# Patient Record
Sex: MALE | Race: BLACK OR AFRICAN AMERICAN | NOT HISPANIC OR LATINO | Employment: UNEMPLOYED | URBAN - METROPOLITAN AREA
[De-identification: names, ages, dates, MRNs, and addresses within clinical notes are randomized per-mention and may not be internally consistent; named-entity substitution may affect disease eponyms.]

---
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
## 2024-01-04 ENCOUNTER — OFFICE VISIT (OUTPATIENT)
Dept: OBGYN CLINIC | Facility: MEDICAL CENTER | Age: 19
End: 2024-01-04
Payer: COMMERCIAL

## 2024-01-04 VITALS
BODY MASS INDEX: 46.68 KG/M2 | HEIGHT: 68 IN | SYSTOLIC BLOOD PRESSURE: 131 MMHG | DIASTOLIC BLOOD PRESSURE: 90 MMHG | WEIGHT: 308 LBS | HEART RATE: 88 BPM

## 2024-01-04 DIAGNOSIS — G89.29 CHRONIC PAIN OF RIGHT KNEE: Primary | ICD-10-CM

## 2024-01-04 DIAGNOSIS — M25.561 CHRONIC PAIN OF RIGHT KNEE: Primary | ICD-10-CM

## 2024-01-04 PROCEDURE — 99203 OFFICE O/P NEW LOW 30 MIN: CPT | Performed by: EMERGENCY MEDICINE

## 2024-01-04 NOTE — PROGRESS NOTES
Assessment/Plan:    Diagnoses and all orders for this visit:    Chronic pain of right knee  -     MRI knee right  wo contrast; Future    No improvement with PT  Reviewed Xrays  Obtain MRI Right knee    Reviewed ER notes and imaging    Return for Follow Up After Imaging Study.      Subjective:   Patient ID: Manuel Back is a 18 y.o. male.    NP presents with Person Directed staff for chronic right knee pain.  Patient has participated in formal PT over the summer and was discharged at that time.  He was recently evaluated in the emergency department as he did have a recent fall landing on a flexed knee x-rays were obtained at that time.  Staff states that he does not appear to be in significant pain he is able to do what he needs to do during the day        Review of Systems    The following portions of the patient's chart were reviewed and updated as appropriate:   Allergy:    Allergies   Allergen Reactions    Cholecalciferol Other (See Comments)    Pollen Extract Sneezing       Medications:    Current Outpatient Medications:     acetaminophen (TYLENOL) 650 mg CR tablet, Take 1 tablet (650 mg total) by mouth every 8 (eight) hours as needed for mild pain, moderate pain or headaches (Patient taking differently: Take 500 mg by mouth every 8 (eight) hours as needed for mild pain, moderate pain or headaches), Disp: 30 tablet, Rfl: 0    aluminum-magnesium hydroxide-simethicone (MAALOX MAX) 400-400-40 MG/5ML suspension, Take 10 mL by mouth every 6 (six) hours as needed for indigestion or heartburn, Disp: 355 mL, Rfl: 0    chlorproMAZINE (THORAZINE) 100 mg tablet, Take 100 mg by mouth 2 (two) times a day, Disp: , Rfl:     cholecalciferol (VITAMIN D3) 1,000 units tablet, Take 1 tablet (1,000 Units total) by mouth daily, Disp: 90 tablet, Rfl: 3    cloNIDine (CATAPRES) 0.2 mg tablet, Take 1 tablet (0.2 mg total) by mouth 3 (three) times a day, Disp: 90 tablet, Rfl: 0    cyanocobalamin (VITAMIN B-12) 500 MCG tablet,  Take 1 tablet (500 mcg total) by mouth daily, Disp: 30 tablet, Rfl: 11    desmopressin (DDAVP) 0.2 mg tablet, Take two tablets by mouth at bedtime, Disp: 60 tablet, Rfl: 2    diphenhydrAMINE (GNP Allergy Relief) 12.5 MG chewable tablet, Chew 1 tablet (12.5 mg total) in the morning, Disp: 30 tablet, Rfl: 11    divalproex sodium (DEPAKOTE ER) 250 mg 24 hr tablet, Take 5 tablets (1,250 mg total) by mouth daily at bedtime, Disp: 150 tablet, Rfl: 0    guaiFENesin (ROBITUSSIN) 100 MG/5ML oral liquid, Take 10 mL (200 mg total) by mouth 3 (three) times a day as needed for cough, Disp: 120 mL, Rfl: 0    ibuprofen (MOTRIN) 400 mg tablet, Take 400 mg by mouth every 6 (six) hours as needed for mild pain, Disp: , Rfl:     metoprolol succinate (TOPROL-XL) 50 mg 24 hr tablet, Take 1 tablet (50 mg total) by mouth daily, Disp: 90 tablet, Rfl: 1    OLANZapine (ZyPREXA) 10 mg tablet, Take 10 mg by mouth 6 (six) times a day, Disp: , Rfl:     paliperidone (INVEGA) 6 MG 24 hr tablet, Take 2 tablets (12 mg total) by mouth daily Do not start before July 12, 2023., Disp: 30 tablet, Rfl: 0    polyethylene glycol (GLYCOLAX) 17 GM/SCOOP, Take 17 g by mouth daily, Disp: 850 g, Rfl: 6    clonazePAM (KlonoPIN) 0.5 mg tablet, Take 1 tablet (0.5 mg total) by mouth daily for 10 days Do not start before July 12, 2023. (Patient not taking: Reported on 10/13/2023), Disp: 10 tablet, Rfl: 0    ergocalciferol (VITAMIN D2) 50,000 units, Take 1 capsule (50,000 Units total) by mouth once a week for 8 doses Do not start before July 7, 2023. (Patient not taking: Reported on 10/13/2023), Disp: 8 capsule, Rfl: 0    Incontinence Supply Disposable (Comfort Shield Adult Diapers) MISC, Use 1 packet 4 (four) times a day (Patient not taking: Reported on 1/12/2023), Disp: 48 each, Rfl: 11    Patient Active Problem List   Diagnosis    Obesity, unspecified    Moderate episode of recurrent major depressive disorder (HCC)    Disorganized schizophrenia (HCC)    Urinary  "incontinence, nocturnal enuresis       Objective:  /90   Pulse 88   Ht 5' 8\" (1.727 m)   Wt (!) 140 kg (308 lb)   BMI 46.83 kg/m²     Right Knee Exam     Tenderness   The patient is experiencing no tenderness.     Range of Motion   Extension:  normal     Tests   Varus: negative Valgus: negative    Other   Erythema: absent  Sensation: normal            Physical Exam      Neurologic Exam    Procedures    I have personally reviewed the written report of the pertinent studies.   RIGHT KNEE     INDICATION:   fall.     COMPARISON:  None     VIEWS:  XR KNEE 4+ VW RIGHT INJURY        FINDINGS:     There is no acute fracture or dislocation.     There is no joint effusion.     No significant degenerative changes.     No lytic or blastic osseous lesion.     Soft tissues are unremarkable.     IMPRESSION:     No acute osseous abnormality.               Past Medical History:   Diagnosis Date    Chronic headaches     Cognitive impairment     Psychiatric illness        Past Surgical History:   Procedure Laterality Date    WRIST SURGERY Right        Social History     Socioeconomic History    Marital status: Single     Spouse name: Not on file    Number of children: Not on file    Years of education: Not on file    Highest education level: Not on file   Occupational History    Not on file   Tobacco Use    Smoking status: Never     Passive exposure: Past    Smokeless tobacco: Never   Vaping Use    Vaping status: Some Days    Substances: Nicotine   Substance and Sexual Activity    Alcohol use: Never    Drug use: Not on file    Sexual activity: Not Currently   Other Topics Concern    Not on file   Social History Narrative    Not on file     Social Determinants of Health     Financial Resource Strain: Not on file   Food Insecurity: Not on file   Transportation Needs: Not on file   Physical Activity: Not on file   Stress: Not on file   Social Connections: Not on file   Intimate Partner Violence: Not on file   Housing Stability: " Not on file       Family History   Problem Relation Age of Onset    No Known Problems Mother     No Known Problems Father

## 2024-01-10 ENCOUNTER — TELEPHONE (OUTPATIENT)
Dept: FAMILY MEDICINE CLINIC | Facility: CLINIC | Age: 19
End: 2024-01-10

## 2024-01-10 DIAGNOSIS — E66.01 CLASS 3 SEVERE OBESITY WITHOUT SERIOUS COMORBIDITY WITH BODY MASS INDEX (BMI) OF 40.0 TO 44.9 IN ADULT, UNSPECIFIED OBESITY TYPE (HCC): Primary | ICD-10-CM

## 2024-01-10 NOTE — TELEPHONE ENCOUNTER
Jannie; the clinical coordinator from the group home called requesting a referral for nutritional services. She states pt is gaining a lot wt.

## 2024-01-17 ENCOUNTER — TELEPHONE (OUTPATIENT)
Dept: FAMILY MEDICINE CLINIC | Facility: CLINIC | Age: 19
End: 2024-01-17
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
## 2024-01-18 ENCOUNTER — HOSPITAL ENCOUNTER (EMERGENCY)
Facility: HOSPITAL | Age: 19
End: 2024-01-19
Attending: EMERGENCY MEDICINE
Payer: COMMERCIAL

## 2024-01-18 DIAGNOSIS — Z00.8 ENCOUNTER FOR PSYCHOLOGICAL EVALUATION: ICD-10-CM

## 2024-01-18 DIAGNOSIS — F29 PSYCHOSIS (HCC): ICD-10-CM

## 2024-01-18 DIAGNOSIS — Z00.8 MEDICAL CLEARANCE FOR PSYCHIATRIC ADMISSION: ICD-10-CM

## 2024-01-18 DIAGNOSIS — R46.89 AGGRESSION: Primary | ICD-10-CM

## 2024-01-18 LAB — ETHANOL EXG-MCNC: 0 MG/DL

## 2024-01-18 PROCEDURE — 36415 COLL VENOUS BLD VENIPUNCTURE: CPT | Performed by: EMERGENCY MEDICINE

## 2024-01-18 PROCEDURE — 82075 ASSAY OF BREATH ETHANOL: CPT | Performed by: EMERGENCY MEDICINE

## 2024-01-18 PROCEDURE — 99285 EMERGENCY DEPT VISIT HI MDM: CPT

## 2024-01-18 PROCEDURE — 99285 EMERGENCY DEPT VISIT HI MDM: CPT | Performed by: EMERGENCY MEDICINE

## 2024-01-18 PROCEDURE — 85025 COMPLETE CBC W/AUTO DIFF WBC: CPT | Performed by: EMERGENCY MEDICINE

## 2024-01-18 PROCEDURE — 80048 BASIC METABOLIC PNL TOTAL CA: CPT | Performed by: EMERGENCY MEDICINE

## 2024-01-18 PROCEDURE — 80164 ASSAY DIPROPYLACETIC ACD TOT: CPT | Performed by: EMERGENCY MEDICINE

## 2024-01-18 PROCEDURE — G0427 INPT/ED TELECONSULT70: HCPCS | Performed by: GENERAL PRACTICE

## 2024-01-18 RX ORDER — CHLORPROMAZINE HYDROCHLORIDE 25 MG/1
100 TABLET, FILM COATED ORAL 2 TIMES DAILY
Status: DISCONTINUED | OUTPATIENT
Start: 2024-01-18 | End: 2024-01-19 | Stop reason: HOSPADM

## 2024-01-18 RX ADMIN — CHLORPROMAZINE HYDROCHLORIDE 100 MG: 25 TABLET, FILM COATED ORAL at 23:39

## 2024-01-19 ENCOUNTER — HOSPITAL ENCOUNTER (INPATIENT)
Facility: HOSPITAL | Age: 19
LOS: 19 days | Discharge: HOME | DRG: 750 | End: 2024-02-07
Attending: PSYCHIATRY & NEUROLOGY | Admitting: STUDENT IN AN ORGANIZED HEALTH CARE EDUCATION/TRAINING PROGRAM
Payer: COMMERCIAL

## 2024-01-19 VITALS
HEART RATE: 91 BPM | SYSTOLIC BLOOD PRESSURE: 142 MMHG | OXYGEN SATURATION: 100 % | DIASTOLIC BLOOD PRESSURE: 92 MMHG | TEMPERATURE: 98.2 F | WEIGHT: 313.27 LBS | BODY MASS INDEX: 47.63 KG/M2 | RESPIRATION RATE: 20 BRPM

## 2024-01-19 DIAGNOSIS — K59.00 CONSTIPATION: ICD-10-CM

## 2024-01-19 DIAGNOSIS — Z00.8 MEDICAL CLEARANCE FOR PSYCHIATRIC ADMISSION: ICD-10-CM

## 2024-01-19 DIAGNOSIS — F25.9 SCHIZOAFFECTIVE DISORDER (HCC): Primary | ICD-10-CM

## 2024-01-19 DIAGNOSIS — E66.01 MORBID OBESITY WITH BODY MASS INDEX (BMI) OF 40.0 TO 49.9 (HCC): ICD-10-CM

## 2024-01-19 DIAGNOSIS — I10 ESSENTIAL HYPERTENSION: ICD-10-CM

## 2024-01-19 LAB
AMPHETAMINES SERPL QL SCN: NEGATIVE
ANION GAP SERPL CALCULATED.3IONS-SCNC: 8 MMOL/L
ATRIAL RATE: 85 BPM
ATRIAL RATE: 86 BPM
BARBITURATES UR QL: NEGATIVE
BASOPHILS # BLD AUTO: 0.05 THOUSANDS/ÂΜL (ref 0–0.1)
BASOPHILS NFR BLD AUTO: 1 % (ref 0–1)
BENZODIAZ UR QL: NEGATIVE
BUN SERPL-MCNC: 8 MG/DL (ref 5–25)
CALCIUM SERPL-MCNC: 9.1 MG/DL (ref 8.4–10.2)
CHLORIDE SERPL-SCNC: 104 MMOL/L (ref 96–108)
CO2 SERPL-SCNC: 26 MMOL/L (ref 21–32)
COCAINE UR QL: NEGATIVE
CREAT SERPL-MCNC: 0.98 MG/DL (ref 0.6–1.3)
EOSINOPHIL # BLD AUTO: 0.07 THOUSAND/ÂΜL (ref 0–0.61)
EOSINOPHIL NFR BLD AUTO: 1 % (ref 0–6)
ERYTHROCYTE [DISTWIDTH] IN BLOOD BY AUTOMATED COUNT: 13.5 % (ref 11.6–15.1)
GFR SERPL CREATININE-BSD FRML MDRD: 111 ML/MIN/1.73SQ M
GLUCOSE SERPL-MCNC: 84 MG/DL (ref 65–140)
HCT VFR BLD AUTO: 41.9 % (ref 36.5–49.3)
HGB BLD-MCNC: 13.9 G/DL (ref 12–17)
IMM GRANULOCYTES # BLD AUTO: 0.04 THOUSAND/UL (ref 0–0.2)
IMM GRANULOCYTES NFR BLD AUTO: 0 % (ref 0–2)
LYMPHOCYTES # BLD AUTO: 3.13 THOUSANDS/ÂΜL (ref 0.6–4.47)
LYMPHOCYTES NFR BLD AUTO: 29 % (ref 14–44)
MCH RBC QN AUTO: 28.1 PG (ref 26.8–34.3)
MCHC RBC AUTO-ENTMCNC: 33.2 G/DL (ref 31.4–37.4)
MCV RBC AUTO: 85 FL (ref 82–98)
METHADONE UR QL: NEGATIVE
MONOCYTES # BLD AUTO: 1.18 THOUSAND/ÂΜL (ref 0.17–1.22)
MONOCYTES NFR BLD AUTO: 11 % (ref 4–12)
NEUTROPHILS # BLD AUTO: 6.38 THOUSANDS/ÂΜL (ref 1.85–7.62)
NEUTS SEG NFR BLD AUTO: 58 % (ref 43–75)
NRBC BLD AUTO-RTO: 0 /100 WBCS
OPIATES UR QL SCN: NEGATIVE
OXYCODONE+OXYMORPHONE UR QL SCN: NEGATIVE
P AXIS: 67 DEGREES
P AXIS: 69 DEGREES
PCP UR QL: NEGATIVE
PLATELET # BLD AUTO: 245 THOUSANDS/UL (ref 149–390)
PMV BLD AUTO: 10.6 FL (ref 8.9–12.7)
POTASSIUM SERPL-SCNC: 4.1 MMOL/L (ref 3.5–5.3)
PR INTERVAL: 152 MS
PR INTERVAL: 154 MS
QRS AXIS: 54 DEGREES
QRS AXIS: 58 DEGREES
QRSD INTERVAL: 80 MS
QRSD INTERVAL: 82 MS
QT INTERVAL: 376 MS
QT INTERVAL: 384 MS
QTC INTERVAL: 447 MS
QTC INTERVAL: 459 MS
RBC # BLD AUTO: 4.95 MILLION/UL (ref 3.88–5.62)
SODIUM SERPL-SCNC: 138 MMOL/L (ref 135–147)
T WAVE AXIS: 64 DEGREES
T WAVE AXIS: 68 DEGREES
THC UR QL: NEGATIVE
VALPROATE SERPL-MCNC: 90 UG/ML (ref 50–100)
VENTRICULAR RATE: 85 BPM
VENTRICULAR RATE: 86 BPM
WBC # BLD AUTO: 10.85 THOUSAND/UL (ref 4.31–10.16)

## 2024-01-19 PROCEDURE — 93005 ELECTROCARDIOGRAM TRACING: CPT

## 2024-01-19 PROCEDURE — 80307 DRUG TEST PRSMV CHEM ANLYZR: CPT | Performed by: EMERGENCY MEDICINE

## 2024-01-19 RX ORDER — SENNOSIDES 8.6 MG
650 CAPSULE ORAL EVERY 8 HOURS PRN
Status: DISCONTINUED | OUTPATIENT
Start: 2024-01-19 | End: 2024-01-19

## 2024-01-19 RX ORDER — HYDROXYZINE 50 MG/1
100 TABLET, FILM COATED ORAL
Status: DISCONTINUED | OUTPATIENT
Start: 2024-01-19 | End: 2024-02-07 | Stop reason: HOSPADM

## 2024-01-19 RX ORDER — IBUPROFEN 400 MG/1
400 TABLET ORAL EVERY 4 HOURS PRN
Status: DISCONTINUED | OUTPATIENT
Start: 2024-01-19 | End: 2024-02-07 | Stop reason: HOSPADM

## 2024-01-19 RX ORDER — BENZTROPINE MESYLATE 1 MG/1
1 TABLET ORAL
Status: DISCONTINUED | OUTPATIENT
Start: 2024-01-19 | End: 2024-02-07 | Stop reason: HOSPADM

## 2024-01-19 RX ORDER — AMOXICILLIN 250 MG
1 CAPSULE ORAL DAILY PRN
Status: DISCONTINUED | OUTPATIENT
Start: 2024-01-19 | End: 2024-02-05

## 2024-01-19 RX ORDER — OLANZAPINE 5 MG/1
2.5 TABLET ORAL
Status: CANCELLED | OUTPATIENT
Start: 2024-01-19

## 2024-01-19 RX ORDER — IBUPROFEN 400 MG/1
400 TABLET ORAL EVERY 4 HOURS PRN
Status: CANCELLED | OUTPATIENT
Start: 2024-01-19

## 2024-01-19 RX ORDER — CHLORPROMAZINE HYDROCHLORIDE 25 MG/1
100 TABLET, FILM COATED ORAL 2 TIMES DAILY
Status: DISCONTINUED | OUTPATIENT
Start: 2024-01-19 | End: 2024-01-19

## 2024-01-19 RX ORDER — OLANZAPINE 5 MG/1
10 TABLET ORAL
Status: CANCELLED | OUTPATIENT
Start: 2024-01-19

## 2024-01-19 RX ORDER — CLONIDINE HYDROCHLORIDE 0.1 MG/1
0.2 TABLET ORAL 3 TIMES DAILY
Status: DISCONTINUED | OUTPATIENT
Start: 2024-01-19 | End: 2024-02-07 | Stop reason: HOSPADM

## 2024-01-19 RX ORDER — LANOLIN ALCOHOL/MO/W.PET/CERES
3 CREAM (GRAM) TOPICAL
Status: CANCELLED | OUTPATIENT
Start: 2024-01-19

## 2024-01-19 RX ORDER — CHLORPROMAZINE HYDROCHLORIDE 25 MG/1
100 TABLET, FILM COATED ORAL 2 TIMES DAILY
OUTPATIENT
Start: 2024-01-19

## 2024-01-19 RX ORDER — DESMOPRESSIN ACETATE 0.1 MG/1
0.2 TABLET ORAL
Status: DISCONTINUED | OUTPATIENT
Start: 2024-01-19 | End: 2024-01-20

## 2024-01-19 RX ORDER — LORAZEPAM 2 MG/ML
2 INJECTION INTRAMUSCULAR EVERY 6 HOURS PRN
Status: CANCELLED | OUTPATIENT
Start: 2024-01-19

## 2024-01-19 RX ORDER — OLANZAPINE 10 MG/2ML
10 INJECTION, POWDER, FOR SOLUTION INTRAMUSCULAR
Status: CANCELLED | OUTPATIENT
Start: 2024-01-19

## 2024-01-19 RX ORDER — IBUPROFEN 400 MG/1
800 TABLET ORAL EVERY 8 HOURS PRN
Status: CANCELLED | OUTPATIENT
Start: 2024-01-19

## 2024-01-19 RX ORDER — OLANZAPINE 10 MG/2ML
5 INJECTION, POWDER, FOR SOLUTION INTRAMUSCULAR
Status: CANCELLED | OUTPATIENT
Start: 2024-01-19

## 2024-01-19 RX ORDER — CHLORPROMAZINE HYDROCHLORIDE 25 MG/ML
50 INJECTION INTRAMUSCULAR EVERY 8 HOURS PRN
Status: CANCELLED | OUTPATIENT
Start: 2024-01-19

## 2024-01-19 RX ORDER — BENZTROPINE MESYLATE 1 MG/ML
1 INJECTION INTRAMUSCULAR; INTRAVENOUS
Status: CANCELLED | OUTPATIENT
Start: 2024-01-19

## 2024-01-19 RX ORDER — OLANZAPINE 10 MG/2ML
10 INJECTION, POWDER, FOR SOLUTION INTRAMUSCULAR
Status: DISCONTINUED | OUTPATIENT
Start: 2024-01-19 | End: 2024-02-07 | Stop reason: HOSPADM

## 2024-01-19 RX ORDER — OLANZAPINE 5 MG/1
5 TABLET ORAL
Status: DISCONTINUED | OUTPATIENT
Start: 2024-01-19 | End: 2024-02-07 | Stop reason: HOSPADM

## 2024-01-19 RX ORDER — CLONIDINE HYDROCHLORIDE 0.2 MG/1
0.2 TABLET ORAL 3 TIMES DAILY
Status: DISCONTINUED | OUTPATIENT
Start: 2024-01-19 | End: 2024-01-19 | Stop reason: HOSPADM

## 2024-01-19 RX ORDER — MAGNESIUM HYDROXIDE/ALUMINUM HYDROXICE/SIMETHICONE 120; 1200; 1200 MG/30ML; MG/30ML; MG/30ML
5 SUSPENSION ORAL EVERY 6 HOURS PRN
Status: DISCONTINUED | OUTPATIENT
Start: 2024-01-19 | End: 2024-01-19 | Stop reason: HOSPADM

## 2024-01-19 RX ORDER — DESMOPRESSIN ACETATE 0.1 MG/1
0.2 TABLET ORAL
Status: DISCONTINUED | OUTPATIENT
Start: 2024-01-19 | End: 2024-01-19 | Stop reason: HOSPADM

## 2024-01-19 RX ORDER — OLANZAPINE 10 MG/1
10 TABLET ORAL
Status: DISCONTINUED | OUTPATIENT
Start: 2024-01-19 | End: 2024-02-07 | Stop reason: HOSPADM

## 2024-01-19 RX ORDER — IBUPROFEN 600 MG/1
600 TABLET ORAL EVERY 6 HOURS PRN
Status: DISCONTINUED | OUTPATIENT
Start: 2024-01-19 | End: 2024-02-07 | Stop reason: HOSPADM

## 2024-01-19 RX ORDER — OLANZAPINE 5 MG/1
10 TABLET ORAL
Status: DISCONTINUED | OUTPATIENT
Start: 2024-01-19 | End: 2024-01-19

## 2024-01-19 RX ORDER — IBUPROFEN 400 MG/1
800 TABLET ORAL EVERY 8 HOURS PRN
Status: DISCONTINUED | OUTPATIENT
Start: 2024-01-19 | End: 2024-02-07 | Stop reason: HOSPADM

## 2024-01-19 RX ORDER — DIPHENHYDRAMINE HYDROCHLORIDE 50 MG/ML
50 INJECTION INTRAMUSCULAR; INTRAVENOUS EVERY 8 HOURS PRN
Status: CANCELLED | OUTPATIENT
Start: 2024-01-19

## 2024-01-19 RX ORDER — CLONIDINE HYDROCHLORIDE 0.2 MG/1
0.2 TABLET ORAL 3 TIMES DAILY
Status: CANCELLED | OUTPATIENT
Start: 2024-01-19

## 2024-01-19 RX ORDER — BENZTROPINE MESYLATE 1 MG/1
1 TABLET ORAL
Status: CANCELLED | OUTPATIENT
Start: 2024-01-19

## 2024-01-19 RX ORDER — AMOXICILLIN 250 MG
1 CAPSULE ORAL DAILY PRN
Status: CANCELLED | OUTPATIENT
Start: 2024-01-19

## 2024-01-19 RX ORDER — OLANZAPINE 2.5 MG/1
2.5 TABLET, FILM COATED ORAL
Status: DISCONTINUED | OUTPATIENT
Start: 2024-01-19 | End: 2024-02-07 | Stop reason: HOSPADM

## 2024-01-19 RX ORDER — MAGNESIUM HYDROXIDE/ALUMINUM HYDROXICE/SIMETHICONE 120; 1200; 1200 MG/30ML; MG/30ML; MG/30ML
30 SUSPENSION ORAL EVERY 4 HOURS PRN
Status: DISCONTINUED | OUTPATIENT
Start: 2024-01-19 | End: 2024-02-07 | Stop reason: HOSPADM

## 2024-01-19 RX ORDER — PROPRANOLOL HYDROCHLORIDE 10 MG/1
10 TABLET ORAL EVERY 8 HOURS PRN
Status: DISCONTINUED | OUTPATIENT
Start: 2024-01-19 | End: 2024-02-07 | Stop reason: HOSPADM

## 2024-01-19 RX ORDER — POLYETHYLENE GLYCOL 3350 17 G/17G
17 POWDER, FOR SOLUTION ORAL DAILY PRN
Status: CANCELLED | OUTPATIENT
Start: 2024-01-19

## 2024-01-19 RX ORDER — DESMOPRESSIN ACETATE 0.1 MG/1
0.2 TABLET ORAL
Status: CANCELLED | OUTPATIENT
Start: 2024-01-19

## 2024-01-19 RX ORDER — POLYETHYLENE GLYCOL 3350 17 G/17G
17 POWDER, FOR SOLUTION ORAL DAILY
Status: DISCONTINUED | OUTPATIENT
Start: 2024-01-20 | End: 2024-02-07 | Stop reason: HOSPADM

## 2024-01-19 RX ORDER — DIPHENHYDRAMINE HYDROCHLORIDE 50 MG/ML
50 INJECTION INTRAMUSCULAR; INTRAVENOUS EVERY 8 HOURS PRN
Status: DISCONTINUED | OUTPATIENT
Start: 2024-01-19 | End: 2024-02-07 | Stop reason: HOSPADM

## 2024-01-19 RX ORDER — HYDROXYZINE HYDROCHLORIDE 25 MG/1
100 TABLET, FILM COATED ORAL
Status: CANCELLED | OUTPATIENT
Start: 2024-01-19

## 2024-01-19 RX ORDER — POLYETHYLENE GLYCOL 3350 17 G/17G
17 POWDER, FOR SOLUTION ORAL DAILY PRN
Status: DISCONTINUED | OUTPATIENT
Start: 2024-01-19 | End: 2024-02-07 | Stop reason: HOSPADM

## 2024-01-19 RX ORDER — CHLORPROMAZINE HYDROCHLORIDE 25 MG/1
100 TABLET, FILM COATED ORAL 2 TIMES DAILY
Status: CANCELLED | OUTPATIENT
Start: 2024-01-19

## 2024-01-19 RX ORDER — OLANZAPINE 5 MG/1
5 TABLET ORAL
Status: CANCELLED | OUTPATIENT
Start: 2024-01-19

## 2024-01-19 RX ORDER — OLANZAPINE 5 MG/1
10 TABLET ORAL 2 TIMES DAILY
Status: CANCELLED | OUTPATIENT
Start: 2024-01-19

## 2024-01-19 RX ORDER — HYDROXYZINE HYDROCHLORIDE 25 MG/1
50 TABLET, FILM COATED ORAL
Status: CANCELLED | OUTPATIENT
Start: 2024-01-19

## 2024-01-19 RX ORDER — LORAZEPAM 2 MG/ML
2 INJECTION INTRAMUSCULAR EVERY 6 HOURS PRN
Status: DISCONTINUED | OUTPATIENT
Start: 2024-01-19 | End: 2024-02-07 | Stop reason: HOSPADM

## 2024-01-19 RX ORDER — GUAIFENESIN 100 MG/5ML
200 SOLUTION ORAL 3 TIMES DAILY PRN
Status: DISCONTINUED | OUTPATIENT
Start: 2024-01-19 | End: 2024-01-19 | Stop reason: HOSPADM

## 2024-01-19 RX ORDER — MAGNESIUM HYDROXIDE/ALUMINUM HYDROXICE/SIMETHICONE 120; 1200; 1200 MG/30ML; MG/30ML; MG/30ML
30 SUSPENSION ORAL EVERY 4 HOURS PRN
Status: CANCELLED | OUTPATIENT
Start: 2024-01-19

## 2024-01-19 RX ORDER — PROPRANOLOL HYDROCHLORIDE 20 MG/1
10 TABLET ORAL EVERY 8 HOURS PRN
Status: CANCELLED | OUTPATIENT
Start: 2024-01-19

## 2024-01-19 RX ORDER — HYDROXYZINE 50 MG/1
50 TABLET, FILM COATED ORAL
Status: DISCONTINUED | OUTPATIENT
Start: 2024-01-19 | End: 2024-02-07 | Stop reason: HOSPADM

## 2024-01-19 RX ORDER — HYDROXYZINE HYDROCHLORIDE 25 MG/1
25 TABLET, FILM COATED ORAL
Status: DISCONTINUED | OUTPATIENT
Start: 2024-01-19 | End: 2024-02-07 | Stop reason: HOSPADM

## 2024-01-19 RX ORDER — IBUPROFEN 400 MG/1
400 TABLET ORAL EVERY 6 HOURS PRN
Status: DISCONTINUED | OUTPATIENT
Start: 2024-01-19 | End: 2024-01-19 | Stop reason: HOSPADM

## 2024-01-19 RX ORDER — BENZTROPINE MESYLATE 1 MG/ML
1 INJECTION INTRAMUSCULAR; INTRAVENOUS
Status: DISCONTINUED | OUTPATIENT
Start: 2024-01-19 | End: 2024-02-07 | Stop reason: HOSPADM

## 2024-01-19 RX ORDER — MELATONIN
1000 DAILY
Status: DISCONTINUED | OUTPATIENT
Start: 2024-01-19 | End: 2024-01-19 | Stop reason: HOSPADM

## 2024-01-19 RX ORDER — DIPHENHYDRAMINE HYDROCHLORIDE 50 MG/ML
50 INJECTION INTRAMUSCULAR; INTRAVENOUS EVERY 6 HOURS PRN
Status: DISCONTINUED | OUTPATIENT
Start: 2024-01-19 | End: 2024-02-07 | Stop reason: HOSPADM

## 2024-01-19 RX ORDER — POLYETHYLENE GLYCOL 3350 17 G/17G
17 POWDER, FOR SOLUTION ORAL DAILY
Status: DISCONTINUED | OUTPATIENT
Start: 2024-01-19 | End: 2024-01-19 | Stop reason: HOSPADM

## 2024-01-19 RX ORDER — HYDROXYZINE HYDROCHLORIDE 25 MG/1
25 TABLET, FILM COATED ORAL
Status: CANCELLED | OUTPATIENT
Start: 2024-01-19

## 2024-01-19 RX ORDER — METOPROLOL SUCCINATE 50 MG/1
50 TABLET, EXTENDED RELEASE ORAL DAILY
Status: DISCONTINUED | OUTPATIENT
Start: 2024-01-19 | End: 2024-01-19 | Stop reason: HOSPADM

## 2024-01-19 RX ORDER — IBUPROFEN 600 MG/1
600 TABLET ORAL EVERY 6 HOURS PRN
Status: CANCELLED | OUTPATIENT
Start: 2024-01-19

## 2024-01-19 RX ORDER — CHLORPROMAZINE HYDROCHLORIDE 25 MG/ML
50 INJECTION INTRAMUSCULAR EVERY 8 HOURS PRN
Status: DISCONTINUED | OUTPATIENT
Start: 2024-01-19 | End: 2024-02-07 | Stop reason: HOSPADM

## 2024-01-19 RX ORDER — DIPHENHYDRAMINE HYDROCHLORIDE 50 MG/ML
50 INJECTION INTRAMUSCULAR; INTRAVENOUS EVERY 6 HOURS PRN
Status: CANCELLED | OUTPATIENT
Start: 2024-01-19

## 2024-01-19 RX ORDER — METOPROLOL SUCCINATE 50 MG/1
50 TABLET, EXTENDED RELEASE ORAL DAILY
Status: CANCELLED | OUTPATIENT
Start: 2024-01-20

## 2024-01-19 RX ORDER — LANOLIN ALCOHOL/MO/W.PET/CERES
3 CREAM (GRAM) TOPICAL
Status: DISCONTINUED | OUTPATIENT
Start: 2024-01-19 | End: 2024-02-07 | Stop reason: HOSPADM

## 2024-01-19 RX ORDER — OLANZAPINE 10 MG/2ML
5 INJECTION, POWDER, FOR SOLUTION INTRAMUSCULAR
Status: DISCONTINUED | OUTPATIENT
Start: 2024-01-19 | End: 2024-02-07 | Stop reason: HOSPADM

## 2024-01-19 RX ORDER — METOPROLOL SUCCINATE 50 MG/1
50 TABLET, EXTENDED RELEASE ORAL DAILY
Status: DISCONTINUED | OUTPATIENT
Start: 2024-01-20 | End: 2024-02-07 | Stop reason: HOSPADM

## 2024-01-19 RX ORDER — POLYETHYLENE GLYCOL 3350 17 G/17G
17 POWDER, FOR SOLUTION ORAL DAILY
Status: CANCELLED | OUTPATIENT
Start: 2024-01-20

## 2024-01-19 RX ADMIN — CYANOCOBALAMIN TAB 500 MCG 500 MCG: 500 TAB at 08:31

## 2024-01-19 RX ADMIN — CLONIDINE HYDROCHLORIDE 0.2 MG: 0.1 TABLET ORAL at 21:18

## 2024-01-19 RX ADMIN — CHLORPROMAZINE HYDROCHLORIDE 100 MG: 25 TABLET, FILM COATED ORAL at 17:33

## 2024-01-19 RX ADMIN — MELATONIN TAB 3 MG 3 MG: 3 TAB at 21:18

## 2024-01-19 RX ADMIN — CHLORPROMAZINE HYDROCHLORIDE 100 MG: 25 TABLET, FILM COATED ORAL at 08:30

## 2024-01-19 RX ADMIN — OLANZAPINE 10 MG: 5 TABLET, FILM COATED ORAL at 13:23

## 2024-01-19 RX ADMIN — DIVALPROEX SODIUM 1250 MG: 500 TABLET, EXTENDED RELEASE ORAL at 21:18

## 2024-01-19 RX ADMIN — OLANZAPINE 10 MG: 5 TABLET, FILM COATED ORAL at 10:45

## 2024-01-19 RX ADMIN — OLANZAPINE 10 MG: 5 TABLET, FILM COATED ORAL at 06:40

## 2024-01-19 RX ADMIN — DIPHENHYDRAMINE HYDROCHLORIDE 12.5 MG: 25 SOLUTION ORAL at 08:33

## 2024-01-19 RX ADMIN — CLONIDINE HYDROCHLORIDE 0.2 MG: 0.2 TABLET ORAL at 08:30

## 2024-01-19 RX ADMIN — DESMOPRESSIN ACETATE 0.2 MG: 0.1 TABLET ORAL at 21:35

## 2024-01-19 RX ADMIN — DESMOPRESSIN ACETATE 0.2 MG: 0.1 TABLET ORAL at 01:49

## 2024-01-19 RX ADMIN — METOPROLOL SUCCINATE 50 MG: 50 TABLET, EXTENDED RELEASE ORAL at 08:31

## 2024-01-19 RX ADMIN — CLONIDINE HYDROCHLORIDE 0.2 MG: 0.2 TABLET ORAL at 17:20

## 2024-01-19 RX ADMIN — DIVALPROEX SODIUM 1250 MG: 500 TABLET, FILM COATED, EXTENDED RELEASE ORAL at 01:49

## 2024-01-19 RX ADMIN — Medication 1000 UNITS: at 08:31

## 2024-01-19 RX ADMIN — POLYETHYLENE GLYCOL 3350 17 G: 17 POWDER, FOR SOLUTION ORAL at 08:31

## 2024-01-19 NOTE — ED NOTES
Patient ambulated to bathroom with an even, steady gait.      Jannie [facility] [place]  01/18/24 0852
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
## 2024-01-19 NOTE — ED NOTES
Provider at bedside.     [place] [facility] [place]  01/18/24 1942

## 2024-01-19 NOTE — ED NOTES
Insurance Authorization for admission:   Phone call placed to [employer] [place]  Phone number: [phone]   Spoke to [place]     4 days approved.  Level of care: acute inpatient psychiatric  Review on 4th day of admission  Authorization # provided to accepting facility upon admission notification      Eligibility Verification System checked - ([phone]).  Online system / automated system indicates: active MA, Shobha Sheldon    Insurance Authorization for Transportation:  TBD  Phone call placed to **.   Phone number **.   Spoke to **.   Authorization #: **

## 2024-01-19 NOTE — ED NOTES
Patient was given  scrubs to change into at this time.      Jannie [facility] [place]  01/18/24 2116

## 2024-01-19 NOTE — ED NOTES
Patient given specimen cup for urine sample at this time. Pt unable to go currently, but group home staff will remind patient of needed specimen.      Jannie [facility] [place]  01/18/24 5828

## 2024-01-19 NOTE — ED NOTES
Saleem was called at this time and informed on patients need for consultation.      Jannie [facility] [place]  01/18/24 9028
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
## 2024-01-19 NOTE — ED PROCEDURE NOTE
PROCEDURE  ECG 12 Lead Documentation Only    Date/Time: 1/19/2024 1:36 PM    Performed by: Raul Kim MD  Authorized by: Raul Kim MD    ECG reviewed by me, the ED Provider: yes    Patient location:  ED  Rate:     ECG rate:  85    ECG rate assessment: normal    Rhythm:     Rhythm: sinus rhythm    Ectopy:     Ectopy: none    QRS:     QRS axis:  Normal    QRS intervals:  Normal  Conduction:     Conduction: normal    ST segments:     ST segments:  Normal  T waves:     T waves: normal         Raul Kim MD  01/19/24 1332
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
## 2024-01-19 NOTE — ED NOTES
Patient signed 201- Rights explained, bed search explained- Faxed to  Intake- Original on chart.    Patient rights explained thoroughly to patient. Patient verbalizes understanding.    Helio James  Crisis Intervention Specialist II  01/18/24

## 2024-01-19 NOTE — ED NOTES
Calls placed to Jose Dos Santos- Guardian,  CYS ([phone])- No answer at either number, left voicemails and asked for call back.    eHlio James  Crisis Intervention Specialist II  01/18/24
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
## 2024-01-19 NOTE — ED NOTES
Patient was given 2 sandwiches, pretzels, and a drink at this time.      Jannie Navarro  01/19/24 0010

## 2024-01-19 NOTE — ED NOTES
Patient on [facility] call at this time.      Jannie [facility]  01/18/24 5300

## 2024-01-19 NOTE — EMTALA/ACUTE CARE TRANSFER
[facility] EMERGENCY DEPARTMENT  [address] [address]  Dept: [phone]      EMTALA TRANSFER CONSENT    NAME Manuel Back                                         2005                              MRN [id number]    I have been informed of my rights regarding examination, treatment, and transfer   by Dr. Raul Kim MD    Benefits: Specialized equipment and/or services available at the receiving facility (Include comment)________________________ (pyschiatry)    Risks: Potential for delay in receiving treatment (psychiatry)      Consent for Transfer:  I acknowledge that my medical condition has been evaluated and explained to me by the emergency department physician or other qualified medical person and/or my attending physician, who has recommended that I be transferred to the service of  Accepting Physician: Dr Grewal at Accepting Facility Name, City & State : [address]. The above potential benefits of such transfer, the potential risks associated with such transfer, and the probable risks of not being transferred have been explained to me, and I fully understand them.  The doctor has explained that, in my case, the benefits of transfer outweigh the risks.  I agree to be transferred.    I authorize the performance of emergency medical procedures and treatments upon me in both transit and upon arrival at the receiving facility.  Additionally, I authorize the release of any and all medical records to the receiving facility and request they be transported with me, if possible.  I understand that the safest mode of transportation during a medical emergency is an ambulance and that the Hospital advocates the use of this mode of transport. Risks of traveling to the receiving facility by car, including absence of medical control, life sustaining equipment, such as oxygen, and medical personnel has been explained to me and I fully understand  them.    (DAO CORRECT BOX BELOW)  [  ]  I consent to the stated transfer and to be transported by ambulance/helicopter.  [  ]  I consent to the stated transfer, but refuse transportation by ambulance and accept full responsibility for my transportation by car.  I understand the risks of non-ambulance transfers and I exonerate the Hospital and its staff from any deterioration in my condition that results from this refusal.    X___________________________________________    DATE  24  TIME________  Signature of patient or legally responsible individual signing on patient behalf           RELATIONSHIP TO PATIENT_________________________          Provider Certification    NAME Manuel Back                                         2005                              MRN [id number]    A medical screening exam was performed on the above named patient.  Based on the examination:    Condition Necessitating Transfer The primary encounter diagnosis was Aggression. Diagnoses of Encounter for psychological evaluation, Psychosis (HCC), and Medical clearance for psychiatric admission were also pertinent to this visit.    Patient Condition: The patient has been stabilized such that within reasonable medical probability, no material deterioration of the patient condition or the condition of the unborn child(patrice) is likely to result from the transfer    Reason for Transfer: Level of Care needed not available at this facility    Transfer Requirements: Facility [address]   Space available and qualified personnel available for treatment as acknowledged by micki  Agreed to accept transfer and to provide appropriate medical treatment as acknowledged by       Dr Grewal  Appropriate medical records of the examination and treatment of the patient are provided at the time of transfer   STAFF INITIAL WHEN COMPLETED _______  Transfer will be performed by qualified personnel from [facility]  and appropriate transfer  equipment as required, including the use of necessary and appropriate life support measures.    Provider Certification: I have examined the patient and explained the following risks and benefits of being transferred/refusing transfer to the patient/family:  General risk, such as traffic hazards, adverse weather conditions, rough terrain or turbulence, possible failure of equipment (including vehicle or aircraft), or consequences of actions of persons outside the control of the transport personnel, Unanticipated needs of medical equipment and personnel during transport, The possibility of a transport vehicle being unavailable, Risk of worsening condition, The patient is stable for psychiatric transfer because they are medically stable, and is protected from harming him/herself or others during transport      Based on these reasonable risks and benefits to the patient and/or the unborn child(patrice), and based upon the information available at the time of the patient’s examination, I certify that the medical benefits reasonably to be expected from the provision of appropriate medical treatments at another medical facility outweigh the increasing risks, if any, to the individual’s medical condition, and in the case of labor to the unborn child, from effecting the transfer.    X____________________________________________ DATE 01/19/24        TIME_______      ORIGINAL - SEND TO MEDICAL RECORDS   COPY - SEND WITH PATIENT DURING TRANSFER

## 2024-01-19 NOTE — ED NOTES
Patient is accepted at [facility]  Patient is accepted by Dr.Siddiqui kavita Jeronimo    Transportation is arranged with [facility]  Transportation is scheduled for TBD    Nurse report is to be called to  [phone]  prior to patient transfer.
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
## 2024-01-19 NOTE — ED NOTES
Call placed to [facility] ([phone])- Left voicemail and requested call back.    Helio James  Crisis Intervention Specialist II  01/18/24

## 2024-01-19 NOTE — ED NOTES
0400 vitals were deferred at this time as patient is sleeping. Pt does not display any signs of distress. Patient with non-labored respirations.      Jannie [facility]  01/19/24 0404

## 2024-01-20 PROBLEM — G89.29 CHRONIC PAIN OF RIGHT KNEE: Status: ACTIVE | Noted: 2024-01-20

## 2024-01-20 PROBLEM — E55.9 VITAMIN D DEFICIENCY: Status: ACTIVE | Noted: 2024-01-20

## 2024-01-20 PROBLEM — Z00.8 MEDICAL CLEARANCE FOR PSYCHIATRIC ADMISSION: Status: ACTIVE | Noted: 2024-01-20

## 2024-01-20 PROBLEM — M25.561 CHRONIC PAIN OF RIGHT KNEE: Status: ACTIVE | Noted: 2024-01-20

## 2024-01-20 PROBLEM — E66.01 MORBID OBESITY WITH BODY MASS INDEX (BMI) OF 40.0 TO 49.9 (HCC): Status: ACTIVE | Noted: 2024-01-20

## 2024-01-20 PROBLEM — I10 ESSENTIAL HYPERTENSION: Status: ACTIVE | Noted: 2024-01-20

## 2024-01-20 LAB
25(OH)D3 SERPL-MCNC: 21.7 NG/ML (ref 30–100)
ALBUMIN SERPL BCP-MCNC: 3.8 G/DL (ref 3.5–5)
ALP SERPL-CCNC: 99 U/L (ref 34–104)
ALT SERPL W P-5'-P-CCNC: 35 U/L (ref 7–52)
ANION GAP SERPL CALCULATED.3IONS-SCNC: 8 MMOL/L
AST SERPL W P-5'-P-CCNC: 35 U/L (ref 13–39)
ATRIAL RATE: 86 BPM
ATRIAL RATE: 88 BPM
BACTERIA UR QL AUTO: ABNORMAL /HPF
BASOPHILS # BLD AUTO: 0.03 THOUSANDS/ÂΜL (ref 0–0.1)
BASOPHILS NFR BLD AUTO: 0 % (ref 0–1)
BILIRUB SERPL-MCNC: 0.27 MG/DL (ref 0.2–1)
BILIRUB UR QL STRIP: NEGATIVE
BUN SERPL-MCNC: 10 MG/DL (ref 5–25)
CALCIUM SERPL-MCNC: 9.3 MG/DL (ref 8.4–10.2)
CHLORIDE SERPL-SCNC: 103 MMOL/L (ref 96–108)
CHOLEST SERPL-MCNC: 105 MG/DL
CLARITY UR: CLEAR
CO2 SERPL-SCNC: 27 MMOL/L (ref 21–32)
COLOR UR: ABNORMAL
CREAT SERPL-MCNC: 0.93 MG/DL (ref 0.6–1.3)
EOSINOPHIL # BLD AUTO: 0.11 THOUSAND/ÂΜL (ref 0–0.61)
EOSINOPHIL NFR BLD AUTO: 1 % (ref 0–6)
ERYTHROCYTE [DISTWIDTH] IN BLOOD BY AUTOMATED COUNT: 13.4 % (ref 11.6–15.1)
GFR SERPL CREATININE-BSD FRML MDRD: 118 ML/MIN/1.73SQ M
GLUCOSE P FAST SERPL-MCNC: 101 MG/DL (ref 65–99)
GLUCOSE SERPL-MCNC: 101 MG/DL (ref 65–140)
GLUCOSE UR STRIP-MCNC: NEGATIVE MG/DL
HCT VFR BLD AUTO: 42.8 % (ref 36.5–49.3)
HDLC SERPL-MCNC: 27 MG/DL
HGB BLD-MCNC: 14 G/DL (ref 12–17)
HGB UR QL STRIP.AUTO: NEGATIVE
IMM GRANULOCYTES # BLD AUTO: 0.03 THOUSAND/UL (ref 0–0.2)
IMM GRANULOCYTES NFR BLD AUTO: 0 % (ref 0–2)
KETONES UR STRIP-MCNC: NEGATIVE MG/DL
LDLC SERPL CALC-MCNC: 29 MG/DL (ref 0–100)
LEUKOCYTE ESTERASE UR QL STRIP: NEGATIVE
LYMPHOCYTES # BLD AUTO: 2.86 THOUSANDS/ÂΜL (ref 0.6–4.47)
LYMPHOCYTES NFR BLD AUTO: 29 % (ref 14–44)
MCH RBC QN AUTO: 28.2 PG (ref 26.8–34.3)
MCHC RBC AUTO-ENTMCNC: 32.7 G/DL (ref 31.4–37.4)
MCV RBC AUTO: 86 FL (ref 82–98)
MONOCYTES # BLD AUTO: 1.02 THOUSAND/ÂΜL (ref 0.17–1.22)
MONOCYTES NFR BLD AUTO: 10 % (ref 4–12)
NEUTROPHILS # BLD AUTO: 5.95 THOUSANDS/ÂΜL (ref 1.85–7.62)
NEUTS SEG NFR BLD AUTO: 60 % (ref 43–75)
NITRITE UR QL STRIP: NEGATIVE
NON-SQ EPI CELLS URNS QL MICRO: ABNORMAL /HPF
NONHDLC SERPL-MCNC: 78 MG/DL
NRBC BLD AUTO-RTO: 0 /100 WBCS
P AXIS: 72 DEGREES
P AXIS: 77 DEGREES
PH UR STRIP.AUTO: 8 [PH]
PLATELET # BLD AUTO: 243 THOUSANDS/UL (ref 149–390)
PMV BLD AUTO: 10.4 FL (ref 8.9–12.7)
POTASSIUM SERPL-SCNC: 4.1 MMOL/L (ref 3.5–5.3)
PR INTERVAL: 156 MS
PR INTERVAL: 158 MS
PROT SERPL-MCNC: 6.8 G/DL (ref 6.4–8.4)
PROT UR STRIP-MCNC: NEGATIVE MG/DL
QRS AXIS: 48 DEGREES
QRS AXIS: 58 DEGREES
QRSD INTERVAL: 78 MS
QRSD INTERVAL: 84 MS
QT INTERVAL: 356 MS
QT INTERVAL: 362 MS
QTC INTERVAL: 430 MS
QTC INTERVAL: 433 MS
RBC # BLD AUTO: 4.97 MILLION/UL (ref 3.88–5.62)
RBC #/AREA URNS AUTO: ABNORMAL /HPF
SODIUM SERPL-SCNC: 138 MMOL/L (ref 135–147)
SP GR UR STRIP.AUTO: 1.01 (ref 1–1.04)
T WAVE AXIS: 35 DEGREES
T WAVE AXIS: 49 DEGREES
TRIGL SERPL-MCNC: 245 MG/DL
TSH SERPL DL<=0.05 MIU/L-ACNC: 3.21 UIU/ML (ref 0.45–4.5)
UROBILINOGEN UA: NEGATIVE MG/DL
VENTRICULAR RATE: 86 BPM
VENTRICULAR RATE: 88 BPM
VIT B12 SERPL-MCNC: 1238 PG/ML (ref 180–914)
WBC # BLD AUTO: 10 THOUSAND/UL (ref 4.31–10.16)
WBC #/AREA URNS AUTO: ABNORMAL /HPF

## 2024-01-20 PROCEDURE — 80053 COMPREHEN METABOLIC PANEL: CPT | Performed by: PSYCHIATRY & NEUROLOGY

## 2024-01-20 PROCEDURE — 93010 ELECTROCARDIOGRAM REPORT: CPT | Performed by: INTERNAL MEDICINE

## 2024-01-20 PROCEDURE — 82306 VITAMIN D 25 HYDROXY: CPT | Performed by: PSYCHIATRY & NEUROLOGY

## 2024-01-20 PROCEDURE — 82607 VITAMIN B-12: CPT | Performed by: NURSE PRACTITIONER

## 2024-01-20 PROCEDURE — 84443 ASSAY THYROID STIM HORMONE: CPT | Performed by: PSYCHIATRY & NEUROLOGY

## 2024-01-20 PROCEDURE — 80061 LIPID PANEL: CPT | Performed by: PSYCHIATRY & NEUROLOGY

## 2024-01-20 PROCEDURE — 86780 TREPONEMA PALLIDUM: CPT | Performed by: PSYCHIATRY & NEUROLOGY

## 2024-01-20 PROCEDURE — 93005 ELECTROCARDIOGRAM TRACING: CPT

## 2024-01-20 PROCEDURE — 85025 COMPLETE CBC W/AUTO DIFF WBC: CPT | Performed by: PSYCHIATRY & NEUROLOGY

## 2024-01-20 PROCEDURE — 99253 IP/OBS CNSLTJ NEW/EST LOW 45: CPT | Performed by: NURSE PRACTITIONER

## 2024-01-20 PROCEDURE — 81001 URINALYSIS AUTO W/SCOPE: CPT | Performed by: PSYCHIATRY & NEUROLOGY

## 2024-01-20 PROCEDURE — 99223 1ST HOSP IP/OBS HIGH 75: CPT | Performed by: STUDENT IN AN ORGANIZED HEALTH CARE EDUCATION/TRAINING PROGRAM

## 2024-01-20 RX ORDER — PALIPERIDONE 6 MG/1
12 TABLET, EXTENDED RELEASE ORAL DAILY
Status: DISCONTINUED | OUTPATIENT
Start: 2024-01-21 | End: 2024-01-26

## 2024-01-20 RX ORDER — CLONAZEPAM 0.5 MG/1
0.5 TABLET ORAL
Status: DISCONTINUED | OUTPATIENT
Start: 2024-01-20 | End: 2024-01-21

## 2024-01-20 RX ORDER — DESMOPRESSIN ACETATE 0.1 MG/1
0.4 TABLET ORAL
Status: DISCONTINUED | OUTPATIENT
Start: 2024-01-20 | End: 2024-02-07 | Stop reason: HOSPADM

## 2024-01-20 RX ADMIN — METOPROLOL SUCCINATE 50 MG: 50 TABLET, EXTENDED RELEASE ORAL at 10:19

## 2024-01-20 RX ADMIN — MELATONIN TAB 3 MG 3 MG: 3 TAB at 21:35

## 2024-01-20 RX ADMIN — CLONIDINE HYDROCHLORIDE 0.2 MG: 0.1 TABLET ORAL at 10:17

## 2024-01-20 RX ADMIN — CLONAZEPAM 0.5 MG: 0.5 TABLET ORAL at 21:36

## 2024-01-20 RX ADMIN — CHLORPROMAZINE HYDROCHLORIDE 150 MG: 100 TABLET, FILM COATED ORAL at 21:35

## 2024-01-20 RX ADMIN — OLANZAPINE 10 MG: 10 TABLET, FILM COATED ORAL at 23:02

## 2024-01-20 RX ADMIN — CLONIDINE HYDROCHLORIDE 0.2 MG: 0.1 TABLET ORAL at 17:23

## 2024-01-20 RX ADMIN — DIPHENHYDRAMINE HYDROCHLORIDE 12.5 MG: 25 SOLUTION ORAL at 10:38

## 2024-01-20 RX ADMIN — DIVALPROEX SODIUM 1250 MG: 500 TABLET, EXTENDED RELEASE ORAL at 21:35

## 2024-01-20 RX ADMIN — CHLORPROMAZINE HYDROCHLORIDE 125 MG: 100 TABLET, FILM COATED ORAL at 17:23

## 2024-01-20 RX ADMIN — POLYETHYLENE GLYCOL 3350 17 G: 17 POWDER, FOR SOLUTION ORAL at 10:21

## 2024-01-20 RX ADMIN — CYANOCOBALAMIN TAB 1000 MCG 500 MCG: 1000 TAB at 10:18

## 2024-01-20 RX ADMIN — CLONIDINE HYDROCHLORIDE 0.2 MG: 0.1 TABLET ORAL at 21:36

## 2024-01-20 RX ADMIN — DESMOPRESSIN ACETATE 0.4 MG: 0.1 TABLET ORAL at 21:35

## 2024-01-20 NOTE — NURSING NOTE
Patient appears to be overly friendly with the female MHT's.  Patient refused his morning medications but was agreeable to take them from another staff member.  He is irritable, and has a difficult time following redirection.  Patient was loitering around the nurses station and he was asked several times to move away.  Patient pretended not to hear the directions and ignored staff.  When he was told security would be called, patient then moved away.

## 2024-01-20 NOTE — ASSESSMENT & PLAN NOTE
Patient will continue with his outpatient vitamin D 1000 international units p.o. daily  Patient will have a vitamin D level drawn today on 1/20/2024  I will adjust his vitamin D as necessary based off of his level
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
## 2024-01-20 NOTE — PLAN OF CARE
Problem: BEHAVIOR  Goal: Pt/Family maintain appropriate behavior and adhere to behavioral management agreement, if implemented  Description: INTERVENTIONS:  - Assess the family dynamic   - Encourage verbalization of thoughts and concerns in a socially appropriate manner  - Assess patient/family's coping skills and non-compliant behavior (including use of illegal substances).  - Utilize positive, consistent limit setting strategies supporting safety of patient, staff and others  - Initiate consult with Case Management, Spiritual Care or other ancillary services as appropriate  - If a patient's/visitor's behavior jeopardizes the safety of the patient, staff, or others, refer to organization procedure.   - Notify Security of behavior or suspected illegal substances which indicate the need for search of the patient and/or belongings  - Encourage participation in the decision making process about a behavioral management agreement; implement if patient meets criteria  1/20/2024 0054 by Beatris Dash RN  Outcome: Progressing  1/19/2024 2043 by Beatris Dash RN  Outcome: Not Progressing     Problem: Alteration in Thoughts and Perception  Goal: Verbalize thoughts and feelings  Description: Interventions:  - Promote a nonjudgmental and trusting relationship with the patient through active listening and therapeutic communication  - Assess patient's level of functioning, behavior and potential for risk  - Engage patient in 1 on 1 interactions  - Encourage patient to express fears, feelings, frustrations, and discuss symptoms    - [place] patient to reality, help patient recognize reality-based thinking   - Administer medications as ordered and assess for potential side effects  - Provide the patient education related to the signs and symptoms of the illness and desired effects of prescribed medications  1/20/2024 0054 by Beatris Dash RN  Outcome: Progressing  1/19/2024 2043 by Beatris Dash RN  Outcome: Not Progressing  Goal:  Agree to be compliant with medication regime, as prescribed and report medication side effects  Description: Interventions:  - Offer appropriate PRN medication and supervise ingestion; conduct AIMS, as needed   1/20/2024 0054 by Beatris Dash RN  Outcome: Progressing  1/19/2024 2043 by Beatris Dash RN  Outcome: Not Progressing  Goal: Attend and participate in unit activities, including therapeutic, recreational, and educational groups  Description: Interventions:  -Encourage Visitation and family involvement in care  1/20/2024 0054 by Beatris Dash RN  Outcome: Progressing  1/19/2024 2043 by Beatris Dash RN  Outcome: Not Progressing  Goal: Recognize dysfunctional thoughts, communicate reality-based thoughts at the time of discharge  Description: Interventions:  - Provide medication and psycho-education to assist patient in compliance and developing insight into his/her illness   1/20/2024 0054 by Beatris Dash RN  Outcome: Progressing  1/19/2024 2043 by Beatris Dash RN  Outcome: Not Progressing

## 2024-01-20 NOTE — TREATMENT PLAN
TREATMENT PLAN REVIEW - Behavioral Health Manuel Back 18 y.o. 2005 male MRN: [id number]    [facility] 3B BEHAVIORAL [facility] Room / Bed: [facility] 352/[facility] 352-01 Encounter: [id number]        Admit Date/Time:  1/19/2024  6:32 PM    Treatment Team:   MD Romeo Lane DO Jarrell Johnson Patricia Ballard, RN Megan Davis Darlyn de Los Santos    Diagnosis: Active Problems:    Urinary incontinence, nocturnal enuresis    Medical clearance for psychiatric admission    Morbid obesity with body mass index (BMI) of 40.0 to 49.9 ([facility])    Essential hypertension    Vitamin D deficiency    Chronic pain of right knee    Patient Strengths/Assets: compliant with medication      Patient Barriers/Limitations: chronic mental illness, intellectual disability, lack of social/family support, poor insight, self-care deficit, unable to express basic needs, uncooperative    Short Term Goals: decrease in depressive symptoms, decrease in anxiety symptoms, decrease in paranoid thoughts, decrease in psychotic symptoms, decrease in homicidal thoughts, decrease in level of agitation, decrease in frequency of aggressive outbursts, ability to stay safe on the unit, ability to stay free from restraints, improvement in ability to express basic needs, improvement in insight, improvement in reality testing, improvement in self care, improvement in impulse control, sleep improvement, improvement in appetite, tolerate medications, mood stabilization, increase in group attendance, increase in group participation, increase in socialization with peers on the unit, acceptance of need for psychiatric treatment, acceptance of psychiatric medications    Long Term Goals: stabilization of mood, free of homicidal thoughts, resolution of psychotic symptoms, no agitation on the unit, no aggressive behavior on the unit, able to express basic needs, acceptance of need for psychiatric medications, acceptance of  need for psychiatric treatment, acceptance of need for psychiatric follow up after discharge, acceptance of psychiatric diagnosis, adequate self care, adequate sleep, adequate appetite, adequate oral intake, appropriate interaction with peers, appropriate interaction with family    Progress Towards Goals: starting psychiatric medications as prescribed    Recommended Treatment: medication management, patient medication education, group therapy, milieu therapy, continued Behavioral Health psychiatric evaluation/assessment process     Treatment Frequency: daily medication monitoring, group and milieu therapy daily, monitoring through interdisciplinary rounds, monitoring through weekly patient care conferences    Expected Discharge Date: 10 days - 1/30/2024    Discharge Plan: discharge to home    Treatment Plan Created/Updated By: Phill Kamara MD

## 2024-01-20 NOTE — PLAN OF CARE
Problem: BEHAVIOR  Goal: Pt/Family maintain appropriate behavior and adhere to behavioral management agreement, if implemented  Description: INTERVENTIONS:  - Assess the family dynamic   - Encourage verbalization of thoughts and concerns in a socially appropriate manner  - Assess patient/family's coping skills and non-compliant behavior (including use of illegal substances).  - Utilize positive, consistent limit setting strategies supporting safety of patient, staff and others  - Initiate consult with Case Management, Spiritual Care or other ancillary services as appropriate  - If a patient's/visitor's behavior jeopardizes the safety of the patient, staff, or others, refer to organization procedure.   - Notify Security of behavior or suspected illegal substances which indicate the need for search of the patient and/or belongings  - Encourage participation in the decision making process about a behavioral management agreement; implement if patient meets criteria  Outcome: Not Progressing     Problem: Alteration in Thoughts and Perception  Goal: Verbalize thoughts and feelings  Description: Interventions:  - Promote a nonjudgmental and trusting relationship with the patient through active listening and therapeutic communication  - Assess patient's level of functioning, behavior and potential for risk  - Engage patient in 1 on 1 interactions  - Encourage patient to express fears, feelings, frustrations, and discuss symptoms    - [place] patient to reality, help patient recognize reality-based thinking   - Administer medications as ordered and assess for potential side effects  - Provide the patient education related to the signs and symptoms of the illness and desired effects of prescribed medications  Outcome: Not Progressing  Goal: Agree to be compliant with medication regime, as prescribed and report medication side effects  Description: Interventions:  - Offer appropriate PRN medication and supervise ingestion;  conduct AIMS, as needed   Outcome: Not Progressing  Goal: Attend and participate in unit activities, including therapeutic, recreational, and educational groups  Description: Interventions:  -Encourage Visitation and family involvement in care  Outcome: Not Progressing  Goal: Recognize dysfunctional thoughts, communicate reality-based thoughts at the time of discharge  Description: Interventions:  - Provide medication and psycho-education to assist patient in compliance and developing insight into his/her illness   Outcome: Not Progressing     Problem: Ineffective Coping  Goal: Cooperates with admission process  Description: Interventions:   - Complete admission process  Outcome: Not Progressing  Goal: Identifies ineffective coping skills  Outcome: Not Progressing  Goal: Identifies healthy coping skills  Outcome: Not Progressing  Goal: Demonstrates healthy coping skills  Outcome: Not Progressing  Goal: Participates in unit activities  Description: Interventions:  - Provide therapeutic environment   - Provide required programming   - Redirect inappropriate behaviors   Outcome: Not Progressing  Goal: Patient/Family participate in treatment and DC plans  Description: Interventions:  - Provide therapeutic environment  Outcome: Not Progressing  Goal: Patient/Family verbalizes awareness of resources  Outcome: Not Progressing  Goal: Understands least restrictive measures  Description: Interventions:  - Utilize least restrictive behavior  Outcome: Not Progressing  Goal: Free from restraint events  Description: - Utilize least restrictive measures   - Provide behavioral interventions   - Redirect inappropriate behaviors   Outcome: Not Progressing
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
## 2024-01-20 NOTE — ASSESSMENT & PLAN NOTE
Vital signs stable afebrile patient seen and examined by myself Labs from today 1/20/2024 reviewed by myself potassium sodium 138 potassium 4.1 creatinine 0.93  Patient medically cleared for admit  SL IM will sign off please call with any questions or concerns

## 2024-01-20 NOTE — ASSESSMENT & PLAN NOTE
Patient will continue with his outpatient clonidine 0.2 mg p.o. 3 times daily  Patient will continue with his metoprolol XL 50 mg p.o. daily

## 2024-01-20 NOTE — ASSESSMENT & PLAN NOTE
Patient has chronic right knee pain from a fall dating back to October 202020  He has seen outpatient orthopedics on multiple occasions  I have ordered Voltaren gel 4 times daily as needed to right knee  He also has Tylenol ordered for pain

## 2024-01-20 NOTE — NURSING NOTE
Patient c/o difficulty sleeping. PRN melatonin 3mg was administered at 2118 and effective. Patient is sleeping.

## 2024-01-21 PROBLEM — F39 MOOD DISORDER (HCC): Status: ACTIVE | Noted: 2024-01-21

## 2024-01-21 LAB — TREPONEMA PALLIDUM IGG+IGM AB [PRESENCE] IN SERUM OR PLASMA BY IMMUNOASSAY: NORMAL

## 2024-01-21 PROCEDURE — 99232 SBSQ HOSP IP/OBS MODERATE 35: CPT | Performed by: STUDENT IN AN ORGANIZED HEALTH CARE EDUCATION/TRAINING PROGRAM

## 2024-01-21 RX ORDER — CLONAZEPAM 0.5 MG/1
0.5 TABLET ORAL DAILY
Status: DISCONTINUED | OUTPATIENT
Start: 2024-01-21 | End: 2024-01-25

## 2024-01-21 RX ORDER — ERGOCALCIFEROL 1.25 MG/1
50000 CAPSULE ORAL WEEKLY
Status: DISCONTINUED | OUTPATIENT
Start: 2024-01-21 | End: 2024-02-07 | Stop reason: HOSPADM

## 2024-01-21 RX ADMIN — CHLORPROMAZINE HYDROCHLORIDE 125 MG: 100 TABLET, FILM COATED ORAL at 10:42

## 2024-01-21 RX ADMIN — CYANOCOBALAMIN TAB 1000 MCG 500 MCG: 1000 TAB at 10:25

## 2024-01-21 RX ADMIN — CLONIDINE HYDROCHLORIDE 0.2 MG: 0.1 TABLET ORAL at 17:05

## 2024-01-21 RX ADMIN — MELATONIN TAB 3 MG 3 MG: 3 TAB at 21:21

## 2024-01-21 RX ADMIN — CHLORPROMAZINE HYDROCHLORIDE 125 MG: 100 TABLET, FILM COATED ORAL at 14:41

## 2024-01-21 RX ADMIN — CLONIDINE HYDROCHLORIDE 0.2 MG: 0.1 TABLET ORAL at 21:21

## 2024-01-21 RX ADMIN — CHLORPROMAZINE HYDROCHLORIDE 150 MG: 100 TABLET, FILM COATED ORAL at 21:21

## 2024-01-21 RX ADMIN — POLYETHYLENE GLYCOL 3350 17 G: 17 POWDER, FOR SOLUTION ORAL at 10:42

## 2024-01-21 RX ADMIN — DESMOPRESSIN ACETATE 0.4 MG: 0.1 TABLET ORAL at 21:21

## 2024-01-21 RX ADMIN — CLONIDINE HYDROCHLORIDE 0.2 MG: 0.1 TABLET ORAL at 10:38

## 2024-01-21 RX ADMIN — PALIPERIDONE 12 MG: 6 TABLET, EXTENDED RELEASE ORAL at 10:26

## 2024-01-21 RX ADMIN — DIVALPROEX SODIUM 1250 MG: 500 TABLET, EXTENDED RELEASE ORAL at 21:21

## 2024-01-21 RX ADMIN — DIPHENHYDRAMINE HYDROCHLORIDE 12.5 MG: 25 SOLUTION ORAL at 10:42

## 2024-01-21 RX ADMIN — ERGOCALCIFEROL 50000 UNITS: 1.25 CAPSULE ORAL at 10:26

## 2024-01-21 RX ADMIN — CLONAZEPAM 0.5 MG: 0.5 TABLET ORAL at 10:38

## 2024-01-21 RX ADMIN — METOPROLOL SUCCINATE 50 MG: 50 TABLET, EXTENDED RELEASE ORAL at 10:26
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
## 2024-01-21 NOTE — NURSING NOTE
Patient showered today.  He has been med/meal compliant.  He is irritable, attention seeking, and lacks boundaries. Patient denies SI/HI and A/V hallucinations.

## 2024-01-21 NOTE — PLAN OF CARE
Problem: SAFETY ADULT  Goal: Maintain or return to baseline ADL function  Description: INTERVENTIONS:  -  Assess patient's ability to carry out ADLs; assess patient's baseline for ADL function and identify physical deficits which impact ability to perform ADLs (bathing, care of mouth/teeth, toileting, grooming, dressing, etc.)  - Assess/evaluate cause of self-care deficits   - Assess range of motion  - Assess patient's mobility; develop plan if impaired  - Assess patient's need for assistive devices and provide as appropriate  - Encourage maximum independence but intervene and supervise when necessary  - Involve family in performance of ADLs  - Assess for home care needs following discharge   - Consider OT consult to assist with ADL evaluation and planning for discharge  - Provide patient education as appropriate  1/21/2024 0035 by Evon Hanson RN  Outcome: Progressing  1/21/2024 0035 by Evon Hanson RN  Outcome: Progressing     Problem: Alteration in Thoughts and Perception  Goal: Agree to be compliant with medication regime, as prescribed and report medication side effects  Description: Interventions:  - Offer appropriate PRN medication and supervise ingestion; conduct AIMS, as needed   1/21/2024 0035 by Evon Hanson RN  Outcome: Progressing  1/21/2024 0035 by Evon Hanson RN  Outcome: Progressing
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
## 2024-01-22 PROCEDURE — 99232 SBSQ HOSP IP/OBS MODERATE 35: CPT | Performed by: STUDENT IN AN ORGANIZED HEALTH CARE EDUCATION/TRAINING PROGRAM

## 2024-01-22 RX ORDER — CHLORPROMAZINE HYDROCHLORIDE 100 MG/1
100 TABLET, FILM COATED ORAL 2 TIMES DAILY
Status: DISCONTINUED | OUTPATIENT
Start: 2024-01-23 | End: 2024-01-30

## 2024-01-22 RX ADMIN — DIPHENHYDRAMINE HYDROCHLORIDE 12.5 MG: 25 SOLUTION ORAL at 08:39

## 2024-01-22 RX ADMIN — CHLORPROMAZINE HYDROCHLORIDE 150 MG: 100 TABLET, FILM COATED ORAL at 21:19

## 2024-01-22 RX ADMIN — DESMOPRESSIN ACETATE 0.4 MG: 0.1 TABLET ORAL at 21:20

## 2024-01-22 RX ADMIN — CHLORPROMAZINE HYDROCHLORIDE 125 MG: 100 TABLET, FILM COATED ORAL at 08:16

## 2024-01-22 RX ADMIN — DIVALPROEX SODIUM 1250 MG: 500 TABLET, EXTENDED RELEASE ORAL at 21:19

## 2024-01-22 RX ADMIN — POLYETHYLENE GLYCOL 3350 17 G: 17 POWDER, FOR SOLUTION ORAL at 08:15

## 2024-01-22 RX ADMIN — MELATONIN TAB 3 MG 3 MG: 3 TAB at 21:19

## 2024-01-22 RX ADMIN — CLONIDINE HYDROCHLORIDE 0.2 MG: 0.1 TABLET ORAL at 21:19

## 2024-01-22 RX ADMIN — CLONAZEPAM 0.5 MG: 0.5 TABLET ORAL at 08:16

## 2024-01-22 RX ADMIN — CLONIDINE HYDROCHLORIDE 0.2 MG: 0.1 TABLET ORAL at 08:16

## 2024-01-22 RX ADMIN — CYANOCOBALAMIN TAB 1000 MCG 500 MCG: 1000 TAB at 08:16

## 2024-01-22 RX ADMIN — CLONIDINE HYDROCHLORIDE 0.2 MG: 0.1 TABLET ORAL at 17:00

## 2024-01-22 RX ADMIN — CHLORPROMAZINE HYDROCHLORIDE 125 MG: 100 TABLET, FILM COATED ORAL at 13:01

## 2024-01-22 RX ADMIN — PALIPERIDONE 12 MG: 6 TABLET, EXTENDED RELEASE ORAL at 08:16

## 2024-01-22 RX ADMIN — METOPROLOL SUCCINATE 50 MG: 50 TABLET, EXTENDED RELEASE ORAL at 08:16

## 2024-01-22 NOTE — PROGRESS NOTES
Progress Note - Behavioral Health   Manuel Back 18 y.o. male MRN: [id number]  Unit/Bed#: [facility] 352-01 Encounter: [id number]    Assessment/Plan   Principal Problem:    Mood disorder (HCC)  Active Problems:    Urinary incontinence, nocturnal enuresis    Medical clearance for psychiatric admission    Morbid obesity with body mass index (BMI) of 40.0 to 49.9 (HCC)    Essential hypertension    Vitamin D deficiency    Chronic pain of right knee      Recommended Treatment:      Will make the following medication changes:  Continue with current medications:  Decrease Thorazine to 100 mg twice daily, 150 mg at bedtime for agitation and impulsivity.  Klonopin 0.5 mg daily for severe anxiety.  Clonidine 0.2 mg 3 times daily for severe agitation and impulsivity.  Vitamin B12 500 mcg daily for supplementation.  Depakote ER 1250 mg nightly for mood stabilization.  Vitamin D2 50,000 units q. weekly for supplementation.  Invega 12 mg daily for psychosis and mood.  MiraLAX 17 g daily for standing bowel regimen.  Continue with group therapy, milieu therapy and occupational therapy.    Continue frequent safety checks and vitals per unit protocol.  Continue with SLIM medical management as indicated  Continue coordinating with case management regarding disposition    Legal Status: 201  Disposition: To be determined, coordinating with case management    Case discussed with treatment team.  Risks, benefits and possible side effects of Medications: Risks, benefits, and possible side effects of medications have been explained to the patient, who verbalizes understanding    ------------------------------------------------------------    Subjective: Patient's chart was reviewed, and patient's progress and plan was discussed with treatment team. Per nursing report, Manuel has been isolative, cooperative on the unit and compliant with medications. Patient has remained in behavioral control for the last 24 hours. Last night patient  "was documented to have slept throughout the night.    Manuel was evaluated this morning for continuity of care. On examination, Manuel is calm, initially sedated but arousable to voice, indicating he is doing well by providing a thumbs up to this writer. He states his mood is \" good.\" He reports sleeping well and his energy level today is adequate which she indicates by giving this writer again a thumbs up. His appetite has been good. He denies adverse effects from medications but appears to be significantly sedated and was amenable to decreasing scheduled Thorazine. He denies suicidal ideation, homicidal ideation, auditory hallucinations, and visual hallucinations. His goals for today are to remain in behavioral control, medication compliant.    VS: Reviewed,  BMI of 47.14, hypertensive noted to be 168/98, otherwise within normal limits    Progress Toward Goals: Slow improvement    Psychiatric Review of Systems:  Behavior over the last 24 hours: unchanged  Sleep: hypersomnia  Appetite: adequate  Medication side effects: none verbalized  Medical ROS:  No cough, chest pain, nausea, vomiting, diarrhea, all other symptoms are negative.    Vital signs in last 24 hours:  Temp:  [97.9 °F (36.6 °C)-98.7 °F (37.1 °C)] 98 °F (36.7 °C)  HR:  [90-99] 99  Resp:  [16-18] 18  BP: (126-168)/(62-98) 168/98    Mental Status Exam:    Appearance:  drowsy, poor eye contact, appears older than stated age, casually dressed, marginal grooming/hygiene, obese, and bearded   Behavior:  calm, cooperative, and laying in bed   Speech:  delayed initiation, slow, soft, and poverty of content   Mood:  \"Good\"   Affect:  flat   Thought Process:  poverty of thought   Associations: concrete associations   Thought Content:  no verbalized delusions or overt paranoia   Perceptual Disturbances: denies current hallucinations and appears internally preoccupied   Risk Potential: Suicidal ideation - None at present  Homicidal ideation - None at " present  Potential for aggression - Not at present   Sensorium:  oriented to person and place   Memory:  recent and remote memory grossly intact   Consciousness:  sedated   Attention/Concentration: attention span and concentration appear shorter than expected for age   Insight:  poor   Judgment: poor   Gait/Station: unable to assess   Motor Activity: no abnormal movements     Current Medications:  Current Facility-Administered Medications   Medication Dose Route Frequency Provider Last Rate    aluminum-magnesium hydroxide-simethicone  30 mL Oral Q4H PRN Dimitris Grewal MD      benztropine  1 mg Intramuscular Q4H PRN Max 6/day Dimitris Grewal MD      benztropine  1 mg Oral Q4H PRN Max 6/day Dimitris Grewal MD      chlorproMAZINE  50 mg Intramuscular Q8H PRN Dimitris Grewal MD      And    diphenhydrAMINE  50 mg Intramuscular Q8H PRN Dimitris Grewal MD      chlorproMAZINE  125 mg Oral BID Phill Kamara MD      chlorproMAZINE  150 mg Oral HS Phill Kamara MD      clonazePAM  0.5 mg Oral Daily Phill Kamara MD      cloNIDine  0.2 mg Oral TID Dimitris Grewal MD      cyanocobalamin  500 mcg Oral Daily Dimitris Grewal MD      desmopressin  0.4 mg Oral HS Phill Kamara MD      Diclofenac Sodium  2 g Topical 4x Daily PRN ABUNDIO Villalobos      hydrOXYzine HCL  50 mg Oral Q6H PRN Max 4/day Dimitris Grewal MD      Or    diphenhydrAMINE  50 mg Intramuscular Q6H PRN Dimitris Grewal MD      diphenhydrAMINE  12.5 mg Oral Daily Dimitris Grewal MD      divalproex sodium  1,250 mg Oral HS Dimitris Grewal MD      ergocalciferol  50,000 Units Oral Weekly ABUNDIO Villalobos      glycerin-hypromellose-  1 drop Both Eyes Q3H PRN Dimitris Grewal MD      hydrOXYzine HCL  100 mg Oral Q6H PRN Max 4/day Dimitris Grewal MD      Or    LORazepam  2 mg Intramuscular Q6H PRN Dimitris Grewal MD      hydrOXYzine HCL  25 mg Oral Q6H PRN Max 4/day Dimitris Grewal MD      ibuprofen  400 mg Oral Q4H PRN Dimitris CASH  MD Carmina      ibuprofen  600 mg Oral Q6H PRN Dimitris Grewal MD      ibuprofen  800 mg Oral Q8H PRN Dimitris Grewal MD      melatonin  3 mg Oral HS PRN Dimitris Grewal MD      metoprolol succinate  50 mg Oral Daily Dimitris Grewal MD      OLANZapine  10 mg Oral Q3H PRN Max 3/day iDmitris Grewal MD      Or    OLANZapine  10 mg Intramuscular Q3H PRN Max 3/day Dimitris Grewal MD      OLANZapine  5 mg Oral Q3H PRN Max 6/day Dimitris Grewal MD      Or    OLANZapine  5 mg Intramuscular Q3H PRN Max 6/day Dimitris Grewal MD      OLANZapine  2.5 mg Oral Q3H PRN Max 8/day Dimitirs Grewal MD      paliperidone  12 mg Oral Daily Phill Kamara MD      polyethylene glycol  17 g Oral Daily Dimitris Grewal MD      polyethylene glycol  17 g Oral Daily PRN Dimitris Grewal MD      propranolol  10 mg Oral Q8H PRN Dimitris Grewal MD      senna-docusate sodium  1 tablet Oral Daily PRN Dimitris Grewal MD         Behavioral Health Medications: all current active meds have been reviewed. Changes as in plan section above.    Laboratory results:  I have personally reviewed all pertinent laboratory/tests results.   Recent Results (from the past 48 hour(s))   ECG 12 lead    Collection Time: 01/20/24 10:45 AM   Result Value Ref Range    Ventricular Rate 88 BPM    Atrial Rate 88 BPM    WA Interval 158 ms    QRSD Interval 78 ms    QT Interval 356 ms    QTC Interval 430 ms    P Axis 77 degrees    QRS Axis 58 degrees    T Wave Axis 49 degrees   ECG 12 lead    Collection Time: 01/20/24 10:46 AM   Result Value Ref Range    Ventricular Rate 86 BPM    Atrial Rate 86 BPM    WA Interval 156 ms    QRSD Interval 84 ms    QT Interval 362 ms    QTC Interval 433 ms    P Axis 72 degrees    QRS Axis 48 degrees    T Wave Axis 35 degrees   Urinalysis    Collection Time: 01/20/24  3:17 PM   Result Value Ref Range    Clarity, UA Clear Clear, Other    Color, UA Straw Straw, Yellow, Pale Yellow    Specific Gravity, UA 1.015 1.003 - 1.040    pH, UA 8.0  4.5, 5.0, 5.5, 6.0, 6.5, 7.0, 7.5, 8.0    Glucose, UA Negative Negative mg/dl    Ketones, UA Negative Negative mg/dl    Occult Blood, UA Negative Negative    Protein, UA Negative Negative mg/dl    Nitrite, UA Negative Negative    Bilirubin, UA Negative Negative    Leukocytes, UA Negative Negative    WBC, UA 0-1 (A) None Seen, 2-4, 5-60 /hpf    RBC, UA 0-1 (A) None Seen, 2-4 /hpf    Bacteria, UA None Seen None Seen, Occasional /hpf    Epithelial Cells Occasional None Seen, Occasional /hpf    UROBILINOGEN UA Negative 1.0, Negative mg/dL        This note has been constructed using a voice recognition system. There may be translation, syntax, or grammatical errors. If you have any questions, please contact the dictating author.    Darrell Le, DO  Psychiatry Residency, PGY-2
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
## 2024-01-22 NOTE — NURSING NOTE
Pt is visible on the milieu, pt has poor boundaries and needs redirection at times. Pt is calm and cooperative upon approach. Denies SI, HI, AH, and VH. Medication and meal compliant. Denies any needs at this time.

## 2024-01-22 NOTE — PROGRESS NOTES
Attempted to meet with patient to complete his admission self assessment. He did not respond to his name being called. He  was wrapped head to toe in his blanket laying on his bed.
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
## 2024-01-22 NOTE — NURSING NOTE
Patient visible on the unit throughout the evening. Labile. Patient swatted this writers hand away during HS medication pass. Within a couple minutes, patient changed his mind and took medications. At 2121, patient received melatonin prn. Patient had no further issues.     At 2221, patient remains awake.

## 2024-01-22 NOTE — PLAN OF CARE
Problem: DISCHARGE PLANNING  Goal: Discharge to home or other facility with appropriate resources  Description: INTERVENTIONS:  - Identify barriers to discharge w/patient and caregiver  - Arrange for needed discharge resources and transportation as appropriate  - Identify discharge learning needs (meds, wound care, etc.)  - Arrange for interpretive services to assist at discharge as needed  - Refer to Case Management Department for coordinating discharge planning if the patient needs post-hospital services based on physician/advanced practitioner order or complex needs related to functional status, cognitive ability, or social support system  Outcome: Progressing     Problem: BEHAVIOR  Goal: Pt/Family maintain appropriate behavior and adhere to behavioral management agreement, if implemented  Description: INTERVENTIONS:  - Assess the family dynamic   - Encourage verbalization of thoughts and concerns in a socially appropriate manner  - Assess patient/family's coping skills and non-compliant behavior (including use of illegal substances).  - Utilize positive, consistent limit setting strategies supporting safety of patient, staff and others  - Initiate consult with Case Management, Spiritual Care or other ancillary services as appropriate  - If a patient's/visitor's behavior jeopardizes the safety of the patient, staff, or others, refer to organization procedure.   - Notify Security of behavior or suspected illegal substances which indicate the need for search of the patient and/or belongings  - Encourage participation in the decision making process about a behavioral management agreement; implement if patient meets criteria  Outcome: Progressing     Problem: DISCHARGE PLANNING - CARE MANAGEMENT  Goal: Discharge to post-acute care or home with appropriate resources  Description: INTERVENTIONS:  - Conduct assessment to determine patient/family and health care team treatment goals, and need for post-acute services  based on payer coverage, community resources, and patient preferences, and barriers to discharge  - Address psychosocial, clinical, and financial barriers to discharge as identified in assessment in conjunction with the patient/family and health care team  - Arrange appropriate level of post-acute services according to patient’s   needs and preference and payer coverage in collaboration with the physician and health care team  - Communicate with and update the patient/family, physician, and health care team regarding progress on the discharge plan  - Arrange appropriate transportation to post-acute venues  Outcome: Progressing     Problem: Alteration in Thoughts and Perception  Goal: Verbalize thoughts and feelings  Description: Interventions:  - Promote a nonjudgmental and trusting relationship with the patient through active listening and therapeutic communication  - Assess patient's level of functioning, behavior and potential for risk  - Engage patient in 1 on 1 interactions  - Encourage patient to express fears, feelings, frustrations, and discuss symptoms    - [place] patient to reality, help patient recognize reality-based thinking   - Administer medications as ordered and assess for potential side effects  - Provide the patient education related to the signs and symptoms of the illness and desired effects of prescribed medications  Outcome: Progressing  Goal: Agree to be compliant with medication regime, as prescribed and report medication side effects  Description: Interventions:  - Offer appropriate PRN medication and supervise ingestion; conduct AIMS, as needed   Outcome: Progressing  Goal: Attend and participate in unit activities, including therapeutic, recreational, and educational groups  Description: Interventions:  -Encourage Visitation and family involvement in care  Outcome: Progressing  Goal: Recognize dysfunctional thoughts, communicate reality-based thoughts at the time of  discharge  Description: Interventions:  - Provide medication and psycho-education to assist patient in compliance and developing insight into his/her illness   Outcome: Progressing

## 2024-01-22 NOTE — PROGRESS NOTES
Attempted to meet Manuel after breakfast he went back to bed and was sound asleep. He continues to be on a 1:1.

## 2024-01-23 ENCOUNTER — TELEPHONE (OUTPATIENT)
Dept: FAMILY MEDICINE CLINIC | Facility: CLINIC | Age: 19
End: 2024-01-23

## 2024-01-23 PROCEDURE — 99232 SBSQ HOSP IP/OBS MODERATE 35: CPT | Performed by: STUDENT IN AN ORGANIZED HEALTH CARE EDUCATION/TRAINING PROGRAM

## 2024-01-23 RX ADMIN — POLYETHYLENE GLYCOL 3350 17 G: 17 POWDER, FOR SOLUTION ORAL at 09:00

## 2024-01-23 RX ADMIN — DESMOPRESSIN ACETATE 0.4 MG: 0.1 TABLET ORAL at 21:05

## 2024-01-23 RX ADMIN — DIPHENHYDRAMINE HYDROCHLORIDE 12.5 MG: 25 SOLUTION ORAL at 09:08

## 2024-01-23 RX ADMIN — METOPROLOL SUCCINATE 50 MG: 50 TABLET, EXTENDED RELEASE ORAL at 08:59

## 2024-01-23 RX ADMIN — CHLORPROMAZINE HYDROCHLORIDE 150 MG: 100 TABLET, FILM COATED ORAL at 21:04

## 2024-01-23 RX ADMIN — CHLORPROMAZINE HYDROCHLORIDE 100 MG: 100 TABLET, FILM COATED ORAL at 08:51

## 2024-01-23 RX ADMIN — CLONAZEPAM 0.5 MG: 0.5 TABLET ORAL at 08:59

## 2024-01-23 RX ADMIN — CLONIDINE HYDROCHLORIDE 0.2 MG: 0.1 TABLET ORAL at 08:58

## 2024-01-23 RX ADMIN — DIVALPROEX SODIUM 1250 MG: 500 TABLET, EXTENDED RELEASE ORAL at 21:05

## 2024-01-23 RX ADMIN — CHLORPROMAZINE HYDROCHLORIDE 100 MG: 100 TABLET, FILM COATED ORAL at 15:53

## 2024-01-23 RX ADMIN — CLONIDINE HYDROCHLORIDE 0.2 MG: 0.1 TABLET ORAL at 15:53

## 2024-01-23 RX ADMIN — PALIPERIDONE 12 MG: 6 TABLET, EXTENDED RELEASE ORAL at 08:51

## 2024-01-23 RX ADMIN — CLONIDINE HYDROCHLORIDE 0.2 MG: 0.1 TABLET ORAL at 21:04

## 2024-01-23 RX ADMIN — CYANOCOBALAMIN TAB 1000 MCG 500 MCG: 1000 TAB at 08:51

## 2024-01-23 NOTE — PROGRESS NOTES
01/23/24 1313    Admission Notification   Notification of Admission Provided to: Psychiatrist   Psychiatrist Notified via: Phone call     CM Intern outreached to Mel Avila at [phone]. Mel Avila was notified of Pt's admission. They also verified that Pt is seeing Dr. Pelaez for Psychiatry and Bill for Psychotherapy.
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
## 2024-01-23 NOTE — MALNUTRITION/BMI
This medical record reflects one or more clinical indicators suggestive of malnutrition and/or morbid obesity.    Malnutrition Findings:                                 BMI Findings:  Adult BMI Classifications: Morbid Obesity 45-49.9        Body mass index is 47.14 kg/m².     See Nutrition note dated 1/23/2024 for additional details.  Completed nutrition assessment is viewable in the nutrition documentation.

## 2024-01-23 NOTE — PROGRESS NOTES
01/22/24 0944   Team Meeting   Meeting Type Tx Team Meeting   Initial Conference Date 01/22/24   Team Members Present   Team Members Present Physician;Nurse;   Physician Team Member Mimi   Nursing Team Member Real   Care Management Team Member Carol   Patient/Family Present   Patient Present Yes   Patient's Family Present No     Attempted to meet with pt to review tx plan. Pt asleep and did not respond to staff's requests to meet. Tx team will re-attempt.
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
## 2024-01-23 NOTE — PLAN OF CARE
Problem: SAFETY ADULT  Goal: Maintain or return to baseline ADL function  Description: INTERVENTIONS:  -  Assess patient's ability to carry out ADLs; assess patient's baseline for ADL function and identify physical deficits which impact ability to perform ADLs (bathing, care of mouth/teeth, toileting, grooming, dressing, etc.)  - Assess/evaluate cause of self-care deficits   - Assess range of motion  - Assess patient's mobility; develop plan if impaired  - Assess patient's need for assistive devices and provide as appropriate  - Encourage maximum independence but intervene and supervise when necessary  - Involve family in performance of ADLs  - Assess for home care needs following discharge   - Consider OT consult to assist with ADL evaluation and planning for discharge  - Provide patient education as appropriate  Outcome: Not Progressing  Goal: Maintains/Returns to pre admission functional level  Description: INTERVENTIONS:  - Perform AM-PAC 6 Click Basic Mobility/ Daily Activity assessment daily.  - Set and communicate daily mobility goal to care team and patient/family/caregiver.   - Collaborate with rehabilitation services on mobility goals if consulted  - Perform Range of Motion  times a day.  - Reposition patient every  hours.  - Dangle patient  times a day  - Stand patient  times a day  - Ambulate patient  times a day  - Out of bed to chair  times a day   - Out of bed for meals  times a day  - Out of bed for toileting  - Record patient progress and toleration of activity level   Outcome: Not Progressing     Problem: Alteration in Thoughts and Perception  Goal: Verbalize thoughts and feelings  Description: Interventions:  - Promote a nonjudgmental and trusting relationship with the patient through active listening and therapeutic communication  - Assess patient's level of functioning, behavior and potential for risk  - Engage patient in 1 on 1 interactions  - Encourage patient to express fears, feelings,  frustrations, and discuss symptoms    - [place] patient to reality, help patient recognize reality-based thinking   - Administer medications as ordered and assess for potential side effects  - Provide the patient education related to the signs and symptoms of the illness and desired effects of prescribed medications  Outcome: Progressing  Goal: Agree to be compliant with medication regime, as prescribed and report medication side effects  Description: Interventions:  - Offer appropriate PRN medication and supervise ingestion; conduct AIMS, as needed   Outcome: Progressing  Goal: Attend and participate in unit activities, including therapeutic, recreational, and educational groups  Description: Interventions:  -Encourage Visitation and family involvement in care  Outcome: Progressing  Goal: Recognize dysfunctional thoughts, communicate reality-based thoughts at the time of discharge  Description: Interventions:  - Provide medication and psycho-education to assist patient in compliance and developing insight into his/her illness   Outcome: Progressing     Problem: Ineffective Coping  Goal: Cooperates with admission process  Description: Interventions:   - Complete admission process  Outcome: Progressing  Goal: Identifies ineffective coping skills  Outcome: Progressing  Goal: Identifies healthy coping skills  Outcome: Progressing  Goal: Demonstrates healthy coping skills  Outcome: Progressing  Goal: Participates in unit activities  Description: Interventions:  - Provide therapeutic environment   - Provide required programming   - Redirect inappropriate behaviors   Outcome: Progressing  Goal: Understands least restrictive measures  Description: Interventions:  - Utilize least restrictive behavior  Outcome: Not Progressing  Goal: Free from restraint events  Description: - Utilize least restrictive measures   - Provide behavioral interventions   - Redirect inappropriate behaviors   Outcome: Progressing     Problem: Risk  for Violence/Aggression Toward Others  Goal: Refrain from harming others  Outcome: Progressing  Goal: Refrain from destructive acts on the environment or property  Outcome: Progressing  Goal: Control angry outbursts  Description: Interventions:  - Monitor patient closely, per order  - Ensure early verbal de-escalation  - Monitor prn medication needs  - Set reasonable/therapeutic limits, outline behavioral expectations, and consequences   - Provide a non-threatening milieu, utilizing the least restrictive interventions   Outcome: Progressing  Goal: Identify appropriate positive anger management techniques  Description: Interventions:  - Offer anger management and coping skills groups   - Staff will provide positive feedback for appropriate anger control  Outcome: Progressing

## 2024-01-23 NOTE — PROGRESS NOTES
01/23/24 0957   Team Meeting   Meeting Type Daily Rounds   Team Members Present   Team Members Present Physician;Nurse;   Physician Team Member Constanza   [facility]   Care Management Team Member Silvia   Patient/Family Present   Patient Present No   Patient's Family Present No     Pt is visible and cooperative on unit, med/meal compliant. Received PRN Melatonin; it was effective. D/c TBD

## 2024-01-23 NOTE — NURSING NOTE
Received patient  at 1500. Patient  has been awake,  and visible out in the milieu. Denies SI,HI,AH, and VH. Cooperative with unit routine. Patient remain Q 7 min check

## 2024-01-23 NOTE — PROGRESS NOTES
Progress Note - Behavioral Health   Manuel Back 18 y.o. male MRN: [id number]  Unit/Bed#: U 340-01 Encounter: [id number]    Assessment/Plan   Principal Problem:    Mood disorder (HCC)  Active Problems:    Urinary incontinence, nocturnal enuresis    Medical clearance for psychiatric admission    Morbid obesity with body mass index (BMI) of 40.0 to 49.9 (HCC)    Essential hypertension    Vitamin D deficiency    Chronic pain of right knee      Recommended Treatment:      No psychopharmacologic changes necessary at this time; will continue to assess for further optimization.  Continue with current medications:  Thorazine 100 mg twice daily, 150 mg at bedtime for agitation and impulsivity.  Klonopin 0.5 mg daily for severe anxiety.  Clonidine 0.2 mg 3 times daily for severe agitation and impulsivity.  Vitamin B12 500 mcg daily for supplementation.  Depakote ER 1250 mg at bedtime for mood stabilization.  Vitamin D2 50,000 units q. weekly for supplementation.  Invega 12 mg daily for psychosis and mood.  MiraLAX 17 g daily for standing bowel regimen.  Continue with group therapy, milieu therapy and occupational therapy.    Continue frequent safety checks and vitals per unit protocol.  Continue with SLIM medical management as indicated  Continue coordinating with case management regarding disposition    Legal Status: 201  Disposition: To be determined, coordinating with case management    Case discussed with treatment team.  Risks, benefits and possible side effects of Medications: Risks, benefits, and possible side effects of medications have previously been explained. No new medications at this time.    ------------------------------------------------------------    Subjective: Patient's chart was reviewed, and patient's progress and plan was discussed with treatment team. Per nursing report, Manuel has been visible in the evening, requiring redirection, poor boundaries, calm, cooperative on the unit and  "compliant with medications. Last night the patient reported insomnia and received PRN melatonin, which was effective. Patient has remained in behavioral control for the last 24 hours. Last night patient was documented to have slept throughout the night.    Manuel was evaluated this morning for continuity of care. On examination, Manuel is calm, pleasant, cooperative, less drowsy than previously, dressed in hospital attire and standing to speak. He states his mood is \" good.\" He reports sleeping well despite only getting 3 hours of sleep last night, he states it was because he was sleeping throughout the majority of the day and his energy level today is adequate. His appetite has been good. He denies adverse effects from medications and reports regular bowel movements with last being last evening. He denies suicidal ideation, homicidal ideation, auditory hallucinations, and visual hallucinations. His goals for today are to remain in behavioral control and medication compliant.    VS: Reviewed,  BMI 47.14, patient's previous hypertension has resolved, otherwise within normal limits    Progress Toward Goals: Slow improvement    Psychiatric Review of Systems:  Behavior over the last 24 hours: improved  Sleep: improving  Appetite: adequate  Medication side effects: none verbalized  Medical ROS:  No cough, chest pain, nausea, vomiting, diarrhea, all other symptoms are negative.    Vital signs in last 24 hours:  Temp:  [97.7 °F (36.5 °C)-98.2 °F (36.8 °C)] 97.7 °F (36.5 °C)  HR:  [90-95] 92  Resp:  [16] 16  BP: (112-130)/(57-77) 112/57    Mental Status Exam:    Appearance:  alert, improving eye contact, appears stated age, hospital attire dressed, appropriate grooming and hygiene, and bearded   Behavior:  calm and cooperative   Speech:  spontaneous, clear, normal rate, normal volume, scant, and coherent   Mood:  \"Good\"   Affect:  blunted   Thought Process:  [place]   Associations: concrete associations   Thought " Content:  paranoid ideation, Anglican preoccupation   Perceptual Disturbances: denies current hallucinations and appears to be responding to internal stimuli   Risk Potential: Suicidal ideation - None at present  Homicidal ideation - None at present  Potential for aggression - Yes, due to poor impulse control   Sensorium:  oriented to person, place, and situation   Memory:  recent and remote memory grossly intact   Consciousness:  alert and awake   Attention/Concentration: attention span and concentration appear shorter than expected for age   Insight:  poor   Judgment: poor   Gait/Station: normal gait/station   Motor Activity: no abnormal movements     Current Medications:  Current Facility-Administered Medications   Medication Dose Route Frequency Provider Last Rate    aluminum-magnesium hydroxide-simethicone  30 mL Oral Q4H PRN Dimitris Grewal MD      benztropine  1 mg Intramuscular Q4H PRN Max 6/day Dimitris Grewal MD      benztropine  1 mg Oral Q4H PRN Max 6/day Dimitris Grewal MD      chlorproMAZINE  50 mg Intramuscular Q8H PRN Dimitris Grewal MD      And    diphenhydrAMINE  50 mg Intramuscular Q8H PRN Dimitris Grewal MD      chlorproMAZINE  100 mg Oral BID Darrell Le DO      chlorproMAZINE  150 mg Oral HS Phill Kamara MD      clonazePAM  0.5 mg Oral Daily Phill Kamara MD      cloNIDine  0.2 mg Oral TID Dimitris Grewal MD      cyanocobalamin  500 mcg Oral Daily Dimitris Grewal MD      desmopressin  0.4 mg Oral HS Phill Kamara MD      Diclofenac Sodium  2 g Topical 4x Daily PRN ABUNDIO Villalobos      hydrOXYzine HCL  50 mg Oral Q6H PRN Max 4/day Dimitris Grewal MD      Or    diphenhydrAMINE  50 mg Intramuscular Q6H PRN Dimitris Grewal MD      diphenhydrAMINE  12.5 mg Oral Daily Dimitris Grewal MD      divalproex sodium  1,250 mg Oral HS Dimitris Grewal MD      ergocalciferol  50,000 Units Oral Weekly ABUNDIO Villalobos      glycerin-hypromellose-  1 drop Both Eyes  Q3H PRN Dimitris Grewal MD      hydrOXYzine HCL  100 mg Oral Q6H PRN Max 4/day Dimitris Grewal MD      Or    LORazepam  2 mg Intramuscular Q6H PRN Dimitris Grewal MD      hydrOXYzine HCL  25 mg Oral Q6H PRN Max 4/day Dimitris Grewal MD      ibuprofen  400 mg Oral Q4H PRN Dimitris Grewal MD      ibuprofen  600 mg Oral Q6H PRN Dimitris Grewal MD      ibuprofen  800 mg Oral Q8H PRN Dimitris Grewal MD      melatonin  3 mg Oral HS PRN Dimitris Grewal MD      metoprolol succinate  50 mg Oral Daily Dimitris Grewal MD      OLANZapine  10 mg Oral Q3H PRN Max 3/day Dimitris Grewal MD      Or    OLANZapine  10 mg Intramuscular Q3H PRN Max 3/day Dimitris Grewal MD      OLANZapine  5 mg Oral Q3H PRN Max 6/day Dimitris Grewal MD      Or    OLANZapine  5 mg Intramuscular Q3H PRN Max 6/day Dimitris Grewal MD      OLANZapine  2.5 mg Oral Q3H PRN Max 8/day Dimitris Grewal MD      paliperidone  12 mg Oral Daily Phill Kamara MD      polyethylene glycol  17 g Oral Daily Dimitris Grewal MD      polyethylene glycol  17 g Oral Daily PRN Dimitris Grewal MD      propranolol  10 mg Oral Q8H PRN Dimitris Grewal MD      senna-docusate sodium  1 tablet Oral Daily PRN Dimitris Grewal MD         Behavioral Health Medications: all current active meds have been reviewed. Changes as in plan section above.    Laboratory results:  I have personally reviewed all pertinent laboratory/tests results.   No results found for this or any previous visit (from the past 48 hour(s)).     This note has been constructed using a voice recognition system. There may be translation, syntax, or grammatical errors. If you have any questions, please contact the dictating author.    Darrell Le, DO  Psychiatry Residency, PGY-2

## 2024-01-23 NOTE — PROGRESS NOTES
01/23/24 1156   Team Meeting   Meeting Type Tx Team Meeting   Initial Conference Date 01/23/24   Team Members Present   Team Members Present Physician;Nurse;;Other (Discipline and Name)   Physician Team Member Constanza   Nursing Team Member Jacqueline   Care Management Team Member Carol   Other (Discipline and Name) TATO Vega Intern   Patient/Family Present   Patient Present Yes   Patient's Family Present No     Tx plan was reviewed and discussed with Pt. Pt was encouraged to attend groups. Medication was discussed with Pt. Pt signed tx plan.

## 2024-01-23 NOTE — NURSING NOTE
Patient remained visible on the unit throughout the evening. Pleasant and cooperative with routine. Denied any unmet needs. HS medication compliant. Melatonin prn given at 2119. At 2200, patient observed resting in bed with eyes closed.

## 2024-01-23 NOTE — CONSULTS
Pt did not want to converse at my visit. Stated he did not ask to speak to me. Pt is eating 100% of meals. Not appropriate for diet education at this time.

## 2024-01-23 NOTE — TELEPHONE ENCOUNTER
Natalie from [employer] called again  in regards to Manuel. They have an investigation for staff neglect, and she has some questions for you. She can be reach at  [phone].        She will be faxing the Medical release paperwork so you could talk to her

## 2024-01-23 NOTE — NURSING NOTE
Pt is visible on the unit and periodically social with staff and peers. Needing verbal redirection throughout the day regarding personal space and boundaries. Denies SI/HI/AH/VH at this time. Medication and meal compliant. Denies any unmet needs or complaints.

## 2024-01-23 NOTE — CASE MANAGEMENT
Cm received signed ROIs from Pt's Guardian, Jose. Jose had sent a return email requesting a call for a status update and discharge planning. CM Intern left a brief status update on his messages, requested a call back to discuss further if needed.     Jose Soriano  [facility]  Independent Living / Adoption  [address] [place] [id number]  Office: [phone]  Cell: [phone]  Fax: [phone]  [email or web address]

## 2024-01-24 PROCEDURE — 99232 SBSQ HOSP IP/OBS MODERATE 35: CPT | Performed by: STUDENT IN AN ORGANIZED HEALTH CARE EDUCATION/TRAINING PROGRAM

## 2024-01-24 RX ORDER — WATER 10 ML/10ML
INJECTION INTRAMUSCULAR; INTRAVENOUS; SUBCUTANEOUS
Status: DISCONTINUED
Start: 2024-01-24 | End: 2024-02-07 | Stop reason: HOSPADM

## 2024-01-24 RX ADMIN — CYANOCOBALAMIN TAB 1000 MCG 500 MCG: 1000 TAB at 08:29

## 2024-01-24 RX ADMIN — CHLORPROMAZINE HYDROCHLORIDE 150 MG: 100 TABLET, FILM COATED ORAL at 21:25

## 2024-01-24 RX ADMIN — CLONAZEPAM 0.5 MG: 0.5 TABLET ORAL at 08:29

## 2024-01-24 RX ADMIN — POLYETHYLENE GLYCOL 3350 17 G: 17 POWDER, FOR SOLUTION ORAL at 08:29

## 2024-01-24 RX ADMIN — CHLORPROMAZINE HYDROCHLORIDE 100 MG: 100 TABLET, FILM COATED ORAL at 13:07

## 2024-01-24 RX ADMIN — CLONIDINE HYDROCHLORIDE 0.2 MG: 0.1 TABLET ORAL at 08:29

## 2024-01-24 RX ADMIN — OLANZAPINE 10 MG: 10 INJECTION, POWDER, LYOPHILIZED, FOR SOLUTION INTRAMUSCULAR at 20:30

## 2024-01-24 RX ADMIN — METOPROLOL SUCCINATE 50 MG: 50 TABLET, EXTENDED RELEASE ORAL at 08:29

## 2024-01-24 RX ADMIN — DESMOPRESSIN ACETATE 0.4 MG: 0.1 TABLET ORAL at 21:25

## 2024-01-24 RX ADMIN — CHLORPROMAZINE HYDROCHLORIDE 100 MG: 100 TABLET, FILM COATED ORAL at 08:29

## 2024-01-24 RX ADMIN — DIVALPROEX SODIUM 1250 MG: 500 TABLET, EXTENDED RELEASE ORAL at 21:25

## 2024-01-24 RX ADMIN — OLANZAPINE 10 MG: 10 TABLET, FILM COATED ORAL at 10:11

## 2024-01-24 RX ADMIN — PALIPERIDONE 12 MG: 6 TABLET, EXTENDED RELEASE ORAL at 08:29

## 2024-01-24 RX ADMIN — HYDROXYZINE HYDROCHLORIDE 100 MG: 50 TABLET, FILM COATED ORAL at 10:11

## 2024-01-24 RX ADMIN — CLONIDINE HYDROCHLORIDE 0.2 MG: 0.1 TABLET ORAL at 21:25

## 2024-01-24 RX ADMIN — DIPHENHYDRAMINE HYDROCHLORIDE 50 MG: 50 INJECTION, SOLUTION INTRAMUSCULAR; INTRAVENOUS at 20:30

## 2024-01-24 NOTE — PROGRESS NOTES
01/24/24 0946   Team Meeting   Meeting Type Daily Rounds   Team Members Present   Team Members Present Physician;Nurse;   Physician Team Member Constanza   Nursing Team Member [facility]   Care Management Team Member Silvia   Patient/Family Present   Patient Present No   Patient's Family Present No     Pt denies symptoms, but does seem to appear to respond to internal stimuli. Pt is intrusive and invades staff and peers personal space, requires redirection. Pt is med/meal compliant. Discharge pending.

## 2024-01-24 NOTE — NURSING NOTE
Patient about the unit. Continues to require redirection due to mumbling negative comments about staff and peers under his breath. Required redirection when giving his 1300 dose of Thorazine after taking the pill and saying he couldn't get it out of the cup and dropping it on the floor. Patient then took his hand and swatted writer away.

## 2024-01-24 NOTE — PLAN OF CARE
Problem: Ineffective Coping  Goal: Cooperates with admission process  Description: Interventions:   - Complete admission process  Outcome: Progressing  Goal: Participates in unit activities  Description: Interventions:  - Provide therapeutic environment   - Provide required programming   - Redirect inappropriate behaviors   Outcome: Progressing   Patient completed his admission self assessment and has begun to attend groups

## 2024-01-24 NOTE — NURSING NOTE
Patient cursing at staff to get the fuck away from him. Refusing to move from the nurses station when redirected to do so. Anxious, agitated, irritable. Escorted back to room by staff. Given PRN of Zyprexa 10 mg po for severe agitation and Atarax 100 mg po for severe anxiety. Will monitor effectiveness.

## 2024-01-24 NOTE — PROGRESS NOTES
Progress Note - Behavioral Health   Manuel Back 18 y.o. male MRN: [id number]  Unit/Bed#: U 340-01 Encounter: [id number]    Assessment/Plan   Principal Problem:    Mood disorder (HCC)  Active Problems:    Urinary incontinence, nocturnal enuresis    Medical clearance for psychiatric admission    Morbid obesity with body mass index (BMI) of 40.0 to 49.9 (HCC)    Essential hypertension    Vitamin D deficiency    Chronic pain of right knee      Recommended Treatment:      No psychopharmacologic changes necessary at this time; will continue to assess for further optimization.  Continue with current medications:  Thorazine 100 mg twice daily, 150 mg at bedtime for agitation and impulsivity.  Klonopin 0.5 mg daily for severe anxiety.  Clonidine 0.2 mg 3 times daily for severe agitation and impulsivity.  Vitamin B12 500 mcg daily for supplementation.  Depakote ER 1250 mg at bedtime for mood stabilization.  Vitamin D2 50,000 units q. weekly for supplementation.  Invega 12 mg daily for psychosis and mood.  MiraLAX 17 g daily for standing bowel regimen.  Continue with group therapy, milieu therapy and occupational therapy.    Continue frequent safety checks and vitals per unit protocol.  Continue with SLIM medical management as indicated  Continue coordinating with case management regarding disposition    Legal Status: 201  Disposition: To be determined, coordinating with case management    Case discussed with treatment team.  Risks, benefits and possible side effects of Medications: Risks, benefits, and possible side effects of medications have been explained to the patient, who verbalizes understanding    ------------------------------------------------------------    Subjective: Patient's chart was reviewed, and patient's progress and plan was discussed with treatment team. Per nursing report, Manuel has been awake, visible, intrusive, requiring redirection, cooperative on the unit and compliant with  "medications. Patient has remained in behavioral control for the last 24 hours. Last night patient was documented to have slept throughout the night.    Manuel was evaluated this morning for continuity of care. On examination, Manuel is displaying bizarre behavior, mumbling, and intermittently making aggressive statements like \"get the fuck out of my face.\" He states his mood is \"good.\" He refuses to report on sleep and his energy level. His appetite has been good. He denies adverse effects from medications. He denies suicidal ideation, homicidal ideation, auditory hallucinations, and visual hallucinations but is actively responding throughout the interview requiring PRN Zyprexa for agitation. His goals for today are to remain in behavioral control and medication compliant.    VS: Reviewed,  BMI of 47.14, softer blood pressure of 106/51, otherwise within normal limits    Progress Toward Goals: Slow improvement    Psychiatric Review of Systems:  Behavior over the last 24 hours: unchanged  Sleep: hypersomnia  Appetite: adequate  Medication side effects: none verbalized  Medical ROS:  No cough, chest pain, nausea, vomiting or diarrhea, all other symptoms are negative.    Vital signs in last 24 hours:  Temp:  [97.4 °F (36.3 °C)-97.8 °F (36.6 °C)] 97.4 °F (36.3 °C)  HR:  [74-94] 74  Resp:  [16-18] 18  BP: (117-145)/(56-81) 117/56    Mental Status Exam:    Appearance:  alert, furtive eye contact, appears stated age, hospital attire dressed, disheveled, obese, and bearded   Behavior:  limited cooperativity and displaying disorganized behavior   Speech:  delayed initiation, dysarthric, slow, soft, scant, and incoherent   Mood:  \"Good\"   Affect:  blunted, irritable   Thought Process:  disorganized   Associations: concrete associations   Thought Content:  paranoid ideation   Perceptual Disturbances: denies current hallucinations and appears to be responding to internal stimuli   Risk Potential: Suicidal ideation - None at " present  Homicidal ideation - None at present  Potential for aggression - Yes, due to acute psychosis   Sensorium:  oriented to person and place   Memory:  recent and remote memory: unable to assess due to lack of cooperation   Consciousness:  alert and awake   Attention/Concentration: attention span and concentration appear shorter than expected for age   Insight:  poor   Judgment: poor   Gait/Station: normal gait/station   Motor Activity: no abnormal movements     Current Medications:  Current Facility-Administered Medications   Medication Dose Route Frequency Provider Last Rate    aluminum-magnesium hydroxide-simethicone  30 mL Oral Q4H PRN Dimitris Grewal MD      benztropine  1 mg Intramuscular Q4H PRN Max 6/day Dimitris Grewal MD      benztropine  1 mg Oral Q4H PRN Max 6/day Dimitris Grewal MD      chlorproMAZINE  50 mg Intramuscular Q8H PRN Dimitris Grewal MD      And    diphenhydrAMINE  50 mg Intramuscular Q8H PRN Dimitris Grewal MD      chlorproMAZINE  100 mg Oral BID Darrell Le DO      chlorproMAZINE  150 mg Oral HS Phill Kamara MD      clonazePAM  0.5 mg Oral Daily Phill Kamara MD      cloNIDine  0.2 mg Oral TID Dimitris Grewal MD      cyanocobalamin  500 mcg Oral Daily Dimitris Grewal MD      desmopressin  0.4 mg Oral HS Phill Kamara MD      Diclofenac Sodium  2 g Topical 4x Daily PRN ABUNDIO Villalobos      hydrOXYzine HCL  50 mg Oral Q6H PRN Max 4/day Dimitris Grewal MD      Or    diphenhydrAMINE  50 mg Intramuscular Q6H PRN Dimitris Grewal MD      divalproex sodium  1,250 mg Oral HS Dimitris Grewal MD      ergocalciferol  50,000 Units Oral Weekly ABUNDIO Villalobos      glycerin-hypromellose-  1 drop Both Eyes Q3H PRN Dimitris Grewal MD      hydrOXYzine HCL  100 mg Oral Q6H PRN Max 4/day Dimitris Grewal MD      Or    LORazepam  2 mg Intramuscular Q6H PRN Dimitris Grewal MD      hydrOXYzine HCL  25 mg Oral Q6H PRN Max 4/day Dimitris Grewal MD      ibuprofen   400 mg Oral Q4H PRN Dimitris Grewal MD      ibuprofen  600 mg Oral Q6H PRN Dimitris Grewal MD      ibuprofen  800 mg Oral Q8H PRN Dimitris Grewal MD      melatonin  3 mg Oral HS PRN Dimitris Greawl MD      metoprolol succinate  50 mg Oral Daily Dimitris Grewal MD      OLANZapine  10 mg Oral Q3H PRN Max 3/day Dimitris Grewal MD      Or    OLANZapine  10 mg Intramuscular Q3H PRN Max 3/day Dimitris Grewal MD      OLANZapine  5 mg Oral Q3H PRN Max 6/day Dimitris Grewal MD      Or    OLANZapine  5 mg Intramuscular Q3H PRN Max 6/day Dimitris Grewal MD      OLANZapine  2.5 mg Oral Q3H PRN Max 8/day Dimitris Grewal MD      paliperidone  12 mg Oral Daily Phill Kamara MD      polyethylene glycol  17 g Oral Daily Dimitris Grewal MD      polyethylene glycol  17 g Oral Daily PRN Dimitris Grewal MD      propranolol  10 mg Oral Q8H PRN Dimitris Grewal MD      senna-docusate sodium  1 tablet Oral Daily PRN Dimitris Grewal MD         Behavioral Health Medications: all current active meds have been reviewed. Changes as in plan section above.    Laboratory results:  I have personally reviewed all pertinent laboratory/tests results.   No results found for this or any previous visit (from the past 48 hour(s)).     This note has been constructed using a voice recognition system. There may be translation, syntax, or grammatical errors. If you have any questions, please contact the dictating author.    Darrell Le, DO  Psychiatry Residency, PGY-2

## 2024-01-24 NOTE — NURSING NOTE
A bit less agitated and anxious. Remains irritable. Continues to mumble negative comments about staff under his breath. PRN of Zyprexa 10 mg po and Atarax 100 mg po has been minimally effective.

## 2024-01-24 NOTE — PLAN OF CARE
Problem: SAFETY ADULT  Goal: Maintain or return to baseline ADL function  Description: INTERVENTIONS:  -  Assess patient's ability to carry out ADLs; assess patient's baseline for ADL function and identify physical deficits which impact ability to perform ADLs (bathing, care of mouth/teeth, toileting, grooming, dressing, etc.)  - Assess/evaluate cause of self-care deficits   - Assess range of motion  - Assess patient's mobility; develop plan if impaired  - Assess patient's need for assistive devices and provide as appropriate  - Encourage maximum independence but intervene and supervise when necessary  - Involve family in performance of ADLs  - Assess for home care needs following discharge   - Consider OT consult to assist with ADL evaluation and planning for discharge  - Provide patient education as appropriate  Outcome: Not Progressing  Goal: Maintains/Returns to pre admission functional level  Description: INTERVENTIONS:  - Perform AM-PAC 6 Click Basic Mobility/ Daily Activity assessment daily.  - Set and communicate daily mobility goal to care team and patient/family/caregiver.   - Collaborate with rehabilitation services on mobility goals if consulted  - Perform Range of Motion  times a day.  - Reposition patient every  hours.  - Dangle patient  times a day  - Stand patient  times a day  - Ambulate patient  times a day  - Out of bed to chair  times a day   - Out of bed for meals  times a day  - Out of bed for toileting  - Record patient progress and toleration of activity level   Outcome: Not Progressing     Problem: Alteration in Thoughts and Perception  Goal: Verbalize thoughts and feelings  Description: Interventions:  - Promote a nonjudgmental and trusting relationship with the patient through active listening and therapeutic communication  - Assess patient's level of functioning, behavior and potential for risk  - Engage patient in 1 on 1 interactions  - Encourage patient to express fears, feelings,  frustrations, and discuss symptoms    - [place] patient to reality, help patient recognize reality-based thinking   - Administer medications as ordered and assess for potential side effects  - Provide the patient education related to the signs and symptoms of the illness and desired effects of prescribed medications  Outcome: Progressing  Goal: Agree to be compliant with medication regime, as prescribed and report medication side effects  Description: Interventions:  - Offer appropriate PRN medication and supervise ingestion; conduct AIMS, as needed   Outcome: Progressing  Goal: Attend and participate in unit activities, including therapeutic, recreational, and educational groups  Description: Interventions:  -Encourage Visitation and family involvement in care  Outcome: Progressing  Goal: Recognize dysfunctional thoughts, communicate reality-based thoughts at the time of discharge  Description: Interventions:  - Provide medication and psycho-education to assist patient in compliance and developing insight into his/her illness   Outcome: Not Progressing     Problem: Ineffective Coping  Goal: Cooperates with admission process  Description: Interventions:   - Complete admission process  Outcome: Progressing  Goal: Identifies ineffective coping skills  Outcome: Not Progressing  Goal: Identifies healthy coping skills  Outcome: Not Progressing  Goal: Demonstrates healthy coping skills  Outcome: Not Progressing  Goal: Participates in unit activities  Description: Interventions:  - Provide therapeutic environment   - Provide required programming   - Redirect inappropriate behaviors   Outcome: Progressing  Goal: Understands least restrictive measures  Description: Interventions:  - Utilize least restrictive behavior  Outcome: Not Progressing  Goal: Free from restraint events  Description: - Utilize least restrictive measures   - Provide behavioral interventions   - Redirect inappropriate behaviors   Outcome: Not  Progressing     Problem: Risk for Violence/Aggression Toward Others  Goal: Refrain from harming others  Outcome: Progressing  Goal: Refrain from destructive acts on the environment or property  Outcome: Progressing  Goal: Control angry outbursts  Description: Interventions:  - Monitor patient closely, per order  - Ensure early verbal de-escalation  - Monitor prn medication needs  - Set reasonable/therapeutic limits, outline behavioral expectations, and consequences   - Provide a non-threatening milieu, utilizing the least restrictive interventions   Outcome: Progressing  Goal: Identify appropriate positive anger management techniques  Description: Interventions:  - Offer anger management and coping skills groups   - Staff will provide positive feedback for appropriate anger control  Outcome: Not Progressing

## 2024-01-24 NOTE — NURSING NOTE
"Patient came up to the nurses station c/o feeling dizzy. Staff instructed patient to sit down so that VS could be obtained. Patient refusing to leave from the nurses station and began banging on the nurses station window as well as threatening writer that he will \"slap the shit\" out of them. Security called to the unit at that time. Patient escorted back to his room. Was given scheduled Thorazine 100 mg po approximately 15 minutes prior to incident. Patient was directed to stay in his room at that time, which he did without incident. Also allowed VS to be obtained. /86, .  "

## 2024-01-25 PROCEDURE — 99232 SBSQ HOSP IP/OBS MODERATE 35: CPT | Performed by: STUDENT IN AN ORGANIZED HEALTH CARE EDUCATION/TRAINING PROGRAM

## 2024-01-25 RX ORDER — WATER 10 ML/10ML
INJECTION INTRAMUSCULAR; INTRAVENOUS; SUBCUTANEOUS
Status: COMPLETED
Start: 2024-01-25 | End: 2024-01-25

## 2024-01-25 RX ORDER — CLONAZEPAM 0.5 MG/1
0.5 TABLET ORAL ONCE
Status: COMPLETED | OUTPATIENT
Start: 2024-01-25 | End: 2024-01-25

## 2024-01-25 RX ORDER — CLONAZEPAM 1 MG/1
1 TABLET ORAL DAILY
Status: DISCONTINUED | OUTPATIENT
Start: 2024-01-26 | End: 2024-02-07 | Stop reason: HOSPADM

## 2024-01-25 RX ADMIN — DESMOPRESSIN ACETATE 0.4 MG: 0.1 TABLET ORAL at 21:04

## 2024-01-25 RX ADMIN — CLONAZEPAM 0.5 MG: 0.5 TABLET ORAL at 10:36

## 2024-01-25 RX ADMIN — OLANZAPINE 10 MG: 10 INJECTION, POWDER, LYOPHILIZED, FOR SOLUTION INTRAMUSCULAR at 17:08

## 2024-01-25 RX ADMIN — CLONIDINE HYDROCHLORIDE 0.2 MG: 0.1 TABLET ORAL at 16:02

## 2024-01-25 RX ADMIN — CHLORPROMAZINE HYDROCHLORIDE 150 MG: 100 TABLET, FILM COATED ORAL at 21:04

## 2024-01-25 RX ADMIN — WATER 2 ML: 1 INJECTION INTRAMUSCULAR; INTRAVENOUS; SUBCUTANEOUS at 17:09

## 2024-01-25 RX ADMIN — CLONAZEPAM 0.5 MG: 0.5 TABLET ORAL at 14:11

## 2024-01-25 RX ADMIN — CLONIDINE HYDROCHLORIDE 0.2 MG: 0.1 TABLET ORAL at 21:13

## 2024-01-25 RX ADMIN — METOPROLOL SUCCINATE 50 MG: 50 TABLET, EXTENDED RELEASE ORAL at 10:36

## 2024-01-25 RX ADMIN — CLONIDINE HYDROCHLORIDE 0.2 MG: 0.1 TABLET ORAL at 10:36

## 2024-01-25 RX ADMIN — PALIPERIDONE 12 MG: 6 TABLET, EXTENDED RELEASE ORAL at 10:36

## 2024-01-25 RX ADMIN — CHLORPROMAZINE HYDROCHLORIDE 100 MG: 100 TABLET, FILM COATED ORAL at 14:11

## 2024-01-25 RX ADMIN — DIVALPROEX SODIUM 1250 MG: 500 TABLET, EXTENDED RELEASE ORAL at 21:04

## 2024-01-25 RX ADMIN — CHLORPROMAZINE HYDROCHLORIDE 100 MG: 100 TABLET, FILM COATED ORAL at 10:36

## 2024-01-25 NOTE — NURSING NOTE
Patient incontinent of urine upon waking at the start of the shift. Showered, bedding changed. Continues to require redirection; poor boundaries, intrusive. Again, attempting to swat at writers hand during medication pass. Scant, mumbled speech. Compliant with medications but did refuse scheduled Miralax.

## 2024-01-25 NOTE — PROGRESS NOTES
01/25/24 0849   Team Meeting   Meeting Type Daily Rounds   Team Members Present   Team Members Present Physician;Nurse;   Physician Team Member Constanza   [facility]   Care Management Team Member Carol   Patient/Family Present   Patient Present No   Patient's Family Present No     Pt visible on the unit. Yelling, cursing at staff in the morning. PRN Zyprexa and Atarax given, slight decrease in agitation. Continuing to mumble negative comments about staff. Poor boundaries, requiring frequent redirection. Refusing HS medications, posturing at staff, agitated, threatening, security present on the unit, received IM Zyprexa. Discussed increase in Klonopin. Discharge to be determined.

## 2024-01-25 NOTE — PROGRESS NOTES
Progress Note - Behavioral Health   Manuel Back 18 y.o. male MRN: [id number]  Unit/Bed#: [facility] 340-01 Encounter: [id number]    Assessment/Plan   Principal Problem:    Mood disorder (HCC)  Active Problems:    Urinary incontinence, nocturnal enuresis    Medical clearance for psychiatric admission    Morbid obesity with body mass index (BMI) of 40.0 to 49.9 (HCC)    Essential hypertension    Vitamin D deficiency    Chronic pain of right knee      Recommended Treatment:      No psychopharmacologic changes necessary at this time; will continue to assess for further optimization.  Continue with current medications:  Thorazine 100 mg twice daily, 150 mg at bedtime for agitation and impulsivity.  Increase Klonopin to 1 mg daily for severe anxiety and agitation.  Clonidine 0.2 mg 3 times daily for severe agitation and impulsivity.  Vitamin B12 500 mcg daily for supplementation.  Depakote ER 1250 mg at bedtime for mood stabilization.  Vitamin D2 50,000 units q. weekly for supplementation.  Invega 12 mg daily for psychosis and mood.  MiraLAX 17 g daily for standing bowel regimen.   Continue with group therapy, milieu therapy and occupational therapy.    Continue frequent safety checks and vitals per unit protocol.  Continue with [facility] medical management as indicated  Continue coordinating with case management regarding disposition    Legal Status: 201  Disposition: To be determined, coordinating with case management    Case discussed with treatment team.  Risks, benefits and possible side effects of Medications: Risks, benefits, and possible side effects of medications have been explained to the patient, who verbalizes understanding    ------------------------------------------------------------    Subjective: Patient's chart was reviewed, and patient's progress and plan was discussed with treatment team. Per nursing report, Manuel has been isolative at times, intermittently agitated, mumbling, poor boundaries,  "requiring frequent redirection but ultimately cooperative on the unit and compliant with medications. Last night the patient reported agitation and received PRN Zyprexa 10 mg IM, which was effective. Patient has remained in behavioral control for the last 24 hours. Last night patient was documented to have slept throughout the night.    Manuel was evaluated this morning for continuity of care. On examination, Manuel is disorganized, disheveled, intrusive, displaying poor boundaries and requiring frequent redirection but more open to talking and reporting dry skin on his hands and requesting petroleum jelly as well as most for his hair which is likely an improvement attending to ADLs though he continues to have periods of intermittent agitation throughout the day. He states his mood is \" fine.\" He reports sleeping well and his energy level today is adequate. His appetite has been good. He denies adverse effects from medications. He denies suicidal ideation, homicidal ideation, auditory hallucinations, and visual hallucinations but continues to appear as though he is actively responding to internal stimuli. His goals for today are to remain in behavioral control and medication compliant and continue working on his grooming and personal hygiene.    VS: Reviewed,  BMI 47.14, notably hypertensive at 176/73, tachycardic at 108,This may be due to periods of agitation, however, given patient's disorganized behavior should have low threshold for considering diagnosis of agitated catatonia, will be increasing Klonopin and considering further benzodiazepine therapy pending clinical response., otherwise within normal limits    Progress Toward Goals: Slow improvement    Psychiatric Review of Systems:  Behavior over the last 24 hours: improved  Sleep: normal  Appetite: adequate  Medication side effects: none verbalized  Medical ROS:  No cough, chest pain, nausea, vomiting, diarrhea, all other symptoms are negative.    Vital " "signs in last 24 hours:  Temp:  [98.2 °F (36.8 °C)] 98.2 °F (36.8 °C)  HR:  [] 108  Resp:  [18] 18  BP: (106-176)/(51-92) 176/73    Mental Status Exam:    Appearance:  alert, improving eye contact, appears stated age, hospital attire dressed, disheveled, obese, and bearded   Behavior:  calm, guarded, and sitting comfortably   Speech:  delayed initiation, slow, soft, and incoherent at times   Mood:  \"Fine\"   Affect:  blunted, irritable edge   Thought Process:  disorganized   Associations: concrete associations   Thought Content:  paranoid ideation, no delusional content elicited   Perceptual Disturbances: denies current hallucinations and appears to be responding to internal stimuli   Risk Potential: Suicidal ideation - None at present  Homicidal ideation - None at present  Potential for aggression - Yes, due to acute psychosis   Sensorium:  oriented to person, place, time/date, and situation   Memory:  recent and remote memory grossly intact   Consciousness:  alert and awake   Attention/Concentration: attention span and concentration appear shorter than expected for age   Insight:  poor   Judgment: poor   Gait/Station: normal gait/station   Motor Activity: no abnormal movements     Current Medications:  Current Facility-Administered Medications   Medication Dose Route Frequency Provider Last Rate    aluminum-magnesium hydroxide-simethicone  30 mL Oral Q4H PRN Dimitris Grewal MD      benztropine  1 mg Intramuscular Q4H PRN Max 6/day Dimitris Grewal MD      benztropine  1 mg Oral Q4H PRN Max 6/day Dimitris Grewal MD      chlorproMAZINE  50 mg Intramuscular Q8H PRN Dimitris Grewal MD      And    diphenhydrAMINE  50 mg Intramuscular Q8H PRN Dimitris Grewal MD      chlorproMAZINE  100 mg Oral BID Darrell Le DO      chlorproMAZINE  150 mg Oral HS Phill Kamara MD      clonazePAM  0.5 mg Oral Daily Phill Kamara MD      cloNIDine  0.2 mg Oral TID Dimitris Grewal MD      desmopressin  0.4 mg Oral HS Phill " MD Anh      Diclofenac Sodium  2 g Topical 4x Daily PRN ABUNDIO Villalobos      hydrOXYzine HCL  50 mg Oral Q6H PRN Max 4/day Dimitris Grewal MD      Or    diphenhydrAMINE  50 mg Intramuscular Q6H PRN Dimitris Grewal MD      divalproex sodium  1,250 mg Oral HS Dimitris Grewal MD      ergocalciferol  50,000 Units Oral Weekly ABUNDIO Villalobos      glycerin-hypromellose-  1 drop Both Eyes Q3H PRN Dimitris Grewal MD      hydrOXYzine HCL  100 mg Oral Q6H PRN Max 4/day Dimitris Grewal MD      Or    LORazepam  2 mg Intramuscular Q6H PRN Dimitris Grewal MD      hydrOXYzine HCL  25 mg Oral Q6H PRN Max 4/day Dimitris Grewal MD      ibuprofen  400 mg Oral Q4H PRN Dimitris Grewal MD      ibuprofen  600 mg Oral Q6H PRN Dimitris Grewal MD      ibuprofen  800 mg Oral Q8H PRN Dimitris Grewal MD      melatonin  3 mg Oral HS PRN Dimitris Grewal MD      metoprolol succinate  50 mg Oral Daily Dimitris Grewal MD      OLANZapine  10 mg Oral Q3H PRN Max 3/day Dimitris Grewal MD      Or    OLANZapine  10 mg Intramuscular Q3H PRN Max 3/day Dimitris Grewal MD      OLANZapine  5 mg Oral Q3H PRN Max 6/day Dimitris Grewal MD      Or    OLANZapine  5 mg Intramuscular Q3H PRN Max 6/day Dimitris Grewal MD      OLANZapine  2.5 mg Oral Q3H PRN Max 8/day Dimitris Grewal MD      paliperidone  12 mg Oral Daily Phill Kamara MD      polyethylene glycol  17 g Oral Daily Dimitris Grewal MD      polyethylene glycol  17 g Oral Daily PRN Dimitris Grewal MD      propranolol  10 mg Oral Q8H PRN Dimitris Grewal MD      senna-docusate sodium  1 tablet Oral Daily PRN Dimitris Grewal MD      sterile water              Behavioral Health Medications: all current active meds have been reviewed. Changes as in plan section above.    Laboratory results:  I have personally reviewed all pertinent laboratory/tests results.   No results found for this or any previous visit (from the past 48 hour(s)).     This note has been  constructed using a voice recognition system. There may be translation, syntax, or grammatical errors. If you have any questions, please contact the dictating author.    Darrell Le, DO  Psychiatry Residency, PGY-2

## 2024-01-25 NOTE — PLAN OF CARE
Problem: SAFETY ADULT  Goal: Maintain or return to baseline ADL function  Description: INTERVENTIONS:  -  Assess patient's ability to carry out ADLs; assess patient's baseline for ADL function and identify physical deficits which impact ability to perform ADLs (bathing, care of mouth/teeth, toileting, grooming, dressing, etc.)  - Assess/evaluate cause of self-care deficits   - Assess range of motion  - Assess patient's mobility; develop plan if impaired  - Assess patient's need for assistive devices and provide as appropriate  - Encourage maximum independence but intervene and supervise when necessary  - Involve family in performance of ADLs  - Assess for home care needs following discharge   - Consider OT consult to assist with ADL evaluation and planning for discharge  - Provide patient education as appropriate  Outcome: Not Progressing  Goal: Maintains/Returns to pre admission functional level  Description: INTERVENTIONS:  - Perform AM-PAC 6 Click Basic Mobility/ Daily Activity assessment daily.  - Set and communicate daily mobility goal to care team and patient/family/caregiver.   - Collaborate with rehabilitation services on mobility goals if consulted  - Out of bed for toileting  - Record patient progress and toleration of activity level   Outcome: Not Progressing     Problem: DISCHARGE PLANNING  Goal: Discharge to home or other facility with appropriate resources  Description: INTERVENTIONS:  - Identify barriers to discharge w/patient and caregiver  - Arrange for needed discharge resources and transportation as appropriate  - Identify discharge learning needs (meds, wound care, etc.)  - Arrange for interpretive services to assist at discharge as needed  - Refer to Case Management Department for coordinating discharge planning if the patient needs post-hospital services based on physician/advanced practitioner order or complex needs related to functional status, cognitive ability, or social support  system  Outcome: Progressing     Problem: BEHAVIOR  Goal: Pt/Family maintain appropriate behavior and adhere to behavioral management agreement, if implemented  Description: INTERVENTIONS:  - Assess the family dynamic   - Encourage verbalization of thoughts and concerns in a socially appropriate manner  - Assess patient/family's coping skills and non-compliant behavior (including use of illegal substances).  - Utilize positive, consistent limit setting strategies supporting safety of patient, staff and others  - Initiate consult with Case Management, Spiritual Care or other ancillary services as appropriate  - If a patient's/visitor's behavior jeopardizes the safety of the patient, staff, or others, refer to organization procedure.   - Notify Security of behavior or suspected illegal substances which indicate the need for search of the patient and/or belongings  - Encourage participation in the decision making process about a behavioral management agreement; implement if patient meets criteria  Outcome: Not Progressing     Problem: DISCHARGE PLANNING - CARE MANAGEMENT  Goal: Discharge to post-acute care or home with appropriate resources  Description: INTERVENTIONS:  - Conduct assessment to determine patient/family and health care team treatment goals, and need for post-acute services based on payer coverage, community resources, and patient preferences, and barriers to discharge  - Address psychosocial, clinical, and financial barriers to discharge as identified in assessment in conjunction with the patient/family and health care team  - Arrange appropriate level of post-acute services according to patient’s   needs and preference and payer coverage in collaboration with the physician and health care team  - Communicate with and update the patient/family, physician, and health care team regarding progress on the discharge plan  - Arrange appropriate transportation to post-acute venues  Outcome: Progressing      Problem: Alteration in Thoughts and Perception  Goal: Verbalize thoughts and feelings  Description: Interventions:  - Promote a nonjudgmental and trusting relationship with the patient through active listening and therapeutic communication  - Assess patient's level of functioning, behavior and potential for risk  - Engage patient in 1 on 1 interactions  - Encourage patient to express fears, feelings, frustrations, and discuss symptoms    - [place] patient to reality, help patient recognize reality-based thinking   - Administer medications as ordered and assess for potential side effects  - Provide the patient education related to the signs and symptoms of the illness and desired effects of prescribed medications  Outcome: Progressing  Goal: Agree to be compliant with medication regime, as prescribed and report medication side effects  Description: Interventions:  - Offer appropriate PRN medication and supervise ingestion; conduct AIMS, as needed   Outcome: Not Progressing  Goal: Attend and participate in unit activities, including therapeutic, recreational, and educational groups  Description: Interventions:  -Encourage Visitation and family involvement in care  Outcome: Progressing  Goal: Recognize dysfunctional thoughts, communicate reality-based thoughts at the time of discharge  Description: Interventions:  - Provide medication and psycho-education to assist patient in compliance and developing insight into his/her illness   Outcome: Not Progressing     Problem: Ineffective Coping  Goal: Cooperates with admission process  Description: Interventions:   - Complete admission process  Outcome: Progressing  Goal: Identifies ineffective coping skills  Outcome: Not Progressing  Goal: Identifies healthy coping skills  Outcome: Not Progressing  Goal: Demonstrates healthy coping skills  Outcome: Not Progressing  Goal: Participates in unit activities  Description: Interventions:  - Provide therapeutic environment   -  Provide required programming   - Redirect inappropriate behaviors   Outcome: Progressing  Goal: Patient/Family participate in treatment and DC plans  Description: Interventions:  - Provide therapeutic environment  Outcome: Progressing  Goal: Patient/Family verbalizes awareness of resources  Outcome: Progressing  Goal: Understands least restrictive measures  Description: Interventions:  - Utilize least restrictive behavior  Outcome: Progressing  Goal: Free from restraint events  Description: - Utilize least restrictive measures   - Provide behavioral interventions   - Redirect inappropriate behaviors   Outcome: Progressing     Problem: Risk for Violence/Aggression Toward Others  Goal: Refrain from harming others  Outcome: Progressing  Goal: Refrain from destructive acts on the environment or property  Outcome: Progressing  Goal: Control angry outbursts  Description: Interventions:  - Monitor patient closely, per order  - Ensure early verbal de-escalation  - Monitor prn medication needs  - Set reasonable/therapeutic limits, outline behavioral expectations, and consequences   - Provide a non-threatening milieu, utilizing the least restrictive interventions   Outcome: Not Progressing  Goal: Identify appropriate positive anger management techniques  Description: Interventions:  - Offer anger management and coping skills groups   - Staff will provide positive feedback for appropriate anger control  Outcome: Not Progressing

## 2024-01-25 NOTE — NURSING NOTE
Pt calm and cooperative, Pt isolated in room at times but is also in the small TV room isolated watching Monty, shows slight agitation at times when he doesn't get his way, Pt mumbles and is hard to understand, Pt has poor boundaries and needs frequent redirection, Pt denied all pain anxiety and depression, Pt denies HI and SI , Pt denied AH and VH, Q7 min safety checks maintained

## 2024-01-25 NOTE — NURSING NOTE
"Pt refused to take his meds, Pt was posturing at staff, verbally abusive and aggressive towards staff, Pt staed several times \"fuck you get out of my face and space\" Pt was given multiple chances to take his meds PO but pt aggressively refused, security called and pt was taken top his room and PRN IM Zyprexa 10mg and Benadryl 50mg was given at 2030, will monitor and reassess    Pt reassessed at 2130 and PRNs were effective, Pt apologized to staff multiple times and is calm and cooperative   "

## 2024-01-26 LAB
ALBUMIN SERPL BCP-MCNC: 3.9 G/DL (ref 3.5–5)
ALP SERPL-CCNC: 118 U/L (ref 34–104)
ALT SERPL W P-5'-P-CCNC: 30 U/L (ref 7–52)
ANION GAP SERPL CALCULATED.3IONS-SCNC: 9 MMOL/L
AST SERPL W P-5'-P-CCNC: 27 U/L (ref 13–39)
BILIRUB SERPL-MCNC: 0.24 MG/DL (ref 0.2–1)
BUN SERPL-MCNC: 9 MG/DL (ref 5–25)
CALCIUM SERPL-MCNC: 9.5 MG/DL (ref 8.4–10.2)
CHLORIDE SERPL-SCNC: 96 MMOL/L (ref 96–108)
CO2 SERPL-SCNC: 28 MMOL/L (ref 21–32)
CREAT SERPL-MCNC: 0.97 MG/DL (ref 0.6–1.3)
GFR SERPL CREATININE-BSD FRML MDRD: 112 ML/MIN/1.73SQ M
GLUCOSE SERPL-MCNC: 301 MG/DL (ref 65–140)
POTASSIUM SERPL-SCNC: 4.2 MMOL/L (ref 3.5–5.3)
PROT SERPL-MCNC: 7.1 G/DL (ref 6.4–8.4)
SODIUM SERPL-SCNC: 133 MMOL/L (ref 135–147)
VALPROATE SERPL-MCNC: 70 UG/ML (ref 50–100)

## 2024-01-26 PROCEDURE — 80053 COMPREHEN METABOLIC PANEL: CPT

## 2024-01-26 PROCEDURE — 99232 SBSQ HOSP IP/OBS MODERATE 35: CPT | Performed by: STUDENT IN AN ORGANIZED HEALTH CARE EDUCATION/TRAINING PROGRAM

## 2024-01-26 PROCEDURE — 80164 ASSAY DIPROPYLACETIC ACD TOT: CPT

## 2024-01-26 RX ORDER — OLANZAPINE 5 MG/1
5 TABLET ORAL
Status: DISCONTINUED | OUTPATIENT
Start: 2024-01-26 | End: 2024-01-29

## 2024-01-26 RX ORDER — PALIPERIDONE 9 MG/1
9 TABLET, EXTENDED RELEASE ORAL DAILY
Status: DISCONTINUED | OUTPATIENT
Start: 2024-01-27 | End: 2024-01-28

## 2024-01-26 RX ADMIN — CLONIDINE HYDROCHLORIDE 0.2 MG: 0.1 TABLET ORAL at 21:20

## 2024-01-26 RX ADMIN — CHLORPROMAZINE HYDROCHLORIDE 100 MG: 100 TABLET, FILM COATED ORAL at 13:27

## 2024-01-26 RX ADMIN — CHLORPROMAZINE HYDROCHLORIDE 100 MG: 100 TABLET, FILM COATED ORAL at 10:30

## 2024-01-26 RX ADMIN — CHLORPROMAZINE HYDROCHLORIDE 150 MG: 100 TABLET, FILM COATED ORAL at 21:20

## 2024-01-26 RX ADMIN — OLANZAPINE 5 MG: 5 TABLET, FILM COATED ORAL at 21:22

## 2024-01-26 RX ADMIN — CLONIDINE HYDROCHLORIDE 0.2 MG: 0.1 TABLET ORAL at 16:13

## 2024-01-26 RX ADMIN — CLONAZEPAM 1 MG: 1 TABLET ORAL at 10:30

## 2024-01-26 RX ADMIN — POLYETHYLENE GLYCOL 3350 17 G: 17 POWDER, FOR SOLUTION ORAL at 10:34

## 2024-01-26 RX ADMIN — CLONIDINE HYDROCHLORIDE 0.2 MG: 0.1 TABLET ORAL at 10:31

## 2024-01-26 RX ADMIN — DIVALPROEX SODIUM 1250 MG: 500 TABLET, EXTENDED RELEASE ORAL at 21:20

## 2024-01-26 RX ADMIN — PALIPERIDONE 12 MG: 6 TABLET, EXTENDED RELEASE ORAL at 10:30

## 2024-01-26 RX ADMIN — DESMOPRESSIN ACETATE 0.4 MG: 0.1 TABLET ORAL at 21:19

## 2024-01-26 RX ADMIN — METOPROLOL SUCCINATE 50 MG: 50 TABLET, EXTENDED RELEASE ORAL at 10:31

## 2024-01-26 NOTE — PLAN OF CARE
Problem: Ineffective Coping  Goal: Cooperates with admission process  Description: Interventions:   - Complete admission process  Outcome: Progressing  Goal: Participates in unit activities  Description: Interventions:  - Provide therapeutic environment   - Provide required programming   - Redirect inappropriate behaviors   Outcome: Progressing   Patient completed his admission self assessment. He does attend groups but is internally stimulated which results in him being intrusive and speaking out of turn.

## 2024-01-26 NOTE — PROGRESS NOTES
01/26/24 0845   Team Meeting   Meeting Type Daily Rounds   Team Members Present   Team Members Present Physician;Nurse;   Physician Team Member Constanza   [facility] Carol   Patient/Family Present   Patient Present No   Patient's Family Present No     Pt threatening staff, posturing, not receptive to redirection. Security present and escorted pt back to room. PRN Zyprexa. Med/meal compliant. Discharge to be determined.

## 2024-01-26 NOTE — NURSING NOTE
Reassessed pt @1808, pt is still irritable but able to stay in behavioral control. PRN of IM 10mg zyprexa moderately effective.

## 2024-01-26 NOTE — PLAN OF CARE
Problem: SAFETY ADULT  Goal: Maintain or return to baseline ADL function  Description: INTERVENTIONS:  -  Assess patient's ability to carry out ADLs; assess patient's baseline for ADL function and identify physical deficits which impact ability to perform ADLs (bathing, care of mouth/teeth, toileting, grooming, dressing, etc.)  - Assess/evaluate cause of self-care deficits   - Assess range of motion  - Assess patient's mobility; develop plan if impaired  - Assess patient's need for assistive devices and provide as appropriate  - Encourage maximum independence but intervene and supervise when necessary  - Involve family in performance of ADLs  - Assess for home care needs following discharge   - Consider OT consult to assist with ADL evaluation and planning for discharge  - Provide patient education as appropriate  Outcome: Not Progressing  Goal: Maintains/Returns to pre admission functional level  Description: INTERVENTIONS:  - Perform AM-PAC 6 Click Basic Mobility/ Daily Activity assessment daily.  - Set and communicate daily mobility goal to care team and patient/family/caregiver.   - Collaborate with rehabilitation services on mobility goals if consulted  - Perform Range of Motion  times a day.  - Reposition patient every  hours.  - Dangle patient  times a day  - Stand patient  times a day  - Ambulate patient  times a day  - Out of bed to chair  times a day   - Out of bed for meals  times a day  - Out of bed for toileting  - Record patient progress and toleration of activity level   Outcome: Not Progressing     Problem: BEHAVIOR  Goal: Pt/Family maintain appropriate behavior and adhere to behavioral management agreement, if implemented  Description: INTERVENTIONS:  - Assess the family dynamic   - Encourage verbalization of thoughts and concerns in a socially appropriate manner  - Assess patient/family's coping skills and non-compliant behavior (including use of illegal substances).  - Utilize positive,  consistent limit setting strategies supporting safety of patient, staff and others  - Initiate consult with Case Management, Spiritual Care or other ancillary services as appropriate  - If a patient's/visitor's behavior jeopardizes the safety of the patient, staff, or others, refer to organization procedure.   - Notify Security of behavior or suspected illegal substances which indicate the need for search of the patient and/or belongings  - Encourage participation in the decision making process about a behavioral management agreement; implement if patient meets criteria  Outcome: Not Progressing     Problem: Alteration in Thoughts and Perception  Goal: Verbalize thoughts and feelings  Description: Interventions:  - Promote a nonjudgmental and trusting relationship with the patient through active listening and therapeutic communication  - Assess patient's level of functioning, behavior and potential for risk  - Engage patient in 1 on 1 interactions  - Encourage patient to express fears, feelings, frustrations, and discuss symptoms    - [place] patient to reality, help patient recognize reality-based thinking   - Administer medications as ordered and assess for potential side effects  - Provide the patient education related to the signs and symptoms of the illness and desired effects of prescribed medications  Outcome: Not Progressing     Problem: Ineffective Coping  Goal: Participates in unit activities  Description: Interventions:  - Provide therapeutic environment   - Provide required programming   - Redirect inappropriate behaviors   Outcome: Not Progressing  Goal: Understands least restrictive measures  Description: Interventions:  - Utilize least restrictive behavior  Outcome: Not Progressing

## 2024-01-26 NOTE — NURSING NOTE
About the unit. Continues to mumble when speaking. Labile, poor boundaries. Irritable. No redirection needed as of this time. Compliant with medications. Incontinent of urine upon waking up this morning. Showered with the assistance of staff. Bedding changed.

## 2024-01-26 NOTE — NURSING NOTE
"Pt is threatening staff stating, \"I'll slap the shit out of you, what are you going to do about it.\" Pt was posturing at staff as well, pt refused to go back to room. Security was called and pt was escorted back to his room. Broset Score 3. PRN of 10mg zyprexa IM given @ 1708  "

## 2024-01-26 NOTE — PROGRESS NOTES
Progress Note - Behavioral Health   Manuel Back 18 y.o. male MRN: [id number]  Unit/Bed#: U 340-01 Encounter: [id number]    Behavior over the last 24 hours:  unchanged.   Sleep: hypersomnia  Appetite: normal  Medication side effects: No  ROS: sedation    Mental Status Evaluation:  Appearance:  In safety scrub in bed   Behavior:  uncooperative, somnolent   Speech:  somnolent   Mood:  somnolent   Affect:  normal and somnolent   Thought Process:  concrete   Thought Content:  concrete   Perceptual Disturbances: Unable to obtain due to patient factors   Risk Potential: Unable to assess due to patent factors   Sensorium:  person, place, and situation   Cognition:  recent and remote memory: unable to assess due to lack of cooperation   Consciousness:  somnolent     Attention: attention span appeared shorter than expected for age   Insight:  limited   Judgment: limited   Gait/Station: In bed   Motor Activity: no abnormal movements         Progress Toward Goals:  Per RN report he is incontinent at times with poor boundaries. Per primary team he is being cross tapered from invega to zypexa and was scheduled to get his first zyprexa last evening. Asleep this AM.  Total Valproic acid 70     Lab Results   Component Value Date    SODIUM 133 (L) 01/26/2024    K 4.2 01/26/2024    CL 96 01/26/2024    CO2 28 01/26/2024    AGAP 9 01/26/2024    BUN 9 01/26/2024    CREATININE 0.97 01/26/2024    GLUC 301 (H) 01/26/2024    GLUF 101 (H) 01/20/2024    CALCIUM 9.5 01/26/2024    AST 27 01/26/2024    ALT 30 01/26/2024    ALKPHOS 118 (H) 01/26/2024    TP 7.1 01/26/2024    TBILI 0.24 01/26/2024    EGFR 112 01/26/2024     Lab Results   Component Value Date    VALPROATE 13.4 08/11/2023         Assessment/Plan   Principal Problem:    Mood disorder (HCC)  Active Problems:    Urinary incontinence, nocturnal enuresis    Medical clearance for psychiatric admission    Morbid obesity with body mass index (BMI) of 40.0 to 49.9 (HCC)     Essential hypertension    Vitamin D deficiency    Chronic pain of right knee      Recommended Treatment: Continue with group therapy, milieu therapy and occupational therapy.      Risks, benefits and possible side effects of Medications:   Patient does not verbalize understanding at this time and will require further explanation.      Medications:       all current active meds have been reviewed, continue current psychiatric medications, and current meds:   Current Facility-Administered Medications   Medication Dose Route Frequency    aluminum-magnesium hydroxide-simethicone (MAALOX) oral suspension 30 mL  30 mL Oral Q4H PRN    benztropine (COGENTIN) injection 1 mg  1 mg Intramuscular Q4H PRN Max 6/day    benztropine (COGENTIN) tablet 1 mg  1 mg Oral Q4H PRN Max 6/day    chlorproMAZINE (THORAZINE) injection SOLN 50 mg  50 mg Intramuscular Q8H PRN    And    diphenhydrAMINE (BENADRYL) injection 50 mg  50 mg Intramuscular Q8H PRN    chlorproMAZINE (THORAZINE) tablet 100 mg  100 mg Oral BID    chlorproMAZINE (THORAZINE) tablet 150 mg  150 mg Oral HS    clonazePAM (KlonoPIN) tablet 1 mg  1 mg Oral Daily    cloNIDine (CATAPRES) tablet 0.2 mg  0.2 mg Oral TID    desmopressin (DDAVP) tablet 0.4 mg  0.4 mg Oral HS    Diclofenac Sodium (VOLTAREN) 1 % topical gel 2 g  2 g Topical 4x Daily PRN    hydrOXYzine HCL (ATARAX) tablet 50 mg  50 mg Oral Q6H PRN Max 4/day    Or    diphenhydrAMINE (BENADRYL) injection 50 mg  50 mg Intramuscular Q6H PRN    divalproex sodium (DEPAKOTE ER) 24 hr tablet 1,250 mg  1,250 mg Oral HS    ergocalciferol (VITAMIN D2) capsule 50,000 Units  50,000 Units Oral Weekly    glycerin-hypromellose- (ARTIFICIAL TEARS) ophthalmic solution 1 drop  1 drop Both Eyes Q3H PRN    hydrOXYzine HCL (ATARAX) tablet 100 mg  100 mg Oral Q6H PRN Max 4/day    Or    LORazepam (ATIVAN) injection 2 mg  2 mg Intramuscular Q6H PRN    hydrOXYzine HCL (ATARAX) tablet 25 mg  25 mg Oral Q6H PRN Max 4/day    ibuprofen (MOTRIN)  tablet 400 mg  400 mg Oral Q4H PRN    ibuprofen (MOTRIN) tablet 600 mg  600 mg Oral Q6H PRN    ibuprofen (MOTRIN) tablet 800 mg  800 mg Oral Q8H PRN    melatonin tablet 3 mg  3 mg Oral HS PRN    metoprolol succinate (TOPROL-XL) 24 hr tablet 50 mg  50 mg Oral Daily    OLANZapine (ZyPREXA) tablet 10 mg  10 mg Oral Q3H PRN Max 3/day    Or    OLANZapine (ZyPREXA) IM injection 10 mg  10 mg Intramuscular Q3H PRN Max 3/day    OLANZapine (ZyPREXA) tablet 5 mg  5 mg Oral Q3H PRN Max 6/day    Or    OLANZapine (ZyPREXA) IM injection 5 mg  5 mg Intramuscular Q3H PRN Max 6/day    OLANZapine (ZyPREXA) tablet 2.5 mg  2.5 mg Oral Q3H PRN Max 8/day    OLANZapine (ZyPREXA) tablet 5 mg  5 mg Oral HS    paliperidone (INVEGA) 24 hr tablet 9 mg  9 mg Oral Daily    polyethylene glycol (MIRALAX) packet 17 g  17 g Oral Daily    polyethylene glycol (MIRALAX) packet 17 g  17 g Oral Daily PRN    propranolol (INDERAL) tablet 10 mg  10 mg Oral Q8H PRN    senna-docusate sodium (SENOKOT S) 8.6-50 mg per tablet 1 tablet  1 tablet Oral Daily PRN    sterile water injection **ADS Override Pull**       .    Labs:  Reviewed  Admission on 01/19/2024   Component Date Value    Clarity, UA 01/20/2024 Clear     Color, UA 01/20/2024 Straw     Specific [place], UA 01/20/2024 1.015     pH, UA 01/20/2024 8.0     Glucose, UA 01/20/2024 Negative     Ketones, UA 01/20/2024 Negative     Occult Blood, UA 01/20/2024 Negative     Protein, UA 01/20/2024 Negative     Nitrite, UA 01/20/2024 Negative     Bilirubin, UA 01/20/2024 Negative     Leukocytes, UA 01/20/2024 Negative     WBC, UA 01/20/2024 0-1 (A)     RBC, UA 01/20/2024 0-1 (A)     Bacteria, UA 01/20/2024 None Seen     Epithelial Cells 01/20/2024 Occasional     UROBILINOGEN UA 01/20/2024 Negative     Sodium 01/20/2024 138     Potassium 01/20/2024 4.1     Chloride 01/20/2024 103     CO2 01/20/2024 27     ANION GAP 01/20/2024 8     BUN 01/20/2024 10     Creatinine 01/20/2024 0.93     Glucose 01/20/2024 101      Glucose, Fasting 01/20/2024 101 (H)     Calcium 01/20/2024 9.3     AST 01/20/2024 35     ALT 01/20/2024 35     Alkaline Phosphatase 01/20/2024 99     Total Protein 01/20/2024 6.8     Albumin 01/20/2024 3.8     Total Bilirubin 01/20/2024 0.27     eGFR 01/20/2024 118     WBC 01/20/2024 10.00     RBC 01/20/2024 4.97     Hemoglobin 01/20/2024 14.0     Hematocrit 01/20/2024 42.8     MCV 01/20/2024 86     MCH 01/20/2024 28.2     MCHC 01/20/2024 32.7     RDW 01/20/2024 13.4     MPV 01/20/2024 10.4     Platelets 01/20/2024 243     nRBC 01/20/2024 0     Neutrophils Relative 01/20/2024 60     Immat GRANS % 01/20/2024 0     Lymphocytes Relative 01/20/2024 29     Monocytes Relative 01/20/2024 10     Eosinophils Relative 01/20/2024 1     Basophils Relative 01/20/2024 0     Neutrophils Absolute 01/20/2024 5.95     Immature Grans Absolute 01/20/2024 0.03     Lymphocytes Absolute 01/20/2024 2.86     Monocytes Absolute 01/20/2024 1.02     Eosinophils Absolute 01/20/2024 0.11     Basophils Absolute 01/20/2024 0.03     TSH 3RD GENERATON 01/20/2024 3.212     Vit D, 25-Hydroxy 01/20/2024 21.7 (L)     Cholesterol 01/20/2024 105     Triglycerides 01/20/2024 245 (H)     HDL, Direct 01/20/2024 27 (L)     LDL Calculated 01/20/2024 29     Non-HDL-Chol (CHOL-HDL) 01/20/2024 78     Syphilis Total Antibody 01/20/2024 Non-reactive     Vitamin B-12 01/20/2024 1,238 (H)     Ventricular Rate 01/20/2024 88     Atrial Rate 01/20/2024 88     OK Interval 01/20/2024 158     QRSD Interval 01/20/2024 78     QT Interval 01/20/2024 356     QTC Interval 01/20/2024 430     P Axis 01/20/2024 77     QRS Axis 01/20/2024 58     T Wave [place] 01/20/2024 49     Ventricular Rate 01/20/2024 86     Atrial Rate 01/20/2024 86     OK Interval 01/20/2024 156     QRSD Interval 01/20/2024 84     QT Interval 01/20/2024 362     QTC Interval 01/20/2024 433     P Axis 01/20/2024 72     QRS Axis 01/20/2024 48     T Wave Axis 01/20/2024 35        Counseling / Coordination of  Care  Total floor / unit time spent today 15 minutes. Greater than 50% of total time was spent with the patient and / or family counseling and / or coordination of care. A description of the counseling / coordination of care: medications, treatment progress and treatment plan reviewed with patient.

## 2024-01-26 NOTE — NURSING NOTE
Patient noted to be out of room on unit, this nurse redirected patient for intrusive behavior and poor boundaries. Patient compliant with medication with encouragement. Patient denies SI/HI, offers no complaints of pain or discomfort at this time. Patient difficult to understand at time due to mumbling and soft speech.

## 2024-01-26 NOTE — PROGRESS NOTES
Progress Note - Behavioral Health   Manuel Back 18 y.o. male MRN: [id number]  Unit/Bed#: [facility] 340-01 Encounter: [id number]    Assessment/Plan   Principal Problem:    Mood disorder (HCC)  Active Problems:    Urinary incontinence, nocturnal enuresis    Medical clearance for psychiatric admission    Morbid obesity with body mass index (BMI) of 40.0 to 49.9 (HCC)    Essential hypertension    Vitamin D deficiency    Chronic pain of right knee      Recommended Treatment:      Will make the following medication changes:  Continue with current medications:  Thorazine 100 mg twice daily, 150 mg at bedtime for agitation and impulsivity.  Klonopin 1 mg daily for severe anxiety and agitation.  Clonidine 0.2 mg 3 times daily for severe agitation and impulsivity.  Vitamin B12 500 mcg daily for supplementation.  Depakote ER 1250 mg at bedtime for mood stabilization.  VPA level and CMP for 1/26/2024 prior to p.m. dose.  Vitamin D2 50,000 units q. weekly for supplementation.  Decrease Invega to 9 mg daily for psychosis and mood.  Start Zyprexa 5 mg at bedtime for psychosis and mood.  MiraLAX 17 g daily for standing bowel regimen.  Continue with group therapy, milieu therapy and occupational therapy.    Continue frequent safety checks and vitals per unit protocol.  Continue with SLIM medical management as indicated  Continue coordinating with case management regarding disposition    Legal Status: 201  Disposition: To be determined, coordinating with case management    Case discussed with treatment team.  Risks, benefits and possible side effects of Medications: Risks, benefits, and possible side effects of medications have been explained to the patient, who verbalizes understanding    ------------------------------------------------------------    Subjective: Patient's chart was reviewed, and patient's progress and plan was discussed with treatment team. Per nursing report, Manuel has been threatening, requiring  "redirection, agitated requiring 10 mg Zyprexa IM as needed which was effective and ultimately cooperative on the unit and compliant with medications. Patient has remained in behavioral control for the last 24 hours. Last night patient was documented to have slept throughout the night.    Manuel was evaluated this morning for continuity of care. On examination, Manuel is disorganized with irritable edge, internally responding, and easily agitated. He states his mood is \" fine.\" He reports sleeping well and his energy level today is adequate. His appetite has been good. He denies adverse effects from medications but agrees that he finds as needed Zyprexa helpful for his agitation, he is amenable to cross tapering from Invega to Zyprexa. He denies suicidal ideation, homicidal ideation, auditory hallucinations, and visual hallucinations but appears to be actively responding to internal stimuli. His goals for today are to remain in behavioral control and medication compliant.    VS: Reviewed,  BMI 47.14, hypertension seems to have resolved, may be correlated with episodes of agitation, slightly tachycardic at 108, otherwise within normal limits    Progress Toward Goals: Slow improvement    Psychiatric Review of Systems:  Behavior over the last 24 hours: unchanged  Sleep: normal  Appetite: adequate  Medication side effects: none verbalized  Medical ROS:  No cough, chest pain, nausea, vomiting, diarrhea, all other symptoms are negative.    Vital signs in last 24 hours:  Temp:  [98.5 °F (36.9 °C)-98.6 °F (37 °C)] 98.6 °F (37 °C)  HR:  [104-113] 104  Resp:  [18-20] 19  BP: (132-176)/(73-87) 135/84    Mental Status Exam:    Appearance:  alert, furtive eye contact, appears stated age, hospital attire dressed, disheveled, obese, bearded, and smiling   Behavior:  limited cooperativity and guarded   Speech:  delayed initiation, slow, soft, scant, and incoherent   Mood:  \"Fine\"   Affect:  blunted, irritable edge   Thought " Process:  disorganized   Associations: concrete associations   Thought Content:  paranoid ideation, no overt delusional content elicited   Perceptual Disturbances: denies current hallucinations and appears to be responding to internal stimuli   Risk Potential: Suicidal ideation - None at present  Homicidal ideation - None at present  Potential for aggression - Yes, due to acute psychosis   Sensorium:  oriented to person and place   Memory:  recent and remote memory: unable to assess due to lack of cooperation   Consciousness:  alert and awake   Attention/Concentration: attention span and concentration appear shorter than expected for age   Insight:  limited   Judgment: limited   Gait/Station: normal gait/station   Motor Activity: no abnormal movements     Current Medications:  Current Facility-Administered Medications   Medication Dose Route Frequency Provider Last Rate    aluminum-magnesium hydroxide-simethicone  30 mL Oral Q4H PRN Dimitris Grewal MD      benztropine  1 mg Intramuscular Q4H PRN Max 6/day Dimitris Grewal MD      benztropine  1 mg Oral Q4H PRN Max 6/day Dimitris Grewal MD      chlorproMAZINE  50 mg Intramuscular Q8H PRN Dimitris Grewal MD      And    diphenhydrAMINE  50 mg Intramuscular Q8H PRN Dimitris Grewal MD      chlorproMAZINE  100 mg Oral BID Darrell Le, DO      chlorproMAZINE  150 mg Oral HS Phill Kamara MD      clonazePAM  1 mg Oral Daily Darrell Le, DO      cloNIDine  0.2 mg Oral TID Dimitris Grewal MD      desmopressin  0.4 mg Oral HS Phill Kamara MD      Diclofenac Sodium  2 g Topical 4x Daily PRN ABUNDIO Villalobos      hydrOXYzine HCL  50 mg Oral Q6H PRN Max 4/day Dimitris Grewal MD      Or    diphenhydrAMINE  50 mg Intramuscular Q6H PRN Dimitris Grewal MD      divalproex sodium  1,250 mg Oral HS Dimitris Grewal MD      ergocalciferol  50,000 Units Oral Weekly ABUNDIO Villalobos      glycerin-hypromellose-  1 drop Both Eyes Q3H PRN Dimitris CASH  MD Carmina      hydrOXYzine HCL  100 mg Oral Q6H PRN Max 4/day Dimitris Grewal MD      Or    LORazepam  2 mg Intramuscular Q6H PRN Dimitris Grewal MD      hydrOXYzine HCL  25 mg Oral Q6H PRN Max 4/day Dimitris Grewal MD      ibuprofen  400 mg Oral Q4H PRN Dimitris Grewal MD      ibuprofen  600 mg Oral Q6H PRN Dimitris Grewal MD      ibuprofen  800 mg Oral Q8H PRN Dimitris Grewal MD      melatonin  3 mg Oral HS PRN Dimitris Grewal MD      metoprolol succinate  50 mg Oral Daily Dimitris Grewal MD      OLANZapine  10 mg Oral Q3H PRN Max 3/day Dimitris Grewal MD      Or    OLANZapine  10 mg Intramuscular Q3H PRN Max 3/day Dimitris Grewal MD      OLANZapine  5 mg Oral Q3H PRN Max 6/day Dimitris Grewal MD      Or    OLANZapine  5 mg Intramuscular Q3H PRN Max 6/day Dimitris Grewal MD      OLANZapine  2.5 mg Oral Q3H PRN Max 8/day Dimitris Grewal MD      paliperidone  12 mg Oral Daily Phill Kamara MD      polyethylene glycol  17 g Oral Daily Dimitris Grewal MD      polyethylene glycol  17 g Oral Daily PRN Dimitris Grewal MD      propranolol  10 mg Oral Q8H PRN Dimitris Grewal MD      senna-docusate sodium  1 tablet Oral Daily PRN Dimitris Grewal MD      sterile water              Behavioral Health Medications: all current active meds have been reviewed. Changes as in plan section above.    Laboratory results:  I have personally reviewed all pertinent laboratory/tests results.   No results found for this or any previous visit (from the past 48 hour(s)).     This note has been constructed using a voice recognition system. There may be translation, syntax, or grammatical errors. If you have any questions, please contact the dictating author.    Darrell Le, DO  Psychiatry Residency, PGY-2

## 2024-01-26 NOTE — NURSING NOTE
Patient noted to be less agitated at this time able to remain in his room at this time . Zyprexa 10 mg IM prn effective.

## 2024-01-27 PROCEDURE — 99232 SBSQ HOSP IP/OBS MODERATE 35: CPT | Performed by: PSYCHIATRY & NEUROLOGY

## 2024-01-27 RX ADMIN — OLANZAPINE 5 MG: 5 TABLET, FILM COATED ORAL at 21:22

## 2024-01-27 RX ADMIN — PALIPERIDONE 9 MG: 9 TABLET, EXTENDED RELEASE ORAL at 08:30

## 2024-01-27 RX ADMIN — MELATONIN TAB 3 MG 3 MG: 3 TAB at 21:23

## 2024-01-27 RX ADMIN — METOPROLOL SUCCINATE 50 MG: 50 TABLET, EXTENDED RELEASE ORAL at 08:41

## 2024-01-27 RX ADMIN — CHLORPROMAZINE HYDROCHLORIDE 100 MG: 100 TABLET, FILM COATED ORAL at 08:30

## 2024-01-27 RX ADMIN — CLONIDINE HYDROCHLORIDE 0.2 MG: 0.1 TABLET ORAL at 15:57

## 2024-01-27 RX ADMIN — CHLORPROMAZINE HYDROCHLORIDE 150 MG: 100 TABLET, FILM COATED ORAL at 21:21

## 2024-01-27 RX ADMIN — POLYETHYLENE GLYCOL 3350 17 G: 17 POWDER, FOR SOLUTION ORAL at 08:42

## 2024-01-27 RX ADMIN — CLONIDINE HYDROCHLORIDE 0.2 MG: 0.1 TABLET ORAL at 21:22

## 2024-01-27 RX ADMIN — CHLORPROMAZINE HYDROCHLORIDE 100 MG: 100 TABLET, FILM COATED ORAL at 14:26

## 2024-01-27 RX ADMIN — DESMOPRESSIN ACETATE 0.4 MG: 0.1 TABLET ORAL at 21:22

## 2024-01-27 RX ADMIN — DIVALPROEX SODIUM 1250 MG: 500 TABLET, EXTENDED RELEASE ORAL at 21:21

## 2024-01-27 RX ADMIN — CLONIDINE HYDROCHLORIDE 0.2 MG: 0.1 TABLET ORAL at 08:30

## 2024-01-27 RX ADMIN — CLONAZEPAM 1 MG: 1 TABLET ORAL at 08:30

## 2024-01-27 NOTE — NURSING NOTE
Patient continues to have poor boundaries and intrusive with peers personal space.   Patient difficult to redirect at times, compliant with medications and routine care. Denies SI/HI/AH/VH and offers no complaint of pain or discomfort. Patient enjoys snacks and walking on the unit.

## 2024-01-27 NOTE — SOCIAL WORK
SW spoke with Pt's group home staff from [facility] Provided update on pt's presentation and behavior upon admission. SW advised no d/c date has been set yet. [facility] requested SW continue to provide updates as available.

## 2024-01-27 NOTE — NURSING NOTE
Patient in bed out for breakfast.  He was medication compliant.  Patient dismissive during morning assessment.  Patient wanted to go back to bed.

## 2024-01-27 NOTE — PLAN OF CARE
Problem: BEHAVIOR  Goal: Pt/Family maintain appropriate behavior and adhere to behavioral management agreement, if implemented  Description: INTERVENTIONS:  - Assess the family dynamic   - Encourage verbalization of thoughts and concerns in a socially appropriate manner  - Assess patient/family's coping skills and non-compliant behavior (including use of illegal substances).  - Utilize positive, consistent limit setting strategies supporting safety of patient, staff and others  - Initiate consult with Case Management, Spiritual Care or other ancillary services as appropriate  - If a patient's/visitor's behavior jeopardizes the safety of the patient, staff, or others, refer to organization procedure.   - Notify Security of behavior or suspected illegal substances which indicate the need for search of the patient and/or belongings  - Encourage participation in the decision making process about a behavioral management agreement; implement if patient meets criteria  Outcome: Progressing     Problem: Alteration in Thoughts and Perception  Goal: Verbalize thoughts and feelings  Description: Interventions:  - Promote a nonjudgmental and trusting relationship with the patient through active listening and therapeutic communication  - Assess patient's level of functioning, behavior and potential for risk  - Engage patient in 1 on 1 interactions  - Encourage patient to express fears, feelings, frustrations, and discuss symptoms    - [place] patient to reality, help patient recognize reality-based thinking   - Administer medications as ordered and assess for potential side effects  - Provide the patient education related to the signs and symptoms of the illness and desired effects of prescribed medications  Outcome: Progressing  Goal: Agree to be compliant with medication regime, as prescribed and report medication side effects  Description: Interventions:  - Offer appropriate PRN medication and supervise ingestion; conduct  AIMS, as needed   Outcome: Progressing  Goal: Attend and participate in unit activities, including therapeutic, recreational, and educational groups  Description: Interventions:  -Encourage Visitation and family involvement in care  Outcome: Progressing  Goal: Recognize dysfunctional thoughts, communicate reality-based thoughts at the time of discharge  Description: Interventions:  - Provide medication and psycho-education to assist patient in compliance and developing insight into his/her illness   Outcome: Progressing     Problem: Ineffective Coping  Goal: Cooperates with admission process  Description: Interventions:   - Complete admission process  Outcome: Progressing  Goal: Identifies ineffective coping skills  Outcome: Progressing  Goal: Identifies healthy coping skills  Outcome: Progressing  Goal: Demonstrates healthy coping skills  Outcome: Progressing  Goal: Participates in unit activities  Description: Interventions:  - Provide therapeutic environment   - Provide required programming   - Redirect inappropriate behaviors   Outcome: Progressing  Goal: Patient/Family participate in treatment and DC plans  Description: Interventions:  - Provide therapeutic environment  Outcome: Progressing  Goal: Patient/Family verbalizes awareness of resources  Outcome: Progressing  Goal: Understands least restrictive measures  Description: Interventions:  - Utilize least restrictive behavior  Outcome: Progressing  Goal: Free from restraint events  Description: - Utilize least restrictive measures   - Provide behavioral interventions   - Redirect inappropriate behaviors   Outcome: Progressing     Problem: Risk for Violence/Aggression Toward Others  Goal: Refrain from harming others  Outcome: Progressing  Goal: Refrain from destructive acts on the environment or property  Outcome: Progressing  Goal: Control angry outbursts  Description: Interventions:  - Monitor patient closely, per order  - Ensure early verbal  de-escalation  - Monitor prn medication needs  - Set reasonable/therapeutic limits, outline behavioral expectations, and consequences   - Provide a non-threatening milieu, utilizing the least restrictive interventions   Outcome: Progressing  Goal: Identify appropriate positive anger management techniques  Description: Interventions:  - Offer anger management and coping skills groups   - Staff will provide positive feedback for appropriate anger control  Outcome: Progressing

## 2024-01-27 NOTE — NURSING NOTE
Patient became more visible after lunch.  Patient continues to have poor boundaries and needs redirection often.

## 2024-01-28 PROCEDURE — 99232 SBSQ HOSP IP/OBS MODERATE 35: CPT | Performed by: PSYCHIATRY & NEUROLOGY

## 2024-01-28 RX ORDER — PALIPERIDONE 6 MG/1
6 TABLET, EXTENDED RELEASE ORAL DAILY
Status: DISCONTINUED | OUTPATIENT
Start: 2024-01-29 | End: 2024-01-29

## 2024-01-28 RX ADMIN — CLONAZEPAM 1 MG: 1 TABLET ORAL at 09:28

## 2024-01-28 RX ADMIN — CLONIDINE HYDROCHLORIDE 0.2 MG: 0.1 TABLET ORAL at 09:28

## 2024-01-28 RX ADMIN — PALIPERIDONE 9 MG: 9 TABLET, EXTENDED RELEASE ORAL at 09:28

## 2024-01-28 RX ADMIN — DIVALPROEX SODIUM 1250 MG: 500 TABLET, EXTENDED RELEASE ORAL at 21:12

## 2024-01-28 RX ADMIN — CHLORPROMAZINE HYDROCHLORIDE 150 MG: 100 TABLET, FILM COATED ORAL at 21:12

## 2024-01-28 RX ADMIN — OLANZAPINE 5 MG: 5 TABLET, FILM COATED ORAL at 21:12

## 2024-01-28 RX ADMIN — CLONIDINE HYDROCHLORIDE 0.2 MG: 0.1 TABLET ORAL at 21:12

## 2024-01-28 RX ADMIN — ERGOCALCIFEROL 50000 UNITS: 1.25 CAPSULE ORAL at 09:28

## 2024-01-28 RX ADMIN — CHLORPROMAZINE HYDROCHLORIDE 100 MG: 100 TABLET, FILM COATED ORAL at 09:30

## 2024-01-28 RX ADMIN — MELATONIN TAB 3 MG 3 MG: 3 TAB at 21:13

## 2024-01-28 RX ADMIN — CLONIDINE HYDROCHLORIDE 0.2 MG: 0.1 TABLET ORAL at 16:55

## 2024-01-28 RX ADMIN — DESMOPRESSIN ACETATE 0.4 MG: 0.1 TABLET ORAL at 21:13

## 2024-01-28 RX ADMIN — METOPROLOL SUCCINATE 50 MG: 50 TABLET, EXTENDED RELEASE ORAL at 09:28

## 2024-01-28 RX ADMIN — CHLORPROMAZINE HYDROCHLORIDE 100 MG: 100 TABLET, FILM COATED ORAL at 13:40

## 2024-01-28 RX ADMIN — POLYETHYLENE GLYCOL 3350 17 G: 17 POWDER, FOR SOLUTION ORAL at 09:29

## 2024-01-28 NOTE — NURSING NOTE
Patient remained visible and social throughout the evening. Attention seeking behaviors noted. However patient remained cooperative. HS medication compliant. Melatonin prn requested and received at 2123.     At 2230, patient observed resting in bed.

## 2024-01-28 NOTE — PROGRESS NOTES
Progress Note - Behavioral Health   Manuel Back 18 y.o. male MRN: [id number]  Unit/Bed#: [facility] 340-01 Encounter: [id number]    Behavior over the last 24 hours:  unchanged.   Sleep: hypersomnia  Appetite: increased  Medication side effects: No  ROS: no complaints    Mental Status Evaluation:  Appearance:  In paper scrubs   Behavior:  psychomotor retardation   Speech:  Mumbles brief answers   Mood:  normal   Affect:  blunted   Thought Process:  concrete   Thought Content:  paranoid   Perceptual Disturbances: Internally preoccupied   Risk Potential: Suicidal Ideations none   Sensorium:  person, place, and situation   Cognition:  recent and remote memory grossly intact   Consciousness:  alert and awake    Attention: attention span appeared shorter than expected for age   Insight:  limited   Judgment: limited   Gait/Station: normal gait/station   Motor Activity: no abnormal movements       Progress Toward Goals: Patient still with poor boundaries, will respond to verbal redirection with effort. No PRN needed yesterday. Today he is eating another peers breakfast, somnolent and will reduce invega to 6mg tmw qday with plan T/C increase zyprexa 1/29.    Liliana 69    Lab Results   Component Value Date    SODIUM 133 (L) 01/26/2024    K 4.2 01/26/2024    CL 96 01/26/2024    CO2 28 01/26/2024    AGAP 9 01/26/2024    BUN 9 01/26/2024    CREATININE 0.97 01/26/2024    GLUC 301 (H) 01/26/2024    GLUF 101 (H) 01/20/2024    CALCIUM 9.5 01/26/2024    AST 27 01/26/2024    ALT 30 01/26/2024    ALKPHOS 118 (H) 01/26/2024    TP 7.1 01/26/2024    TBILI 0.24 01/26/2024    EGFR 112 01/26/2024     Alk phos elevated from intake CMP, will cont to trend that and be mindful of Na    Wt Readings from Last 3 Encounters:   01/19/24 (!) 141 kg (310 lb) (>99%, Z= 3.17)*   01/18/24 (!) 142 kg (313 lb 4.4 oz) (>99%, Z= 3.21)*   01/04/24 (!) 140 kg (308 lb) (>99%, Z= 3.15)*     * Growth percentiles are based on CDC (Boys, 2-20 Years) data.     Temp  Readings from Last 3 Encounters:   01/28/24 98.8 °F (37.1 °C) (Temporal)   01/19/24 98.2 °F (36.8 °C) (Oral)   12/17/23 98.2 °F (36.8 °C) (Oral)     BP Readings from Last 3 Encounters:   01/28/24 120/59   01/19/24 142/92   01/04/24 131/90     Pulse Readings from Last 3 Encounters:   01/28/24 71   01/19/24 91   01/04/24 88         Assessment/Plan   Principal Problem:    Mood disorder (HCC)  Active Problems:    Urinary incontinence, nocturnal enuresis    Medical clearance for psychiatric admission    Morbid obesity with body mass index (BMI) of 40.0 to 49.9 (HCC)    Essential hypertension    Vitamin D deficiency    Chronic pain of right knee      Recommended Treatment: Continue with group therapy, milieu therapy and occupational therapy.      Risks, benefits and possible side effects of Medications:   Risks, benefits, and possible side effects of medications explained to patient and patient verbalizes understanding.      Medications:       all current active meds have been reviewed, continue current psychiatric medications, and current meds:   Current Facility-Administered Medications   Medication Dose Route Frequency    aluminum-magnesium hydroxide-simethicone (MAALOX) oral suspension 30 mL  30 mL Oral Q4H PRN    benztropine (COGENTIN) injection 1 mg  1 mg Intramuscular Q4H PRN Max 6/day    benztropine (COGENTIN) tablet 1 mg  1 mg Oral Q4H PRN Max 6/day    chlorproMAZINE (THORAZINE) injection SOLN 50 mg  50 mg Intramuscular Q8H PRN    And    diphenhydrAMINE (BENADRYL) injection 50 mg  50 mg Intramuscular Q8H PRN    chlorproMAZINE (THORAZINE) tablet 100 mg  100 mg Oral BID    chlorproMAZINE (THORAZINE) tablet 150 mg  150 mg Oral HS    clonazePAM (KlonoPIN) tablet 1 mg  1 mg Oral Daily    cloNIDine (CATAPRES) tablet 0.2 mg  0.2 mg Oral TID    desmopressin (DDAVP) tablet 0.4 mg  0.4 mg Oral HS    Diclofenac Sodium (VOLTAREN) 1 % topical gel 2 g  2 g Topical 4x Daily PRN    hydrOXYzine HCL (ATARAX) tablet 50 mg  50 mg  Oral Q6H PRN Max 4/day    Or    diphenhydrAMINE (BENADRYL) injection 50 mg  50 mg Intramuscular Q6H PRN    divalproex sodium (DEPAKOTE ER) 24 hr tablet 1,250 mg  1,250 mg Oral HS    ergocalciferol (VITAMIN D2) capsule 50,000 Units  50,000 Units Oral Weekly    glycerin-hypromellose- (ARTIFICIAL TEARS) ophthalmic solution 1 drop  1 drop Both Eyes Q3H PRN    hydrOXYzine HCL (ATARAX) tablet 100 mg  100 mg Oral Q6H PRN Max 4/day    Or    LORazepam (ATIVAN) injection 2 mg  2 mg Intramuscular Q6H PRN    hydrOXYzine HCL (ATARAX) tablet 25 mg  25 mg Oral Q6H PRN Max 4/day    ibuprofen (MOTRIN) tablet 400 mg  400 mg Oral Q4H PRN    ibuprofen (MOTRIN) tablet 600 mg  600 mg Oral Q6H PRN    ibuprofen (MOTRIN) tablet 800 mg  800 mg Oral Q8H PRN    melatonin tablet 3 mg  3 mg Oral HS PRN    metoprolol succinate (TOPROL-XL) 24 hr tablet 50 mg  50 mg Oral Daily    OLANZapine (ZyPREXA) tablet 10 mg  10 mg Oral Q3H PRN Max 3/day    Or    OLANZapine (ZyPREXA) IM injection 10 mg  10 mg Intramuscular Q3H PRN Max 3/day    OLANZapine (ZyPREXA) tablet 5 mg  5 mg Oral Q3H PRN Max 6/day    Or    OLANZapine (ZyPREXA) IM injection 5 mg  5 mg Intramuscular Q3H PRN Max 6/day    OLANZapine (ZyPREXA) tablet 2.5 mg  2.5 mg Oral Q3H PRN Max 8/day    OLANZapine (ZyPREXA) tablet 5 mg  5 mg Oral HS    [START ON 1/29/2024] paliperidone (INVEGA) 24 hr tablet 6 mg  6 mg Oral Daily    polyethylene glycol (MIRALAX) packet 17 g  17 g Oral Daily    polyethylene glycol (MIRALAX) packet 17 g  17 g Oral Daily PRN    propranolol (INDERAL) tablet 10 mg  10 mg Oral Q8H PRN    senna-docusate sodium (SENOKOT S) 8.6-50 mg per tablet 1 tablet  1 tablet Oral Daily PRN    sterile water injection **ADS Override Pull**       .    Labs:  Reviewed  Admission on 01/19/2024   Component Date Value    Clarity, UA 01/20/2024 Clear     Color, UA 01/20/2024 Straw     Specific [place], UA 01/20/2024 1.015     pH, UA 01/20/2024 8.0     Glucose, UA 01/20/2024 Negative      Ketones, UA 01/20/2024 Negative     Occult Blood, UA 01/20/2024 Negative     Protein, UA 01/20/2024 Negative     Nitrite, UA 01/20/2024 Negative     Bilirubin, UA 01/20/2024 Negative     Leukocytes, UA 01/20/2024 Negative     WBC, UA 01/20/2024 0-1 (A)     RBC, UA 01/20/2024 0-1 (A)     Bacteria, UA 01/20/2024 None Seen     Epithelial Cells 01/20/2024 Occasional     UROBILINOGEN UA 01/20/2024 Negative     Sodium 01/20/2024 138     Potassium 01/20/2024 4.1     Chloride 01/20/2024 103     CO2 01/20/2024 27     ANION GAP 01/20/2024 8     BUN 01/20/2024 10     Creatinine 01/20/2024 0.93     Glucose 01/20/2024 101     Glucose, Fasting 01/20/2024 101 (H)     Calcium 01/20/2024 9.3     AST 01/20/2024 35     ALT 01/20/2024 35     Alkaline Phosphatase 01/20/2024 99     Total Protein 01/20/2024 6.8     Albumin 01/20/2024 3.8     Total Bilirubin 01/20/2024 0.27     eGFR 01/20/2024 118     WBC 01/20/2024 10.00     RBC 01/20/2024 4.97     Hemoglobin 01/20/2024 14.0     Hematocrit 01/20/2024 42.8     MCV 01/20/2024 86     MCH 01/20/2024 28.2     MCHC 01/20/2024 32.7     RDW 01/20/2024 13.4     MPV 01/20/2024 10.4     Platelets 01/20/2024 243     nRBC 01/20/2024 0     Neutrophils Relative 01/20/2024 60     Immat GRANS % 01/20/2024 0     Lymphocytes Relative 01/20/2024 29     Monocytes Relative 01/20/2024 10     Eosinophils Relative 01/20/2024 1     Basophils Relative 01/20/2024 0     Neutrophils Absolute 01/20/2024 5.95     Immature Grans Absolute 01/20/2024 0.03     Lymphocytes Absolute 01/20/2024 2.86     Monocytes Absolute 01/20/2024 1.02     Eosinophils Absolute 01/20/2024 0.11     Basophils Absolute 01/20/2024 0.03     TSH 3RD GENERATON 01/20/2024 3.212     Vit D, 25-Hydroxy 01/20/2024 21.7 (L)     Cholesterol 01/20/2024 105     Triglycerides 01/20/2024 245 (H)     HDL, Direct 01/20/2024 27 (L)     LDL Calculated 01/20/2024 29     Non-HDL-Chol (CHOL-HDL) 01/20/2024 78     Syphilis Total Antibody 01/20/2024 Non-reactive      Vitamin B-12 01/20/2024 1,238 (H)     Ventricular Rate 01/20/2024 88     Atrial Rate 01/20/2024 88     NH Interval 01/20/2024 158     QRSD Interval 01/20/2024 78     QT Interval 01/20/2024 356     QTC Interval 01/20/2024 430     P Axis 01/20/2024 77     QRS Axis 01/20/2024 58     T Wave [place] 01/20/2024 49     Ventricular Rate 01/20/2024 86     Atrial Rate 01/20/2024 86     NH Interval 01/20/2024 156     QRSD Interval 01/20/2024 84     QT Interval 01/20/2024 362     QTC Interval 01/20/2024 433     P Axis 01/20/2024 72     QRS Axis 01/20/2024 48     T Wave Axis 01/20/2024 35     Valproic Acid, Total 01/26/2024 70     Sodium 01/26/2024 133 (L)     Potassium 01/26/2024 4.2     Chloride 01/26/2024 96     CO2 01/26/2024 28     ANION GAP 01/26/2024 9     BUN 01/26/2024 9     Creatinine 01/26/2024 0.97     Glucose 01/26/2024 301 (H)     Calcium 01/26/2024 9.5     AST 01/26/2024 27     ALT 01/26/2024 30     Alkaline Phosphatase 01/26/2024 118 (H)     Total Protein 01/26/2024 7.1     Albumin 01/26/2024 3.9     Total Bilirubin 01/26/2024 0.24     eGFR 01/26/2024 112        Counseling / Coordination of Care  Total floor / unit time spent today 20 minutes. Greater than 50% of total time was spent with the patient and / or family counseling and / or coordination of care. A description of the counseling / coordination of care: medications, treatment progress and treatment plan reviewed with patient.

## 2024-01-28 NOTE — NURSING NOTE
Patient visible for breakfast and medication compliant.  He denies SI/HI and A/V hallucinations.  Patient seems irritable in the morning, scant in conversation.

## 2024-01-28 NOTE — PLAN OF CARE
Problem: DISCHARGE PLANNING  Goal: Discharge to home or other facility with appropriate resources  Description: INTERVENTIONS:  - Identify barriers to discharge w/patient and caregiver  - Arrange for needed discharge resources and transportation as appropriate  - Identify discharge learning needs (meds, wound care, etc.)  - Arrange for interpretive services to assist at discharge as needed  - Refer to Case Management Department for coordinating discharge planning if the patient needs post-hospital services based on physician/advanced practitioner order or complex needs related to functional status, cognitive ability, or social support system  1/28/2024 0300 by Evon Hanson RN  Outcome: Progressing  1/28/2024 0300 by Evon Hanson RN  Outcome: Progressing     Problem: BEHAVIOR  Goal: Pt/Family maintain appropriate behavior and adhere to behavioral management agreement, if implemented  Description: INTERVENTIONS:  - Assess the family dynamic   - Encourage verbalization of thoughts and concerns in a socially appropriate manner  - Assess patient/family's coping skills and non-compliant behavior (including use of illegal substances).  - Utilize positive, consistent limit setting strategies supporting safety of patient, staff and others  - Initiate consult with Case Management, Spiritual Care or other ancillary services as appropriate  - If a patient's/visitor's behavior jeopardizes the safety of the patient, staff, or others, refer to organization procedure.   - Notify Security of behavior or suspected illegal substances which indicate the need for search of the patient and/or belongings  - Encourage participation in the decision making process about a behavioral management agreement; implement if patient meets criteria  1/28/2024 0300 by Evon Hanson RN  Outcome: Progressing  1/28/2024 0300 by Evon Hanson RN  Outcome: Progressing     Problem: DISCHARGE PLANNING - CARE MANAGEMENT  Goal: Discharge to post-acute  care or home with appropriate resources  Description: INTERVENTIONS:  - Conduct assessment to determine patient/family and health care team treatment goals, and need for post-acute services based on payer coverage, community resources, and patient preferences, and barriers to discharge  - Address psychosocial, clinical, and financial barriers to discharge as identified in assessment in conjunction with the patient/family and health care team  - Arrange appropriate level of post-acute services according to patient’s   needs and preference and payer coverage in collaboration with the physician and health care team  - Communicate with and update the patient/family, physician, and health care team regarding progress on the discharge plan  - Arrange appropriate transportation to post-acute venues  1/28/2024 0300 by Evon Hanson RN  Outcome: Progressing  1/28/2024 0300 by Evon Hanson, RN  Outcome: Progressing

## 2024-01-29 PROCEDURE — 99232 SBSQ HOSP IP/OBS MODERATE 35: CPT | Performed by: PSYCHIATRY & NEUROLOGY

## 2024-01-29 RX ORDER — DIVALPROEX SODIUM 500 MG/1
2000 TABLET, EXTENDED RELEASE ORAL
Status: DISCONTINUED | OUTPATIENT
Start: 2024-01-29 | End: 2024-01-29

## 2024-01-29 RX ORDER — PALIPERIDONE 6 MG/1
6 TABLET, EXTENDED RELEASE ORAL DAILY
Status: DISCONTINUED | OUTPATIENT
Start: 2024-01-30 | End: 2024-01-29

## 2024-01-29 RX ORDER — PALIPERIDONE 3 MG/1
3 TABLET, EXTENDED RELEASE ORAL DAILY
Status: COMPLETED | OUTPATIENT
Start: 2024-01-30 | End: 2024-01-30

## 2024-01-29 RX ORDER — OLANZAPINE 10 MG/1
10 TABLET ORAL
Status: DISCONTINUED | OUTPATIENT
Start: 2024-01-29 | End: 2024-01-30

## 2024-01-29 RX ORDER — PALIPERIDONE 3 MG/1
3 TABLET, EXTENDED RELEASE ORAL DAILY
Status: DISCONTINUED | OUTPATIENT
Start: 2024-01-31 | End: 2024-01-29

## 2024-01-29 RX ADMIN — PALIPERIDONE 6 MG: 6 TABLET, EXTENDED RELEASE ORAL at 10:39

## 2024-01-29 RX ADMIN — POLYETHYLENE GLYCOL 3350 17 G: 17 POWDER, FOR SOLUTION ORAL at 10:40

## 2024-01-29 RX ADMIN — DIVALPROEX SODIUM 1750 MG: 500 TABLET, EXTENDED RELEASE ORAL at 21:07

## 2024-01-29 RX ADMIN — CHLORPROMAZINE HYDROCHLORIDE 100 MG: 100 TABLET, FILM COATED ORAL at 15:00

## 2024-01-29 RX ADMIN — CLONAZEPAM 1 MG: 1 TABLET ORAL at 10:39

## 2024-01-29 RX ADMIN — DESMOPRESSIN ACETATE 0.4 MG: 0.1 TABLET ORAL at 21:07

## 2024-01-29 RX ADMIN — LORAZEPAM 2 MG: 2 INJECTION INTRAMUSCULAR; INTRAVENOUS at 22:07

## 2024-01-29 RX ADMIN — CLONIDINE HYDROCHLORIDE 0.2 MG: 0.1 TABLET ORAL at 10:40

## 2024-01-29 RX ADMIN — CHLORPROMAZINE HYDROCHLORIDE 100 MG: 100 TABLET, FILM COATED ORAL at 10:40

## 2024-01-29 RX ADMIN — OLANZAPINE 10 MG: 10 TABLET, FILM COATED ORAL at 21:08

## 2024-01-29 RX ADMIN — CHLORPROMAZINE HYDROCHLORIDE 150 MG: 100 TABLET, FILM COATED ORAL at 21:11

## 2024-01-29 RX ADMIN — CLONIDINE HYDROCHLORIDE 0.2 MG: 0.1 TABLET ORAL at 16:32

## 2024-01-29 RX ADMIN — CLONIDINE HYDROCHLORIDE 0.2 MG: 0.1 TABLET ORAL at 21:08

## 2024-01-29 RX ADMIN — METOPROLOL SUCCINATE 50 MG: 50 TABLET, EXTENDED RELEASE ORAL at 10:40

## 2024-01-29 NOTE — PLAN OF CARE
Problem: Ineffective Coping  Goal: Participates in unit activities  Description: Interventions:  - Provide therapeutic environment   - Provide required programming   - Redirect inappropriate behaviors   Outcome: Not Progressing   Patient has not been attending therapy groups

## 2024-01-29 NOTE — NURSING NOTE
Patient has been awake, and visible out in the milieu.Denies any depression, anxiety, a/v hallucinations. Denies ay unmeet needs

## 2024-01-29 NOTE — PROGRESS NOTES
Progress Note - Behavioral Health   Manuel Back 18 y.o. male MRN: [id number]  Unit/Bed#: [facility] 340-01 Encounter: [id number]    Assessment/Plan   Principal Problem:    Mood disorder (HCC)  Active Problems:    Urinary incontinence, nocturnal enuresis    Medical clearance for psychiatric admission    Morbid obesity with body mass index (BMI) of 40.0 to 49.9 (HCC)    Essential hypertension    Vitamin D deficiency    Chronic pain of right knee      Recommended Treatment:      Will make the following medication changes:  Continue with current medications:  Thorazine 100 mg twice daily, 150 mg at bedtime for agitation and impulsivity.  Klonopin 1 mg daily for severe anxiety and agitation.  Clonidine 0.2 mg 3 times daily for severe agitation and impulsivity.  Vitamin B12 500 mcg daily for supplementation.  Increase Depakote ER to 1750 mg at bedtime for mood stabilization.  VPA level, CMP, CBC for 2/1/2024 prior to p.m. dose.  Vitamin D2 50,000 units q. weekly for supplementation.  Decrease Invega to 3 mg daily for psychosis and mood for x 1 dose then discontinue to complete taper.  Increase Zyprexa to 10 mg at bedtime for psychosis and mood.  MiraLAX 17 g daily for standing bowel regimen.  Continue with group therapy, milieu therapy and occupational therapy.    Continue frequent safety checks and vitals per unit protocol.  Continue with [facility]IM medical management as indicated  Continue coordinating with case management regarding disposition    Legal Status: 201  Disposition: To be determined, coordinating with case management    Case discussed with treatment team.  Risks, benefits and possible side effects of Medications: Risks, benefits, and possible side effects of medications have been explained to the patient, who verbalizes understanding    ------------------------------------------------------------    Subjective: Patient's chart was reviewed, and patient's progress and plan was discussed with treatment team.  "Per nursing report, Manuel has been disorganized, redirectable, intrusive, demanding at times, cooperative on the unit and compliant with medications. Patient has remained in behavioral control for the last 24 hours. Last night patient was documented to have slept throughout the night.    Manuel was evaluated this morning for continuity of care. On examination, Manuel is still disorganized, intermittently agitated, requiring frequent redirection, dressed in hospital attire and standing to speak. He states his mood is \" good, I already talked to the other doctor.\" He reports sleeping well and his energy level today is adequate. His appetite has been good. He denies adverse effects from medications. He denies suicidal ideation, homicidal ideation, auditory hallucinations, and visual hallucinations but continues to appear to be actively responding to internal stimuli. His goals for today are to remain in behavioral control and medication compliant.    VS: Reviewed,  BMI 47.14, otherwise within normal limits    Progress Toward Goals: Slow improvement    Psychiatric Review of Systems:  Behavior over the last 24 hours: improved  Sleep: normal  Appetite: adequate  Medication side effects: none verbalized  Medical ROS:  No cough, chest pain, nausea, vomiting, diarrhea, all other symptoms are negative.    Vital signs in last 24 hours:  Temp:  [97.9 °F (36.6 °C)-98.3 °F (36.8 °C)] 97.9 °F (36.6 °C)  HR:  [80-99] 80  Resp:  [16] 16  BP: (123-147)/(58-77) 123/58    Mental Status Exam:    Appearance:  alert, furtive eye contact, appears stated age, hospital attire dressed, disheveled, obese, and bearded   Behavior:  limited cooperativity, guarded, and standing to speak   Speech:  spontaneous, clear, slow, soft, and coherent   Mood:  \"Good, I already talked to the other doctor\"   Affect:  Blunted, irritable edge   Thought Process:  disorganized   Associations: concrete associations   Thought Content:  paranoid ideation "   Perceptual Disturbances: denies current hallucinations and appears to be responding to internal stimuli   Risk Potential: Suicidal ideation - None at present  Homicidal ideation - None at present  Potential for aggression - Yes, due to acute psychosis   Sensorium:  oriented to person and place   Memory:  recent and remote memory grossly intact   Consciousness:  alert and awake   Attention/Concentration: attention span and concentration appear shorter than expected for age   Insight:  limited   Judgment: limited   Gait/Station: normal gait/station   Motor Activity: no abnormal movements     Current Medications:  Current Facility-Administered Medications   Medication Dose Route Frequency Provider Last Rate    aluminum-magnesium hydroxide-simethicone  30 mL Oral Q4H PRN Dimitris Grewal MD      benztropine  1 mg Intramuscular Q4H PRN Max 6/day Dimitris Grewal MD      benztropine  1 mg Oral Q4H PRN Max 6/day Dimitris Grewal MD      chlorproMAZINE  50 mg Intramuscular Q8H PRN Dimitris Grewal MD      And    diphenhydrAMINE  50 mg Intramuscular Q8H PRN Dimitris Grewal MD      chlorproMAZINE  100 mg Oral BID Darrell Le,       chlorproMAZINE  150 mg Oral HS Phill Kamara MD      clonazePAM  1 mg Oral Daily Darrell Le,       cloNIDine  0.2 mg Oral TID Dimitris Grewal MD      desmopressin  0.4 mg Oral HS Phill Kamara MD      Diclofenac Sodium  2 g Topical 4x Daily PRN ABUNDIO Villalobos      hydrOXYzine HCL  50 mg Oral Q6H PRN Max 4/day Dimitris Grewal MD      Or    diphenhydrAMINE  50 mg Intramuscular Q6H PRN Dimitris Grewal MD      divalproex sodium  1,750 mg Oral HS Darrell eL DO      ergocalciferol  50,000 Units Oral Weekly ABUNDIO Villalobos      glycerin-hypromellose-  1 drop Both Eyes Q3H PRN Dimitris Grewal MD      hydrOXYzine HCL  100 mg Oral Q6H PRN Max 4/day Dimitris Grewal MD      Or    LORazepam  2 mg Intramuscular Q6H PRN Dimitris Grewal MD      hydrOXYzine HCL   25 mg Oral Q6H PRN Max 4/day Dimitris Grewal MD      ibuprofen  400 mg Oral Q4H PRN Dimitris Grewal MD      ibuprofen  600 mg Oral Q6H PRN Dimitris Grewal MD      ibuprofen  800 mg Oral Q8H PRN Dimitris Grewal MD      melatonin  3 mg Oral HS PRN Dimitris Grewal MD      metoprolol succinate  50 mg Oral Daily Dimitris Grewal MD      OLANZapine  10 mg Oral Q3H PRN Max 3/day Dimitris Grewal MD      Or    OLANZapine  10 mg Intramuscular Q3H PRN Max 3/day Dimitris Grewal MD      OLANZapine  5 mg Oral Q3H PRN Max 6/day Dimitris Grewal MD      Or    OLANZapine  5 mg Intramuscular Q3H PRN Max 6/day Dimitris Grewal MD      OLANZapine  10 mg Oral HS Darrell Le DO      OLANZapine  2.5 mg Oral Q3H PRN Max 8/day Dimitris Grewal MD      [START ON 1/31/2024] paliperidone  3 mg Oral Daily Darrell Le DO      [START ON 1/30/2024] paliperidone  6 mg Oral Daily Darrell Le DO      polyethylene glycol  17 g Oral Daily Dimitris Grewal MD      polyethylene glycol  17 g Oral Daily PRN Dimitris Grewal MD      propranolol  10 mg Oral Q8H PRN Dimitris Grewal MD      senna-docusate sodium  1 tablet Oral Daily PRN Dimitris Grewal MD      sterile water              Behavioral Health Medications: all current active meds have been reviewed. Changes as in plan section above.    Laboratory results:  I have personally reviewed all pertinent laboratory/tests results.   No results found for this or any previous visit (from the past 48 hour(s)).     This note has been constructed using a voice recognition system. There may be translation, syntax, or grammatical errors. If you have any questions, please contact the dictating author.    Darrell Le DO  Psychiatry Residency, PGY-2

## 2024-01-29 NOTE — PROGRESS NOTES
01/29/24 0838   Team Meeting   Meeting Type Daily Rounds   Team Members Present   Team Members Present Physician;Nurse;   Physician Team Member Carmina   Nursing Team Member Carlos   Care Management Team Member Carol   Patient/Family Present   Patient Present No   Patient's Family Present No     Pt continues mumbling, pt laying on the floor in the shower. Irritable at times. Med/meal compliant. Visible in the evening. PRN Melatonin on Saturday was effective. Remains with childlike behavior. Discharge to be determined.

## 2024-01-29 NOTE — NURSING NOTE
Patient has been about the unit as the shift went on. No behavioral issues or redirection required as of this time.Continues to mumble and speak softly during interaction. Compliant with all medications. Denies symptoms.

## 2024-01-29 NOTE — PLAN OF CARE
Problem: DISCHARGE PLANNING  Goal: Discharge to home or other facility with appropriate resources  Description: INTERVENTIONS:  - Identify barriers to discharge w/patient and caregiver  - Arrange for needed discharge resources and transportation as appropriate  - Identify discharge learning needs (meds, wound care, etc.)  - Arrange for interpretive services to assist at discharge as needed  - Refer to Case Management Department for coordinating discharge planning if the patient needs post-hospital services based on physician/advanced practitioner order or complex needs related to functional status, cognitive ability, or social support system  Outcome: Progressing     Problem: BEHAVIOR  Goal: Pt/Family maintain appropriate behavior and adhere to behavioral management agreement, if implemented  Description: INTERVENTIONS:  - Assess the family dynamic   - Encourage verbalization of thoughts and concerns in a socially appropriate manner  - Assess patient/family's coping skills and non-compliant behavior (including use of illegal substances).  - Utilize positive, consistent limit setting strategies supporting safety of patient, staff and others  - Initiate consult with Case Management, Spiritual Care or other ancillary services as appropriate  - If a patient's/visitor's behavior jeopardizes the safety of the patient, staff, or others, refer to organization procedure.   - Notify Security of behavior or suspected illegal substances which indicate the need for search of the patient and/or belongings  - Encourage participation in the decision making process about a behavioral management agreement; implement if patient meets criteria  Outcome: Progressing     Problem: DISCHARGE PLANNING - CARE MANAGEMENT  Goal: Discharge to post-acute care or home with appropriate resources  Description: INTERVENTIONS:  - Conduct assessment to determine patient/family and health care team treatment goals, and need for post-acute services  based on payer coverage, community resources, and patient preferences, and barriers to discharge  - Address psychosocial, clinical, and financial barriers to discharge as identified in assessment in conjunction with the patient/family and health care team  - Arrange appropriate level of post-acute services according to patient’s   needs and preference and payer coverage in collaboration with the physician and health care team  - Communicate with and update the patient/family, physician, and health care team regarding progress on the discharge plan  - Arrange appropriate transportation to post-acute venues  Outcome: Progressing     Problem: Alteration in Thoughts and Perception  Goal: Verbalize thoughts and feelings  Description: Interventions:  - Promote a nonjudgmental and trusting relationship with the patient through active listening and therapeutic communication  - Assess patient's level of functioning, behavior and potential for risk  - Engage patient in 1 on 1 interactions  - Encourage patient to express fears, feelings, frustrations, and discuss symptoms    - [place] patient to reality, help patient recognize reality-based thinking   - Administer medications as ordered and assess for potential side effects  - Provide the patient education related to the signs and symptoms of the illness and desired effects of prescribed medications  Outcome: Progressing  Goal: Agree to be compliant with medication regime, as prescribed and report medication side effects  Description: Interventions:  - Offer appropriate PRN medication and supervise ingestion; conduct AIMS, as needed   Outcome: Progressing  Goal: Attend and participate in unit activities, including therapeutic, recreational, and educational groups  Description: Interventions:  -Encourage Visitation and family involvement in care  Outcome: Progressing  Goal: Recognize dysfunctional thoughts, communicate reality-based thoughts at the time of  discharge  Description: Interventions:  - Provide medication and psycho-education to assist patient in compliance and developing insight into his/her illness   Outcome: Progressing

## 2024-01-29 NOTE — NURSING NOTE
Administered 5 mg Zyprexa and 50 mg atarax for moderate anxiety and restless agitation. Pt appears internally preoccupied at time of assessment. Speech soft and slow. Poor eye contact. Verbalizes that he doesn't want to get "hyper all amped up" Will evaluate effectiveness of prn medication. No

## 2024-01-29 NOTE — NURSING NOTE
Patient remained visible and social throughout the evening. Behavior childlike. Affect brightens with engagement. Denied any unmet needs. HS medication compliant.

## 2024-01-30 PROBLEM — F25.9 SCHIZOAFFECTIVE DISORDER (HCC): Status: ACTIVE | Noted: 2024-01-21

## 2024-01-30 PROCEDURE — 99232 SBSQ HOSP IP/OBS MODERATE 35: CPT | Performed by: PSYCHIATRY & NEUROLOGY

## 2024-01-30 RX ORDER — CHLORPROMAZINE HYDROCHLORIDE 100 MG/1
100 TABLET, FILM COATED ORAL 2 TIMES DAILY
Status: DISCONTINUED | OUTPATIENT
Start: 2024-01-30 | End: 2024-02-07 | Stop reason: HOSPADM

## 2024-01-30 RX ADMIN — PALIPERIDONE 3 MG: 3 TABLET, EXTENDED RELEASE ORAL at 11:11

## 2024-01-30 RX ADMIN — CHLORPROMAZINE HYDROCHLORIDE 150 MG: 100 TABLET, FILM COATED ORAL at 21:21

## 2024-01-30 RX ADMIN — CHLORPROMAZINE HYDROCHLORIDE 100 MG: 100 TABLET, FILM COATED ORAL at 11:11

## 2024-01-30 RX ADMIN — CLONIDINE HYDROCHLORIDE 0.2 MG: 0.1 TABLET ORAL at 15:42

## 2024-01-30 RX ADMIN — DIVALPROEX SODIUM 1750 MG: 500 TABLET, EXTENDED RELEASE ORAL at 21:22

## 2024-01-30 RX ADMIN — CHLORPROMAZINE HYDROCHLORIDE 100 MG: 100 TABLET, FILM COATED ORAL at 15:42

## 2024-01-30 RX ADMIN — CLONIDINE HYDROCHLORIDE 0.2 MG: 0.1 TABLET ORAL at 11:11

## 2024-01-30 RX ADMIN — CLONAZEPAM 1 MG: 1 TABLET ORAL at 11:11

## 2024-01-30 RX ADMIN — DESMOPRESSIN ACETATE 0.4 MG: 0.1 TABLET ORAL at 21:06

## 2024-01-30 RX ADMIN — OLANZAPINE 15 MG: 5 TABLET, FILM COATED ORAL at 21:23

## 2024-01-30 RX ADMIN — METOPROLOL SUCCINATE 50 MG: 50 TABLET, EXTENDED RELEASE ORAL at 11:11

## 2024-01-30 RX ADMIN — CLONIDINE HYDROCHLORIDE 0.2 MG: 0.1 TABLET ORAL at 21:07

## 2024-01-30 NOTE — PROGRESS NOTES
Progress Note - Behavioral Health   Manuel Back 18 y.o. male MRN: [id number]  Unit/Bed#: [facility] 340-01 Encounter: [id number]    Assessment/Plan   Principal Problem:    Schizoaffective disorder (HCC)  Active Problems:    Urinary incontinence, nocturnal enuresis    Medical clearance for psychiatric admission    Morbid obesity with body mass index (BMI) of 40.0 to 49.9 (HCC)    Essential hypertension    Vitamin D deficiency    Chronic pain of right knee      Recommended Treatment:      Will make the following medication changes:  Continue with current medications:  Thorazine 100 mg BID, 150 mg HS for agitation and impulsivity.  Times for BID dosing adjusted to 1000 and 1500 per nursing request.  Klonopin 1 mg qd for severe anxiety and agitation.  Clonidine 0.2 mg TID for severe agitation and impulsivity.  Vitamin b12 500 mcg qd for supplementation.  Depakote ER 1750 mg HS for mood stabilization.  Vpa level, CMP, CBC for 2/1/24 prior to PM dose.  Vitamin d2 50,000 units qweekly for supplementation.  Discontinue Invega to complete taper.   Increase Zyprexa to 15 mg HS for psychosis and mood.  Miralax 17 g qd for standing bowel regimen.   Continue with group therapy, milieu therapy and occupational therapy.    Continue frequent safety checks and vitals per unit protocol.  Continue with SLIM medical management as indicated  Continue coordinating with case management regarding disposition    Legal Status: 201  Disposition: To be determined, coordinating with case management    Case discussed with treatment team.  Risks, benefits and possible side effects of Medications: Risks, benefits, and possible side effects of medications have been explained to the patient, who verbalizes understanding    ------------------------------------------------------------    Subjective: Patient's chart was reviewed, and patient's progress and plan was discussed with treatment team. Per nursing report, Manuel has been intermittently  "agitated, redirectable, requiring Ativan 2 mg IM PRN, for severe anxiety and agitation, and cooperative on the unit and compliant with medications. Patient has remained in behavioral control for the last 24 hours. Last night patient was documented to have slept throughout the night.    Manuel was evaluated this morning for continuity of care. On examination, Manuel is initially guarded with irritable edge, but then apologetic and redirectable, he continues to perseverate over discharge but understands the importance of continued stabilization and attending to groups and other therapies while admitted. He states his mood is \"Fine.\" He reports sleeping well and his energy level today is adequate. His appetite has been good. He denies adverse effects from medications and is amenable to continuing taper and increasing Zyprexa for mood and psychosis. He denies suicidal ideation, homicidal ideation, auditory hallucinations, and visual hallucinations but continues to appear internally preoccupied at this time. His goals for today are to remain in behavioral control and medication compliant.    VS: Reviewed,  BMI of 47.1, mild tachycardia of 107 bpm, otherwise within normal limits    Progress Toward Goals: slow improvement    Psychiatric Review of Systems:  Behavior over the last 24 hours: improved  Sleep: normal  Appetite: adequate  Medication side effects: none verbalized  Medical ROS:  No cough, chest pain, nausea, vomiting or diarrhea, all other symptoms are negative.    Vital signs in last 24 hours:  Temp:  [97.2 °F (36.2 °C)-97.5 °F (36.4 °C)] 97.2 °F (36.2 °C)  HR:  [] 107  Resp:  [17-18] 18  BP: (126-142)/(68-90) 135/90    Mental Status Exam:    Appearance:  alert, improving eye contact, appears stated age, hospital attire dressed, marginal grooming/hygiene, obese, bearded, and smiling   Behavior:  calm, limited cooperativity, guarded, and laying in bed   Speech:  spontaneous, clear, slow, soft, and " "coherent   Mood:  \"fine\"   Affect:  blunted, irritable edge   Thought Process:  Less disorganized, perseverative   Associations: concrete associations   Thought Content:  mild paranoia, no overt delusional content   Perceptual Disturbances: denies current hallucinations and appears internally preoccupied   Risk Potential: Suicidal ideation - None at present  Homicidal ideation - None at present  Potential for aggression - Yes, due to acute psychosis   Sensorium:  oriented to person, place, time/date, and situation   Memory:  recent and remote memory grossly intact   Consciousness:  alert and awake   Attention/Concentration: attention span and concentration appear shorter than expected for age   Insight:  limited   Judgment: limited   Gait/Station: normal gait/station   Motor Activity: no abnormal movements     Current Medications:  Current Facility-Administered Medications   Medication Dose Route Frequency Provider Last Rate    aluminum-magnesium hydroxide-simethicone  30 mL Oral Q4H PRN Dimitris Grewal MD      benztropine  1 mg Intramuscular Q4H PRN Max 6/day Dimitris Grewal MD      benztropine  1 mg Oral Q4H PRN Max 6/day Dimitris Grewal MD      chlorproMAZINE  50 mg Intramuscular Q8H PRN Dimitris Grewal MD      And    diphenhydrAMINE  50 mg Intramuscular Q8H PRN Dimitris Grewal MD      chlorproMAZINE  100 mg Oral BID Darrell Le,       chlorproMAZINE  150 mg Oral HS Phill Kamara MD      clonazePAM  1 mg Oral Daily Darrell Le,       cloNIDine  0.2 mg Oral TID Dimitris Grewal MD      desmopressin  0.4 mg Oral HS Phill Kamara MD      Diclofenac Sodium  2 g Topical 4x Daily PRN ABUNDIO Villalobos      hydrOXYzine HCL  50 mg Oral Q6H PRN Max 4/day Dimitris Grewal MD      Or    diphenhydrAMINE  50 mg Intramuscular Q6H PRN Dimitris Grewal MD      divalproex sodium  1,750 mg Oral HS Darrell Le DO      ergocalciferol  50,000 Units Oral Weekly ABUNDIO Villalobos      " glycerin-hypromellose-  1 drop Both Eyes Q3H PRN Dimitris Grewal MD      hydrOXYzine HCL  100 mg Oral Q6H PRN Max 4/day Dimitris Grewal MD      Or    LORazepam  2 mg Intramuscular Q6H PRN Dimitris Grewal MD      hydrOXYzine HCL  25 mg Oral Q6H PRN Max 4/day Dimitris Grewal MD      ibuprofen  400 mg Oral Q4H PRN Dimitris Grewal MD      ibuprofen  600 mg Oral Q6H PRN Dimitris Grewal MD      ibuprofen  800 mg Oral Q8H PRN Dimitris Grewal MD      melatonin  3 mg Oral HS PRN Dimitris Grewal MD      metoprolol succinate  50 mg Oral Daily Dimitris Grewal MD      OLANZapine  10 mg Oral Q3H PRN Max 3/day Dimitris Grewal MD      Or    OLANZapine  10 mg Intramuscular Q3H PRN Max 3/day Dimitris Grewal MD      OLANZapine  5 mg Oral Q3H PRN Max 6/day Dimitris Grewal MD      Or    OLANZapine  5 mg Intramuscular Q3H PRN Max 6/day Dimitris Grewal MD      OLANZapine  15 mg Oral HS Dimitris Grewal MD      OLANZapine  2.5 mg Oral Q3H PRN Max 8/day Dimitris Grewal MD      polyethylene glycol  17 g Oral Daily Dimitris Grewal MD      polyethylene glycol  17 g Oral Daily PRN Dimitris Grewal MD      propranolol  10 mg Oral Q8H PRN Dimitris Grewal MD      senna-docusate sodium  1 tablet Oral Daily PRN Dimitris Grewal MD      sterile water              Behavioral Health Medications: all current active meds have been reviewed. Changes as in plan section above.    Laboratory results:  I have personally reviewed all pertinent laboratory/tests results.   No results found for this or any previous visit (from the past 48 hour(s)).     This note has been constructed using a voice recognition system. There may be translation, syntax, or grammatical errors. If you have any questions, please contact the dictating author.    Darrell Le, DO  Psychiatry Residency, PGY-2

## 2024-01-30 NOTE — NURSING NOTE
Received patient at 1500. Patient remained visible throughout the evening around the  nurse station area. Attention seeking behavior was noted, also with poor boundaries with staff.
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
## 2024-01-30 NOTE — PLAN OF CARE
Problem: SAFETY ADULT  Goal: Maintain or return to baseline ADL function  Description: INTERVENTIONS:  -  Assess patient's ability to carry out ADLs; assess patient's baseline for ADL function and identify physical deficits which impact ability to perform ADLs (bathing, care of mouth/teeth, toileting, grooming, dressing, etc.)  - Assess/evaluate cause of self-care deficits   - Assess range of motion  - Assess patient's mobility; develop plan if impaired  - Assess patient's need for assistive devices and provide as appropriate  - Encourage maximum independence but intervene and supervise when necessary  - Involve family in performance of ADLs  - Assess for home care needs following discharge   - Consider OT consult to assist with ADL evaluation and planning for discharge  - Provide patient education as appropriate  Outcome: Not Progressing  Goal: Maintains/Returns to pre admission functional level  Description: INTERVENTIONS:  - Perform AM-PAC 6 Click Basic Mobility/ Daily Activity assessment daily.  - Set and communicate daily mobility goal to care team and patient/family/caregiver.   - Collaborate with rehabilitation services on mobility goals if consulted  - Perform Range of Motion  times a day.  - Reposition patient every hours.  - [place] patient  times a day  - Stand patient  times a day  - Ambulate patient  times a day  - Out of bed to chair  times a day   - Out of bed for meals   Problem: Alteration in Thoughts and Perception  Goal: Verbalize thoughts and feelings  Description: Interventions:  - Promote a nonjudgmental and trusting relationship with the patient through active listening and therapeutic communication  - Assess patient's level of functioning, behavior and potential for risk  - Engage patient in 1 on 1 interactions  - Encourage patient to express fears, feelings, frustrations, and discuss symptoms    - [place] patient to reality, help patient recognize reality-based thinking   - Administer  medications as ordered and assess for potential side effects  - Provide the patient education related to the signs and symptoms of the illness and desired effects of prescribed medications  Outcome: Not Progressing  Goal: Agree to be compliant with medication regime, as prescribed and report medication side effects  Description: Interventions:  - Offer appropriate PRN medication and supervise ingestion; conduct AIMS, as needed   Outcome: Progressing  Goal: Attend and participate in unit activities, including therapeutic, recreational, and educational groups  Description: Interventions:  -Encourage Visitation and family involvement in care  Outcome: Progressing  Goal: Recognize dysfunctional thoughts, communicate reality-based thoughts at the time of discharge  Description: Interventions:  - Provide medication and psycho-education to assist patient in compliance and developing insight into his/her illness   Outcome: Not Progressing     Problem: Ineffective Coping  Goal: Cooperates with admission process  Description: Interventions:   - Complete admission process  Outcome: Progressing  Goal: Identifies ineffective coping skills  Outcome: Not Progressing  Goal: Identifies healthy coping skills  Outcome: Not Progressing  Goal: Demonstrates healthy coping skills  Outcome: Not Progressing  Goal: Participates in unit activities  Description: Interventions:  - Provide therapeutic environment   - Provide required programming   - Redirect inappropriate behaviors   Outcome: Progressing  Goal: Understands least restrictive measures  Description: Interventions:  - Utilize least restrictive behavior  Outcome: Not Progressing  Goal: Free from restraint events  Description: - Utilize least restrictive measures   - Provide behavioral interventions   - Redirect inappropriate behaviors   Outcome: Progressing     Problem: Risk for Violence/Aggression Toward Others  Goal: Refrain from harming others  Outcome: Not Progressing  Goal:  Refrain from destructive acts on the environment or property  Outcome: Not Progressing  Goal: Control angry outbursts  Description: Interventions:  - Monitor patient closely, per order  - Ensure early verbal de-escalation  - Monitor prn medication needs  - Set reasonable/therapeutic limits, outline behavioral expectations, and consequences   - Provide a non-threatening milieu, utilizing the least restrictive interventions   Outcome: Not Progressing  Goal: Identify appropriate positive anger management techniques  Description: Interventions:  - Offer anger management and coping skills groups   - Staff will provide positive feedback for appropriate anger control  Outcome: Not Progressing    times a day  - Out of bed for toileting  - Record patient progress and toleration of activity level   Outcome: Progressing

## 2024-01-30 NOTE — NURSING NOTE
At the start of the shift, patient refusing to get out of bed for vitals, medications and breakfast. More agreeable around 11 am, which is when patient took his morning medications. Refused Miralax. Continues to mumble during interaction. No behavioral issues at this time.

## 2024-01-30 NOTE — NURSING NOTE
Patient at the nurse station apologizing to the nurse.  23:07 ativan was effective patient is calm and presents with behavior control .
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
## 2024-01-30 NOTE — CASE MANAGEMENT
CM Intern outreached to Pts guardian at [phone] to provide an update. Message was left stating that Pt difficulty staying on current regimen; Pt will sometimes refuse medications. Pt still becomes agitated and loses behavioral control, however, when PRN medications are utilized her calms down and will apologize to the staff for his behaviors. Invega is being cross-tapered to Zyprexa. Discharge is pending, date to be determined as staff is still adjusting medication.     Requested a call back if Guardian had any questions.
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
## 2024-01-31 PROCEDURE — 99232 SBSQ HOSP IP/OBS MODERATE 35: CPT | Performed by: PSYCHIATRY & NEUROLOGY

## 2024-01-31 RX ORDER — ECHINACEA PURPUREA EXTRACT 125 MG
1 TABLET ORAL
Status: DISCONTINUED | OUTPATIENT
Start: 2024-01-31 | End: 2024-02-07 | Stop reason: HOSPADM

## 2024-01-31 RX ORDER — GABAPENTIN 300 MG/1
300 CAPSULE ORAL 3 TIMES DAILY
Status: DISCONTINUED | OUTPATIENT
Start: 2024-01-31 | End: 2024-01-31

## 2024-01-31 RX ADMIN — CLONIDINE HYDROCHLORIDE 0.2 MG: 0.1 TABLET ORAL at 21:15

## 2024-01-31 RX ADMIN — DESMOPRESSIN ACETATE 0.4 MG: 0.1 TABLET ORAL at 21:14

## 2024-01-31 RX ADMIN — METOPROLOL SUCCINATE 50 MG: 50 TABLET, EXTENDED RELEASE ORAL at 10:28

## 2024-01-31 RX ADMIN — LORAZEPAM 2 MG: 2 INJECTION INTRAMUSCULAR; INTRAVENOUS at 15:45

## 2024-01-31 RX ADMIN — CHLORPROMAZINE HYDROCHLORIDE 150 MG: 100 TABLET, FILM COATED ORAL at 21:15

## 2024-01-31 RX ADMIN — CLONIDINE HYDROCHLORIDE 0.2 MG: 0.1 TABLET ORAL at 10:29

## 2024-01-31 RX ADMIN — POLYETHYLENE GLYCOL 3350 17 G: 17 POWDER, FOR SOLUTION ORAL at 10:29

## 2024-01-31 RX ADMIN — CHLORPROMAZINE HYDROCHLORIDE 100 MG: 100 TABLET, FILM COATED ORAL at 10:30

## 2024-01-31 RX ADMIN — OLANZAPINE 15 MG: 5 TABLET, FILM COATED ORAL at 21:15

## 2024-01-31 RX ADMIN — DIVALPROEX SODIUM 1750 MG: 500 TABLET, EXTENDED RELEASE ORAL at 21:14

## 2024-01-31 RX ADMIN — CLONAZEPAM 1 MG: 1 TABLET ORAL at 10:29

## 2024-01-31 RX ADMIN — CHLORPROMAZINE HYDROCHLORIDE 50 MG: 25 INJECTION INTRAMUSCULAR at 15:45

## 2024-01-31 NOTE — PROGRESS NOTES
01/31/24 0837   Team Meeting   Meeting Type Daily Rounds   Team Members Present   Team Members Present Physician;Nurse;   Physician Team Member Carmina   Nursing Team Member Carlos   Care Management Team Member Carol   Patient/Family Present   Patient Present No   Patient's Family Present No     Pt refusing Miralax yesterday, mumbling, poor boundaries. Refusing EKG. Pt's dosing for Thorazine changed to assist with drowsiness and agitation. Discharge TBD.

## 2024-01-31 NOTE — NURSING NOTE
Patient noted to be agitated and anxious , mumbling , and making verbal threats.Patient refused medications . Unable to redirect Security called to unit for Administration assistance Prn thorazine 50 mg severe agitation IM,ativan 2 mg IM Prn severe anxiety.

## 2024-01-31 NOTE — PLAN OF CARE
Problem: Alteration in Thoughts and Perception  Goal: Attend and participate in unit activities, including therapeutic, recreational, and educational groups  Description: Interventions:  -Encourage Visitation and family involvement in care  Outcome: Not Progressing   Patient has attended some groups but does not participate. He appears to be responding to internal stimuli and is intrusive.

## 2024-01-31 NOTE — NURSING NOTE
Patient OOB later in the shift. Ate breakfast and took morning medications. After eating breakfast, patient was sitting in the dayroom and was incontinent of urine. Refused to shower but did wash up and change clothing. No behavioral issues or redirection required as of this time. Speech remains mumbled.

## 2024-01-31 NOTE — PROGRESS NOTES
Progress Note - Behavioral Health   Manuel Back 18 y.o. male MRN: [id number]  Unit/Bed#: [facility] 340-01 Encounter: [id number]    Assessment/Plan   Principal Problem:    Schizoaffective disorder (HCC)  Active Problems:    Urinary incontinence, nocturnal enuresis    Medical clearance for psychiatric admission    Morbid obesity with body mass index (BMI) of 40.0 to 49.9 (HCC)    Essential hypertension    Vitamin D deficiency    Chronic pain of right knee      Recommended Treatment:      No psychopharmacologic changes necessary at this time; will continue to assess for further optimization.  Continue with current medications:  Thorazine 100 mg twice daily, 150 mg at bedtime for agitation and impulsivity.  Klonopin 1 mg daily for severe anxiety and agitation.  Clonidine 0.2 mg 3 times daily for severe agitation and impulsivity.  Vitamin B12 500 mcg daily for supplementation.  Depakote ER 1750 mg at bedtime for mood stabilization.  VPA level, CMP, CBC for 2/1/24 prior to p.m. dose.  Vitamin D2 50,000 units q. weekly for supplementation.  Zyprexa 15 mg at bedtime for psychosis and mood.  MiraLAX 17 g daily for standing bowel regimen.   Continue with group therapy, milieu therapy and occupational therapy.    Continue frequent safety checks and vitals per unit protocol.  Continue with SLIM medical management as indicated  Continue coordinating with case management regarding disposition    Legal Status: 201  Disposition: To be determined, coordinating with case management    Case discussed with treatment team.  Risks, benefits and possible side effects of Medications: Risks, benefits, and possible side effects of medications have previously been explained. No new medications at this time.    ------------------------------------------------------------    Subjective: Patient's chart was reviewed, and patient's progress and plan was discussed with treatment team. Per nursing report, Manuel has been intermittently  "irritable, scant, redirectable, cooperative on the unit and compliant with medications. Patient has remained in behavioral control for the last 24 hours. Last night patient was documented to have slept throughout the night.    Manuel was evaluated this morning for continuity of care. On examination, Manuel is more drowsy than previously, intermittently incoherent with regards to speech, laying in bed and dressed in hospital attire. He states his mood is \" fine.\" He reports sleeping well and his energy level today is low. His appetite has been good but he denies eating breakfast due to sleeping in this morning. He denies adverse effects from medications other than some slight sedation this morning due to the increase in Zyprexa. He denies suicidal ideation, homicidal ideation, auditory hallucinations, and visual hallucinations and continues to appear internally preoccupied. His goals for today are to remain in behavioral control and medication compliant.    VS: Reviewed,  BMI of 47.14, hypertensive at 148/79, otherwise within normal limits    Progress Toward Goals: Slow improvement    Psychiatric Review of Systems:  Behavior over the last 24 hours:  Slowly improving  Sleep: hypersomnia  Appetite: adequate  Medication side effects:  Slight sedation  Medical ROS:  No cough, chest pain, nausea, vomiting, diarrhea, all other symptoms negative.    Vital signs in last 24 hours:  Temp:  [97.2 °F (36.2 °C)-97.6 °F (36.4 °C)] 97.6 °F (36.4 °C)  HR:  [] 93  Resp:  [18] 18  BP: (135-160)/(79-93) 148/79    Mental Status Exam:    Appearance:  drowsy, poor eye contact, appears stated age, hospital attire dressed, marginal grooming/hygiene, obese, and bearded   Behavior:  calm, guarded, and laying in bed   Speech:  delayed initiation, slow, soft, and incoherent at times   Mood:  \"Fine\"   Affect:  blunted   Thought Process:  Less disorganized, concrete   Associations: concrete associations   Thought Content:  mild " paranoia, no overt delusional content elicited   Perceptual Disturbances: denies current hallucinations and appears internally preoccupied   Risk Potential: Suicidal ideation - None at present  Homicidal ideation - None at present  Potential for aggression - Yes, due to acute psychosis   Sensorium:  oriented to person, place, time/date, and situation   Memory:  recent and remote memory grossly intact   Consciousness:  sedated   Attention/Concentration: attention span and concentration appear shorter than expected for age   Insight:  limited   Judgment: limited   Gait/Station: normal gait/station   Motor Activity: no abnormal movements     Current Medications:  Current Facility-Administered Medications   Medication Dose Route Frequency Provider Last Rate    aluminum-magnesium hydroxide-simethicone  30 mL Oral Q4H PRN Dimitris Grewal MD      benztropine  1 mg Intramuscular Q4H PRN Max 6/day Dimitris Grewal MD      benztropine  1 mg Oral Q4H PRN Max 6/day Dimitris Grewal MD      chlorproMAZINE  50 mg Intramuscular Q8H PRN Dimitris Grewal MD      And    diphenhydrAMINE  50 mg Intramuscular Q8H PRN Dimitris Grewal MD      chlorproMAZINE  100 mg Oral BID Darrell Le,       chlorproMAZINE  150 mg Oral HS Phill Kamara MD      clonazePAM  1 mg Oral Daily Darrell Le,       cloNIDine  0.2 mg Oral TID Dimitris Grewal MD      desmopressin  0.4 mg Oral HS Pihll Kamara MD      Diclofenac Sodium  2 g Topical 4x Daily PRN ABUNDIO Villalobos      hydrOXYzine HCL  50 mg Oral Q6H PRN Max 4/day Dimitris Grewal MD      Or    diphenhydrAMINE  50 mg Intramuscular Q6H PRN Dimitris Grewal MD      divalproex sodium  1,750 mg Oral HS Darrell Le DO      ergocalciferol  50,000 Units Oral Weekly ABUNDIO Villalobos      glycerin-hypromellose-  1 drop Both Eyes Q3H PRN Dimitris Grewal MD      hydrOXYzine HCL  100 mg Oral Q6H PRN Max 4/day Dimitris Grewal MD      Or    LORazepam  2 mg Intramuscular  Q6H PRN Dimitris Grewal MD      hydrOXYzine HCL  25 mg Oral Q6H PRN Max 4/day Dimitris Grewal MD      ibuprofen  400 mg Oral Q4H PRN Dimitris Grewal MD      ibuprofen  600 mg Oral Q6H PRN Dimitris Grewal MD      ibuprofen  800 mg Oral Q8H PRN Dimitris Grewal MD      melatonin  3 mg Oral HS PRN Dimitris Grewal MD      metoprolol succinate  50 mg Oral Daily Dimitris Grewal MD      OLANZapine  10 mg Oral Q3H PRN Max 3/day Dimitris Grewal MD      Or    OLANZapine  10 mg Intramuscular Q3H PRN Max 3/day Dimitris Grewal MD      OLANZapine  5 mg Oral Q3H PRN Max 6/day Dimitris Grewal MD      Or    OLANZapine  5 mg Intramuscular Q3H PRN Max 6/day Dimitris Grewal MD      OLANZapine  15 mg Oral HS Dimitris Grewal MD      OLANZapine  2.5 mg Oral Q3H PRN Max 8/day Dimitris Grewal MD      polyethylene glycol  17 g Oral Daily Dimitris Grewal MD      polyethylene glycol  17 g Oral Daily PRN Dimitris Grewal MD      propranolol  10 mg Oral Q8H PRN Dimitris Grewal MD      senna-docusate sodium  1 tablet Oral Daily PRN Dimitris Grewal MD      sterile water              Behavioral Health Medications: all current active meds have been reviewed. Changes as in plan section above.    Laboratory results:  I have personally reviewed all pertinent laboratory/tests results.   No results found for this or any previous visit (from the past 48 hour(s)).     This note has been constructed using a voice recognition system. There may be translation, syntax, or grammatical errors. If you have any questions, please contact the dictating author.    Darrell Le, DO  Psychiatry Residency, PGY-2

## 2024-02-01 LAB
ALBUMIN SERPL BCP-MCNC: 3.9 G/DL (ref 3.5–5)
ALP SERPL-CCNC: 121 U/L (ref 34–104)
ALT SERPL W P-5'-P-CCNC: 37 U/L (ref 7–52)
ANION GAP SERPL CALCULATED.3IONS-SCNC: 6 MMOL/L
AST SERPL W P-5'-P-CCNC: 33 U/L (ref 13–39)
BASOPHILS # BLD AUTO: 0.02 THOUSANDS/ÂΜL (ref 0–0.1)
BASOPHILS NFR BLD AUTO: 0 % (ref 0–1)
BILIRUB SERPL-MCNC: 0.24 MG/DL (ref 0.2–1)
BUN SERPL-MCNC: 8 MG/DL (ref 5–25)
CALCIUM SERPL-MCNC: 9 MG/DL (ref 8.4–10.2)
CHLORIDE SERPL-SCNC: 98 MMOL/L (ref 96–108)
CHOLEST SERPL-MCNC: 126 MG/DL
CO2 SERPL-SCNC: 30 MMOL/L (ref 21–32)
CREAT SERPL-MCNC: 1.11 MG/DL (ref 0.6–1.3)
EOSINOPHIL # BLD AUTO: 0.06 THOUSAND/ÂΜL (ref 0–0.61)
EOSINOPHIL NFR BLD AUTO: 1 % (ref 0–6)
ERYTHROCYTE [DISTWIDTH] IN BLOOD BY AUTOMATED COUNT: 13.2 % (ref 11.6–15.1)
GFR SERPL CREATININE-BSD FRML MDRD: 95 ML/MIN/1.73SQ M
GLUCOSE SERPL-MCNC: 218 MG/DL (ref 65–140)
HCT VFR BLD AUTO: 40.3 % (ref 36.5–49.3)
HDLC SERPL-MCNC: 32 MG/DL
HGB BLD-MCNC: 13.1 G/DL (ref 12–17)
IMM GRANULOCYTES # BLD AUTO: 0.06 THOUSAND/UL (ref 0–0.2)
IMM GRANULOCYTES NFR BLD AUTO: 1 % (ref 0–2)
LDLC SERPL CALC-MCNC: 23 MG/DL (ref 0–100)
LYMPHOCYTES # BLD AUTO: 2 THOUSANDS/ÂΜL (ref 0.6–4.47)
LYMPHOCYTES NFR BLD AUTO: 23 % (ref 14–44)
MCH RBC QN AUTO: 27.4 PG (ref 26.8–34.3)
MCHC RBC AUTO-ENTMCNC: 32.5 G/DL (ref 31.4–37.4)
MCV RBC AUTO: 84 FL (ref 82–98)
MONOCYTES # BLD AUTO: 1.06 THOUSAND/ÂΜL (ref 0.17–1.22)
MONOCYTES NFR BLD AUTO: 12 % (ref 4–12)
NEUTROPHILS # BLD AUTO: 5.66 THOUSANDS/ÂΜL (ref 1.85–7.62)
NEUTS SEG NFR BLD AUTO: 63 % (ref 43–75)
NONHDLC SERPL-MCNC: 94 MG/DL
NRBC BLD AUTO-RTO: 0 /100 WBCS
PLATELET # BLD AUTO: 249 THOUSANDS/UL (ref 149–390)
PMV BLD AUTO: 10.4 FL (ref 8.9–12.7)
POTASSIUM SERPL-SCNC: 4.3 MMOL/L (ref 3.5–5.3)
PROT SERPL-MCNC: 6.7 G/DL (ref 6.4–8.4)
RBC # BLD AUTO: 4.78 MILLION/UL (ref 3.88–5.62)
SODIUM SERPL-SCNC: 134 MMOL/L (ref 135–147)
TRIGL SERPL-MCNC: 357 MG/DL
VALPROATE SERPL-MCNC: 99 UG/ML (ref 50–100)
WBC # BLD AUTO: 8.86 THOUSAND/UL (ref 4.31–10.16)

## 2024-02-01 PROCEDURE — 80053 COMPREHEN METABOLIC PANEL: CPT

## 2024-02-01 PROCEDURE — 80061 LIPID PANEL: CPT

## 2024-02-01 PROCEDURE — 80164 ASSAY DIPROPYLACETIC ACD TOT: CPT

## 2024-02-01 PROCEDURE — 99232 SBSQ HOSP IP/OBS MODERATE 35: CPT | Performed by: PSYCHIATRY & NEUROLOGY

## 2024-02-01 PROCEDURE — 85025 COMPLETE CBC W/AUTO DIFF WBC: CPT

## 2024-02-01 PROCEDURE — 83036 HEMOGLOBIN GLYCOSYLATED A1C: CPT

## 2024-02-01 RX ORDER — OLANZAPINE 10 MG/1
20 TABLET ORAL
Status: DISCONTINUED | OUTPATIENT
Start: 2024-02-01 | End: 2024-02-07 | Stop reason: HOSPADM

## 2024-02-01 RX ADMIN — DIPHENHYDRAMINE HYDROCHLORIDE 50 MG: 50 INJECTION, SOLUTION INTRAMUSCULAR; INTRAVENOUS at 17:04

## 2024-02-01 RX ADMIN — CLONIDINE HYDROCHLORIDE 0.2 MG: 0.1 TABLET ORAL at 21:35

## 2024-02-01 RX ADMIN — DESMOPRESSIN ACETATE 0.4 MG: 0.1 TABLET ORAL at 21:33

## 2024-02-01 RX ADMIN — METOPROLOL SUCCINATE 50 MG: 50 TABLET, EXTENDED RELEASE ORAL at 09:55

## 2024-02-01 RX ADMIN — CLONIDINE HYDROCHLORIDE 0.2 MG: 0.1 TABLET ORAL at 09:54

## 2024-02-01 RX ADMIN — CHLORPROMAZINE HYDROCHLORIDE 100 MG: 100 TABLET, FILM COATED ORAL at 15:56

## 2024-02-01 RX ADMIN — CHLORPROMAZINE HYDROCHLORIDE 100 MG: 100 TABLET, FILM COATED ORAL at 09:56

## 2024-02-01 RX ADMIN — OLANZAPINE 20 MG: 10 TABLET, FILM COATED ORAL at 21:33

## 2024-02-01 RX ADMIN — DIVALPROEX SODIUM 1750 MG: 500 TABLET, EXTENDED RELEASE ORAL at 21:33

## 2024-02-01 RX ADMIN — CLONAZEPAM 1 MG: 1 TABLET ORAL at 09:55

## 2024-02-01 RX ADMIN — CHLORPROMAZINE HYDROCHLORIDE 150 MG: 100 TABLET, FILM COATED ORAL at 21:33

## 2024-02-01 RX ADMIN — POLYETHYLENE GLYCOL 3350 17 G: 17 POWDER, FOR SOLUTION ORAL at 09:55

## 2024-02-01 RX ADMIN — CLONIDINE HYDROCHLORIDE 0.2 MG: 0.1 TABLET ORAL at 16:19

## 2024-02-01 NOTE — PROGRESS NOTES
Progress Note - Behavioral Health   Manuel Back 18 y.o. male MRN: [id number]  Unit/Bed#: [facility] 340-01 Encounter: [id number]    Assessment/Plan   Principal Problem:    Schizoaffective disorder (HCC)  Active Problems:    Urinary incontinence, nocturnal enuresis    Medical clearance for psychiatric admission    Morbid obesity with body mass index (BMI) of 40.0 to 49.9 (HCC)    Essential hypertension    Vitamin D deficiency    Chronic pain of right knee      Recommended Treatment:      Will make the following medication changes:  Continue with current medications:  Thorazine 100 mg twice daily, 150 mg at bedtime for agitation and impulsivity.  Klonopin 1 mg daily for severe anxiety and agitation.  Clonidine 0.2 mg 3 times daily for severe agitation and impulsivity.  Vitamin B12 500 mcg daily for supplementation.  Depakote ER 1750 mg at bedtime for mood stabilization.  VPA level, CBC, CMP scheduled for 2/1/2024 prior to p.m. dose of Depakote.  Vitamin D2 50,000 units q. weekly for supplementation.  Increase Zyprexa to 20 mg at bedtime for psychosis and mood.  MiraLAX 17 g daily for standing bowel regimen.   Continue with group therapy, milieu therapy and occupational therapy.    Continue frequent safety checks and vitals per unit protocol.  Continue with SLIM medical management as indicated  Continue coordinating with case management regarding disposition    Legal Status: 201  Disposition: coordinating with case management, tentative discharge by mid next week.    Case discussed with treatment team.  Risks, benefits and possible side effects of Medications: Risks, benefits, and possible side effects of medications have been explained to the patient, who verbalizes understanding    ------------------------------------------------------------    Subjective: Patient's chart was reviewed, and patient's progress and plan was discussed with treatment team. Per nursing report, Manuel has been intermittently agitated  "and anxious and receiving as needed Ativan 2 mg IM, as needed Thorazine 50 mg IM was effective, he was redirectable and ultimately cooperative on the unit and compliant with medications. Patient has remained in behavioral control for the last 24 hours. Last night patient was documented to have slept throughout the night.    Manuel was evaluated this morning for continuity of care. On examination, Manuel is less irritable, slightly brighter, less guarded and more cooperative with interview today, he states he is in the middle of eating breakfast but is willing to answer questions because \"I know your questions are important.\" He states his mood is \" good.\" He reports sleeping well and his energy level today is low to adequate. His appetite has been good. He denies adverse effects from medications and is amenable to further titration of Zyprexa. He denies suicidal ideation, homicidal ideation, auditory hallucinations, and visual hallucinations. His goals for today are to remain in behavioral control and medication compliant.  The patient is notably less internally preoccupied on interview and appears to be making significant progress with continued titration of Zyprexa and Depakote, he is amenable to receiving EKG and blood work today.    VS: Reviewed,  BMI 47.1, hypertensive at 140/75 likely due to agitative state, mildly tachycardic at 105, otherwise within normal limits    Progress Toward Goals: Slow improvement    Psychiatric Review of Systems:  Behavior over the last 24 hours: improved  Sleep: hypersomnia  Appetite: adequate  Medication side effects: none verbalized  Medical ROS:  No cough, chest pain, nausea, vomiting, diarrhea, all other symptoms are negative.    Vital signs in last 24 hours:  Temp:  [97.2 °F (36.2 °C)-98.4 °F (36.9 °C)] 98.4 °F (36.9 °C)  HR:  [] 105  Resp:  [16-17] 16  BP: (112-170)/(59-97) 140/75    Mental Status Exam:    Appearance:  alert, improving eye contact, appears stated " "age, hospital attire dressed, marginal grooming/hygiene, obese, and bearded   Behavior:  calm, cooperative, and sitting comfortably   Speech:  delayed initiation, slow, soft, and more coherent   Mood:  \"Good\"   Affect:  slightly brighter, less irritable   Thought Process:  Goal-directed, concrete   Associations: concrete associations   Thought Content:  no verbalized delusions or overt paranoia   Perceptual Disturbances: denies current hallucinations and does not appear to be responding to internal stimuli at this time   Risk Potential: Suicidal ideation - None at present  Homicidal ideation - None at present  Potential for aggression - Yes due to history of agitation   Sensorium:  oriented to person, place, time/date, and situation   Memory:  recent and remote memory grossly intact   Consciousness:  alert and awake   Attention/Concentration: attention span and concentration appear shorter than expected for age   Insight:  limited   Judgment: limited   Gait/Station: normal gait/station   Motor Activity: no abnormal movements     Current Medications:  Current Facility-Administered Medications   Medication Dose Route Frequency Provider Last Rate    aluminum-magnesium hydroxide-simethicone  30 mL Oral Q4H PRN Dimitris Grewal MD      benztropine  1 mg Intramuscular Q4H PRN Max 6/day Dimitris Grewal MD      benztropine  1 mg Oral Q4H PRN Max 6/day Dimitris Grewal MD      chlorproMAZINE  50 mg Intramuscular Q8H PRN Dimitris Grewal MD      And    diphenhydrAMINE  50 mg Intramuscular Q8H PRN Dimitris Grewal MD      chlorproMAZINE  100 mg Oral BID Darrell Le,       chlorproMAZINE  150 mg Oral HS Phill Kamara MD      clonazePAM  1 mg Oral Daily Darrell Le DO      cloNIDine  0.2 mg Oral TID Dimitris Grewal MD      desmopressin  0.4 mg Oral HS Phill Kamara MD      Diclofenac Sodium  2 g Topical 4x Daily PRN ABUNDIO Villalobos      hydrOXYzine HCL  50 mg Oral Q6H PRN Max 4/day Dimitris Grewal MD      " Or    diphenhydrAMINE  50 mg Intramuscular Q6H PRN Dimitris Grewal MD      divalproex sodium  1,750 mg Oral HS Darrell Le DO      ergocalciferol  50,000 Units Oral Weekly ABUNDIO Villalobos      Artificial Tears  1 drop Both Eyes Q3H PRN Dimitris Grewal MD      hydrOXYzine HCL  100 mg Oral Q6H PRN Max 4/day Dimitris Grewal MD      Or    LORazepam  2 mg Intramuscular Q6H PRN Dimitris Grewal MD      hydrOXYzine HCL  25 mg Oral Q6H PRN Max 4/day Dimitris Grewal MD      ibuprofen  400 mg Oral Q4H PRN Dimitris Grewal MD      ibuprofen  600 mg Oral Q6H PRN Dimitris Grewal MD      ibuprofen  800 mg Oral Q8H PRN Dimitris Grewal MD      melatonin  3 mg Oral HS PRN Dimitris Grewal MD      metoprolol succinate  50 mg Oral Daily Dimitris Grewal MD      OLANZapine  10 mg Oral Q3H PRN Max 3/day Dimitris Grewal MD      Or    OLANZapine  10 mg Intramuscular Q3H PRN Max 3/day Dimitris Grewal MD      OLANZapine  5 mg Oral Q3H PRN Max 6/day Dimitris Grewal MD      Or    OLANZapine  5 mg Intramuscular Q3H PRN Max 6/day Dimitris Grewal MD      OLANZapine  15 mg Oral HS Dimitris Grewal MD      OLANZapine  2.5 mg Oral Q3H PRN Max 8/day Dimitris Grewal MD      polyethylene glycol  17 g Oral Daily Dimitris Grewal MD      polyethylene glycol  17 g Oral Daily PRN Dimitris Grewal MD      propranolol  10 mg Oral Q8H PRN Dimitris Grewal MD      senna-docusate sodium  1 tablet Oral Daily PRN Dimitris Grewal MD      sodium chloride  1 spray Each Nare Q1H PRN Phil Beavers DO      sterile water              Behavioral Health Medications: all current active meds have been reviewed. Changes as in plan section above.    Laboratory results:  I have personally reviewed all pertinent laboratory/tests results.   No results found for this or any previous visit (from the past 48 hour(s)).     This note has been constructed using a voice recognition system. There may be translation, syntax, or grammatical errors. If you  have any questions, please contact the dictating author.    Darrell Le, DO  Psychiatry Residency, PGY-2

## 2024-02-01 NOTE — NURSING NOTE
Patient has been seclusive to his room. In bed sleeping. Only out for breakfast. Compliant with medications. No behavioral issues at this time. Denies symptoms.

## 2024-02-01 NOTE — PLAN OF CARE
Problem: SAFETY ADULT  Goal: Maintain or return to baseline ADL function  Description: INTERVENTIONS:  -  Assess patient's ability to carry out ADLs; assess patient's baseline for ADL function and identify physical deficits which impact ability to perform ADLs (bathing, care of mouth/teeth, toileting, grooming, dressing, etc.)  - Assess/evaluate cause of self-care deficits   - Assess range of motion  - Assess patient's mobility; develop plan if impaired  - Assess patient's need for assistive devices and provide as appropriate  - Encourage maximum independence but intervene and supervise when necessary  - Involve family in performance of ADLs  - Assess for home care needs following discharge   - Consider OT consult to assist with ADL evaluation and planning for discharge  - Provide patient education as appropriate  Outcome: Progressing     Problem: BEHAVIOR  Goal: Pt/Family maintain appropriate behavior and adhere to behavioral management agreement, if implemented  Description: INTERVENTIONS:  - Assess the family dynamic   - Encourage verbalization of thoughts and concerns in a socially appropriate manner  - Assess patient/family's coping skills and non-compliant behavior (including use of illegal substances).  - Utilize positive, consistent limit setting strategies supporting safety of patient, staff and others  - Initiate consult with Case Management, Spiritual Care or other ancillary services as appropriate  - If a patient's/visitor's behavior jeopardizes the safety of the patient, staff, or others, refer to organization procedure.   - Notify Security of behavior or suspected illegal substances which indicate the need for search of the patient and/or belongings  - Encourage participation in the decision making process about a behavioral management agreement; implement if patient meets criteria  Outcome: Progressing     Problem: Alteration in Thoughts and Perception  Goal: Verbalize thoughts and  feelings  Description: Interventions:  - Promote a nonjudgmental and trusting relationship with the patient through active listening and therapeutic communication  - Assess patient's level of functioning, behavior and potential for risk  - Engage patient in 1 on 1 interactions  - Encourage patient to express fears, feelings, frustrations, and discuss symptoms    - [place] patient to reality, help patient recognize reality-based thinking   - Administer medications as ordered and assess for potential side effects  - Provide the patient education related to the signs and symptoms of the illness and desired effects of prescribed medications  Outcome: Progressing  Goal: Agree to be compliant with medication regime, as prescribed and report medication side effects  Description: Interventions:  - Offer appropriate PRN medication and supervise ingestion; conduct AIMS, as needed   Outcome: Progressing     Problem: Ineffective Coping  Goal: Participates in unit activities  Description: Interventions:  - Provide therapeutic environment   - Provide required programming   - Redirect inappropriate behaviors   Outcome: Progressing

## 2024-02-01 NOTE — PROGRESS NOTES
02/01/24 0837   Team Meeting   Meeting Type Daily Rounds   Team Members Present   Team Members Present Physician;Nurse;   Physician Team Member Carmina   [facility]   Care Management Team Member Carol   Patient/Family Present   Patient Present No   Patient's Family Present No     Pt ate breakfast yesterday, refusing medications. Later in the day refusing medication, threw meds. Security present on the unit for pt, received PRN IM medication. Discharge possible next week.

## 2024-02-01 NOTE — NURSING NOTE
Patient noted to be out of room in dayroom and walking on the unit. Currently under behavioral control, refused evening medications. Denies SI/HI/AH/VH at this time.

## 2024-02-01 NOTE — SOCIAL WORK
ALEC spoke with Pt's guardian, Josetom Soriano from [facility], SW provided update on pt's medication changes and behaviors     Guardian confirmed pt does have a formal ID diagnoses, he is getting connected with supports coordination and the group home he lives in is for individuals with ID dx. Pt's guardian believes he has been diagnosed with Moderate Intellectual disability.     Guardian reports he has spoken with pt twice while IP and he sounds at baseline but states it is hard to  because pt likes his guardian and they tend to have positive interactions even if pt has recently had a negative interaction or a bad day.     Guardian states pt will start mumbling in the middle of the conversation and look away. This is a baseline behavior, sometimes it is connected to pt being asked to engage in non-preferred activities, other times it is out of nowhere.

## 2024-02-01 NOTE — NURSING NOTE
Patient noted in room able to keep behavioral control. Patient has no further signs of anxiety and agitation. Prn Thorazine and ativan 2mg effective.

## 2024-02-02 LAB
EST. AVERAGE GLUCOSE BLD GHB EST-MCNC: 160 MG/DL
HBA1C MFR BLD: 7.2 %

## 2024-02-02 PROCEDURE — 93005 ELECTROCARDIOGRAM TRACING: CPT

## 2024-02-02 PROCEDURE — 99232 SBSQ HOSP IP/OBS MODERATE 35: CPT | Performed by: PSYCHIATRY & NEUROLOGY

## 2024-02-02 RX ADMIN — DIVALPROEX SODIUM 1750 MG: 500 TABLET, EXTENDED RELEASE ORAL at 21:17

## 2024-02-02 RX ADMIN — CHLORPROMAZINE HYDROCHLORIDE 100 MG: 100 TABLET, FILM COATED ORAL at 17:34

## 2024-02-02 RX ADMIN — METOPROLOL SUCCINATE 50 MG: 50 TABLET, EXTENDED RELEASE ORAL at 09:46

## 2024-02-02 RX ADMIN — CHLORPROMAZINE HYDROCHLORIDE 100 MG: 100 TABLET, FILM COATED ORAL at 10:07

## 2024-02-02 RX ADMIN — CLONIDINE HYDROCHLORIDE 0.2 MG: 0.1 TABLET ORAL at 21:18

## 2024-02-02 RX ADMIN — CLONAZEPAM 1 MG: 1 TABLET ORAL at 09:46

## 2024-02-02 RX ADMIN — CLONIDINE HYDROCHLORIDE 0.2 MG: 0.1 TABLET ORAL at 17:34

## 2024-02-02 RX ADMIN — POLYETHYLENE GLYCOL 3350 17 G: 17 POWDER, FOR SOLUTION ORAL at 10:01

## 2024-02-02 RX ADMIN — DESMOPRESSIN ACETATE 0.4 MG: 0.1 TABLET ORAL at 21:17

## 2024-02-02 RX ADMIN — CLONIDINE HYDROCHLORIDE 0.2 MG: 0.1 TABLET ORAL at 09:46

## 2024-02-02 RX ADMIN — CHLORPROMAZINE HYDROCHLORIDE 150 MG: 100 TABLET, FILM COATED ORAL at 21:17

## 2024-02-02 RX ADMIN — OLANZAPINE 20 MG: 10 TABLET, FILM COATED ORAL at 21:17

## 2024-02-02 NOTE — PLAN OF CARE
Problem: Ineffective Coping  Goal: Participates in unit activities  Description: Interventions:  - Provide therapeutic environment   - Provide required programming   - Redirect inappropriate behaviors   Outcome: Not Progressing   Has not been active in group therapy.

## 2024-02-02 NOTE — NURSING NOTE
Patient pleasant and cooperative at this time,compliant with medications and routine care. Denies SI/HI/AH/VH . Offers no complaints of pain or discomfort.

## 2024-02-02 NOTE — NURSING NOTE
Patient has been visible  on the unit, out for meals and medications.  He is cooperative, calm, and in behavioral control. Patient occasionally requires redirection for intrusiveness.

## 2024-02-02 NOTE — PLAN OF CARE
Problem: Alteration in Thoughts and Perception  Goal: Verbalize thoughts and feelings  Description: Interventions:  - Promote a nonjudgmental and trusting relationship with the patient through active listening and therapeutic communication  - Assess patient's level of functioning, behavior and potential for risk  - Engage patient in 1 on 1 interactions  - Encourage patient to express fears, feelings, frustrations, and discuss symptoms    - [place] patient to reality, help patient recognize reality-based thinking   - Administer medications as ordered and assess for potential side effects  - Provide the patient education related to the signs and symptoms of the illness and desired effects of prescribed medications  Outcome: Progressing  Goal: Agree to be compliant with medication regime, as prescribed and report medication side effects  Description: Interventions:  - Offer appropriate PRN medication and supervise ingestion; conduct AIMS, as needed   Outcome: Progressing  Goal: Attend and participate in unit activities, including therapeutic, recreational, and educational groups  Description: Interventions:  -Encourage Visitation and family involvement in care  Outcome: Progressing  Goal: Recognize dysfunctional thoughts, communicate reality-based thoughts at the time of discharge  Description: Interventions:  - Provide medication and psycho-education to assist patient in compliance and developing insight into his/her illness   Outcome: Progressing     Problem: Ineffective Coping  Goal: Cooperates with admission process  Description: Interventions:   - Complete admission process  Outcome: Progressing  Goal: Identifies ineffective coping skills  Outcome: Progressing  Goal: Identifies healthy coping skills  Outcome: Progressing  Goal: Demonstrates healthy coping skills  Outcome: Progressing  Goal: Participates in unit activities  Description: Interventions:  - Provide therapeutic environment   - Provide required  programming   - Redirect inappropriate behaviors   Outcome: Progressing  Goal: Patient/Family participate in treatment and DC plans  Description: Interventions:  - Provide therapeutic environment  Outcome: Progressing  Goal: Patient/Family verbalizes awareness of resources  Outcome: Progressing  Goal: Understands least restrictive measures  Description: Interventions:  - Utilize least restrictive behavior  Outcome: Progressing  Goal: Free from restraint events  Description: - Utilize least restrictive measures   - Provide behavioral interventions   - Redirect inappropriate behaviors   Outcome: Progressing

## 2024-02-02 NOTE — PROGRESS NOTES
02/02/24 0840   Team Meeting   Meeting Type Daily Rounds   Team Members Present   Team Members Present Physician;Nurse;   Physician Team Member Carmina   Nursing Team Member Aspen   Care Management Team Member Carol   Patient/Family Present   Patient Present No   Patient's Family Present No     VPA level was 99 this morning. Pt's Zyprexa was increased for HS dose to start today. Pt in good behavioral control, did not require any PRN medications. Discharge possible Wednesday.

## 2024-02-02 NOTE — NURSING NOTE
Patient noted to be be visibly anxious picking up the phone and walking back and forth. HAM score 29. Benadryl 50 mg IM

## 2024-02-02 NOTE — PROGRESS NOTES
Progress Note - Behavioral Health   Manuel Back 18 y.o. male MRN: [id number]  Unit/Bed#: [facility] 340-01 Encounter: [id number]    Assessment/Plan   Principal Problem:    Schizoaffective disorder (HCC)  Active Problems:    Urinary incontinence, nocturnal enuresis    Medical clearance for psychiatric admission    Morbid obesity with body mass index (BMI) of 40.0 to 49.9 (HCC)    Essential hypertension    Vitamin D deficiency    Chronic pain of right knee      Recommended Treatment:      No psychopharmacologic changes necessary at this time; will continue to assess for further optimization.  Continue with current medications:  Thorazine 100 mg twice daily, 150 mg at bedtime for agitation and impulsivity.  Klonopin 1 mg daily for severe anxiety and agitation.  Clonidine 0.2 mg 3 times daily for severe agitation and impulsivity.  Vitamin B12 500 mcg daily for supplementation.  Depakote DR 1750 mg at bedtime for mood stabilization.  CBC, CMP unremarkable, valproic acid level of 99, noted to be therapeutic at this time, A1c noted to be 7.2%, and elevated triglycerides with low HDL on lipid panel  Vitamin D2 50,000 units q. weekly for supplementation.  Zyprexa 20 mg at bedtime for psychosis and mood.  MiraLAX 17 g daily for standing bowel regimen.  Continue with group therapy, milieu therapy and occupational therapy.    Continue frequent safety checks and vitals per unit protocol.  Continue with SLIM medical management as indicated  Continue coordinating with case management regarding disposition    Legal Status: 201  Disposition: coordinating with case management, tentative discharge on Wednesday 2/7/24    Case discussed with treatment team.  Risks, benefits and possible side effects of Medications: Risks, benefits, and possible side effects of medications have previously been explained. No new medications at this time.    ------------------------------------------------------------    Subjective: Patient's chart was  "reviewed, and patient's progress and plan was discussed with treatment team. Per nursing report, Manuel has been visibly anxious, requiring as needed Benadryl 50 mg IM which was effective, redirectable and ultimately cooperative on the unit and compliant with medications. Patient has remained in behavioral control for the last 24 hours. Last night patient was documented to have slept throughout the night.    Manuel was evaluated this morning for continuity of care. On examination, Manuel is calm, with an irritable edge, less disorganized and appearing at his behavioral baseline, he is dressed in hospital attire and sitting comfortably. He states his mood is \" fine but I do not want to talk right now.\" He reports sleeping well and his energy level today is adequate. His appetite has been good. He denies adverse effects from medications. He denies suicidal ideation, homicidal ideation, auditory hallucinations, and visual hallucinations but looks internally preoccupied at times. His goals for today are to remain in behavioral control and medication compliant.    VS: Reviewed,  BMI of 47.1, hypertension seems to have resolved, may be due to periods of agitation, otherwise within normal limits    Progress Toward Goals: Slow improvement    Psychiatric Review of Systems:  Behavior over the last 24 hours: improved  Sleep: hypersomnia  Appetite: adequate  Medication side effects: none verbalized  Medical ROS:  No cough, chest pain, nausea, vomiting, diarrhea, all other symptoms are negative.    Vital signs in last 24 hours:  Temp:  [97 °F (36.1 °C)-97.7 °F (36.5 °C)] 97 °F (36.1 °C)  HR:  [] 80  Resp:  [18-19] 18  BP: (112-142)/(59-99) 112/59    Mental Status Exam:    Appearance:  alert, minimal eye contact, appears stated age, casually dressed, disheveled, obese, and bearded   Behavior:  calm, limited cooperativity, guarded, and sitting comfortably   Speech:  delayed initiation, slow, loud, poverty of content, " "and coherent   Mood:  \"Fine\"   Affect:  blunted, irritable edge   Thought Process:  [place]   Associations: concrete associations   Thought Content:  mild paranoia, no overt delusional content   Perceptual Disturbances: denies current hallucinations and appears internally preoccupied at times   Risk Potential: Suicidal ideation - None at present  Homicidal ideation - None at present  Potential for aggression - Yes, due to poor impulse control   Sensorium:  oriented to person, place, time/date, and situation   Memory:  recent and remote memory grossly intact   Consciousness:  alert and awake   Attention/Concentration: attention span and concentration appear shorter than expected for age   Insight:  limited   Judgment: limited   Gait/Station: slow gait   Motor Activity: no abnormal movements     Current Medications:  Current Facility-Administered Medications   Medication Dose Route Frequency Provider Last Rate    aluminum-magnesium hydroxide-simethicone  30 mL Oral Q4H PRN Dimitris Grewal MD      benztropine  1 mg Intramuscular Q4H PRN Max 6/day Dimitris Grewal MD      benztropine  1 mg Oral Q4H PRN Max 6/day Dimitris Grewal MD      chlorproMAZINE  50 mg Intramuscular Q8H PRN Dimitris Grewal MD      And    diphenhydrAMINE  50 mg Intramuscular Q8H PRN Dimitris Grewal MD      chlorproMAZINE  100 mg Oral BID Darrell Le DO      chlorproMAZINE  150 mg Oral HS Phill Kamara MD      clonazePAM  1 mg Oral Daily Darrell Le DO      cloNIDine  0.2 mg Oral TID Dimitris Grewal MD      desmopressin  0.4 mg Oral HS Phill Kamara MD      Diclofenac Sodium  2 g Topical 4x Daily PRN ABUNDIO Villalobos      hydrOXYzine HCL  50 mg Oral Q6H PRN Max 4/day Dimitris Grewal MD      Or    diphenhydrAMINE  50 mg Intramuscular Q6H PRN Diimtris Grewal MD      divalproex sodium  1,750 mg Oral HS Darrell Le DO      ergocalciferol  50,000 Units Oral Weekly ABUNDIO Villalobos      Artificial Tears  1 drop Both " Eyes Q3H PRN Dimitris Grewal MD      hydrOXYzine HCL  100 mg Oral Q6H PRN Max 4/day Dimitris Grewal MD      Or    LORazepam  2 mg Intramuscular Q6H PRN Dimitris Grewal MD      hydrOXYzine HCL  25 mg Oral Q6H PRN Max 4/day Dimitris Grewal MD      ibuprofen  400 mg Oral Q4H PRN Dimitris Grewal MD      ibuprofen  600 mg Oral Q6H PRN Dimitris Grewal MD      ibuprofen  800 mg Oral Q8H PRN Dimitris Grewal MD      melatonin  3 mg Oral HS PRN Dimitris Grewal MD      metoprolol succinate  50 mg Oral Daily Dimitris Grewal MD      OLANZapine  10 mg Oral Q3H PRN Max 3/day Dimitris Grewal MD      Or    OLANZapine  10 mg Intramuscular Q3H PRN Max 3/day Dimitris Grewal MD      OLANZapine  5 mg Oral Q3H PRN Max 6/day Dimitris Grewal MD      Or    OLANZapine  5 mg Intramuscular Q3H PRN Max 6/day Dimitris Grewal MD      OLANZapine  2.5 mg Oral Q3H PRN Max 8/day Dimitris Grewal MD      OLANZapine  20 mg Oral HS Darrell Le DO      polyethylene glycol  17 g Oral Daily Dimitris Grewal MD      polyethylene glycol  17 g Oral Daily PRN Dimitris Grewal MD      propranolol  10 mg Oral Q8H PRN Dimitris Grewal MD      senna-docusate sodium  1 tablet Oral Daily PRN Dimitris Grewal MD      sodium chloride  1 spray Each Nare Q1H PRN Phil Beavers DO      sterile water              Behavioral Health Medications: all current active meds have been reviewed. Changes as in plan section above.    Laboratory results:  I have personally reviewed all pertinent laboratory/tests results.   Recent Results (from the past 48 hour(s))   Hemoglobin A1C    Collection Time: 02/01/24  8:27 PM   Result Value Ref Range    Hemoglobin A1C 7.2 (H) Normal 4.0-5.6%; PreDiabetic 5.7-6.4%; Diabetic >=6.5%; Glycemic control for adults with diabetes <7.0% %     mg/dl   Valproic acid level, total    Collection Time: 02/01/24  8:28 PM   Result Value Ref Range    Valproic Acid, Total 99 50 - 100 ug/mL   Comprehensive metabolic panel    Collection  Time: 02/01/24  8:28 PM   Result Value Ref Range    Sodium 134 (L) 135 - 147 mmol/L    Potassium 4.3 3.5 - 5.3 mmol/L    Chloride 98 96 - 108 mmol/L    CO2 30 21 - 32 mmol/L    ANION GAP 6 mmol/L    BUN 8 5 - 25 mg/dL    Creatinine 1.11 0.60 - 1.30 mg/dL    Glucose 218 (H) 65 - 140 mg/dL    Calcium 9.0 8.4 - 10.2 mg/dL    AST 33 13 - 39 U/L    ALT 37 7 - 52 U/L    Alkaline Phosphatase 121 (H) 34 - 104 U/L    Total Protein 6.7 6.4 - 8.4 g/dL    Albumin 3.9 3.5 - 5.0 g/dL    Total Bilirubin 0.24 0.20 - 1.00 mg/dL    eGFR 95 ml/min/1.73sq m   CBC and differential    Collection Time: 02/01/24  8:28 PM   Result Value Ref Range    WBC 8.86 4.31 - 10.16 Thousand/uL    RBC 4.78 3.88 - 5.62 Million/uL    Hemoglobin 13.1 12.0 - 17.0 g/dL    Hematocrit 40.3 36.5 - 49.3 %    MCV 84 82 - 98 fL    MCH 27.4 26.8 - 34.3 pg    MCHC 32.5 31.4 - 37.4 g/dL    RDW 13.2 11.6 - 15.1 %    MPV 10.4 8.9 - 12.7 fL    Platelets 249 149 - 390 Thousands/uL    nRBC 0 /100 WBCs    Neutrophils Relative 63 43 - 75 %    Immat GRANS % 1 0 - 2 %    Lymphocytes Relative 23 14 - 44 %    Monocytes Relative 12 4 - 12 %    Eosinophils Relative 1 0 - 6 %    Basophils Relative 0 0 - 1 %    Neutrophils Absolute 5.66 1.85 - 7.62 Thousands/µL    Immature Grans Absolute 0.06 0.00 - 0.20 Thousand/uL    Lymphocytes Absolute 2.00 0.60 - 4.47 Thousands/µL    Monocytes Absolute 1.06 0.17 - 1.22 Thousand/µL    Eosinophils Absolute 0.06 0.00 - 0.61 Thousand/µL    Basophils Absolute 0.02 0.00 - 0.10 Thousands/µL   Lipid panel    Collection Time: 02/01/24  8:28 PM   Result Value Ref Range    Cholesterol 126 See Comment mg/dL    Triglycerides 357 (H) See Comment mg/dL    HDL, Direct 32 (L) >=40 mg/dL    LDL Calculated 23 0 - 100 mg/dL    Non-HDL-Chol (CHOL-HDL) 94 mg/dl        This note has been constructed using a voice recognition system. There may be translation, syntax, or grammatical errors. If you have any questions, please contact the dictating author.    Darrell SHIPMAN  [place],   Psychiatry Residency, PGY-2

## 2024-02-03 LAB
ATRIAL RATE: 95 BPM
P AXIS: 74 DEGREES
PR INTERVAL: 140 MS
QRS AXIS: 51 DEGREES
QRSD INTERVAL: 76 MS
QT INTERVAL: 348 MS
QTC INTERVAL: 437 MS
T WAVE AXIS: 51 DEGREES
VENTRICULAR RATE: 95 BPM

## 2024-02-03 PROCEDURE — 99232 SBSQ HOSP IP/OBS MODERATE 35: CPT | Performed by: STUDENT IN AN ORGANIZED HEALTH CARE EDUCATION/TRAINING PROGRAM

## 2024-02-03 RX ADMIN — CLONIDINE HYDROCHLORIDE 0.2 MG: 0.1 TABLET ORAL at 21:23

## 2024-02-03 RX ADMIN — CLONIDINE HYDROCHLORIDE 0.2 MG: 0.1 TABLET ORAL at 17:12

## 2024-02-03 RX ADMIN — CHLORPROMAZINE HYDROCHLORIDE 100 MG: 100 TABLET, FILM COATED ORAL at 11:29

## 2024-02-03 RX ADMIN — DESMOPRESSIN ACETATE 0.4 MG: 0.1 TABLET ORAL at 21:22

## 2024-02-03 RX ADMIN — CHLORPROMAZINE HYDROCHLORIDE 100 MG: 100 TABLET, FILM COATED ORAL at 17:13

## 2024-02-03 RX ADMIN — MELATONIN TAB 3 MG 3 MG: 3 TAB at 21:23

## 2024-02-03 RX ADMIN — METOPROLOL SUCCINATE 50 MG: 50 TABLET, EXTENDED RELEASE ORAL at 11:29

## 2024-02-03 RX ADMIN — CLONIDINE HYDROCHLORIDE 0.2 MG: 0.1 TABLET ORAL at 11:28

## 2024-02-03 RX ADMIN — CLONAZEPAM 1 MG: 1 TABLET ORAL at 11:29

## 2024-02-03 RX ADMIN — CHLORPROMAZINE HYDROCHLORIDE 150 MG: 100 TABLET, FILM COATED ORAL at 21:22

## 2024-02-03 RX ADMIN — POLYETHYLENE GLYCOL 3350 17 G: 17 POWDER, FOR SOLUTION ORAL at 11:28

## 2024-02-03 RX ADMIN — DIVALPROEX SODIUM 1750 MG: 500 TABLET, EXTENDED RELEASE ORAL at 21:22

## 2024-02-03 RX ADMIN — OLANZAPINE 20 MG: 10 TABLET, FILM COATED ORAL at 21:23

## 2024-02-03 NOTE — PLAN OF CARE
Problem: SAFETY ADULT  Goal: Maintain or return to baseline ADL function  Description: INTERVENTIONS:  -  Assess patient's ability to carry out ADLs; assess patient's baseline for ADL function and identify physical deficits which impact ability to perform ADLs (bathing, care of mouth/teeth, toileting, grooming, dressing, etc.)  - Assess/evaluate cause of self-care deficits   - Assess range of motion  - Assess patient's mobility; develop plan if impaired  - Assess patient's need for assistive devices and provide as appropriate  - Encourage maximum independence but intervene and supervise when necessary  - Involve family in performance of ADLs  - Assess for home care needs following discharge   - Consider OT consult to assist with ADL evaluation and planning for discharge  - Provide patient education as appropriate  Outcome: Progressing  Goal: Maintains/Returns to pre admission functional level  Description: INTERVENTIONS:  - Perform AM-PAC 6 Click Basic Mobility/ Daily Activity assessment daily.  - Set and communicate daily mobility goal to care team and patient/family/caregiver.   - Collaborate with rehabilitation services on mobility goals if consulted  - Perform Range of Motion  times a day.  - Reposition patient every  hours.  - Dangle patient  times a day  - Stand patient  times a day  - Ambulate patient  times a day  - Out of bed to chair  times a day   - Out of bed for meals  times a day  - Out of bed for toileting  - Record patient progress and toleration of activity level   Outcome: Progressing     Problem: BEHAVIOR  Goal: Pt/Family maintain appropriate behavior and adhere to behavioral management agreement, if implemented  Description: INTERVENTIONS:  - Assess the family dynamic   - Encourage verbalization of thoughts and concerns in a socially appropriate manner  - Assess patient/family's coping skills and non-compliant behavior (including use of illegal substances).  - Utilize positive, consistent limit  setting strategies supporting safety of patient, staff and others  - Initiate consult with Case Management, Spiritual Care or other ancillary services as appropriate  - If a patient's/visitor's behavior jeopardizes the safety of the patient, staff, or others, refer to organization procedure.   - Notify Security of behavior or suspected illegal substances which indicate the need for search of the patient and/or belongings  - Encourage participation in the decision making process about a behavioral management agreement; implement if patient meets criteria  Outcome: Progressing     Problem: DISCHARGE PLANNING - CARE MANAGEMENT  Goal: Discharge to post-acute care or home with appropriate resources  Description: INTERVENTIONS:  - Conduct assessment to determine patient/family and health care team treatment goals, and need for post-acute services based on payer coverage, community resources, and patient preferences, and barriers to discharge  - Address psychosocial, clinical, and financial barriers to discharge as identified in assessment in conjunction with the patient/family and health care team  - Arrange appropriate level of post-acute services according to patient’s   needs and preference and payer coverage in collaboration with the physician and health care team  - Communicate with and update the patient/family, physician, and health care team regarding progress on the discharge plan  - Arrange appropriate transportation to post-acute venues  Outcome: Progressing     Problem: Alteration in Thoughts and Perception  Goal: Verbalize thoughts and feelings  Description: Interventions:  - Promote a nonjudgmental and trusting relationship with the patient through active listening and therapeutic communication  - Assess patient's level of functioning, behavior and potential for risk  - Engage patient in 1 on 1 interactions  - Encourage patient to express fears, feelings, frustrations, and discuss symptoms    - [place]  patient to reality, help patient recognize reality-based thinking   - Administer medications as ordered and assess for potential side effects  - Provide the patient education related to the signs and symptoms of the illness and desired effects of prescribed medications  Outcome: Progressing  Goal: Agree to be compliant with medication regime, as prescribed and report medication side effects  Description: Interventions:  - Offer appropriate PRN medication and supervise ingestion; conduct AIMS, as needed   Outcome: Progressing  Goal: Attend and participate in unit activities, including therapeutic, recreational, and educational groups  Description: Interventions:  -Encourage Visitation and family involvement in care  Outcome: Progressing  Goal: Recognize dysfunctional thoughts, communicate reality-based thoughts at the time of discharge  Description: Interventions:  - Provide medication and psycho-education to assist patient in compliance and developing insight into his/her illness   Outcome: Progressing     Problem: Ineffective Coping  Goal: Cooperates with admission process  Description: Interventions:   - Complete admission process  Outcome: Progressing  Goal: Identifies ineffective coping skills  Outcome: Progressing  Goal: Identifies healthy coping skills  Outcome: Progressing  Goal: Demonstrates healthy coping skills  Outcome: Progressing  Goal: Participates in unit activities  Description: Interventions:  - Provide therapeutic environment   - Provide required programming   - Redirect inappropriate behaviors   Outcome: Progressing  Goal: Patient/Family participate in treatment and DC plans  Description: Interventions:  - Provide therapeutic environment  Outcome: Progressing  Goal: Patient/Family verbalizes awareness of resources  Outcome: Progressing  Goal: Understands least restrictive measures  Description: Interventions:  - Utilize least restrictive behavior  Outcome: Progressing  Goal: Free from restraint  events  Description: - Utilize least restrictive measures   - Provide behavioral interventions   - Redirect inappropriate behaviors   Outcome: Progressing     Problem: Risk for Violence/Aggression Toward Others  Goal: Refrain from harming others  Outcome: Progressing  Goal: Refrain from destructive acts on the environment or property  Outcome: Progressing  Goal: Control angry outbursts  Description: Interventions:  - Monitor patient closely, per order  - Ensure early verbal de-escalation  - Monitor prn medication needs  - Set reasonable/therapeutic limits, outline behavioral expectations, and consequences   - Provide a non-threatening milieu, utilizing the least restrictive interventions   Outcome: Progressing  Goal: Identify appropriate positive anger management techniques  Description: Interventions:  - Offer anger management and coping skills groups   - Staff will provide positive feedback for appropriate anger control  Outcome: Progressing

## 2024-02-03 NOTE — NURSING NOTE
Attempted to give patient morning medications but he pushed my hand away.  Patient would not wake up.  Even respirations, patient does not appear in distress.

## 2024-02-03 NOTE — PROGRESS NOTES
Progress Note - Behavioral Health   Manuel Back 18 y.o. male MRN: [id number]  Unit/Bed#: [facility] 340-01 Encounter: [id number]    Assessment/Plan   Principal Problem:    Schizoaffective disorder (HCC)  Active Problems:    Urinary incontinence, nocturnal enuresis    Medical clearance for psychiatric admission    Morbid obesity with body mass index (BMI) of 40.0 to 49.9 (HCC)    Essential hypertension    Vitamin D deficiency    Chronic pain of right knee      Recommended Treatment:      No psychopharmacologic changes necessary at this time; will continue to assess for further optimization.  Continue with current medications:  Thorazine 100 mg twice daily, 150 mg at bedtime for agitation and impulsivity.  Klonopin 1 mg daily for severe anxiety and agitation.  Clonidine 0.2 mg 3 times daily for severe agitation and impulsivity.  Vitamin B12 500 mcg daily for supplementation.  Depakote DR 1750 mg at bedtime for mood stabilization.  Vitamin D2 50,000 units q. weekly for supplementation.  Zyprexa 20 mg at bedtime for psychosis and mood.  MiraLAX 17 g daily for standing bowel regimen.  Continue with group therapy, milieu therapy and occupational therapy.    Continue frequent safety checks and vitals per unit protocol.  Continue with SLIM medical management as indicated  Continue coordinating with case management regarding disposition    Legal Status: 201  Disposition: coordinating with case management, tentative discharge by midweek    Case discussed with treatment team.  Risks, benefits and possible side effects of Medications: Risks, benefits, and possible side effects of medications have previously been explained. No new medications at this time.    ------------------------------------------------------------    Subjective: Patient's chart was reviewed, and patient's progress and plan was discussed with treatment team. Per nursing report, Manuel has been visible, redirectable, intrusive at times with poor  "boundaries, but ultimately in behavioral control and cooperative on the unit and compliant with medications. Patient has remained in behavioral control for the last 24 hours. Last night patient was documented to have slept throughout the night.    Manuel was evaluated this morning for continuity of care. On examination, Manuel is calm, less irritable, cooperative, dressed in hospital attire and sitting comfortably. He states his mood is \" good.\" He reports sleeping well and his energy level today is adequate. His appetite has been good. He denies adverse effects from medications. He denies suicidal ideation, homicidal ideation, auditory hallucinations, and visual hallucinations but continues to appear internally preoccupied at times. His goals for today are to remain in behavioral control and medication compliant.    VS: Reviewed,  BMI of 47.1, otherwise within normal limits    Progress Toward Goals: Slow improvement    Psychiatric Review of Systems:  Behavior over the last 24 hours: improved  Sleep: normal  Appetite: adequate  Medication side effects: none verbalized  Medical ROS:  No cough, chest pain, nausea, vomiting, diarrhea, all other symptoms are negative.    Vital signs in last 24 hours:  Temp:  [97.5 °F (36.4 °C)-98.2 °F (36.8 °C)] 97.5 °F (36.4 °C)  HR:  [] 73  Resp:  [18] 18  BP: (117-131)/(56-73) 117/56    Mental Status Exam:    Appearance:  alert, improving eye contact, appears stated age, hospital attire dressed, marginal grooming/hygiene, obese, bearded, and smiling   Behavior:  calm, cooperative, and sitting comfortably   Speech:  delayed initiation, slow, soft, scant, and coherent   Mood:  \"Good\"   Affect:  blunted   Thought Process:  [place]   Associations: concrete associations   Thought Content:  no verbalized delusions or overt paranoia   Perceptual Disturbances: denies current hallucinations and appears internally preoccupied   Risk Potential: Suicidal ideation - None at " present  Homicidal ideation - None at present  Potential for aggression - Yes, due to poor impulse control   Sensorium:  oriented to person, place, time/date, and situation   Memory:  recent and remote memory grossly intact   Consciousness:  alert and awake   Attention/Concentration: attention span and concentration appear shorter than expected for age   Insight:  limited   Judgment: limited   Gait/Station: normal gait/station   Motor Activity: no abnormal movements     Current Medications:  Current Facility-Administered Medications   Medication Dose Route Frequency Provider Last Rate    aluminum-magnesium hydroxide-simethicone  30 mL Oral Q4H PRN Dimitris Grewal MD      benztropine  1 mg Intramuscular Q4H PRN Max 6/day Dimitris Grewal MD      benztropine  1 mg Oral Q4H PRN Max 6/day Dimitris Grewal MD      chlorproMAZINE  50 mg Intramuscular Q8H PRN Dimitris Grewal MD      And    diphenhydrAMINE  50 mg Intramuscular Q8H PRN Dimitris Grewal MD      chlorproMAZINE  100 mg Oral BID Darrell Le,       chlorproMAZINE  150 mg Oral HS Phill Kamara MD      clonazePAM  1 mg Oral Daily Darrell Le DO      cloNIDine  0.2 mg Oral TID Dimitris Grewal MD      desmopressin  0.4 mg Oral HS Phill Kamara MD      Diclofenac Sodium  2 g Topical 4x Daily PRN ABUNDIO Villalobos      hydrOXYzine HCL  50 mg Oral Q6H PRN Max 4/day Dimitris Grewal MD      Or    diphenhydrAMINE  50 mg Intramuscular Q6H PRN Dimitris Grewal MD      divalproex sodium  1,750 mg Oral HS Darrell Le DO      ergocalciferol  50,000 Units Oral Weekly ABUNDIO Villalobos      Artificial Tears  1 drop Both Eyes Q3H PRN Dimitris Grewal MD      hydrOXYzine HCL  100 mg Oral Q6H PRN Max 4/day Dimitris Grewal MD      Or    LORazepam  2 mg Intramuscular Q6H PRN Dimitris Grewal MD      hydrOXYzine HCL  25 mg Oral Q6H PRN Max 4/day Dimitris Grewal MD      ibuprofen  400 mg Oral Q4H PRN Dimitris Grewal MD      ibuprofen  600 mg Oral  Q6H PRN Dimitris Grewal MD      ibuprofen  800 mg Oral Q8H PRN Dimitris Grewal MD      melatonin  3 mg Oral HS PRN Dimitris Grewal MD      metoprolol succinate  50 mg Oral Daily Dimitris Grewal MD      OLANZapine  10 mg Oral Q3H PRN Max 3/day Dimitris Grewal MD      Or    OLANZapine  10 mg Intramuscular Q3H PRN Max 3/day Dimitris Grewal MD      OLANZapine  5 mg Oral Q3H PRN Max 6/day Dimitris Grewal MD      Or    OLANZapine  5 mg Intramuscular Q3H PRN Max 6/day Dimitris Grewal MD      OLANZapine  2.5 mg Oral Q3H PRN Max 8/day Dimitris Grewal MD      OLANZapine  20 mg Oral HS Darrell Le DO      polyethylene glycol  17 g Oral Daily Dimitris Grewal MD      polyethylene glycol  17 g Oral Daily PRN Dimitris Grewal MD      propranolol  10 mg Oral Q8H PRN Dimitris Grewal MD      senna-docusate sodium  1 tablet Oral Daily PRN Dimitris Grewal MD      sodium chloride  1 spray Each Nare Q1H PRN Phil Beavers DO      sterile water              Behavioral Health Medications: all current active meds have been reviewed. Changes as in plan section above.    Laboratory results:  I have personally reviewed all pertinent laboratory/tests results.   Recent Results (from the past 48 hour(s))   Hemoglobin A1C    Collection Time: 02/01/24  8:27 PM   Result Value Ref Range    Hemoglobin A1C 7.2 (H) Normal 4.0-5.6%; PreDiabetic 5.7-6.4%; Diabetic >=6.5%; Glycemic control for adults with diabetes <7.0% %     mg/dl   Valproic acid level, total    Collection Time: 02/01/24  8:28 PM   Result Value Ref Range    Valproic Acid, Total 99 50 - 100 ug/mL   Comprehensive metabolic panel    Collection Time: 02/01/24  8:28 PM   Result Value Ref Range    Sodium 134 (L) 135 - 147 mmol/L    Potassium 4.3 3.5 - 5.3 mmol/L    Chloride 98 96 - 108 mmol/L    CO2 30 21 - 32 mmol/L    ANION GAP 6 mmol/L    BUN 8 5 - 25 mg/dL    Creatinine 1.11 0.60 - 1.30 mg/dL    Glucose 218 (H) 65 - 140 mg/dL    Calcium 9.0 8.4 - 10.2 mg/dL    AST  33 13 - 39 U/L    ALT 37 7 - 52 U/L    Alkaline Phosphatase 121 (H) 34 - 104 U/L    Total Protein 6.7 6.4 - 8.4 g/dL    Albumin 3.9 3.5 - 5.0 g/dL    Total Bilirubin 0.24 0.20 - 1.00 mg/dL    eGFR 95 ml/min/1.73sq m   CBC and differential    Collection Time: 02/01/24  8:28 PM   Result Value Ref Range    WBC 8.86 4.31 - 10.16 Thousand/uL    RBC 4.78 3.88 - 5.62 Million/uL    Hemoglobin 13.1 12.0 - 17.0 g/dL    Hematocrit 40.3 36.5 - 49.3 %    MCV 84 82 - 98 fL    MCH 27.4 26.8 - 34.3 pg    MCHC 32.5 31.4 - 37.4 g/dL    RDW 13.2 11.6 - 15.1 %    MPV 10.4 8.9 - 12.7 fL    Platelets 249 149 - 390 Thousands/uL    nRBC 0 /100 WBCs    Neutrophils Relative 63 43 - 75 %    Immat GRANS % 1 0 - 2 %    Lymphocytes Relative 23 14 - 44 %    Monocytes Relative 12 4 - 12 %    Eosinophils Relative 1 0 - 6 %    Basophils Relative 0 0 - 1 %    Neutrophils Absolute 5.66 1.85 - 7.62 Thousands/µL    Immature Grans Absolute 0.06 0.00 - 0.20 Thousand/uL    Lymphocytes Absolute 2.00 0.60 - 4.47 Thousands/µL    Monocytes Absolute 1.06 0.17 - 1.22 Thousand/µL    Eosinophils Absolute 0.06 0.00 - 0.61 Thousand/µL    Basophils Absolute 0.02 0.00 - 0.10 Thousands/µL   Lipid panel    Collection Time: 02/01/24  8:28 PM   Result Value Ref Range    Cholesterol 126 See Comment mg/dL    Triglycerides 357 (H) See Comment mg/dL    HDL, Direct 32 (L) >=40 mg/dL    LDL Calculated 23 0 - 100 mg/dL    Non-HDL-Chol (CHOL-HDL) 94 mg/dl        This note has been constructed using a voice recognition system. There may be translation, syntax, or grammatical errors. If you have any questions, please contact the dictating author.    Darrell Le, DO  Psychiatry Residency, PGY-2

## 2024-02-03 NOTE — NURSING NOTE
Patient pleasant and cooperative, noted out on unit.Patient compliant with medications and routine care. Patient continues to have soft mumbled speech difficult to understand at times. Patient needed redirection at time for intrusive behavior and poor boundaries with success. Denies SI/HI/AH/VH at this time.

## 2024-02-04 PROCEDURE — 99232 SBSQ HOSP IP/OBS MODERATE 35: CPT | Performed by: STUDENT IN AN ORGANIZED HEALTH CARE EDUCATION/TRAINING PROGRAM

## 2024-02-04 RX ADMIN — CHLORPROMAZINE HYDROCHLORIDE 100 MG: 100 TABLET, FILM COATED ORAL at 15:56

## 2024-02-04 RX ADMIN — POLYETHYLENE GLYCOL 3350 17 G: 17 POWDER, FOR SOLUTION ORAL at 08:37

## 2024-02-04 RX ADMIN — CHLORPROMAZINE HYDROCHLORIDE 150 MG: 100 TABLET, FILM COATED ORAL at 21:07

## 2024-02-04 RX ADMIN — METOPROLOL SUCCINATE 50 MG: 50 TABLET, EXTENDED RELEASE ORAL at 08:36

## 2024-02-04 RX ADMIN — CLONIDINE HYDROCHLORIDE 0.2 MG: 0.1 TABLET ORAL at 21:07

## 2024-02-04 RX ADMIN — OLANZAPINE 20 MG: 10 TABLET, FILM COATED ORAL at 21:07

## 2024-02-04 RX ADMIN — DESMOPRESSIN ACETATE 0.4 MG: 0.1 TABLET ORAL at 21:07

## 2024-02-04 RX ADMIN — CLONAZEPAM 1 MG: 1 TABLET ORAL at 08:35

## 2024-02-04 RX ADMIN — DIVALPROEX SODIUM 1750 MG: 500 TABLET, EXTENDED RELEASE ORAL at 21:07

## 2024-02-04 RX ADMIN — CLONIDINE HYDROCHLORIDE 0.2 MG: 0.1 TABLET ORAL at 15:40

## 2024-02-04 RX ADMIN — ERGOCALCIFEROL 50000 UNITS: 1.25 CAPSULE ORAL at 08:35

## 2024-02-04 RX ADMIN — MELATONIN TAB 3 MG 3 MG: 3 TAB at 21:08

## 2024-02-04 RX ADMIN — CLONIDINE HYDROCHLORIDE 0.2 MG: 0.1 TABLET ORAL at 08:35

## 2024-02-04 NOTE — DISCHARGE INSTR - OTHER ORDERS
CRISIS INFORMATION  If you are experiencing a mental health emergency, you may call the [facility] Crisis Intervention Office 24 hours a day, 7 days per week at [phone].    In [facility], call [phone].    [facility] is a confidential 24/7 telephone support service manned by trained mental health consumers.  [facility] provides support, a listening ear and can provide information about available services.[facility] specializes in the concerns of mental health consumers, their families and friends.  However, we are also here for anyone who has a mental health concern, is confused about or just doesn't know anything about mental health or where to get information.  To reach [facility], call [phone].    HOW TO GET SUBSTANCE ABUSE HELP:  If you or someone you know has a drug or alcohol problem, there is help:  [facility] Drug & Alcohol Abuse Services: [phone]  [facility] Alcohol Abuse Services: [phone]  An assessment is the first step.   In addition to those listed there are other programs available in the area but assessment is best to determine an appropriate level of care.  If you DO NOT have Medical Assistance (MA) or Private Insurance, an assessment can be scheduled at one of these providers:  [facility]  [address] [place] [address]  [phone]   [facility]  [address] [place] [address]  [phone]   [address] [place] [address]  [phone]   [facility]  [address] [place] [address]  [phone]   [facility]  [address] [place] [address]  [phone]   [facility]  [address] [place] [address]  [phone]   [facility]  [address], [place] [address]  [phone]     If you HAVE Medical Assistance, an assessment can be scheduled at one of these providers:  [facility]  [address] [place] [address]  [phone]   [facility]  [address] [place] [address]  [phone]   [facility]  [address] [facility], [address]st [address], [place] [address]  [phone]  [address] [place] [address], [address], [place] [address] [phone]   [address] [address]  [phone]   [facility]  [address] [place] [address]  [phone]   [facility] ([facility])  [address] [facility] [address] [place] [address]  [phone]   [facility]  [address] [facility] [address] [place] [address]  [phone]   [facility]  [address] [place] [address]  [phone]   [facility]  [address] [place] [address]  [phone]   [facility]  [address], [place] [address]  [phone]     If you HAVE Private Insurance, an assessment can be scheduled at one of these providers.  Please contact these Providers to determine if they are in your network plan:  [facility]  [address], [place] [address]  [phone]   [address] [address]  [phone]   [facility]  [address] [place] [address]  [phone]   [facility] ([facility])  [address] [facility] [place] [address]  [phone]   [facility]  [address] [place] [address]  [phone]   [facility]  [address], [place] [address]  [phone]     From the [facility] website [place]guides[place]opioid-epidemic/#GetNaloxone    How do I get naloxone?  Family members and friends can access naloxone by:    Obtaining a prescription from their family doctor  Using the standing order issued by Acting Physician General Vanita Martinez. A standing order is a prescription written  for the general public, rather than specifically for an individual.  To use the standing order, print it and take it with you to the pharmacy or have the digital version on your phone. Download the standing order from the Department of [facility] (PDF).    If you are unable to print it or use the digital version, the standing order is kept on file at many pharmacies. If a pharmacy does not have it on file, they may have the ability to look it up.    Naloxone prescriptions can be filled at most pharmacies. Although the medication might not be available for same-day pickup, it often can be ordered and available within a day or two.

## 2024-02-04 NOTE — PROGRESS NOTES
Progress Note - Behavioral Health   Manuel Back 18 y.o. male MRN: [id number]  Unit/Bed#: [facility] 340-01 Encounter: [id number]    Assessment/Plan   Principal Problem:    Schizoaffective disorder (HCC)  Active Problems:    Urinary incontinence, nocturnal enuresis    Medical clearance for psychiatric admission    Morbid obesity with body mass index (BMI) of 40.0 to 49.9 (HCC)    Essential hypertension    Vitamin D deficiency    Chronic pain of right knee      Recommended Treatment:      No psychopharmacologic changes necessary at this time; will continue to assess for further optimization.  Continue with current medications:  Thorazine 100 mg twice daily, 150 mg at bedtime for agitation and impulsivity.  Klonopin 1 mg daily for severe anxiety and agitation.  Clonidine 0.2 mg 3 times daily for severe agitation and impulsivity.  Vitamin B12 500 mcg daily for supplementation.  Depakote  mg at bedtime for mood stabilization.  Vitamin D2 50,000 units q. weekly for supplementation.  Zyprexa 20 mg at bedtime for psychosis and mood.  MiraLAX 17 g daily for standing bowel regimen.   Continue with group therapy, milieu therapy and occupational therapy.    Continue frequent safety checks and vitals per unit protocol.  Continue with SLIM medical management as indicated  Continue coordinating with case management regarding disposition    Legal Status: 201  Disposition: coordinating with case management, tentative discharge by midweek    Case discussed with treatment team.  Risks, benefits and possible side effects of Medications: Risks, benefits, and possible side effects of medications have previously been explained. No new medications at this time.    ------------------------------------------------------------    Subjective: Patient's chart was reviewed, and patient's progress and plan was discussed with treatment team. Per nursing report, Manuel has been less intrusive, more redirectable, displaying better  "behavioral control and impulse control, cooperative on the unit and compliant with medications. Patient has remained in behavioral control for the last 24 hours. Last night patient was documented to have slept throughout the night.    Manuel was evaluated this morning for continuity of care. On examination, Manuel is calm, less irritable, dressed in paper scrubs and sitting comfortably to speak. He states his mood is \" fine.\" He reports sleeping well and his energy level today is adequate. His appetite has been good. He denies adverse effects from medications. He denies suicidal ideation, homicidal ideation, auditory hallucinations, and visual hallucinations and he appears less internally preoccupied than previously. His goals for today are to remain in behavioral control and medication compliant.    VS: Reviewed,  BMI of 47.01, mildly hypertensive at 135/69, trending patient vitals it appears to be a trend that the patient has mild hypertensive episodes during periods of agitation but likely will not require standing antihypertensive regimen at this time due to decreasing episodes of agitation, otherwise within normal limits    Progress Toward Goals: Slow improvement    Psychiatric Review of Systems:  Behavior over the last 24 hours: improved  Sleep: normal  Appetite: adequate  Medication side effects: none verbalized  Medical ROS:  No cough, chest pain, nausea, vomiting, diarrhea, all other symptoms are negative.    Vital signs in last 24 hours:  Temp:  [97.4 °F (36.3 °C)-97.5 °F (36.4 °C)] 97.4 °F (36.3 °C)  HR:  [] 97  Resp:  [18] 18  BP: (115-139)/(59-74) 135/69    Mental Status Exam:    Appearance:  alert, good eye contact, appears stated age, paper scrubs dressed, marginal grooming/hygiene, obese, bearded, and smiling   Behavior:  calm, cooperative, and sitting comfortably   Speech:  delayed initiation, slow, soft, scant, and coherent   Mood:  \"Fine\"   Affect:  constricted, slightly brighter "   Thought Process:  [place]   Associations: concrete associations   Thought Content:  no verbalized delusions or overt paranoia   Perceptual Disturbances: denies current hallucinations and less internally preoccupied than previously   Risk Potential: Suicidal ideation - None at present  Homicidal ideation - None at present  Potential for aggression - Yes, due to poor impulse control   Sensorium:  oriented to person, place, time/date, and situation   Memory:  recent and remote memory grossly intact   Consciousness:  alert and awake   Attention/Concentration: attention span and concentration appear shorter than expected for age   Insight:  limited   Judgment: limited   Gait/Station: slow gait   Motor Activity: no abnormal movements     Current Medications:  Current Facility-Administered Medications   Medication Dose Route Frequency Provider Last Rate    aluminum-magnesium hydroxide-simethicone  30 mL Oral Q4H PRN Dimitris Grewal MD      benztropine  1 mg Intramuscular Q4H PRN Max 6/day Dimitris Grewal MD      benztropine  1 mg Oral Q4H PRN Max 6/day Dimitris Grewal MD      chlorproMAZINE  50 mg Intramuscular Q8H PRN Dimitris Grewal MD      And    diphenhydrAMINE  50 mg Intramuscular Q8H PRN Dimitris Grewal MD      chlorproMAZINE  100 mg Oral BID Darrell Le,       chlorproMAZINE  150 mg Oral HS Phill Kamara MD      clonazePAM  1 mg Oral Daily Darrell Le,       cloNIDine  0.2 mg Oral TID Dimitris Grewal MD      desmopressin  0.4 mg Oral HS Phill Kamara MD      Diclofenac Sodium  2 g Topical 4x Daily PRN ABUNDIO Villalobos      hydrOXYzine HCL  50 mg Oral Q6H PRN Max 4/day Dimitris Grewal MD      Or    diphenhydrAMINE  50 mg Intramuscular Q6H PRN Dimitris Grewal MD      divalproex sodium  1,750 mg Oral HS Darrell Le DO      ergocalciferol  50,000 Units Oral Weekly ABUNDIO Villalobos      Artificial Tears  1 drop Both Eyes Q3H PRN Dimitris Grewal MD      hydrOXYzine HCL  100 mg  Oral Q6H PRN Max 4/day Dimitris Grewal MD      Or    LORazepam  2 mg Intramuscular Q6H PRN Dimitris Grewal MD      hydrOXYzine HCL  25 mg Oral Q6H PRN Max 4/day Dimitris Grewal MD      ibuprofen  400 mg Oral Q4H PRN Dimitris Grewal MD      ibuprofen  600 mg Oral Q6H PRN Dimitris Grewal MD      ibuprofen  800 mg Oral Q8H PRN Dimitris Grewal MD      melatonin  3 mg Oral HS PRN Dimitris Grewal MD      metoprolol succinate  50 mg Oral Daily Dimitris Grewal MD      OLANZapine  10 mg Oral Q3H PRN Max 3/day Dimitris Grewal MD      Or    OLANZapine  10 mg Intramuscular Q3H PRN Max 3/day Dimitris Grewal MD      OLANZapine  5 mg Oral Q3H PRN Max 6/day Dimitris Grewal MD      Or    OLANZapine  5 mg Intramuscular Q3H PRN Max 6/day Dimitris Grewal MD      OLANZapine  2.5 mg Oral Q3H PRN Max 8/day Dimitris Grewal MD      OLANZapine  20 mg Oral HS Darrell Le DO      polyethylene glycol  17 g Oral Daily Dimitris Grewal MD      polyethylene glycol  17 g Oral Daily PRN Dimitris Grewal MD      propranolol  10 mg Oral Q8H PRN Dimitris Grewal MD      senna-docusate sodium  1 tablet Oral Daily PRN Dimitris Grewal MD      sodium chloride  1 spray Each Nare Q1H PRN Phil Beavers DO      sterile water              Behavioral Health Medications: all current active meds have been reviewed. Changes as in plan section above.    Laboratory results:  I have personally reviewed all pertinent laboratory/tests results.   Recent Results (from the past 48 hour(s))   ECG 12 lead    Collection Time: 02/02/24  7:04 PM   Result Value Ref Range    Ventricular Rate 95 BPM    Atrial Rate 95 BPM    NC Interval 140 ms    QRSD Interval 76 ms    QT Interval 348 ms    QTC Interval 437 ms    P Axis 74 degrees    QRS Axis 51 degrees    T Wave Axis 51 degrees        This note has been constructed using a voice recognition system. There may be translation, syntax, or grammatical errors. If you have any questions, please contact the dictating  author.    Darrell Le, DO  Psychiatry Residency, PGY-2

## 2024-02-04 NOTE — NURSING NOTE
Patient was up for breakfast and was med/meal compliant.  Patient was irritable in the morning, he then returned to bed. He denied A/V hallucinations and SI/HI.

## 2024-02-04 NOTE — NURSING NOTE
Patient remained visible on the unit throughout the evening. Cooperative with routine. Intrusive at times, however redirectable. HS medication compliant. Melatonin prn given. Patient retreated to his room for sleep without issue.

## 2024-02-04 NOTE — DISCHARGE INSTR - APPOINTMENTS
Sherri, or Tiffany, our Behavioral Health Nurse Navigators, will be calling you after your discharge, on the phone number that you provided.  They will be available as an additional support, if needed.   If you wish to speak with one of them, you may contact Sherri at [phone] or Tiffany at [phone].

## 2024-02-04 NOTE — PLAN OF CARE
Problem: SAFETY ADULT  Goal: Maintain or return to baseline ADL function  Description: INTERVENTIONS:  -  Assess patient's ability to carry out ADLs; assess patient's baseline for ADL function and identify physical deficits which impact ability to perform ADLs (bathing, care of mouth/teeth, toileting, grooming, dressing, etc.)  - Assess/evaluate cause of self-care deficits   - Assess range of motion  - Assess patient's mobility; develop plan if impaired  - Assess patient's need for assistive devices and provide as appropriate  - Encourage maximum independence but intervene and supervise when necessary  - Involve family in performance of ADLs  - Assess for home care needs following discharge   - Consider OT consult to assist with ADL evaluation and planning for discharge  - Provide patient education as appropriate  Outcome: Progressing  Goal: Maintains/Returns to pre admission functional level  Description: INTERVENTIONS:  - Perform AM-PAC 6 Click Basic Mobility/ Daily Activity assessment daily.  - Set and communicate daily mobility goal to care team and patient/family/caregiver.   - Collaborate with rehabilitation services on mobility goals if consulted  - Pe  Problem: DISCHARGE PLANNING - CARE MANAGEMENT  Goal: Discharge to post-acute care or home with appropriate resources  Description: INTERVENTIONS:  - Conduct assessment to determine patient/family and health care team treatment goals, and need for post-acute services based on payer coverage, community resources, and patient preferences, and barriers to discharge  - Address psychosocial, clinical, and financial barriers to discharge as identified in assessment in conjunction with the patient/family and health care team  - Arrange appropriate level of post-acute services according to patient’s   needs and preference and payer coverage in collaboration with the physician and health care team  - Communicate with and update the patient/family, physician, and  health care team regarding progress on the discharge plan  - Arrange appropriate transportation to post-acute venues  Outcome: Progressing     Problem: Alteration in Thoughts and Perception  Goal: Verbalize thoughts and feelings  Description: Interventions:  - Promote a nonjudgmental and trusting relationship with the patient through active listening and therapeutic communication  - Assess patient's level of functioning, behavior and potential for risk  - Engage patient in 1 on 1 interactions  - Encourage patient to express fears, feelings, frustrations, and discuss symptoms    - [place] patient to reality, help patient recognize reality-based thinking   - Administer medications as ordered and assess for potential side effects  - Provide the patient education related to the signs and symptoms of the illness and desired effects of prescribed medications  Outcome: Progressing  Goal: Agree to be compliant with medication regime, as prescribed and report medication side effects  Description: Interventions:  - Offer appropriate PRN medication and supervise ingestion; conduct AIMS, as needed   Outcome: Progressing  Goal: Attend and participate in unit activities, including therapeutic, recreational, and educational groups  Description: Interventions:  -Encourage Visitation and family involvement in care  Outcome: Progressing  Goal: Recognize dysfunctional thoughts, communicate reality-based thoughts at the time of discharge  Description: Interventions:  - Provide medication and psycho-education to assist patient in compliance and developing insight into his/her illness   Outcome: Progressing     - Out of bed for toileting  - Record patient progress and toleration of activity level   Outcome: Progressing     Problem: DISCHARGE PLANNING  Goal: Discharge to home or other facility with appropriate resources  Description: INTERVENTIONS:  - Identify barriers to discharge w/patient and caregiver  - Arrange for needed discharge  resources and transportation as appropriate  - Identify discharge learning needs (meds, wound care, etc.)  - Arrange for interpretive services to assist at discharge as needed  - Refer to Case Management Department for coordinating discharge planning if the patient needs post-hospital services based on physician/advanced practitioner order or complex needs related to functional status, cognitive ability, or social support system  Outcome: Progressing

## 2024-02-05 PROCEDURE — 99232 SBSQ HOSP IP/OBS MODERATE 35: CPT | Performed by: PSYCHIATRY & NEUROLOGY

## 2024-02-05 RX ORDER — AMOXICILLIN 250 MG
1 CAPSULE ORAL 2 TIMES DAILY
Status: DISCONTINUED | OUTPATIENT
Start: 2024-02-05 | End: 2024-02-07 | Stop reason: HOSPADM

## 2024-02-05 RX ADMIN — CHLORPROMAZINE HYDROCHLORIDE 100 MG: 100 TABLET, FILM COATED ORAL at 10:09

## 2024-02-05 RX ADMIN — DIVALPROEX SODIUM 1750 MG: 500 TABLET, EXTENDED RELEASE ORAL at 21:23

## 2024-02-05 RX ADMIN — HYDROXYZINE HYDROCHLORIDE 100 MG: 50 TABLET, FILM COATED ORAL at 13:08

## 2024-02-05 RX ADMIN — DESMOPRESSIN ACETATE 0.4 MG: 0.1 TABLET ORAL at 21:22

## 2024-02-05 RX ADMIN — CHLORPROMAZINE HYDROCHLORIDE 150 MG: 100 TABLET, FILM COATED ORAL at 21:23

## 2024-02-05 RX ADMIN — CLONIDINE HYDROCHLORIDE 0.2 MG: 0.1 TABLET ORAL at 21:23

## 2024-02-05 RX ADMIN — SENNOSIDES AND DOCUSATE SODIUM 1 TABLET: 8.6; 5 TABLET ORAL at 17:01

## 2024-02-05 RX ADMIN — SENNOSIDES AND DOCUSATE SODIUM 1 TABLET: 8.6; 5 TABLET ORAL at 13:06

## 2024-02-05 RX ADMIN — CHLORPROMAZINE HYDROCHLORIDE 100 MG: 100 TABLET, FILM COATED ORAL at 15:23

## 2024-02-05 RX ADMIN — SALINE NASAL SPRAY 1 SPRAY: 1.5 SOLUTION NASAL at 21:28

## 2024-02-05 RX ADMIN — CLONAZEPAM 1 MG: 1 TABLET ORAL at 08:17

## 2024-02-05 RX ADMIN — MELATONIN TAB 3 MG 3 MG: 3 TAB at 21:23

## 2024-02-05 RX ADMIN — CLONIDINE HYDROCHLORIDE 0.2 MG: 0.1 TABLET ORAL at 15:24

## 2024-02-05 RX ADMIN — POLYETHYLENE GLYCOL 3350 17 G: 17 POWDER, FOR SOLUTION ORAL at 08:16

## 2024-02-05 RX ADMIN — OLANZAPINE 20 MG: 10 TABLET, FILM COATED ORAL at 21:23

## 2024-02-05 NOTE — PLAN OF CARE
Problem: SAFETY ADULT  Goal: Maintain or return to baseline ADL function  Description: INTERVENTIONS:  -  Assess patient's ability to carry out ADLs; assess patient's baseline for ADL function and identify physical deficits which impact ability to perform ADLs (bathing, care of mouth/teeth, toileting, grooming, dressing, etc.)  - Assess/evaluate cause of self-care deficits   - Assess range of motion  - Assess patient's mobility; develop plan if impaired  - Assess patient's need for assistive devices and provide as appropriate  - Encourage maximum independence but intervene and supervise when necessary  - Involve family in performance of ADLs  - Assess for home care needs following discharge   - Consider OT consult to assist with ADL evaluation and planning for discharge  - Provide patient education as appropriate  Outcome: Progressing  Goal: Maintains/Returns to pre admission functional level  Description: INTERVENTIONS:  - Perform AM-PAC 6 Click Basic Mobility/ Daily Activity assessment daily.  - Set and communicate daily mobility goal to care team and patient/family/caregiver.   - Collaborate with rehabilitation services on mobility goals if consulted  - - Out of bed for toileting  - Record patient progress and toleration of activity level   Outcome: Progressing     Problem: DISCHARGE PLANNING  Goal: Discharge to home or other facility with appropriate resources  Description: INTERVENTIONS:  - Identify barriers to discharge w/patient and caregiver  - Arrange for needed discharge resources and transportation as appropriate  - Identify discharge learning needs (meds, wound care, etc.)  - Arrange for interpretive services to assist at discharge as needed  - Refer to Case Management Department for coordinating discharge planning if the patient needs post-hospital services based on physician/advanced practitioner order or complex needs related to functional status, cognitive ability, or social support  system  Outcome: Progressing     Problem: BEHAVIOR  Goal: Pt/Family maintain appropriate behavior and adhere to behavioral management agreement, if implemented  Description: INTERVENTIONS:  - Assess the family dynamic   - Encourage verbalization of thoughts and concerns in a socially appropriate manner  - Assess patient/family's coping skills and non-compliant behavior (including use of illegal substances).  - Utilize positive, consistent limit setting strategies supporting safety of patient, staff and others  - Initiate consult with Case Management, Spiritual Care or other ancillary services as appropriate  - If a patient's/visitor's behavior jeopardizes the safety of the patient, staff, or others, refer to organization procedure.   - Notify Security of behavior or suspected illegal substances which indicate the need for search of the patient and/or belongings  - Encourage participation in the decision making process about a behavioral management agreement; implement if patient meets criteria  Outcome: Progressing     Problem: DISCHARGE PLANNING - CARE MANAGEMENT  Goal: Discharge to post-acute care or home with appropriate resources  Description: INTERVENTIONS:  - Conduct assessment to determine patient/family and health care team treatment goals, and need for post-acute services based on payer coverage, community resources, and patient preferences, and barriers to discharge  - Address psychosocial, clinical, and financial barriers to discharge as identified in assessment in conjunction with the patient/family and health care team  - Arrange appropriate level of post-acute services according to patient’s   needs and preference and payer coverage in collaboration with the physician and health care team  - Communicate with and update the patient/family, physician, and health care team regarding progress on the discharge plan  - Arrange appropriate transportation to post-acute venues  Outcome: Progressing     Problem:  Alteration in Thoughts and Perception  Goal: Verbalize thoughts and feelings  Description: Interventions:  - Promote a nonjudgmental and trusting relationship with the patient through active listening and therapeutic communication  - Assess patient's level of functioning, behavior and potential for risk  - Engage patient in 1 on 1 interactions  - Encourage patient to express fears, feelings, frustrations, and discuss symptoms    - [place] patient to reality, help patient recognize reality-based thinking   - Administer medications as ordered and assess for potential side effects  - Provide the patient education related to the signs and symptoms of the illness and desired effects of prescribed medications  Outcome: Progressing  Goal: Agree to be compliant with medication regime, as prescribed and report medication side effects  Description: Interventions:  - Offer appropriate PRN medication and supervise ingestion; conduct AIMS, as needed   Outcome: Progressing  Goal: Attend and participate in unit activities, including therapeutic, recreational, and educational groups  Description: Interventions:  -Encourage Visitation and family involvement in care  Outcome: Progressing  Goal: Recognize dysfunctional thoughts, communicate reality-based thoughts at the time of discharge  Description: Interventions:  - Provide medication and psycho-education to assist patient in compliance and developing insight into his/her illness   Outcome: Progressing

## 2024-02-05 NOTE — NURSING NOTE
"Pt initially irritable with this writer, refusing to open bedroom door for am medications. Pt quickly apologetic stating \"I am getting out of here this week I will have a good day.\" Pt denies SI/HI/AH/VH at this time. Speech is mumbled, soft and at times difficult to understand. Attempted to give pt shower supplies and he knocked them out of this writers hands, but with staff redirection did take them and stated he would shower prior to lunch. Med and meal compliant. Did not receive scheduled clonidine and metoprolol due to parameters.   "

## 2024-02-05 NOTE — NURSING NOTE
Patient remained visible on the unit throughout the evening. Needs a lot of redirection. However, cooperative and mostly pleasant with staff. Minimal peer engagement. HS medication compliant. Melatonin prn given at 2108.    At 2200, patient observed resting in bed.

## 2024-02-05 NOTE — PROGRESS NOTES
02/05/24 0839   Team Meeting   Meeting Type Daily Rounds   Team Members Present   Team Members Present Nurse;Physician;   Physician Team Member Carmina   Nursing Team Member Jacqueline   Care Management Team Member Carol   Patient/Family Present   Patient Present No   Patient's Family Present No     Saturday: Pt visible on the unit, cooperative with unit routine. Med/meal compliant. PRN melatonin given.   Sunday: Med/meal compliant. Visible on the unit, requires frequent redirection. PRN Melatonin given  Discharge Wednesday.

## 2024-02-05 NOTE — PROGRESS NOTES
Progress Note - Behavioral Health   Manuel Back 18 y.o. male MRN: [id number]  Unit/Bed#: [facility] 340-01 Encounter: [id number]    Assessment/Plan   Principal Problem:    Schizoaffective disorder (HCC)  Active Problems:    Urinary incontinence, nocturnal enuresis    Medical clearance for psychiatric admission    Morbid obesity with body mass index (BMI) of 40.0 to 49.9 (HCC)    Essential hypertension    Vitamin D deficiency    Chronic pain of right knee      Recommended Treatment:      No psychopharmacologic changes necessary at this time; will continue to assess for further optimization.  Continue with current medications:  Thorazine 100 mg twice daily, 150 mg at bedtime for agitation and impulsivity.  Klonopin 1 mg daily for severe anxiety and agitation.  Clonidine 0.2 mg 3 times daily for severe agitation and impulsivity.  Vitamin B12 500 mcg daily for supplementation.  Depakote  mg at bedtime for mood stabilization.  Vitamin D2 50,000 units q. weekly for supplementation.  Zyprexa 20 mg at bedtime for psychosis and mood.  MiraLAX 17 g daily, Start Senokot 1 tablet twice daily for standing bowel regimen.   Continue with group therapy, milieu therapy and occupational therapy.    Continue frequent safety checks and vitals per unit protocol.  Continue with [facility] medical management as indicated  Continue coordinating with case management regarding disposition    Legal Status: 201  Disposition: coordinating with case management, tentative DC for Wednesday, 2/7/24    Case discussed with treatment team.  Risks, benefits and possible side effects of Medications: Risks, benefits, and possible side effects of medications have been explained to the patient, who verbalizes understanding    ------------------------------------------------------------    Subjective: Patient's chart was reviewed, and patient's progress and plan was discussed with treatment team. Per nursing report, Manuel has been calm, pleasant,  "redirectable and cooperative on the unit and compliant with medications. Patient has remained in behavioral control for the last 24 hours. Last night patient was documented to have slept throughout the night.    Manuel was evaluated this morning for continuity of care. On examination, Manuel is calm, pleasant, cooperative, concrete but brighter and less irritable than previously. He states his mood is \"Good.\" He reports sleeping well and his energy level today is adequate. His appetite has been good but he endorses some constipation and is amenable to increasing bowel regimen with addition of senokot. He denies any other adverse effects from medications. He denies suicidal ideation, homicidal ideation, auditory hallucinations, and visual hallucinations. His goals for today are to remain in behavioral control and medication compliant.    VS: Reviewed,  BMI of 47.0, hypertensive at 173/84, will reach out to medical for evaluation of HTN regimen, otherwise within normal limits    Progress Toward Goals: Slow improvement    Psychiatric Review of Systems:  Behavior over the last 24 hours: improved  Sleep: improving  Appetite: adequate  Medication side effects: none verbalized other than constipation  Medical ROS:  Positive constipation, no cough, chest pain, nausea, vomiting or diarrhea, all other symptoms are negative.    Vital signs in last 24 hours:  Temp:  [97.2 °F (36.2 °C)-97.8 °F (36.6 °C)] 97.2 °F (36.2 °C)  HR:  [80-97] 89  Resp:  [17-18] 18  BP: ()/(59-76) 97/59    Mental Status Exam:    Appearance:  alert, improving eye contact, appears stated age, paper scrub dressed, marginal grooming/hygiene, obese, bearded, and smiling   Behavior:  calm, cooperative, and standing to speak   Speech:  spontaneous, clear, slow, soft, and coherent   Mood:  \"Good\"   Affect:  slightly brighter, mood-congruent   Thought Process:  concrete   Associations: concrete associations   Thought Content:  no verbalized delusions " or overt paranoia   Perceptual Disturbances: denies current hallucinations and does not appear to be responding to internal stimuli at this time   Risk Potential: Suicidal ideation - None at present  Homicidal ideation - None at present  Potential for aggression - Not at present   Sensorium:  oriented to person, place, time/date, and situation   Memory:  recent and remote memory grossly intact   Consciousness:  alert and awake   Attention/Concentration: attention span and concentration appear shorter than expected for age   Insight:  limited   Judgment: limited   Gait/Station: normal gait/station   Motor Activity: no abnormal movements     Current Medications:  Current Facility-Administered Medications   Medication Dose Route Frequency Provider Last Rate    aluminum-magnesium hydroxide-simethicone  30 mL Oral Q4H PRN Dimitris Grewal MD      benztropine  1 mg Intramuscular Q4H PRN Max 6/day Dimitris Grewal MD      benztropine  1 mg Oral Q4H PRN Max 6/day Dimitris Grewal MD      chlorproMAZINE  50 mg Intramuscular Q8H PRN Dimitris Grewal MD      And    diphenhydrAMINE  50 mg Intramuscular Q8H PRN Dimitris Grewal MD      chlorproMAZINE  100 mg Oral BID Darrell Le, DO      chlorproMAZINE  150 mg Oral HS Phill Kamara MD      clonazePAM  1 mg Oral Daily Darrell Le, DO      cloNIDine  0.2 mg Oral TID Dimitris Grewal MD      desmopressin  0.4 mg Oral HS Phill Kamara MD      Diclofenac Sodium  2 g Topical 4x Daily PRN ABUNDIO Villalobos      hydrOXYzine HCL  50 mg Oral Q6H PRN Max 4/day Dimitris Grewal MD      Or    diphenhydrAMINE  50 mg Intramuscular Q6H PRN Dimitris Grewal MD      divalproex sodium  1,750 mg Oral HS Darrell Le DO      ergocalciferol  50,000 Units Oral Weekly ABUNDIO Villalobos      Artificial Tears  1 drop Both Eyes Q3H PRN Dimitris Grewal MD      hydrOXYzine HCL  100 mg Oral Q6H PRN Max 4/day Dimitris Grewal MD      Or    LORazepam  2 mg Intramuscular Q6H PRN Dimitris  SHREYA Grewal MD      hydrOXYzine HCL  25 mg Oral Q6H PRN Max 4/day Dimitris Grewal MD      ibuprofen  400 mg Oral Q4H PRN Dimitris Grewal MD      ibuprofen  600 mg Oral Q6H PRN Dimitris Grewal MD      ibuprofen  800 mg Oral Q8H PRN Dimitris Grewal MD      melatonin  3 mg Oral HS PRN Dimitris Grewal MD      metoprolol succinate  50 mg Oral Daily Dimitris Grewal MD      OLANZapine  10 mg Oral Q3H PRN Max 3/day Dimitris Grewal MD      Or    OLANZapine  10 mg Intramuscular Q3H PRN Max 3/day Dimitris Grewal MD      OLANZapine  5 mg Oral Q3H PRN Max 6/day Dimitris Grewal MD      Or    OLANZapine  5 mg Intramuscular Q3H PRN Max 6/day Dimitris Grewal MD      OLANZapine  2.5 mg Oral Q3H PRN Max 8/day Dimitris Grewal MD      OLANZapine  20 mg Oral HS Darrell Le DO      polyethylene glycol  17 g Oral Daily Dimitris Grewal MD      polyethylene glycol  17 g Oral Daily PRN Dimitris Grewal MD      propranolol  10 mg Oral Q8H PRN Dimitris Grewal MD      senna-docusate sodium  1 tablet Oral BID Darrell Le, DO      sodium chloride  1 spray Each Nare Q1H PRN Phil Beavers, DO      sterile water              Behavioral Health Medications: all current active meds have been reviewed. Changes as in plan section above.    Laboratory results:  I have personally reviewed all pertinent laboratory/tests results.   No results found for this or any previous visit (from the past 48 hour(s)).     This note has been constructed using a voice recognition system. There may be translation, syntax, or grammatical errors. If you have any questions, please contact the dictating author.    Darrell Le DO  Psychiatry Residency, PGY-2

## 2024-02-05 NOTE — PLAN OF CARE
ALEC spoke with Ary from pt's group home, [facility]. Ary advised they will likely need to have a d/c meeting with the hospital prior to pt returning home. Ary reported she was unsure if this meeting would be held with just  staff or if hospital would need to be involved as well. ALEC agreed to send email to schedule meeting with  staff and Pt's guardian if hospital is required to be involved.     ALEC sent email to guardisam Soriano and Ary <[email or web address] to further discuss d/c planning.

## 2024-02-05 NOTE — PLAN OF CARE
Problem: Ineffective Coping  Goal: Participates in unit activities  Description: Interventions:  - Provide therapeutic environment   - Provide required programming   - Redirect inappropriate behaviors   Outcome: Progressing   Patient;s attendance has been sparse, he did attend group today

## 2024-02-06 PROBLEM — Z00.8 MEDICAL CLEARANCE FOR PSYCHIATRIC ADMISSION: Status: RESOLVED | Noted: 2024-01-20 | Resolved: 2024-02-06

## 2024-02-06 PROCEDURE — 99232 SBSQ HOSP IP/OBS MODERATE 35: CPT | Performed by: PSYCHIATRY & NEUROLOGY

## 2024-02-06 RX ORDER — CHLORPROMAZINE HYDROCHLORIDE 50 MG/1
150 TABLET, FILM COATED ORAL
Qty: 90 TABLET | Refills: 1 | Status: SHIPPED | OUTPATIENT
Start: 2024-02-06 | End: 2024-04-06

## 2024-02-06 RX ORDER — DIVALPROEX SODIUM 250 MG/1
1750 TABLET, EXTENDED RELEASE ORAL
Qty: 210 TABLET | Refills: 1 | Status: SHIPPED | OUTPATIENT
Start: 2024-02-06 | End: 2024-02-16 | Stop reason: SDUPTHER

## 2024-02-06 RX ORDER — DESMOPRESSIN ACETATE 0.2 MG/1
0.4 TABLET ORAL
Qty: 60 TABLET | Refills: 1 | Status: SHIPPED | OUTPATIENT
Start: 2024-02-06 | End: 2024-04-06

## 2024-02-06 RX ORDER — CLONAZEPAM 1 MG/1
1 TABLET ORAL DAILY
Qty: 30 TABLET | Refills: 0 | Status: SHIPPED | OUTPATIENT
Start: 2024-02-07 | End: 2024-03-08

## 2024-02-06 RX ORDER — AMOXICILLIN 250 MG
1 CAPSULE ORAL 2 TIMES DAILY
Qty: 60 TABLET | Refills: 1 | Status: SHIPPED | OUTPATIENT
Start: 2024-02-06 | End: 2024-04-06

## 2024-02-06 RX ORDER — POLYETHYLENE GLYCOL 3350 17 G/17G
17 POWDER, FOR SOLUTION ORAL DAILY
Qty: 510 G | Refills: 1 | Status: SHIPPED | OUTPATIENT
Start: 2024-02-07 | End: 2024-04-07

## 2024-02-06 RX ORDER — CHLORPROMAZINE HYDROCHLORIDE 100 MG/1
100 TABLET, FILM COATED ORAL 2 TIMES DAILY
Qty: 60 TABLET | Refills: 1 | Status: SHIPPED | OUTPATIENT
Start: 2024-02-06 | End: 2024-04-06

## 2024-02-06 RX ADMIN — CHLORPROMAZINE HYDROCHLORIDE 150 MG: 100 TABLET, FILM COATED ORAL at 21:16

## 2024-02-06 RX ADMIN — CLONAZEPAM 1 MG: 1 TABLET ORAL at 08:36

## 2024-02-06 RX ADMIN — CHLORPROMAZINE HYDROCHLORIDE 100 MG: 100 TABLET, FILM COATED ORAL at 11:00

## 2024-02-06 RX ADMIN — SENNOSIDES AND DOCUSATE SODIUM 1 TABLET: 8.6; 5 TABLET ORAL at 17:40

## 2024-02-06 RX ADMIN — CLONIDINE HYDROCHLORIDE 0.2 MG: 0.1 TABLET ORAL at 21:16

## 2024-02-06 RX ADMIN — OLANZAPINE 20 MG: 10 TABLET, FILM COATED ORAL at 21:17

## 2024-02-06 RX ADMIN — DIVALPROEX SODIUM 1750 MG: 500 TABLET, EXTENDED RELEASE ORAL at 21:16

## 2024-02-06 RX ADMIN — POLYETHYLENE GLYCOL 3350 17 G: 17 POWDER, FOR SOLUTION ORAL at 08:36

## 2024-02-06 RX ADMIN — MELATONIN TAB 3 MG 3 MG: 3 TAB at 21:17

## 2024-02-06 RX ADMIN — SENNOSIDES AND DOCUSATE SODIUM 1 TABLET: 8.6; 5 TABLET ORAL at 08:36

## 2024-02-06 RX ADMIN — DESMOPRESSIN ACETATE 0.4 MG: 0.1 TABLET ORAL at 21:17

## 2024-02-06 RX ADMIN — CLONIDINE HYDROCHLORIDE 0.2 MG: 0.1 TABLET ORAL at 15:23

## 2024-02-06 RX ADMIN — CHLORPROMAZINE HYDROCHLORIDE 100 MG: 100 TABLET, FILM COATED ORAL at 15:22

## 2024-02-06 NOTE — NURSING NOTE
Pt resting in bed most of day but up for medications. When pt woke up he was cooperative and excited for upcoming discharge. Denying SI/HI/AH/VH at this time. Denies any unmet needs or complaints.

## 2024-02-06 NOTE — PLAN OF CARE
Problem: SAFETY ADULT  Goal: Maintain or return to baseline ADL function  Description: INTERVENTIONS:  -  Assess patient's ability to carry out ADLs; assess patient's baseline for ADL function and identify physical deficits which impact ability to perform ADLs (bathing, care of mouth/teeth, toileting, grooming, dressing, etc.)  - Assess/evaluate cause of self-care deficits   - Assess range of motion  - Assess patient's mobility; develop plan if impaired  - Assess patient's need for assistive devices and provide as appropriate  - Encourage maximum independence but intervene and supervise when necessary  - Involve family in performance of ADLs  - Assess for home care needs following discharge   - Consider OT consult to assist with ADL evaluation and planning for discharge  - Provide patient education as appropriate  Outcome: Progressing  Goal: Maintains/Returns to pre admission functional level  Description: INTERVENTIONS:  - Perform AM-PAC 6 Click Basic Mobility/ Daily Activity assessment daily.  - Set and communicate daily mobility goal to care team and patient/family/caregiver.   - Collaborate with rehabilitation services on mobility goals if consulted  - - Out of bed for toileting  - Record patient progress and toleration of activity level   Outcome: Progressing     Problem: DISCHARGE PLANNING  Goal: Discharge to home or other facility with appropriate resources  Description: INTERVENTIONS:  - Identify barriers to discharge w/patient and caregiver  - Arrange for needed discharge resources and transportation as appropriate  - Identify discharge learning needs (meds, wound care, etc.)  - Arrange for interpretive services to assist at discharge as needed  - Refer to Case Management Department for coordinating discharge planning if the patient needs post-hospital services based on physician/advanced practitioner order or complex needs related to functional status, cognitive ability, or social support  system  Outcome: Progressing     Problem: BEHAVIOR  Goal: Pt/Family maintain appropriate behavior and adhere to behavioral management agreement, if implemented  Description: INTERVENTIONS:  - Assess the family dynamic   - Encourage verbalization of thoughts and concerns in a socially appropriate manner  - Assess patient/family's coping skills and non-compliant behavior (including use of illegal substances).  - Utilize positive, consistent limit setting strategies supporting safety of patient, staff and others  - Initiate consult with Case Management, Spiritual Care or other ancillary services as appropriate  - If a patient's/visitor's behavior jeopardizes the safety of the patient, staff, or others, refer to organization procedure.   - Notify Security of behavior or suspected illegal substances which indicate the need for search of the patient and/or belongings  - Encourage participation in the decision making process about a behavioral management agreement; implement if patient meets criteria  Outcome: Progressing     Problem: DISCHARGE PLANNING - CARE MANAGEMENT  Goal: Discharge to post-acute care or home with appropriate resources  Description: INTERVENTIONS:  - Conduct assessment to determine patient/family and health care team treatment goals, and need for post-acute services based on payer coverage, community resources, and patient preferences, and barriers to discharge  - Address psychosocial, clinical, and financial barriers to discharge as identified in assessment in conjunction with the patient/family and health care team  - Arrange appropriate level of post-acute services according to patient’s   needs and preference and payer coverage in collaboration with the physician and health care team  - Communicate with and update the patient/family, physician, and health care team regarding progress on the discharge plan  - Arrange appropriate transportation to post-acute venues  Outcome: Progressing     Problem:  Alteration in Thoughts and Perception  Goal: Verbalize thoughts and feelings  Description: Interventions:  - Promote a nonjudgmental and trusting relationship with the patient through active listening and therapeutic communication  - Assess patient's level of functioning, behavior and potential for risk  - Engage patient in 1 on 1 interactions  - Encourage patient to express fears, feelings, frustrations, and discuss symptoms    - [place] patient to reality, help patient recognize reality-based thinking   - Administer medications as ordered and assess for potential side effects  - Provide the patient education related to the signs and symptoms of the illness and desired effects of prescribed medications  Outcome: Progressing  Goal: Agree to be compliant with medication regime, as prescribed and report medication side effects  Description: Interventions:  - Offer appropriate PRN medication and supervise ingestion; conduct AIMS, as needed   Outcome: Progressing  Goal: Attend and participate in unit activities, including therapeutic, recreational, and educational groups  Description: Interventions:  -Encourage Visitation and family involvement in care  Outcome: Progressing  Goal: Recognize dysfunctional thoughts, communicate reality-based thoughts at the time of discharge  Description: Interventions:  - Provide medication and psycho-education to assist patient in compliance and developing insight into his/her illness   Outcome: Progressing
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
## 2024-02-06 NOTE — NURSING NOTE
Patient remained visible on the unit throughout the evening. Cooperative with routine. Denied any unmet needs. HS medication compliant. Melatonin prn given.

## 2024-02-06 NOTE — PROGRESS NOTES
Progress Note - Behavioral Health   Manuel Back 18 y.o. male MRN: [id number]  Unit/Bed#: [facility] 340-01 Encounter: [id number]    Assessment/Plan   Principal Problem:    Schizoaffective disorder (HCC)  Active Problems:    Urinary incontinence, nocturnal enuresis    Medical clearance for psychiatric admission    Morbid obesity with body mass index (BMI) of 40.0 to 49.9 (HCC)    Essential hypertension    Vitamin D deficiency    Chronic pain of right knee      Recommended Treatment:      No psychopharmacologic changes necessary at this time; will continue to assess for further optimization.  Continue with current medications:  Thorazine 100 mg twice daily, 150 mg at bedtime for agitation impulsivity.  Klonopin 1 mg daily for severe agitation and anxiety.  Clonidine 0.2 mg 3 times daily for severe agitation and impulsivity.  Vitamin B12 500 mcg daily for supplementation.  Depakote  mg at bedtime for mood stabilization.  Vitamin D2 50,000 units q. weekly for supplementation.  Zyprexa 20 mg at bedtime for psychosis and mood.  MiraLAX 17 g daily, Senokot 1 tablet twice daily for standing bowel regimen.   Continue with group therapy, milieu therapy and occupational therapy.    Continue frequent safety checks and vitals per unit protocol.  Continue with SLIM medical management as indicated  Continue coordinating with case management regarding disposition    Legal Status: 201  Disposition: coordinating with case management, tentative discharge tomorrow 2/7/24    Case discussed with treatment team.  Risks, benefits and possible side effects of Medications: Risks, benefits, and possible side effects of medications have previously been explained. No new medications at this time.    ------------------------------------------------------------    Subjective: Patient's chart was reviewed, and patient's progress and plan was discussed with treatment team. Per nursing report, Manuel has been calm, pleasant,  "redirectable, cooperative on the unit and compliant with medications. Patient has remained in behavioral control for the last 24 hours. Last night patient was documented to have slept throughout the night.    Manuel was evaluated this morning for continuity of care. On examination, Manuel is calm, pleasant, cooperative, dressed in paper scrubs and sitting comfortably, he is notably less irritable, slightly brighter, improved eye contact, maintaining more appropriate physical boundaries than previously, and appearing to be future oriented with regards to his discharge. He states his mood is \" good.\" He reports sleeping well and his energy level today is adequate. His appetite has been good. He denies adverse effects from medications. He denies suicidal ideation, homicidal ideation, auditory hallucinations, and visual hallucinations. His goals for today are to remain in behavioral control, medication compliant and continue working toward discharge tomorrow.    VS: Reviewed,  BMI 47.01, tachycardic at 116, otherwise within normal limits    Progress Toward Goals: Slow improvement    Psychiatric Review of Systems:  Behavior over the last 24 hours: improved  Sleep: normal  Appetite: adequate  Medication side effects: none verbalized  Medical ROS:  No cough, chest pain, nausea, vomiting, diarrhea, all other symptoms are negative.    Vital signs in last 24 hours:  Temp:  [98 °F (36.7 °C)] 98 °F (36.7 °C)  HR:  [113-116] 116  Resp:  [14] 14  BP: (119-173)/(71-84) 119/71    Mental Status Exam:    Appearance:  alert, improving eye contact, appears stated age, paper scrubs dressed, marginal grooming/hygiene, obese, bearded, and smiling   Behavior:  calm, cooperative, and sitting comfortably   Speech:  delayed initiation, clear, slow, soft, scant, and coherent   Mood:  \"Good\"   Affect:  constricted, slightly brighter   Thought Process:  [place]   Associations: concrete associations   Thought Content:  no verbalized " delusions or overt paranoia   Perceptual Disturbances: denies current hallucinations and does not appear to be responding to internal stimuli at this time   Risk Potential: Suicidal ideation - None at present  Homicidal ideation - None at present  Potential for aggression - Not at present   Sensorium:  oriented to person, place, time/date, and situation   Memory:  recent and remote memory grossly intact   Consciousness:  alert and awake   Attention/Concentration: attention span and concentration appear shorter than expected for age   Insight:  limited   Judgment: limited   Gait/Station: slow gait   Motor Activity: no abnormal movements     Current Medications:  Current Facility-Administered Medications   Medication Dose Route Frequency Provider Last Rate    aluminum-magnesium hydroxide-simethicone  30 mL Oral Q4H PRN Dimitris Grewal MD      benztropine  1 mg Intramuscular Q4H PRN Max 6/day Dimitris Grewal MD      benztropine  1 mg Oral Q4H PRN Max 6/day Dimitris Grewal MD      chlorproMAZINE  50 mg Intramuscular Q8H PRN Dimitris Grewal MD      And    diphenhydrAMINE  50 mg Intramuscular Q8H PRN Dimitris Grewal MD      chlorproMAZINE  100 mg Oral BID Darrell Le, DO      chlorproMAZINE  150 mg Oral HS Phill Kamara MD      clonazePAM  1 mg Oral Daily Darrell Le, DO      cloNIDine  0.2 mg Oral TID Dimitris Grewal MD      desmopressin  0.4 mg Oral HS Phill Kamara MD      Diclofenac Sodium  2 g Topical 4x Daily PRN ABUNDIO Villalobos      hydrOXYzine HCL  50 mg Oral Q6H PRN Max 4/day Dimitris Grewal MD      Or    diphenhydrAMINE  50 mg Intramuscular Q6H PRN Dimitris Grewal MD      divalproex sodium  1,750 mg Oral HS Darrell Le DO      ergocalciferol  50,000 Units Oral Weekly ABUNDIO Villalobos      Artificial Tears  1 drop Both Eyes Q3H PRN Dimitris Grewal MD      hydrOXYzine HCL  100 mg Oral Q6H PRN Max 4/day Dimitris Grewal MD      Or    LORazepam  2 mg Intramuscular Q6H PRN Dimitris  SHREYA Grewal MD      hydrOXYzine HCL  25 mg Oral Q6H PRN Max 4/day Dimitris Grewal MD      ibuprofen  400 mg Oral Q4H PRN Dimitris Grewal MD      ibuprofen  600 mg Oral Q6H PRN Dimitris Grewal MD      ibuprofen  800 mg Oral Q8H PRN Dimitris Grewal MD      melatonin  3 mg Oral HS PRN Dimitris Grewal MD      metoprolol succinate  50 mg Oral Daily Dimitris Grewal MD      OLANZapine  10 mg Oral Q3H PRN Max 3/day Dimitris Grewal MD      Or    OLANZapine  10 mg Intramuscular Q3H PRN Max 3/day Dimitris Grewal MD      OLANZapine  5 mg Oral Q3H PRN Max 6/day Dimitris Grewal MD      Or    OLANZapine  5 mg Intramuscular Q3H PRN Max 6/day Dimitris Grewal MD      OLANZapine  2.5 mg Oral Q3H PRN Max 8/day Dimitris Grewal MD      OLANZapine  20 mg Oral HS Darrell Le DO      polyethylene glycol  17 g Oral Daily Dimitris Grewal MD      polyethylene glycol  17 g Oral Daily PRN Dimitris Grewal MD      propranolol  10 mg Oral Q8H PRN Dimitris Grewal MD      senna-docusate sodium  1 tablet Oral BID Darrell Le, DO      sodium chloride  1 spray Each Nare Q1H PRN Phil Beavers, DO      sterile water              Behavioral Health Medications: all current active meds have been reviewed. Changes as in plan section above.    Laboratory results:  I have personally reviewed all pertinent laboratory/tests results.   No results found for this or any previous visit (from the past 48 hour(s)).     This note has been constructed using a voice recognition system. There may be translation, syntax, or grammatical errors. If you have any questions, please contact the dictating author.    Darrell Le DO  Psychiatry Residency, PGY-2

## 2024-02-06 NOTE — PROGRESS NOTES
02/06/24 1013   Team Meeting   Meeting Type Daily Rounds   Team Members Present   Team Members Present Physician;Nurse;   Physician Team Member Carmina   Nursing Team Member Carlos   Care Management Team Member Silvia   Patient/Family Present   Patient Present No   Patient's Family Present No     Pt is isolated to room and irritable. Pt still mumbles and is difficult to understand at times. Pt does apologize for his behaviors and attends groups. Med/Meal compliant. Received PRN Atarax for anxiety; it was effective. Group Home meeting today at 3pm; Discharging Wednesday.

## 2024-02-07 VITALS
RESPIRATION RATE: 16 BRPM | BODY MASS INDEX: 46.86 KG/M2 | DIASTOLIC BLOOD PRESSURE: 70 MMHG | HEART RATE: 78 BPM | TEMPERATURE: 97.3 F | HEIGHT: 68 IN | OXYGEN SATURATION: 97 % | WEIGHT: 309.2 LBS | SYSTOLIC BLOOD PRESSURE: 142 MMHG

## 2024-02-07 PROCEDURE — 99238 HOSP IP/OBS DSCHRG MGMT 30/<: CPT | Performed by: PSYCHIATRY & NEUROLOGY

## 2024-02-07 RX ORDER — OLANZAPINE 20 MG/1
20 TABLET ORAL
Qty: 30 TABLET | Refills: 1 | Status: SHIPPED | OUTPATIENT
Start: 2024-02-07 | End: 2024-04-07

## 2024-02-07 RX ADMIN — CHLORPROMAZINE HYDROCHLORIDE 100 MG: 100 TABLET, FILM COATED ORAL at 14:26

## 2024-02-07 RX ADMIN — CLONIDINE HYDROCHLORIDE 0.2 MG: 0.1 TABLET ORAL at 08:11

## 2024-02-07 RX ADMIN — CLONIDINE HYDROCHLORIDE 0.2 MG: 0.1 TABLET ORAL at 15:00

## 2024-02-07 RX ADMIN — SENNOSIDES AND DOCUSATE SODIUM 1 TABLET: 8.6; 5 TABLET ORAL at 08:11

## 2024-02-07 RX ADMIN — CHLORPROMAZINE HYDROCHLORIDE 100 MG: 100 TABLET, FILM COATED ORAL at 11:20

## 2024-02-07 RX ADMIN — CLONAZEPAM 1 MG: 1 TABLET ORAL at 08:10

## 2024-02-07 RX ADMIN — METOPROLOL SUCCINATE 50 MG: 50 TABLET, EXTENDED RELEASE ORAL at 08:11

## 2024-02-07 RX ADMIN — POLYETHYLENE GLYCOL 3350 17 G: 17 POWDER, FOR SOLUTION ORAL at 08:10

## 2024-02-07 NOTE — NURSING NOTE
Patient is excited about discharge and returning to his group home tomorrow. He has been in good spirits this evening and has been social with some peers..  He denies S.I.H.I.A/H V/H and he was cooperative with HS medications.

## 2024-02-07 NOTE — PLAN OF CARE
Problem: SAFETY ADULT  Goal: Maintain or return to baseline ADL function  Description: INTERVENTIONS:  -  Assess patient's ability to carry out ADLs; assess patient's baseline for ADL function and identify physical deficits which impact ability to perform ADLs (bathing, care of mouth/teeth, toileting, grooming, dressing, etc.)  - Assess/evaluate cause of self-care deficits   - Assess range of motion  - Assess patient's mobility; develop plan if impaired  - Assess patient's need for assistive devices and provide as appropriate  - Encourage maximum independence but intervene and supervise when necessary  - Involve family in performance of ADLs  - Assess for home care needs following discharge   - Consider OT consult to assist with ADL evaluation and planning for discharge  - Provide patient education as appropriate  Outcome: Adequate for Discharge  Goal: Maintains/Returns to pre admission functional level  Description: INTERVENTIONS:  - Perform AM-PAC 6 Click Basic Mobility/ Daily Activity assessment daily.  - Set and communicate daily mobility goal to care team and patient/family/caregiver.   - Collaborate with rehabilitation services on mobility goals if consulted  - Perform Range of Motion  times a day.  - Reposition patient every  hours.  - Dangle patient  times a day  - Stand patient  times a day  - Ambulate patient  times a day  - Out of bed to chair  times a day   - Out of bed for meals  times a day  - Out of bed for toileting  - Record patient progress and toleration of activity level   Outcome: Adequate for Discharge     Problem: DISCHARGE PLANNING  Goal: Discharge to home or other facility with appropriate resources  Description: INTERVENTIONS:  - Identify barriers to discharge w/patient and caregiver  - Arrange for needed discharge resources and transportation as appropriate  - Identify discharge learning needs (meds, wound care, etc.)  - Arrange for interpretive services to assist at discharge as  needed  - Refer to Case Management Department for coordinating discharge planning if the patient needs post-hospital services based on physician/advanced practitioner order or complex needs related to functional status, cognitive ability, or social support system  Outcome: Adequate for Discharge     Problem: BEHAVIOR  Goal: Pt/Family maintain appropriate behavior and adhere to behavioral management agreement, if implemented  Description: INTERVENTIONS:  - Assess the family dynamic   - Encourage verbalization of thoughts and concerns in a socially appropriate manner  - Assess patient/family's coping skills and non-compliant behavior (including use of illegal substances).  - Utilize positive, consistent limit setting strategies supporting safety of patient, staff and others  - Initiate consult with Case Management, Spiritual Care or other ancillary services as appropriate  - If a patient's/visitor's behavior jeopardizes the safety of the patient, staff, or others, refer to organization procedure.   - Notify Security of behavior or suspected illegal substances which indicate the need for search of the patient and/or belongings  - Encourage participation in the decision making process about a behavioral management agreement; implement if patient meets criteria  Outcome: Adequate for Discharge     Problem: DISCHARGE PLANNING - CARE MANAGEMENT  Goal: Discharge to post-acute care or home with appropriate resources  Description: INTERVENTIONS:  - Conduct assessment to determine patient/family and health care team treatment goals, and need for post-acute services based on payer coverage, community resources, and patient preferences, and barriers to discharge  - Address psychosocial, clinical, and financial barriers to discharge as identified in assessment in conjunction with the patient/family and health care team  - Arrange appropriate level of post-acute services according to patient’s   needs and preference and payer  coverage in collaboration with the physician and health care team  - Communicate with and update the patient/family, physician, and health care team regarding progress on the discharge plan  - Arrange appropriate transportation to post-acute venues  Outcome: Adequate for Discharge     Problem: Alteration in Thoughts and Perception  Goal: Verbalize thoughts and feelings  Description: Interventions:  - Promote a nonjudgmental and trusting relationship with the patient through active listening and therapeutic communication  - Assess patient's level of functioning, behavior and potential for risk  - Engage patient in 1 on 1 interactions  - Encourage patient to express fears, feelings, frustrations, and discuss symptoms    - [place] patient to reality, help patient recognize reality-based thinking   - Administer medications as ordered and assess for potential side effects  - Provide the patient education related to the signs and symptoms of the illness and desired effects of prescribed medications  Outcome: Adequate for Discharge  Goal: Agree to be compliant with medication regime, as prescribed and report medication side effects  Description: Interventions:  - Offer appropriate PRN medication and supervise ingestion; conduct AIMS, as needed   Outcome: Adequate for Discharge  Goal: Attend and participate in unit activities, including therapeutic, recreational, and educational groups  Description: Interventions:  -Encourage Visitation and family involvement in care  Outcome: Adequate for Discharge  Goal: Recognize dysfunctional thoughts, communicate reality-based thoughts at the time of discharge  Description: Interventions:  - Provide medication and psycho-education to assist patient in compliance and developing insight into his/her illness   Outcome: Adequate for Discharge     Problem: Ineffective Coping  Goal: Cooperates with admission process  Description: Interventions:   - Complete admission process  Outcome:  Adequate for Discharge  Goal: Identifies ineffective coping skills  Outcome: Adequate for Discharge  Goal: Identifies healthy coping skills  Outcome: Adequate for Discharge  Goal: Demonstrates healthy coping skills  Outcome: Adequate for Discharge  Goal: Participates in unit activities  Description: Interventions:  - Provide therapeutic environment   - Provide required programming   - Redirect inappropriate behaviors   Outcome: Adequate for Discharge  Goal: Patient/Family participate in treatment and DC plans  Description: Interventions:  - Provide therapeutic environment  Outcome: Adequate for Discharge  Goal: Patient/Family verbalizes awareness of resources  Outcome: Adequate for Discharge  Goal: Understands least restrictive measures  Description: Interventions:  - Utilize least restrictive behavior  Outcome: Adequate for Discharge  Goal: Free from restraint events  Description: - Utilize least restrictive measures   - Provide behavioral interventions   - Redirect inappropriate behaviors   Outcome: Adequate for Discharge     Problem: Risk for Violence/Aggression Toward Others  Goal: Refrain from harming others  Outcome: Adequate for Discharge  Goal: Refrain from destructive acts on the environment or property  Outcome: Adequate for Discharge  Goal: Control angry outbursts  Description: Interventions:  - Monitor patient closely, per order  - Ensure early verbal de-escalation  - Monitor prn medication needs  - Set reasonable/therapeutic limits, outline behavioral expectations, and consequences   - Provide a non-threatening milieu, utilizing the least restrictive interventions   Outcome: Adequate for Discharge  Goal: Identify appropriate positive anger management techniques  Description: Interventions:  - Offer anger management and coping skills groups   - Staff will provide positive feedback for appropriate anger control  Outcome: Adequate for Discharge

## 2024-02-07 NOTE — PLAN OF CARE
Problem: Ineffective Coping  Goal: Participates in unit activities  Description: Interventions:  - Provide therapeutic environment   - Provide required programming   - Redirect inappropriate behaviors   Outcome: Adequate for Discharge   Patient is being discharged today. He did attend the community meeting and was appropriate.

## 2024-02-07 NOTE — NURSING NOTE
Pt up and excited for discharge. Medication and meal compliant. Denies SI/HI/AH/VH at this time. AVS gone over with pt and pt states he is willing to take medications as ordered. Pt left unit with belongings at his side. Medications sent to his own pharmacy. Pt picked up by group home staff in lobby.

## 2024-02-07 NOTE — BH TRANSITION RECORD
Contact Information: If you have any questions, concerns, pended studies, tests and/or procedures, or emergencies regarding your inpatient behavioral health visit. Please contact [facility] [phone] and ask to speak to a , nurse or physician. A contact is available 24 hours/ 7 days a week at this number.     Summary of Procedures Performed During your Stay:  Below is a list of major procedures performed during your hospital stay and a summary of results:  - Cardiac Procedures/Studies: ECG.  ECG 12 lead  Order: [id number]  Status: Final result       Visible to patient: No (inaccessible [facility])       Next appt: 04/22/2024 at 11:00 AM in Family Medicine ([facility])    0 Result Notes            Component  Ref Range & Units 2/2/24 1904 1/20/24 1046 1/20/24 1045 1/19/24 1323 1/19/24 1322 8/11/23 1204 6/20/23 1814   Ventricular Rate  BPM 95 86 88 85 86 92 101   Atrial Rate  BPM 95 86 88 85 86 92 101   AK Interval  ms 140 156 158 152 154 140 138   QRSD Interval  ms 76 84 78 82 80 76 94   QT Interval  ms 348 362 356 376 384 342 354   QTC Interval  ms 437 433 430 447 459 422 459   P Axis  degrees 74 72 77 69 67 78 68   QRS Axis  degrees 51 48 58 58 54 62 62   T Wave Axis  degrees 51 35 49 64 68 27 56              Narrative & Impression    Normal sinus rhythm  Normal ECG  When compared with ECG of 20-JAN-2024 10:46,  Nonspecific T wave abnormality, improved in Anterior leads           If studies are pending at discharge, follow up with your PCP and/or referring provider.

## 2024-02-07 NOTE — PROGRESS NOTES
Met with Manuel which required a lot of prompting. We did complete his relapse prevention and discussed the basic community supports. He is returning to his group home today which he listed as one of his protective factors. He identified coping skills and stressors. His insight into his behaviors and mental health is limited.

## 2024-02-07 NOTE — SOCIAL WORK
Pt to D/C today. Pt denies SI/HI/AVH. Pt oriented x3. Pt to d/c to his group home and they will  upon discharge. Pt to follow up with Mel Avila on 2/13. Scripts sent to preferred pharmacy.     Discharge Address: [facility] [address], [place] [address]  Phone: [phone]

## 2024-02-07 NOTE — PLAN OF CARE
Problem: SAFETY ADULT  Goal: Maintain or return to baseline ADL function  Description: INTERVENTIONS:  -  Assess patient's ability to carry out ADLs; assess patient's baseline for ADL function and identify physical deficits which impact ability to perform ADLs (bathing, care of mouth/teeth, toileting, grooming, dressing, etc.)  - Assess/evaluate cause of self-care deficits   - Assess range of motion  - Assess patient's mobility; develop plan if impaired  - Assess patient's need for assistive devices and provide as appropriate  - Encourage maximum independence but intervene and supervise when necessary  - Involve family in performance of ADLs  - Assess for home care needs following discharge   - Consider OT consult to assist with ADL evaluation and planning for discharge  - Provide patient education as appropriate  Outcome: Progressing  Goal: Maintains/Returns to pre admission functional level  Description: INTERVENTIONS:  - Perform AM-PAC 6 Click Basic Mobility/ Daily Activity assessment daily.  - Set and communicate daily mobility goal to care team and patient/family/caregiver.   - Collaborate with rehabilitation services on mobility goals if consulted  Problem: Alteration in Thoughts and Perception  Goal: Verbalize thoughts and feelings  Description: Interventions:  - Promote a nonjudgmental and trusting relationship with the patient through active listening and therapeutic communication  - Assess patient's level of functioning, behavior and potential for risk  - Engage patient in 1 on 1 interactions  - Encourage patient to express fears, feelings, frustrations, and discuss symptoms    - Hye patient to reality, help patient recognize reality-based thinking   - Administer medications as ordered and assess for potential side effects  - Provide the patient education related to the signs and symptoms of the illness and desired effects of prescribed medications  Outcome: Progressing  Goal: Agree to be compliant  with medication regime, as prescribed and report medication side effects  Description: Interventions:  - Offer appropriate PRN medication and supervise ingestion; conduct AIMS, as needed   Outcome: Progressing  Goal: Attend and participate in unit activities, including therapeutic, recreational, and educational groups  Description: Interventions:  -Encourage Visitation and family involvement in care  Outcome: Progressing  Goal: Recognize dysfunctional thoughts, communicate reality-based thoughts at the time of discharge  Description: Interventions:  - Provide medication and psycho-education to assist patient in compliance and developing insight into his/her illness   Outcome: Progressing     Problem: Ineffective Coping  Goal: Cooperates with admission process  Description: Interventions:   - Complete admission process  Outcome: Progressing  Goal: Identifies ineffective coping skills  Outcome: Progressing  Goal: Identifies healthy coping skills  Outcome: Progressing  Goal: Demonstrates healthy coping skills  Outcome: Progressing  Goal: Participates in unit activities  Description: Interventions:  - Provide therapeutic environment   - Provide required programming   - Redirect inappropriate behaviors   Outcome: Progressing  Goal: Patient/Family participate in treatment and DC plans  Description: Interventions:  - Provide therapeutic environment  Outcome: Progressing  Goal: Patient/Family verbalizes awareness of resources  Outcome: Progressing  Goal: Understands least restrictive measures  Description: Interventions:  - Utilize least restrictive behavior  Outcome: Progressing  Goal: Free from restraint events  Description: - Utilize least restrictive measures   - Provide behavioral interventions   - Redirect inappropriate behaviors   Outcome: Progressing     Problem: Risk for Violence/Aggression Toward Others  Goal: Refrain from harming others  Outcome: Progressing  Goal: Refrain from destructive acts on the environment  or property  Outcome: Progressing  Goal: Control angry outbursts  Description: Interventions:  - Monitor patient closely, per order  - Ensure early verbal de-escalation  - Monitor prn medication needs  - Set reasonable/therapeutic limits, outline behavioral expectations, and consequences   - Provide a non-threatening milieu, utilizing the least restrictive interventions   Outcome: Progressing  Goal: Identify appropriate positive anger management techniques  Description: Interventions:  - Offer anger management and coping skills groups   - Staff will provide positive feedback for appropriate anger control  Outcome: Progressing     - Out of bed for toileting  - Record patient progress and toleration of activity level   Outcome: Progressing     Problem: DISCHARGE PLANNING  Goal: Discharge to home or other facility with appropriate resources  Description: INTERVENTIONS:  - Identify barriers to discharge w/patient and caregiver  - Arrange for needed discharge resources and transportation as appropriate  - Identify discharge learning needs (meds, wound care, etc.)  - Arrange for interpretive services to assist at discharge as needed  - Refer to Case Management Department for coordinating discharge planning if the patient needs post-hospital services based on physician/advanced practitioner order or complex needs related to functional status, cognitive ability, or social support system  Outcome: Progressing

## 2024-02-07 NOTE — SOCIAL WORK
ALEC facilitated discharge planning meeting with Pt's group home staff and IP Tx team. Resident physician provided update on pt's medication and SW provided update on pt's progress inpatient. Group home, agreed to pick pt up at 12pm on 2/8. Requested medication [facility] pharmacy. SW to schedule follow up appointment with Mel Avila.

## 2024-02-07 NOTE — PROGRESS NOTES
02/07/24 1008   Team Meeting   Meeting Type Daily Rounds   Team Members Present   Team Members Present Physician;Nurse;   Physician Team Member Carmina   Nursing Team Member Carlos   Care Management Team Member Silvia   Patient/Family Present   Patient Present No   Patient's Family Present No     Pt denies all symptoms, med/meal compliant. Pt is discharging today at 12pm

## 2024-02-16 ENCOUNTER — OFFICE VISIT (OUTPATIENT)
Dept: FAMILY MEDICINE CLINIC | Facility: CLINIC | Age: 19
End: 2024-02-16
Payer: COMMERCIAL

## 2024-02-16 VITALS
SYSTOLIC BLOOD PRESSURE: 148 MMHG | HEIGHT: 68 IN | OXYGEN SATURATION: 95 % | BODY MASS INDEX: 47.62 KG/M2 | DIASTOLIC BLOOD PRESSURE: 98 MMHG | WEIGHT: 314.2 LBS | HEART RATE: 120 BPM | TEMPERATURE: 98.7 F

## 2024-02-16 DIAGNOSIS — F39 MOOD DISORDER (HCC): ICD-10-CM

## 2024-02-16 DIAGNOSIS — F25.9 SCHIZOAFFECTIVE DISORDER, UNSPECIFIED TYPE (HCC): Primary | ICD-10-CM

## 2024-02-16 DIAGNOSIS — I10 PRIMARY HYPERTENSION: ICD-10-CM

## 2024-02-16 DIAGNOSIS — F41.9 ANXIETY: ICD-10-CM

## 2024-02-16 DIAGNOSIS — F25.9 SCHIZOAFFECTIVE DISORDER (HCC): ICD-10-CM

## 2024-02-16 PROCEDURE — 99213 OFFICE O/P EST LOW 20 MIN: CPT | Performed by: NURSE PRACTITIONER

## 2024-02-16 RX ORDER — PALIPERIDONE 6 MG/1
6 TABLET, EXTENDED RELEASE ORAL EVERY MORNING
Start: 2024-02-16

## 2024-02-16 RX ORDER — DIVALPROEX SODIUM 250 MG/1
250 TABLET, EXTENDED RELEASE ORAL
Start: 2024-02-16 | End: 2024-04-16

## 2024-02-16 RX ORDER — CLONIDINE HYDROCHLORIDE 0.2 MG/1
0.2 TABLET ORAL 3 TIMES DAILY
Start: 2024-02-16

## 2024-02-16 RX ORDER — DIVALPROEX SODIUM 500 MG/1
1500 TABLET, DELAYED RELEASE ORAL EVERY 8 HOURS SCHEDULED
Start: 2024-02-16

## 2024-02-16 RX ORDER — CLONIDINE HYDROCHLORIDE 0.2 MG/1
1 TABLET ORAL DAILY
Start: 2024-02-16 | End: 2024-02-16 | Stop reason: SDUPTHER

## 2024-02-16 NOTE — PROGRESS NOTES
"Assessment/Plan:    Problem List Items Addressed This Visit     Schizoaffective disorder (HCC) - Primary    Relevant Medications    paliperidone (INVEGA) 6 MG 24 hr tablet    divalproex sodium (Depakote) 500 mg DR tablet    divalproex sodium (DEPAKOTE ER) 250 mg 24 hr tablet   Other Visit Diagnoses     Mood disorder (HCC)        Relevant Medications    paliperidone (INVEGA) 6 MG 24 hr tablet    Anxiety        Primary hypertension        Relevant Medications    cloNIDine (CATAPRES) 0.2 mg tablet           Diagnoses and all orders for this visit:    Schizoaffective disorder, unspecified type (HCC)  -     paliperidone (INVEGA) 6 MG 24 hr tablet; Take 1 tablet (6 mg total) by mouth every morning    Mood disorder (HCC)  -     paliperidone (INVEGA) 6 MG 24 hr tablet; Take 1 tablet (6 mg total) by mouth every morning    Anxiety  -     Discontinue: cloNIDine (CATAPRES) 0.2 mg tablet; Take 5 tablets (1 mg total) by mouth in the morning    Schizoaffective disorder (HCC)  -     divalproex sodium (Depakote) 500 mg DR tablet; Take 3 tablets (1,500 mg total) by mouth every 8 (eight) hours  -     divalproex sodium (DEPAKOTE ER) 250 mg 24 hr tablet; Take 1 tablet (250 mg total) by mouth daily at bedtime    Primary hypertension  -     cloNIDine (CATAPRES) 0.2 mg tablet; Take 1 tablet (0.2 mg total) by mouth 3 (three) times a day        No problem-specific Assessment & Plan notes found for this encounter.        Subjective:      Patient ID: Manuel Back is a 18 y.o. male.    Pt was hospitalized for MH reasons x 3 weeks. Pt is present with home caregivers for \"TCM\" and clearance to return to group home.   Pt offers no acute or interval issues/concerns to discuss.           The following portions of the patient's history were reviewed and updated as appropriate:   He has a past medical history of Chronic headaches, Cognitive impairment, and Psychiatric illness.,  does not have any pertinent problems on file.,   has a past " surgical history that includes Wrist surgery (Right).,  family history includes No Known Problems in his father and mother.,   reports that he has never smoked. He has been exposed to tobacco smoke. He has never used smokeless tobacco. He reports that he does not currently use drugs after having used the following drugs: Marijuana. He reports that he does not drink alcohol.,  is allergic to cholecalciferol and pollen extract..  Current Outpatient Medications   Medication Sig Dispense Refill   • Acetaminophen Extra Strength 500 MG TABS      • aluminum-magnesium hydroxide-simethicone (MAALOX MAX) 400-400-40 MG/5ML suspension Take 10 mL by mouth every 6 (six) hours as needed for indigestion or heartburn 355 mL 0   • chlorproMAZINE (THORAZINE) 100 mg tablet Take 1 tablet (100 mg total) by mouth 2 (two) times a day One in the morning, One in the afternoon. 60 tablet 1   • chlorproMAZINE (THORAZINE) 50 mg tablet Take 3 tablets (150 mg total) by mouth daily at bedtime 90 tablet 1   • cholecalciferol (VITAMIN D3) 1,000 units tablet Take 1 tablet (1,000 Units total) by mouth daily 90 tablet 3   • clonazePAM (KlonoPIN) 1 mg tablet Take 1 tablet (1 mg total) by mouth daily 30 tablet 0   • cloNIDine (CATAPRES) 0.2 mg tablet Take 1 tablet (0.2 mg total) by mouth 3 (three) times a day     • cyanocobalamin (VITAMIN B-12) 500 MCG tablet Take 1 tablet (500 mcg total) by mouth daily 30 tablet 11   • desmopressin (DDAVP) 0.2 mg tablet Take 2 tablets (0.4 mg total) by mouth daily at bedtime 60 tablet 1   • diphenhydrAMINE (GNP Allergy Relief) 12.5 MG chewable tablet Chew 1 tablet (12.5 mg total) in the morning 30 tablet 11   • divalproex sodium (DEPAKOTE ER) 250 mg 24 hr tablet Take 1 tablet (250 mg total) by mouth daily at bedtime     • divalproex sodium (Depakote) 500 mg DR tablet Take 3 tablets (1,500 mg total) by mouth every 8 (eight) hours     • ibuprofen (MOTRIN) 400 mg tablet Take 400 mg by mouth every 6 (six) hours as needed  "for mild pain     • metoprolol succinate (TOPROL-XL) 50 mg 24 hr tablet Take 1 tablet (50 mg total) by mouth daily 90 tablet 1   • OLANZapine (ZyPREXA) 20 MG tablet Take 1 tablet (20 mg total) by mouth daily at bedtime 30 tablet 1   • paliperidone (INVEGA) 6 MG 24 hr tablet Take 1 tablet (6 mg total) by mouth every morning     • polyethylene glycol (MIRALAX) 17 g packet Take 17 g by mouth daily 510 g 1   • senna-docusate sodium (SENOKOT S) 8.6-50 mg per tablet Take 1 tablet by mouth 2 (two) times a day 60 tablet 1   • acetaminophen (TYLENOL) 650 mg CR tablet Take 1 tablet (650 mg total) by mouth every 8 (eight) hours as needed for mild pain, moderate pain or headaches (Patient not taking: Reported on 2/16/2024) 30 tablet 0   • ergocalciferol (VITAMIN D2) 50,000 units Take 1 capsule (50,000 Units total) by mouth once a week for 8 doses Do not start before July 7, 2023. (Patient not taking: Reported on 10/13/2023) 8 capsule 0   • Incontinence Supply Disposable (Comfort Shield Adult Diapers) MISC Use 1 packet 4 (four) times a day (Patient not taking: Reported on 1/12/2023) 48 each 11     No current facility-administered medications for this visit.            Review of Systems   All other systems reviewed and are negative.        Objective:  Vitals:    02/16/24 1424   BP: 148/98   Pulse: (!) 120   Temp: 98.7 °F (37.1 °C)   TempSrc: Tympanic   SpO2: 95%   Weight: (!) 143 kg (314 lb 3.2 oz)   Height: 5' 8\" (1.727 m)     Body mass index is 47.77 kg/m².     Physical Exam  Vitals and nursing note reviewed.   Constitutional:       Appearance: He is well-developed. He is obese.   HENT:      Head: Normocephalic and atraumatic.      Right Ear: Tympanic membrane, ear canal and external ear normal.      Left Ear: Tympanic membrane, ear canal and external ear normal.      Nose: Nose normal.      Mouth/Throat:      Mouth: Mucous membranes are moist.      Pharynx: Uvula midline.   Eyes:      General: Lids are normal.      " Conjunctiva/sclera: Conjunctivae normal.      Pupils: Pupils are equal, round, and reactive to light.   Neck:      Thyroid: No thyroid mass.      Vascular: No JVD.      Trachea: Trachea and phonation normal.   Cardiovascular:      Rate and Rhythm: Normal rate and regular rhythm.      Pulses: Normal pulses.      Heart sounds: Normal heart sounds, S1 normal and S2 normal. No murmur heard.     No friction rub. No gallop.   Pulmonary:      Effort: Pulmonary effort is normal.      Breath sounds: Normal breath sounds.   Abdominal:      General: Bowel sounds are normal.      Palpations: Abdomen is soft.      Tenderness: There is no abdominal tenderness.   Genitourinary:     Comments: Deferred  Musculoskeletal:         General: Normal range of motion.      Cervical back: Full passive range of motion without pain, normal range of motion and neck supple.      Right lower leg: No edema.      Left lower leg: No edema.   Lymphadenopathy:      Head:      Right side of head: No submental, submandibular, tonsillar, preauricular, posterior auricular or occipital adenopathy.      Left side of head: No submental, submandibular, tonsillar, preauricular, posterior auricular or occipital adenopathy.      Cervical: No cervical adenopathy.   Skin:     General: Skin is warm and dry.      Capillary Refill: Capillary refill takes less than 2 seconds.   Neurological:      General: No focal deficit present.      Mental Status: He is alert and oriented to person, place, and time.      Sensory: Sensation is intact.      Motor: Motor function is intact.      Coordination: Coordination is intact.      Gait: Gait is intact.   Psychiatric:         Attention and Perception: Attention and perception normal.         Mood and Affect: Mood and affect normal.         Speech: Speech normal.         Behavior: Behavior normal. Behavior is cooperative.         Thought Content: Thought content normal.         Cognition and Memory: Cognition normal.          "Judgment: Judgment normal.           Portions of the record may have been created with voice recognition software. Occasional wrong word or \"sound a like\" substitutions may have occurred due to the inherent limitations of voice recognition software. Read the chart carefully and recognize, using context, where substitutions have occurred. Contact me with any questions.  "

## 2024-03-01 ENCOUNTER — HOSPITAL ENCOUNTER (OUTPATIENT)
Facility: MEDICAL CENTER | Age: 19
Discharge: HOME | End: 2024-03-01
Payer: COMMERCIAL

## 2024-03-01 DIAGNOSIS — M25.561 CHRONIC PAIN OF RIGHT KNEE: ICD-10-CM

## 2024-03-01 DIAGNOSIS — G89.29 CHRONIC PAIN OF RIGHT KNEE: ICD-10-CM

## 2024-03-01 PROCEDURE — 73721 MRI JNT OF LWR EXTRE W/O DYE: CPT

## 2024-03-01 PROCEDURE — G1004 CDSM NDSC: HCPCS

## 2024-03-07 ENCOUNTER — OFFICE VISIT (OUTPATIENT)
Dept: OBGYN CLINIC | Facility: MEDICAL CENTER | Age: 19
End: 2024-03-07
Payer: COMMERCIAL

## 2024-03-07 VITALS — BODY MASS INDEX: 47.29 KG/M2 | WEIGHT: 312 LBS | HEIGHT: 68 IN

## 2024-03-07 DIAGNOSIS — S83.271D COMPLEX TEAR OF LATERAL MENISCUS OF RIGHT KNEE AS CURRENT INJURY, SUBSEQUENT ENCOUNTER: ICD-10-CM

## 2024-03-07 DIAGNOSIS — M23.006 MENISCAL CYST, RIGHT: ICD-10-CM

## 2024-03-07 DIAGNOSIS — G89.29 CHRONIC PAIN OF RIGHT KNEE: Primary | ICD-10-CM

## 2024-03-07 DIAGNOSIS — M25.561 CHRONIC PAIN OF RIGHT KNEE: Primary | ICD-10-CM

## 2024-03-07 PROCEDURE — 99213 OFFICE O/P EST LOW 20 MIN: CPT | Performed by: EMERGENCY MEDICINE

## 2024-03-07 NOTE — LETTER
March 7, 2024     Patient: Manuel Back  YOB: 2005  Date of Visit: 3/7/2024      To Whom it May Concern:    Manuel Back is under my professional care. Manuel was seen in my office on 3/7/2024. Manuel has a lateral meniscus tear of the right knee.      If you have any questions or concerns, please don't hesitate to call.         Sincerely,          Ronan Wallace MD        CC: No Recipients

## 2024-03-07 NOTE — PROGRESS NOTES
Assessment/Plan:    Diagnoses and all orders for this visit:    Chronic pain of right knee  -     Ambulatory Referral to Orthopedic Surgery; Future    Complex tear of lateral meniscus of right knee as current injury, subsequent encounter  -     Ambulatory Referral to Orthopedic Surgery; Future    Meniscal cyst, right  -     Ambulatory Referral to Orthopedic Surgery; Future    Patient and support group with take time to discuss treatment options.  I did explain that if the symptoms are mild and not significantly interfering with his quality of life that he may hold off on the surgery for the lateral meniscus and either continue to monitor it or participate in formal physical therapy like he did in the past.    Return if symptoms worsen or fail to improve.      Subjective:   Patient ID: Manuel Back is a 18 y.o. male.    Patient returns to review MRI knee    Initial note:   NP presents with Person Directed staff for chronic right knee pain.  Patient has participated in formal PT over the summer and was discharged at that time.  He was recently evaluated in the emergency department as he did have a recent fall landing on a flexed knee x-rays were obtained at that time.  Staff states that he does not appear to be in significant pain he is able to do what he needs to do during the day        Review of Systems    The following portions of the patient's chart were reviewed and updated as appropriate:   Allergy:    Allergies   Allergen Reactions    Cholecalciferol Other (See Comments)    Pollen Extract Sneezing       Medications:    Current Outpatient Medications:     Acetaminophen Extra Strength 500 MG TABS, , Disp: , Rfl:     aluminum-magnesium hydroxide-simethicone (MAALOX MAX) 400-400-40 MG/5ML suspension, Take 10 mL by mouth every 6 (six) hours as needed for indigestion or heartburn, Disp: 355 mL, Rfl: 0    chlorproMAZINE (THORAZINE) 100 mg tablet, Take 1 tablet (100 mg total) by mouth 2 (two) times a day  One in the morning, One in the afternoon., Disp: 60 tablet, Rfl: 1    chlorproMAZINE (THORAZINE) 50 mg tablet, Take 3 tablets (150 mg total) by mouth daily at bedtime, Disp: 90 tablet, Rfl: 1    cholecalciferol (VITAMIN D3) 1,000 units tablet, Take 1 tablet (1,000 Units total) by mouth daily, Disp: 90 tablet, Rfl: 3    clonazePAM (KlonoPIN) 1 mg tablet, Take 1 tablet (1 mg total) by mouth daily, Disp: 30 tablet, Rfl: 0    cloNIDine (CATAPRES) 0.2 mg tablet, Take 1 tablet (0.2 mg total) by mouth 3 (three) times a day, Disp: , Rfl:     cyanocobalamin (VITAMIN B-12) 500 MCG tablet, Take 1 tablet (500 mcg total) by mouth daily, Disp: 30 tablet, Rfl: 11    desmopressin (DDAVP) 0.2 mg tablet, Take 2 tablets (0.4 mg total) by mouth daily at bedtime, Disp: 60 tablet, Rfl: 1    diphenhydrAMINE (GNP Allergy Relief) 12.5 MG chewable tablet, Chew 1 tablet (12.5 mg total) in the morning, Disp: 30 tablet, Rfl: 11    divalproex sodium (DEPAKOTE ER) 250 mg 24 hr tablet, Take 1 tablet (250 mg total) by mouth daily at bedtime, Disp: , Rfl:     divalproex sodium (Depakote) 500 mg DR tablet, Take 3 tablets (1,500 mg total) by mouth every 8 (eight) hours, Disp: , Rfl:     ibuprofen (MOTRIN) 400 mg tablet, Take 400 mg by mouth every 6 (six) hours as needed for mild pain, Disp: , Rfl:     metoprolol succinate (TOPROL-XL) 50 mg 24 hr tablet, Take 1 tablet (50 mg total) by mouth daily, Disp: 90 tablet, Rfl: 1    OLANZapine (ZyPREXA) 20 MG tablet, Take 1 tablet (20 mg total) by mouth daily at bedtime, Disp: 30 tablet, Rfl: 1    paliperidone (INVEGA) 6 MG 24 hr tablet, Take 1 tablet (6 mg total) by mouth every morning, Disp: , Rfl:     polyethylene glycol (MIRALAX) 17 g packet, Take 17 g by mouth daily, Disp: 510 g, Rfl: 1    senna-docusate sodium (SENOKOT S) 8.6-50 mg per tablet, Take 1 tablet by mouth 2 (two) times a day, Disp: 60 tablet, Rfl: 1    acetaminophen (TYLENOL) 650 mg CR tablet, Take 1 tablet (650 mg total) by mouth every 8  "(eight) hours as needed for mild pain, moderate pain or headaches (Patient not taking: Reported on 2/16/2024), Disp: 30 tablet, Rfl: 0    ergocalciferol (VITAMIN D2) 50,000 units, Take 1 capsule (50,000 Units total) by mouth once a week for 8 doses Do not start before July 7, 2023. (Patient not taking: Reported on 10/13/2023), Disp: 8 capsule, Rfl: 0    Incontinence Supply Disposable (Comfort Shield Adult Diapers) MISC, Use 1 packet 4 (four) times a day (Patient not taking: Reported on 1/12/2023), Disp: 48 each, Rfl: 11    Patient Active Problem List   Diagnosis    Obesity, unspecified    Moderate episode of recurrent major depressive disorder (HCC)    Disorganized schizophrenia (HCC)    Urinary incontinence, nocturnal enuresis    Morbid obesity with body mass index (BMI) of 40.0 to 49.9 (HCC)    Essential hypertension    Vitamin D deficiency    Chronic pain of right knee    Schizoaffective disorder (HCC)       Objective:  Ht 5' 8\" (1.727 m)   Wt (!) 142 kg (312 lb)   BMI 47.44 kg/m²     Ortho Exam    Physical Exam      Neurologic Exam    Procedures    I have personally reviewed the written report of the pertinent studies.     MRI Knee    IMPRESSION:  1. Extensive superior surface lateral meniscus tear with parameniscal cyst formation but no evidence of flipped fragment as described. Medial meniscus remains intact.  2. Small joint effusion.            Past Medical History:   Diagnosis Date    Chronic headaches     Cognitive impairment     Psychiatric illness        Past Surgical History:   Procedure Laterality Date    WRIST SURGERY Right        Social History     Socioeconomic History    Marital status: Single     Spouse name: Not on file    Number of children: Not on file    Years of education: Not on file    Highest education level: Not on file   Occupational History    Not on file   Tobacco Use    Smoking status: Never     Passive exposure: Past    Smokeless tobacco: Never   Vaping Use    Vaping status: Never " Used   Substance and Sexual Activity    Alcohol use: Never    Drug use: Not Currently     Types: Marijuana    Sexual activity: Not Currently   Other Topics Concern    Not on file   Social History Narrative    Not on file     Social Determinants of Health     Financial Resource Strain: Low Risk  (1/23/2024)    Overall Financial Resource Strain (CARDIA)     Difficulty of Paying Living Expenses: Not hard at all   Food Insecurity: No Food Insecurity (1/23/2024)    Hunger Vital Sign     Worried About Running Out of Food in the Last Year: Never true     Ran Out of Food in the Last Year: Never true   Transportation Needs: No Transportation Needs (1/23/2024)    PRAPARE - Transportation     Lack of Transportation (Medical): No     Lack of Transportation (Non-Medical): No   Physical Activity: Not on file   Stress: Not on file   Social Connections: Not on file   Intimate Partner Violence: Not At Risk (1/23/2024)    Humiliation, Afraid, Rape, and Kick questionnaire     Fear of Current or Ex-Partner: No     Emotionally Abused: No     Physically Abused: No     Sexually Abused: No   Housing Stability: Low Risk  (1/23/2024)    Housing Stability Vital Sign     Unable to Pay for Housing in the Last Year: No     Number of Places Lived in the Last Year: 1     Unstable Housing in the Last Year: No       Family History   Problem Relation Age of Onset    No Known Problems Mother     No Known Problems Father

## 2024-03-08 DIAGNOSIS — K29.60 REFLUX GASTRITIS: ICD-10-CM

## 2024-03-08 DIAGNOSIS — R10.13 DYSPEPSIA: ICD-10-CM

## 2024-03-11 RX ORDER — ALUMINA, MAGNESIA, AND SIMETHICONE 2400; 2400; 240 MG/30ML; MG/30ML; MG/30ML
10 SUSPENSION ORAL EVERY 6 HOURS PRN
Qty: 355 ML | Refills: 1 | Status: SHIPPED | OUTPATIENT
Start: 2024-03-11

## 2024-03-13 ENCOUNTER — TELEPHONE (OUTPATIENT)
Dept: FAMILY MEDICINE CLINIC | Facility: CLINIC | Age: 19
End: 2024-03-13

## 2024-03-13 ENCOUNTER — CLINICAL SUPPORT (OUTPATIENT)
Dept: NUTRITION | Facility: HOSPITAL | Age: 19
End: 2024-03-13
Payer: COMMERCIAL

## 2024-03-13 VITALS — BODY MASS INDEX: 47.99 KG/M2 | WEIGHT: 315 LBS

## 2024-03-13 DIAGNOSIS — E66.01 CLASS 3 SEVERE OBESITY WITHOUT SERIOUS COMORBIDITY WITH BODY MASS INDEX (BMI) OF 40.0 TO 44.9 IN ADULT, UNSPECIFIED OBESITY TYPE (HCC): ICD-10-CM

## 2024-03-13 DIAGNOSIS — R00.0 TACHYCARDIA: ICD-10-CM

## 2024-03-13 RX ORDER — METOPROLOL SUCCINATE 50 MG/1
50 TABLET, EXTENDED RELEASE ORAL DAILY
Qty: 90 TABLET | Refills: 1 | Status: SHIPPED | OUTPATIENT
Start: 2024-03-13

## 2024-03-13 NOTE — PROGRESS NOTES
" Nutrition Assessment Form    Patient Name: Manuel Back    YOB: 2005    Sex: Male     Assessment Date: 3/13/2024  Start Time: 1010 Stop Time: 1110 Total Minutes: 60     Data:  Present at session: self   Parent/Patient Concerns/reason for visit: \"I'm here for my weight\"   Medical Dx/Reason for Referral: obesity   Past Medical History:   Diagnosis Date    Chronic headaches     Cognitive impairment     Psychiatric illness        Current Outpatient Medications   Medication Sig Dispense Refill    acetaminophen (TYLENOL) 650 mg CR tablet Take 1 tablet (650 mg total) by mouth every 8 (eight) hours as needed for mild pain, moderate pain or headaches (Patient not taking: Reported on 2/16/2024) 30 tablet 0    Acetaminophen Extra Strength 500 MG TABS       aluminum-magnesium hydroxide-simethicone (MAALOX MAX) 400-400-40 MG/5ML suspension Take 10 mL by mouth every 6 (six) hours as needed for indigestion or heartburn 355 mL 1    chlorproMAZINE (THORAZINE) 100 mg tablet Take 1 tablet (100 mg total) by mouth 2 (two) times a day One in the morning, One in the afternoon. 60 tablet 1    chlorproMAZINE (THORAZINE) 50 mg tablet Take 3 tablets (150 mg total) by mouth daily at bedtime 90 tablet 1    cholecalciferol (VITAMIN D3) 1,000 units tablet Take 1 tablet (1,000 Units total) by mouth daily 90 tablet 3    clonazePAM (KlonoPIN) 1 mg tablet Take 1 tablet (1 mg total) by mouth daily 30 tablet 0    cloNIDine (CATAPRES) 0.2 mg tablet Take 1 tablet (0.2 mg total) by mouth 3 (three) times a day      cyanocobalamin (VITAMIN B-12) 500 MCG tablet Take 1 tablet (500 mcg total) by mouth daily 30 tablet 11    desmopressin (DDAVP) 0.2 mg tablet Take 2 tablets (0.4 mg total) by mouth daily at bedtime 60 tablet 1    diphenhydrAMINE (GNP Allergy Relief) 12.5 MG chewable tablet Chew 1 tablet (12.5 mg total) in the morning 30 tablet 11    divalproex sodium (DEPAKOTE ER) 250 mg 24 hr tablet Take 1 tablet (250 mg total) by mouth " daily at bedtime      divalproex sodium (Depakote) 500 mg DR tablet Take 3 tablets (1,500 mg total) by mouth every 8 (eight) hours      ergocalciferol (VITAMIN D2) 50,000 units Take 1 capsule (50,000 Units total) by mouth once a week for 8 doses Do not start before July 7, 2023. (Patient not taking: Reported on 10/13/2023) 8 capsule 0    ibuprofen (MOTRIN) 400 mg tablet Take 400 mg by mouth every 6 (six) hours as needed for mild pain      Incontinence Supply Disposable (Comfort Shield Adult Diapers) MISC Use 1 packet 4 (four) times a day (Patient not taking: Reported on 1/12/2023) 48 each 11    metoprolol succinate (TOPROL-XL) 50 mg 24 hr tablet Take 1 tablet (50 mg total) by mouth daily 90 tablet 1    OLANZapine (ZyPREXA) 20 MG tablet Take 1 tablet (20 mg total) by mouth daily at bedtime 30 tablet 1    paliperidone (INVEGA) 6 MG 24 hr tablet Take 1 tablet (6 mg total) by mouth every morning      polyethylene glycol (MIRALAX) 17 g packet Take 17 g by mouth daily 510 g 1    senna-docusate sodium (SENOKOT S) 8.6-50 mg per tablet Take 1 tablet by mouth 2 (two) times a day 60 tablet 1     No current facility-administered medications for this visit.        Additional Meds/Supplements: N/a   Special Learning Needs/barriers to learning/any new barriers N/a   Height: HC Readings from Last 5 Encounters:   No data found for HC      Weight: Wt Readings from Last 10 Encounters:   03/07/24 (!) 142 kg (312 lb) (>99%, Z= 3.20)*   02/16/24 (!) 143 kg (314 lb 3.2 oz) (>99%, Z= 3.22)*   01/18/24 (!) 142 kg (313 lb 4.4 oz) (>99%, Z= 3.21)*   01/04/24 (!) 140 kg (308 lb) (>99%, Z= 3.15)*   12/17/23 (!) 140 kg (308 lb 6.8 oz) (>99%, Z= 3.15)*   12/07/23 (!) 141 kg (310 lb) (>99%, Z= 3.17)*   11/20/23 (!) 142 kg (313 lb 11.4 oz) (>99%, Z= 3.21)*   10/20/23 (!) 138 kg (305 lb 1.9 oz) (>99%, Z= 3.11)*   10/13/23 (!) 140 kg (307 lb 9.6 oz) (>99%, Z= 3.14)*   08/22/23 133 kg (294 lb) (>99%, Z= 2.99)*     * Growth percentiles are based on CDC  "(Boys, 2-20 Years) data.     Estimated body mass index is 47.44 kg/m² as calculated from the following:    Height as of 3/7/24: 5' 8\" (1.727 m).    Weight as of 3/7/24: 142 kg (312 lb).   Recent Weight Change: [x]Yes     []No  Amount: 7# gain x2 months not desired      Energy Needs: 1840cals (20cals/kg-1000 for 2#/wk wt loss)   Allergies   Allergen Reactions    Cholecalciferol Other (See Comments)    Pollen Extract Sneezing    or intolerances NKFA   Social History     Substance and Sexual Activity   Alcohol Use Never    N/a   Social History     Tobacco Use   Smoking Status Never    Passive exposure: Past   Smokeless Tobacco Never    N/a   Who shops? Group home   Who cooks/cooking methods/Eating out/take out habits   Group home  Cooking methods: bake/burroughs/air burroughs/grill/boil/other________    Minimal eating out   Exercise: Plays football in the spring and summer time   Other: ie: Sleep habits/ stress level/ work habits household-lives with ?/ food security Stress level 2/10 scale  Sleep schedule currently off- sleeping all day and up all night was in psychiatric hospital last month and that is when his sleep schedule changed- trying to get back on schedule now  Currently going to graduate [employer] this May- he is nervous   Prior Nutritional Counseling? []Yes     [x]No  When:      Why:         Diet Hx: smoothies and water 40oz approx and milk- 2% milk - fruit/hawaiian punch (20oz)  Breakfast: Diet:  mostly skips B and lunch because he is sleeping all day   Lunch: He will have letha side up eggs-        Dinner:  smoothie a few times a week maybe 3- 2-3c milk and few cups of frozen fruit and plain greek yogurt if it is avaiable, 24oz per smoothie and will have 3x/wk 7-8pm- will have 2-3 thighs and 2-3 cups rice not many vegetables     Snacks:  will snack on fruits most days but candy on Wednesdays when he gest paid AM -   PM - 330pm   HS -    Other Notes/ Initial Assessment:  Manuel is her with 2 staff members from " his group home.  He is a senior in high school and would like to lose weight, his sleep schedule is currently off because of his stay on the hospital last month.    Discussed importance of being active,  regular sleep schedule, regular meals, daily activity, encouraged trying new foods and increasing vegetables intake in general, minimizing calories from drinks, portions per plate method.  Provided Tips for wt loss and [address] sample menus and healthy snack ideas, RD name and number for home use.   Follow up scheduled for May 8th.  Pt is self pay.    Updated assessment (Follow up note only):       Nutrition Diagnosis:   Overweight/obesity  related to Excess energy intake as evidenced by  BMI more than normative standard for age and sex (obesity-grade III 40+)       Any change or new dx since previous visit:     Nutrition Diagnosis:         Medical Nutrition Therapy Intervention:  [x]Individualized Meal Plan-Discussed the importance of eating 3 meals daily and not skipping, and how metabolism is affected.  Also discussed adding in small snacks or 5-6 small meals daily if desired vs 3 larger ones, and appropriate options and portions.  Appropriate timing of meals, including eating within 1 hr of waking and eating meals slowly 20mins/meals, minimizing mindless/boredom or habitual eating, etc was also mentioned.  Discussed the plate method of portioning foods, including half a plate fruits and vegetables or a half plate all vegetables, 1/4 of the plate a lean protein source or meat, and a 1/4 of the plate being a whole grain carb- usually 1/2-1c.  This should be followed for at least 2 meals of the day, but could also be followed for all 3.  \Fluid intake discussed and appropriate amounts and choices, 16 oz with meals and additional 32oz during the day.  S/S dehydration also covered.Discussed the importance of trying new foods 12-16x, and retraining taste buds to like new foods.  Discussed how taste buds will change  over the course of a lifetime.  Also included trying new foods at the beginning of a meal when you are the hungriest and more apt to like a new food and be more accepting of it. []Understanding Lab Values   []Basic Pathophysiology of Disease []Food/Medication Interactions   []Food Diary [x]Exercise-150 mins of moderate activity weekly, discussed importance of variety of activity, including wt bearing activities 2-3x/wk x 10-15mins. Discussed importance of activity throughout the lifecycle and its impact on overall health/stress management, etc.   [x]Lifestyle/Behavior Modification Techniques-Discussed the importance of adequate sleep, and ideal sleeping conditions, including a cool temperature 64-68 degrees F, and a dark and quiet room. Also discussed the importance of a regular and consistent sleep routine, including minimizing blue light exposure an hour before sleeping.  Weekend habits should include staying fairly consistent with weekday sleep habits to minimize disruption during the week.  A connection was made between getting adequate and good quality sleep and the ability to handle stress the next day, make healthy food choices, and be active. []Medication, Mechanism of Action   []Label Reading: CHO/ Na/ Fat/ other_________ []Self Blood Glucose Monitoring   []Weight/BMI Goals: gain/lose/maintain []Other -           Comprehension: []Excellent  []Very Good  [x]Good  []Fair   []Poor    Receptivity: []Excellent  []Very Good  [x]Good  []Fair   []Poor    Expected Compliance: []Excellent  []Very Good  []Good  [x]Fair   [x]Poor        Goals (initial)/ Progress made on previous goals/new goals:   3 meals daily no skipping   2.  Portions per plate method as dicussed with RD   3. Minimize calories from drinks       No follow-ups on file.  Labs:  CMP  Lab Results   Component Value Date    K 4.3 02/01/2024    CL 98 02/01/2024    CO2 30 02/01/2024    BUN 8 02/01/2024    CREATININE 1.11 02/01/2024    GLUF 101 (H) 01/20/2024  "   CALCIUM 9.0 02/01/2024    CORRECTEDCA 9.7 05/04/2023    AST 33 02/01/2024    ALT 37 02/01/2024    ALKPHOS 121 (H) 02/01/2024    EGFR 95 02/01/2024       BMP  Lab Results   Component Value Date    CALCIUM 9.0 02/01/2024    K 4.3 02/01/2024    CO2 30 02/01/2024    CL 98 02/01/2024    BUN 8 02/01/2024    CREATININE 1.11 02/01/2024       Lipids  No results found for: \"CHOL\"  Lab Results   Component Value Date    HDL 32 (L) 02/01/2024    HDL 27 (L) 01/20/2024    HDL 31 (L) 10/17/2023     Lab Results   Component Value Date    LDLCALC 23 02/01/2024    LDLCALC 29 01/20/2024    LDLCALC 46 10/17/2023     Lab Results   Component Value Date    TRIG 357 (H) 02/01/2024    TRIG 245 (H) 01/20/2024    TRIG 253 (H) 10/17/2023     No results found for: \"CHOLHDL\"    Hemoglobin A1C  Lab Results   Component Value Date    HGBA1C 7.2 (H) 02/01/2024       Fasting Glucose  Lab Results   Component Value Date    GLUF 101 (H) 01/20/2024       Insulin     Thyroid  Lab Results   Component Value Date    TSH 1.50 05/21/2023       Hepatic Function Panel  Lab Results   Component Value Date    ALT 37 02/01/2024    AST 33 02/01/2024    ALKPHOS 121 (H) 02/01/2024       Celiac Disease Antibody Panel  No results found for: \"ENDOMYSIAL IGA\", \"GLIADIN IGA\", \"GLIADIN IGG\", \"IGA\", \"TISSUE TRANSGLUT AB\", \"TTG IGA\"   Iron  No results found for: \"IRON\", \"TIBC\", \"FERRITIN\"         Asha Paez RD  [facility] CLINICAL NUTRITION [facility]  [address] [id number]    "

## 2024-03-13 NOTE — TELEPHONE ENCOUNTER
He needs a refill for Vitamin D(as in dog)-12 1.25mg He is going to run out of it on Sunday. I called to verify that was what she was asking for I just don't see it on his list

## 2024-03-20 ENCOUNTER — OFFICE VISIT (OUTPATIENT)
Dept: OBGYN CLINIC | Facility: MEDICAL CENTER | Age: 19
End: 2024-03-20
Payer: COMMERCIAL

## 2024-03-20 VITALS — WEIGHT: 315 LBS | HEIGHT: 68 IN | BODY MASS INDEX: 47.74 KG/M2

## 2024-03-20 DIAGNOSIS — S83.271D COMPLEX TEAR OF LATERAL MENISCUS OF RIGHT KNEE AS CURRENT INJURY, SUBSEQUENT ENCOUNTER: Primary | ICD-10-CM

## 2024-03-20 DIAGNOSIS — M23.006 MENISCAL CYST, RIGHT: ICD-10-CM

## 2024-03-20 DIAGNOSIS — M25.561 CHRONIC PAIN OF RIGHT KNEE: ICD-10-CM

## 2024-03-20 DIAGNOSIS — G89.29 CHRONIC PAIN OF RIGHT KNEE: ICD-10-CM

## 2024-03-20 PROCEDURE — 99243 OFF/OP CNSLTJ NEW/EST LOW 30: CPT | Performed by: ORTHOPAEDIC SURGERY

## 2024-03-20 NOTE — PROGRESS NOTES
Ortho Sports Medicine Knee New Patient Visit     Assesment:   18 y.o. male right knee lateral meniscus tear    Plan:        Conservative treatment:    Discussed with patient conservative treatments which include steroid injection, physical therapy and over-the-counter anti-inflammatories for pain relief and also discussed a surgical option a right knee arthroscopy partial lateral meniscectomy.  At this time declines a steroid injection and also does not want surgery    Imaging:    All imaging from today was reviewed by myself and explained to the patient.   MRI right knee was reviewed    Injection:    No Injection planned at this time.      Surgery:     No surgery is recommended at this point, continue with conservative treatment plan as noted.      Follow up:    Return if symptoms worsen or fail to improve.        Chief Complaint   Patient presents with    Right Knee - Pain       History of Present Illness:    The patient is a 18 y.o. 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 Wallace, seen in clinic for consultation of right knee pain.      Pain is located lateral.  The patient rates the pain as a 4/10.  The pain has been present for few  weeks.       The mechanism of injury was a twist . The pain is characterized as dull, achy.  The pain is present at all times.      Pain is improved by rest.  Pain is aggravated by weight bearing, running, walking, and standing.    Symptoms include clicking.     The patient has tried rest, ice, NSAIDS, and physical therapy.          Knee Surgical History:  None    Past Medical, Social and Family History:  Past Medical History:   Diagnosis Date    Chronic headaches     Cognitive impairment     Psychiatric illness      Past Surgical History:   Procedure Laterality Date    WRIST SURGERY Right      Allergies   Allergen Reactions    Cholecalciferol Other (See Comments)    Pollen Extract Sneezing     Current Outpatient Medications on File Prior to Visit   Medication Sig  Dispense Refill    Acetaminophen Extra Strength 500 MG TABS       aluminum-magnesium hydroxide-simethicone (MAALOX MAX) 400-400-40 MG/5ML suspension Take 10 mL by mouth every 6 (six) hours as needed for indigestion or heartburn 355 mL 1    chlorproMAZINE (THORAZINE) 100 mg tablet Take 1 tablet (100 mg total) by mouth 2 (two) times a day One in the morning, One in the afternoon. 60 tablet 1    chlorproMAZINE (THORAZINE) 50 mg tablet Take 3 tablets (150 mg total) by mouth daily at bedtime 90 tablet 1    cholecalciferol (VITAMIN D3) 1,000 units tablet Take 1 tablet (1,000 Units total) by mouth daily 90 tablet 3    cloNIDine (CATAPRES) 0.2 mg tablet Take 1 tablet (0.2 mg total) by mouth 3 (three) times a day      cyanocobalamin (VITAMIN B-12) 500 MCG tablet Take 1 tablet (500 mcg total) by mouth daily 30 tablet 11    desmopressin (DDAVP) 0.2 mg tablet Take 2 tablets (0.4 mg total) by mouth daily at bedtime 60 tablet 1    diphenhydrAMINE (GNP Allergy Relief) 12.5 MG chewable tablet Chew 1 tablet (12.5 mg total) in the morning 30 tablet 11    divalproex sodium (DEPAKOTE ER) 250 mg 24 hr tablet Take 1 tablet (250 mg total) by mouth daily at bedtime      divalproex sodium (Depakote) 500 mg DR tablet Take 3 tablets (1,500 mg total) by mouth every 8 (eight) hours      ibuprofen (MOTRIN) 400 mg tablet Take 400 mg by mouth every 6 (six) hours as needed for mild pain      metoprolol succinate (TOPROL-XL) 50 mg 24 hr tablet Take 1 tablet (50 mg total) by mouth daily 90 tablet 1    OLANZapine (ZyPREXA) 20 MG tablet Take 1 tablet (20 mg total) by mouth daily at bedtime 30 tablet 1    paliperidone (INVEGA) 6 MG 24 hr tablet Take 1 tablet (6 mg total) by mouth every morning      polyethylene glycol (MIRALAX) 17 g packet Take 17 g by mouth daily 510 g 1    senna-docusate sodium (SENOKOT S) 8.6-50 mg per tablet Take 1 tablet by mouth 2 (two) times a day 60 tablet 1    acetaminophen (TYLENOL) 650 mg CR tablet Take 1 tablet (650 mg  total) by mouth every 8 (eight) hours as needed for mild pain, moderate pain or headaches (Patient not taking: Reported on 2/16/2024) 30 tablet 0    clonazePAM (KlonoPIN) 1 mg tablet Take 1 tablet (1 mg total) by mouth daily 30 tablet 0    Incontinence Supply Disposable (Comfort Shield Adult Diapers) MISC Use 1 packet 4 (four) times a day (Patient not taking: Reported on 1/12/2023) 48 each 11     No current facility-administered medications on file prior to visit.     Social History     Socioeconomic History    Marital status: Single     Spouse name: Not on file    Number of children: Not on file    Years of education: Not on file    Highest education level: Not on file   Occupational History    Not on file   Tobacco Use    Smoking status: Never     Passive exposure: Past    Smokeless tobacco: Never   Vaping Use    Vaping status: Never Used   Substance and Sexual Activity    Alcohol use: Never    Drug use: Not Currently     Types: Marijuana    Sexual activity: Not Currently   Other Topics Concern    Not on file   Social History Narrative    Not on file     Social Determinants of Health     Financial Resource Strain: Low Risk  (1/23/2024)    Overall Financial Resource Strain (CARDIA)     Difficulty of Paying Living Expenses: Not hard at all   Food Insecurity: No Food Insecurity (1/23/2024)    Hunger Vital Sign     Worried About Running Out of Food in the Last Year: Never true     Ran Out of Food in the Last Year: Never true   Transportation Needs: No Transportation Needs (1/23/2024)    PRAPARE - Transportation     Lack of Transportation (Medical): No     Lack of Transportation (Non-Medical): No   Physical Activity: Not on file   Stress: Not on file   Social Connections: Not on file   Intimate Partner Violence: Not At Risk (1/23/2024)    Humiliation, Afraid, Rape, and Kick questionnaire     Fear of Current or Ex-Partner: No     Emotionally Abused: No     Physically Abused: No     Sexually Abused: No   Housing  "Stability: Low Risk  (1/23/2024)    Housing Stability Vital Sign     Unable to Pay for Housing in the Last Year: No     Number of Places Lived in the Last Year: 1     Unstable Housing in the Last Year: No         I have reviewed the past medical, surgical, social and family history, medications and allergies as documented in the EMR.    Review of systems: ROS is negative other than that noted in the HPI.  Constitutional: Negative for fatigue and fever.   HENT: Negative for sore throat.    Respiratory: Negative for shortness of breath.    Cardiovascular: Negative for chest pain.   Gastrointestinal: Negative for abdominal pain.   Endocrine: Negative for cold intolerance and heat intolerance.   Genitourinary: Negative for flank pain.   Musculoskeletal: Negative for back pain.   Skin: Negative for rash.   Allergic/Immunologic: Negative for immunocompromised state.   Neurological: Negative for dizziness.   Psychiatric/Behavioral: Negative for agitation.      Physical Exam:    Height 5' 8\" (1.727 m), weight (!) 144 kg (317 lb).    General/Constitutional: NAD, well developed, well nourished  HENT: Normocephalic, atraumatic  CV: Intact distal pulses, regular rate  Resp: No respiratory distress or labored breathing  GI: Soft and non-tender   Lymphatic: No lymphadenopathy palpated  Neuro: Alert and Oriented x 3, no focal deficits  Psych: Normal mood, normal affect, normal judgement, normal behavior  Skin: Warm, dry, no rashes, no erythema      Knee Exam (focused):                RIGHT LEFT   ROM:   0-130 0-130   Palpation: Effusion negative negative     MJL tenderness Negative Negative     LJL tenderness Positive Negative   Meniscus: Geraldine Negative Negative    Apley's Compression Negative Negative   Instability: Varus stable stable     Valgus stable stable   Special Tests: Lachman Negative Negative     Posterior drawer Negative Negative     Anterior drawer Negative Negative     Pivot shift not tested not tested     Dial " not tested not tested   Patella: Palpation no tenderness no tenderness     Mobility 1/4 1/4     Apprehension Negative Negative   Other: Single leg 1/4 squat not tested not tested      LE NV Exam: +2 DP/PT pulses bilaterally  Sensation intact to light touch L2-S1 bilaterally     Bilateral hip ROM demonstrates no pain actively or passively    No calf tenderness to palpation bilaterally    Knee Imaging    X-rays of the right knee were reviewed, which demonstrate no acute fractures or dislocations no acute osseous abnormalities.  I have reviewed the radiology report and agree with their impression.    MRI of the right knee were reviewed, which demonstrate lateral meniscal tear.  I have reviewed the radiology report and agree with their impression.      Scribe Attestation      I,:  Florian Rivera am acting as a scribe while in the presence of the attending physician.:       I,:  Jose Wells, DO personally performed the services described in this documentation    as scribed in my presence.:

## 2024-03-27 ENCOUNTER — APPOINTMENT (OUTPATIENT)
Dept: LAB | Facility: CLINIC | Age: 19
End: 2024-03-27
Payer: COMMERCIAL

## 2024-03-27 DIAGNOSIS — Z79.899 ENCOUNTER FOR LONG-TERM (CURRENT) USE OF OTHER MEDICATIONS: ICD-10-CM

## 2024-03-27 DIAGNOSIS — E55.9 AVITAMINOSIS D: ICD-10-CM

## 2024-03-27 LAB
25(OH)D3 SERPL-MCNC: 26.7 NG/ML (ref 30–100)
ALBUMIN SERPL BCP-MCNC: 3.7 G/DL (ref 3.5–5)
ALP SERPL-CCNC: 115 U/L (ref 34–104)
ALT SERPL W P-5'-P-CCNC: 55 U/L (ref 7–52)
ANION GAP SERPL CALCULATED.3IONS-SCNC: 8 MMOL/L (ref 4–13)
AST SERPL W P-5'-P-CCNC: 35 U/L (ref 13–39)
BASOPHILS # BLD AUTO: 0.02 THOUSANDS/ÂΜL (ref 0–0.1)
BASOPHILS NFR BLD AUTO: 0 % (ref 0–1)
BILIRUB SERPL-MCNC: 0.28 MG/DL (ref 0.2–1)
BUN SERPL-MCNC: 13 MG/DL (ref 5–25)
CALCIUM SERPL-MCNC: 9.1 MG/DL (ref 8.4–10.2)
CHLORIDE SERPL-SCNC: 100 MMOL/L (ref 96–108)
CHOLEST SERPL-MCNC: 121 MG/DL
CO2 SERPL-SCNC: 31 MMOL/L (ref 21–32)
CREAT SERPL-MCNC: 1.04 MG/DL (ref 0.6–1.3)
EOSINOPHIL # BLD AUTO: 0.08 THOUSAND/ÂΜL (ref 0–0.61)
EOSINOPHIL NFR BLD AUTO: 1 % (ref 0–6)
ERYTHROCYTE [DISTWIDTH] IN BLOOD BY AUTOMATED COUNT: 13.6 % (ref 11.6–15.1)
GFR SERPL CREATININE-BSD FRML MDRD: 103 ML/MIN/1.73SQ M
GLUCOSE P FAST SERPL-MCNC: 130 MG/DL (ref 65–99)
HCT VFR BLD AUTO: 45.4 % (ref 36.5–49.3)
HDLC SERPL-MCNC: 27 MG/DL
HGB BLD-MCNC: 14.4 G/DL (ref 12–17)
IMM GRANULOCYTES # BLD AUTO: 0.04 THOUSAND/UL (ref 0–0.2)
IMM GRANULOCYTES NFR BLD AUTO: 1 % (ref 0–2)
LDLC SERPL CALC-MCNC: 27 MG/DL (ref 0–100)
LYMPHOCYTES # BLD AUTO: 2.86 THOUSANDS/ÂΜL (ref 0.6–4.47)
LYMPHOCYTES NFR BLD AUTO: 34 % (ref 14–44)
MCH RBC QN AUTO: 28 PG (ref 26.8–34.3)
MCHC RBC AUTO-ENTMCNC: 31.7 G/DL (ref 31.4–37.4)
MCV RBC AUTO: 88 FL (ref 82–98)
MONOCYTES # BLD AUTO: 1.1 THOUSAND/ÂΜL (ref 0.17–1.22)
MONOCYTES NFR BLD AUTO: 13 % (ref 4–12)
NEUTROPHILS # BLD AUTO: 4.43 THOUSANDS/ÂΜL (ref 1.85–7.62)
NEUTS SEG NFR BLD AUTO: 51 % (ref 43–75)
NONHDLC SERPL-MCNC: 94 MG/DL
NRBC BLD AUTO-RTO: 0 /100 WBCS
PLATELET # BLD AUTO: 244 THOUSANDS/UL (ref 149–390)
PMV BLD AUTO: 11.3 FL (ref 8.9–12.7)
POTASSIUM SERPL-SCNC: 4.1 MMOL/L (ref 3.5–5.3)
PROT SERPL-MCNC: 6.8 G/DL (ref 6.4–8.4)
RBC # BLD AUTO: 5.14 MILLION/UL (ref 3.88–5.62)
SODIUM SERPL-SCNC: 139 MMOL/L (ref 135–147)
T4 FREE SERPL-MCNC: 0.68 NG/DL (ref 0.61–1.12)
TRIGL SERPL-MCNC: 333 MG/DL
TSH SERPL DL<=0.05 MIU/L-ACNC: 6.98 UIU/ML (ref 0.45–4.5)
VALPROATE SERPL-MCNC: 94 UG/ML (ref 50–100)
WBC # BLD AUTO: 8.53 THOUSAND/UL (ref 4.31–10.16)

## 2024-03-27 PROCEDURE — 80164 ASSAY DIPROPYLACETIC ACD TOT: CPT

## 2024-03-27 PROCEDURE — 85025 COMPLETE CBC W/AUTO DIFF WBC: CPT

## 2024-03-27 PROCEDURE — 84439 ASSAY OF FREE THYROXINE: CPT

## 2024-03-27 PROCEDURE — 80061 LIPID PANEL: CPT

## 2024-03-27 PROCEDURE — 82306 VITAMIN D 25 HYDROXY: CPT

## 2024-03-27 PROCEDURE — 80053 COMPREHEN METABOLIC PANEL: CPT

## 2024-03-27 PROCEDURE — 84443 ASSAY THYROID STIM HORMONE: CPT

## 2024-03-27 PROCEDURE — 36415 COLL VENOUS BLD VENIPUNCTURE: CPT

## 2024-03-28 ENCOUNTER — OFFICE VISIT (OUTPATIENT)
Dept: FAMILY MEDICINE CLINIC | Facility: CLINIC | Age: 19
End: 2024-03-28
Payer: COMMERCIAL

## 2024-03-28 VITALS
SYSTOLIC BLOOD PRESSURE: 118 MMHG | BODY MASS INDEX: 47.44 KG/M2 | HEIGHT: 68 IN | TEMPERATURE: 98.1 F | OXYGEN SATURATION: 94 % | HEART RATE: 85 BPM | WEIGHT: 313 LBS | DIASTOLIC BLOOD PRESSURE: 82 MMHG

## 2024-03-28 DIAGNOSIS — R73.09 ELEVATED GLUCOSE LEVEL: Primary | ICD-10-CM

## 2024-03-28 DIAGNOSIS — E11.9 TYPE 2 DIABETES MELLITUS WITHOUT COMPLICATION, WITHOUT LONG-TERM CURRENT USE OF INSULIN (HCC): ICD-10-CM

## 2024-03-28 PROCEDURE — 83036 HEMOGLOBIN GLYCOSYLATED A1C: CPT | Performed by: NURSE PRACTITIONER

## 2024-03-28 PROCEDURE — 99215 OFFICE O/P EST HI 40 MIN: CPT | Performed by: NURSE PRACTITIONER

## 2024-03-28 NOTE — PATIENT INSTRUCTIONS
"10% - bad control\"> 10% - bad control,Hemoglobin A1c (HbA1c) greater than 10% indicating poor diabetic control,Haemoglobin A1c greater than 10% indicating poor diabetic control\">   Diabetes Mellitus Type 2 in Adults, Ambulatory Care   GENERAL INFORMATION:   Diabetes mellitus type 2  is a disease that affects how your body uses glucose (sugar). Insulin helps move sugar out of the blood so it can be used for energy. Normally, when the blood sugar level increases, the pancreas makes more insulin. Type 2 diabetes develops because either the body cannot make enough insulin, or it cannot use the insulin correctly. After many years, your pancreas may stop making insulin.  Common symptoms include the following:   More hunger or thirst than usual     Frequent urination     Weight loss without trying     Blurred vision  Seek immediate care for the following symptoms:   Severe abdominal pain, or pain that spreads to your back. You may also be vomiting.    Trouble staying awake or focusing    Shaking or sweating    Blurred or double vision    Breath has a fruity, sweet smell    Breathing is deep and labored, or rapid and shallow    Heartbeat is fast and weak  Treatment for diabetes mellitus type 2  includes keeping your blood sugar at a normal level. You must eat the right foods, and exercise regularly. You may also need medicine if you cannot control your blood sugar level with nutrition and exercise.  Manage diabetes mellitus type 2:   Check your blood sugar level.  You will be taught how to check a small drop of blood in a glucose monitor. Ask your healthcare provider when and how often to check during the day. Ask your healthcare provider what your blood sugar levels should be when you check them.     Keep track of carbohydrates (sugar and starchy foods).  Your blood sugar level can get too high if you eat too many carbohydrates. Your dietitian will help you plan meals and snacks that have the right amount of " carbohydrates.    Eat low-fat foods.  Some examples are skinless chicken and low-fat milk.     Eat less sodium (salt).  Some examples of high-sodium foods to limit are soy sauce, potato chips, and soup. Do not add salt to food you cook. Limit your use of table salt.    Eat high-fiber foods.  Foods that are a good source of fiber include vegetables, whole grain bread, and beans.    Limit alcohol.  Alcohol affects your blood sugar level and can make it harder to manage your diabetes. Women should limit alcohol to 1 drink a day. Men should limit alcohol to 2 drinks a day. A drink of alcohol is 12 ounces of beer, 5 ounces of wine, or 1½ ounces of liquor.     Get regular exercise.  Exercise can help keep your blood sugar level steady, decrease your risk of heart disease, and help you lose weight. Exercise for at least 30 minutes, 5 days a week. Include muscle strengthening activities 2 days each week. Work with your healthcare provider to create an exercise plan.    Check your feet each day  for injuries or open sores. Ask your healthcare provider for activities you can do if you have an open sore.    Quit smoking.  If you smoke, it is never too late to quit. Smoking can worsen the problems that may occur with diabetes. Ask your healthcare provider for information about how to stop smoking if you are having trouble quitting.     Ask about your weight:  Ask healthcare providers if you need to lose weight, and how much to lose. Ask them to help you with a weight loss program. Even a 10 to 15 pound weight loss can help you manage your blood sugar level.     Carry medical alert identification.  Wear medical alert jewelry or carry a card that says you have diabetes. Ask your healthcare provider where to get these items.     Ask about vaccines.  Diabetes puts you at risk of serious illness if you get the flu, pneumonia, or hepatitis. Ask your healthcare provider if you should get a flu, pneumonia, or hepatitis B vaccine, and  when to get the vaccine.  Follow up with your healthcare provider as directed:  Write down your questions so you remember to ask them during your visits.   CARE AGREEMENT:   You have the right to help plan your care. Learn about your health condition and how it may be treated. Discuss treatment options with your caregivers to decide what care you want to receive. You always have the right to refuse treatment. The above information is an  only. It is not intended as medical advice for individual conditions or treatments. Talk to your doctor, nurse or pharmacist before following any medical regimen to see if it is safe and effective for you.  © 2014 [employer]. Information is for End User's use only and may not be sold, redistributed or otherwise used for commercial purposes. All illustrations and images included in CareNotes® are the copyrighted property of [employer]. or [employer].    Foot Care for People with Diabetes   WHAT YOU NEED TO KNOW:   What do I need to know about foot care?   Foot care helps protect your feet and prevent foot ulcers or sores. Long-term high blood sugar levels can damage the blood vessels and nerves in your legs and feet. This damage makes it hard to feel pressure, pain, temperature, and touch. You may not be able to feel a cut or sore, or shoes that are too tight. Foot care is needed to prevent serious problems, such as an infection or amputation.     Diabetes may cause your toes to become crooked or curved under. These changes may affect the way you walk and can lead to increased pressure on your foot. The pressure can decrease blood flow to your feet. Lack of blood flow increases your risk for a foot ulcer. Do not ignore small problems, such as dry skin or small wounds. These can become life-threatening over time without proper care.  How do I care for my feet?   Check your feet each day.  Look at your whole foot, including the bottom,  and between and under your toes. Check for wounds, corns, and calluses. Use a mirror to see the bottom of your feet. The skin on your feet may be shiny, tight, or darker than normal. Your feet may also be cold and pale. Feel your feet by running your hands along the tops, bottoms, sides, and between your toes. Redness, swelling, and warmth are signs of blood flow problems that can lead to a foot ulcer. Do not try to remove corns or calluses yourself.           Wash your feet each day with soap and warm water.  Do not use hot water, because this can injure your foot. Dry your feet gently with a towel after you wash them. Dry between and under your toes.     Apply lotion or a moisturizer on your dry feet.  Ask your healthcare provider what lotions are best to use. Do not put lotion or moisturizer between your toes.     Cut your toenails correctly.  File or cut your toenails straight across. Use a soft brush to clean around your toenails. If your toenails are very thick, you may need to have a healthcare provider or specialist cut them.     Protect your feet.  Do not walk barefoot or wear your shoes without socks. Check your shoes for rocks or other objects that can hurt your feet. Wear cotton socks to help keep your feet dry. Wear socks without toe seams, or wear them with the seams inside out. Change your socks each day. Do not wear socks that are dirty or damp.    Wear shoes that fit well.  Wear shoes that do not rub against any area of your feet. Your shoes should be ½ to ¾ inch (1 to 2 centimeters) longer than your feet. Your shoes should also have extra space around the widest part of your feet. Walking or athletic shoes with laces or straps that adjust are best. Ask your healthcare provider for help to choose shoes that fit you best. Ask him if you need to wear an insert, orthotic, or bandage on your feet.    Go to your follow-up visits.  Your healthcare provider will do a foot exam at least once a year. You may  need a foot exam more often if you have nerve damage, foot deformities, or ulcers. He will check for nerve damage and how well you can feel your feet. He will check your shoes to see if they fit well.  When should I contact my healthcare provider?   Your feet become numb, weak, or hard to move.     You have pus draining from a sore on your foot.     You have a wound on your foot that gets bigger, deeper, or does not heal.     You see blisters, cuts, scratches, calluses, or sores on your foot.     You have a fever, and your feet become red, warm, and swollen.     Your toenails become thick, curled, or yellow.     You find it hard to check your feet because your vision is poor.     You have questions or concerns about your condition or care.  CARE AGREEMENT:   You have the right to help plan your care. Learn about your health condition and how it may be treated. Discuss treatment options with your caregivers to decide what care you want to receive. You always have the right to refuse treatment. The above information is an  only. It is not intended as medical advice for individual conditions or treatments. Talk to your doctor, nurse or pharmacist before following any medical regimen to see if it is safe and effective for you.  © 2017 [employer] Information is for End User's use only and may not be sold, redistributed or otherwise used for commercial purposes. All illustrations and images included in CareNotes® are the copyrighted property of [employer] or [employer].    Meal Planning with Diabetes Exchanges   AMBULATORY CARE:   Diabetes exchanges  are servings of food that contain similar amounts of carbohydrate, fat, protein, and calories within a food group. The exchanges can be used to develop a healthy meal plan that helps to keep your blood sugar within the recommended levels. A meal plan with the right amount of carbohydrates is especially important. Your blood  sugar naturally rises after you eat carbohydrates. Too many carbohydrates in 1 meal or snack can raise your blood sugar level. Carbohydrates are found in starches, fruit, milk, yogurt, and sweets.  How to create a meal plan with exchanges:  A dietitian will work with you to develop a healthy meal plan that is right for you. This meal plan will include the amount of exchanges you can have from each food group throughout the day. Follow your meal plan by keeping track of the amount of exchanges you eat for each meal and snack. Your meal plan will be based on your age, weight, blood sugar levels, medicine, and activity level.   Starch food group exchanges:  Each exchange below contains about 15 grams of carbohydrate , 3 grams of protein, 1 gram of fat, and 80 calories.  1 ounce of white, whole wheat or rye bread (1 slice)    1 ounce of bagel (about ¼ of a bagel)    1 6-inch flour or corn tortilla or 1 4-inch pancake (about ¼ inch thick)    ? cup of cooked pasta or rice    ¾ cup of dry, ready-to-eat cereal with no sugar added     ½ cup of cooked cereal, such as oatmeal    3 jyoti cracker squares or 8 animal crackers    6 saltine-type crackers or     3 cups of popcorn or ¾ ounce of pretzels     Starchy vegetables and cooked legumes:      ½ cup of corn, green peas, sweet potatoes, or mashed potatoes     ¼ of a large baked potato     1 cup of acorn, butternut squash, or pumpkin     ½ cup of beans, lentils, or peas (such as ann, kidney, or black-eyed)    ? cup of lima beans  Fruit group exchanges:  Each exchange contains about 15 grams of carbohydrate  and 60 calories.  1 small (4 ounce) apple, banana orange, or nectarine    ½ cup of canned or fresh fruit    ½ cup (4 ounces) of unsweetened fruit juice    2 tablespoons of dried fruit  Milk group exchanges:  Each exchange contains about 12 grams of carbohydrate  and 8 grams of protein. The amount of fat and calories in each serving depends on the type of milk (such as  whole, low-fat, or fat-free).  1 cup fat-free or low-fat milk    ¾ cup of plain, nonfat yogurt    1 cup fat-free, flavored yogurt with artificial (no calorie) sweetener  Non-starchy vegetable group exchanges:  Each exchange contains about 5 grams of carbohydrate , 2 grams of protein, and 25 calories. Examples include beets, broccoli, cabbage, carrots, cauliflower, cucumber, mushrooms, tomatoes, and zucchini.  ½ cup of cooked vegetables or 1 cup of raw vegetables     ½ cup of vegetable juice  Meat and meat substitute group exchanges:  Each exchange of a lean meat  listed below contains about 7 grams of protein, 0 to 3 grams of fat, and 45 calories. The meat and meat substitutes food group does not contain any carbohydrates. Medium and high-fat meats have more calories.  1 ounce of chicken or turkey without skin, or 1 ounce of fish (not breaded or fried)     1 ounce of lean beef, pork, or lamb     1-inch cube or 1 ounce of low-fat cheese     2 egg whites or ¼ cup of egg substitute     ½ cup of tofu  Sweets, desserts, and other carbohydrate group exchanges:   Sweets and other desserts:  Each exchange has about 15 grams of carbohydrate .    1 ounce of yvette food cake or 2-inch square cake (unfrosted)    2 small cookies     ½ cup of sugar-free, fat-free ice cream    1 tablespoon of syrup, jam, jelly, table sugar, or honey    Combination foods:     1 cup of an entrée, such as lasagna, spaghetti with meatballs, macaroni and cheese, and chili with beans (each serving counts as 2 carbohydrate exchanges )     1 cup of tomato or vegetable beef soup (each serving counts as 1 carbohydrate exchange )  Fat group exchanges:  Each exchange contains 5 grams of fat and 45 calories.  1 teaspoon of oil (such as canola, olive, or corn oil)     6 almonds or cashews, 10 peanuts, or 4 pecan halves     2 tablespoons of avocado     ½ tablespoon of peanut butter     1 teaspoon of regular margarine or 2 teaspoons of low-fat margarine     1  teaspoon of regular butter or 1 tablespoon of low-fat butter     1 teaspoon of regular mayonnaise or 1 tablespoon of low-fat mayonnaise     1 tablespoon of regular salad dressing or 2 tablespoons of low-fat salad dressing  Free foods: The foods on this list are called free foods because they have very few calories. Free foods usually do not increase your blood sugar if you limit them.  1 tablespoon of catsup or taco sauce     ¼ cup of salsa     2 tablespoons of sugar-free syrup or 2 teaspoons of light jam or jelly     1 tablespoon of fat-free salad dressing     4 tablespoons of fat-free margarine or fat-free mayonnaise     Sugar-free drinks: diet soda, sugar-free drink mixes, or mineral water     Low-sodium bouillon or fat-free broth     Mustard     Seasonings such as spices, herbs, and garlic     Sugar-free gelatin without added fruit  Other healthy nutrition guidelines:   Eat more fiber.  Choose foods that are good sources of fiber, such as fruits, vegetables, and whole grains. Cereals that contain 5 or more grams of fiber per serving are good sources of fiber. Legumes such as garbanzo, ann beans, kidney beans, and lentils are also good sources.     Limit fat.  Ask your dietitian or healthcare provider how much fat you should eat each day. Choose foods low in fat, saturated fat, trans fat, and cholesterol. Examples include turkey or chicken without the skin, fish, lean cuts of meat, and beans. Low-fat dairy foods, such as low-fat or fat-free milk and low-fat yogurt are also good choices. Omega-3 fatty acids are healthy fats that are found in canola oil, soybean oil and fatty fish. [place], albacore tuna, and sardines are good sources of omega 3 fatty acids. Eat 2 servings of these types of fish each week. Do not eat fried fish.     Limit sugar.  Sugar and sweets must be counted toward the carbohydrate exchanges that you can have within your meal plan. Limit sugar and sweets because they are usually also high in  calories and fat. Eat smaller portions of sweets by sharing a dessert or asking for a child-size portion at a restaurant.    Limit sodium  (salt) to about 2,300 mg per day. You may need to eat even less sodium if you have certain medical conditions. Foods high in sodium include soy sauce, potato chips, and soup.     Limit alcohol.  Ask your healthcare provider if it is safe for you to drink alcohol. If alcohol is safe for you to have, eat a meal when you drink alcohol. If you drink alcohol on an empty stomach, your blood sugar may drop to a low level. Women should limit alcohol to 1 drink per day. Men should limit alcohol to 2 drinks per day. A drink of alcohol is 5 ounces of wine, 12 ounces of beer, or 1½ ounces of liquor.  Other ways to manage your diabetes:   Control your blood sugar level.  Test your blood sugar level regularly and keep a record of the results. Ask your healthcare provider when and how often to test your blood sugar. You may need to check your blood sugar level at least 3 times each day.     Talk to your healthcare provider about your weight.  Ask if you need to lose weight, and how much you need to lose. If you are overweight, you may need to make other changes to lose weight. Ask your healthcare provider to help you create a weight loss program.     Exercise  can help to control your blood sugar levels and decrease your risk of heart disease. It can also help you lose or maintain your weight. Get at least 30 minutes of exercise, 5 times each week. Do resistance training (using weights) 2 times each week. Do not sit for longer than 90 minutes. Work with your healthcare provider to plan the best exercise program for you.  Contact your healthcare provider if:   You have high blood sugar levels during a certain time of day, or almost all of the time.    You often have low blood sugar levels.    You have questions or concerns about your condition or care.  © 2017 [employer]  Information is for End User's use only and may not be sold, redistributed or otherwise used for commercial purposes. All illustrations and images included in CareNotes® are the copyrighted property of [employer]. or [employer].  The above information is an  only. It is not intended as medical advice for individual conditions or treatments. Talk to your doctor, nurse or pharmacist before following any medical regimen to see if it is safe and effective for you.    Hemoglobin A1c   WHAT YOU NEED TO KNOW:   What is a hemoglobin A1c test, and why do I need one?  A hemoglobin A1c is a blood test that measures your average blood sugar level for the past 2 to 3 months. It is also called an HbA1c or glycohemoglobin test. An A1c test can help diagnose prediabetes or diabetes. It can also tell you how well your diabetes plan is working. A1c testing can help your healthcare provider make changes to your treatment plan. These changes can help improve or control your blood sugar levels. Good control of your blood sugar levels can decrease your risk for problems caused by diabetes. Examples include heart attack, stroke, blindness, kidney disease, and neuropathy (nerve problems).   What increases my risk for diabetes?  You may need an A1c test if you have any of the following risk factors for diabetes:  Heart disease    High blood pressure    Polycystic ovarian syndrome    A first-degree relative with diabetes, such as a parent, brother, sister, or child    Being overweight    Lack of exercise or activity     History of gestational diabetes and poor nutrition while pregnant  Who should get an A1c test?   Adults who are overweight and have 1 or more risk factors for diabetes    Adults 45 years of age or older    Children 10 to 18 years who are overweight and have 2 or more risk factors for diabetes    Anyone with signs or symptoms of diabetes, such as increased thirst, slow-healing infections, or  increased urination  What do the results of an A1c test mean?  The results are given in percentages.  An A1c of 5.6% or lower means you do not have diabetes.     An A1c of 5.7% to 6.4% means you are at risk for diabetes. This is also called prediabetes.     An A1c of 6.5% or higher means you have diabetes.     If you currently have diabetes in good control, your A1c goal may be 7% or lower. Your healthcare provider will decide what your goal should be. This decision is based on your diabetes history and other medical conditions. You may need an A1c test 2 to 4 times each year, depending on your blood sugar level control.  How should I prepare for the test?  You do not need to do anything to prepare for the test. Wear a short-sleeved or loose shirt to the test. This will make it easier to draw your blood. Other tests may be needed to diagnose or monitor diabetes if you have certain conditions. Tell your healthcare provider if you have any of the following:  Iron-deficiency or [id number] anemia    Cystic fibrosis    Recent heavy blood loss or a blood transfusion    Recent erythropoietin therapy    Sickle cell disease or thalassemia    Kidney failure or liver disease  What will happen after the test?  Schedule a follow-up appointment with your healthcare provider to talk about your test results.  If your A1c is lower than your goal , your medicines may be changed.     If your A1c is at your goal , you may not need any changes to your diabetes treatment plan.     If your A1c is higher than your goal , you may need changes to your medicines, eating plan, or exercise plan.  What else should I know about an A1c test?   You may get an estimated Average Glucose (eAG) with your A1c results. The eAG gives your A1c result in numbers like you see on your glucose meter. For example, an A1c of 6% will be reported as an eAG of 126 mg/dL. This means your average blood sugar level was 126 mg/dL over the last 2 to 3 months. The  eAG can help you understand if your A1c result is on target for what your healthcare provider recommends.     You are at risk for an uncommon type of hemoglobin if you are , Mediterranean, or [place] . This type of hemoglobin can affect your A1c test results. Tell your healthcare provider if you come from any of these backgrounds. You may need to have your A1c sent to a lab with certain equipment. This will help make sure your results are accurate.  CARE AGREEMENT:   You have the right to help plan your care. To help with this plan, you must learn about your lab tests. You can then discuss the results with your caregivers. Work with them to decide what care may be used to treat you. You always have the right to refuse treatment. The above information is an  only. It is not intended as medical advice for individual conditions or treatments. Talk to your doctor, nurse or pharmacist before following any medical regimen to see if it is safe and effective for you.  © 2017 [employer] Information is for End User's use only and may not be sold, redistributed or otherwise used for commercial purposes. All illustrations and images included in CareNotes® are the copyrighted property of [employer] or [employer].      Diabetic Retinopathy   WHAT YOU NEED TO KNOW:   Diabetic retinopathy (DR) is eye damage caused by long-term high blood sugar levels. The walls of the blood vessels in the retina weaken and leak blood. This causes swelling and vision problems. Over time, new, weak blood vessels grow, leak blood, and cover the center of the retina. DR can lead to blindness.       DISCHARGE INSTRUCTIONS:   Call 911 for any of the following:   You suddenly cannot see.    Contact your healthcare provider if:   Your blurred vision gets worse, or you start to see double.    You see more floating spots.    You see dark spots.    You have questions or concerns about your  condition or care.  Prevent DR:   Control your blood sugar.  Keep your blood sugar levels as close to normal as possible. You may need to check your blood sugar levels 3 times each day.           Get your eyes checked at least once each year.  Your eye doctor may want to see you every 6 months or more often. If you are pregnant, get your eyes checked during the first 13 weeks of your pregnancy. You will need frequent eye exams during pregnancy and for 1 year after you give birth.    Manage your blood pressure and cholesterol.  Your blood pressure should be 140/90 mmHg or lower. You may need lab tests that measure the amount of cholesterol in your blood. You may need to make lifestyle changes and take medicines to control your blood pressure and cholesterol.    Exercise regularly.  Ask your healthcare provider about the best exercise plan for you. He will tell you how to control your blood sugar when you exercise. You may need to check your blood sugar more often during exercise. Bring a snack with you when you exercise in case your blood sugar gets too low.     Do not smoke.  Nicotine can damage blood vessels in your eyes and make it more difficult to manage your diabetes. Do not use e-cigarettes or smokeless tobacco in place of cigarettes or to help you quit. They still contain nicotine. Ask your healthcare provider for information if you currently smoke and need help quitting.  Follow up with your healthcare provider or eye specialist as directed:  Write down your questions so you remember to ask them during your visits.   © 2017 [employer] Information is for End User's use only and may not be sold, redistributed or otherwise used for commercial purposes. All illustrations and images included in CareNotes® are the copyrighted property of [employer] or [employer].  The above information is an  only. It is not intended as medical advice for individual conditions or  treatments. Talk to your doctor, nurse or pharmacist before following any medical regimen to see if it is safe and effective for you. CARBOHYDRATES  As a carbohydrate is digested through our bodies it becomes a sugar.      There are TWO main things I want you to know about CARBOHYDRATES:     1. NUMBER     How many carbohydrates are in each serving of food you are about to eat matters to weight loss/gain, diabetes control, and sugar levels.  A food product is in the LOW carbohydrate level if it has less than 15grams carbohydrates per serving.  These are always good choices in general for a snack.  A meal can include anywhere from 15-45 grams carbohydrates in the meal.      TIP: Sugar Free foods contain carbohydrates which become sugar in the body.  If you are going to consider eating sugar free foods, you MUST follow serving size carefully.  These foods will still result in high glucose levels.      2. TYPE     The type of carbohydrate is VERY important.  A slice of bread (white or wheat) will have same amount of carbohydrates but both break down at different paces in the body.  Carbohydrates that are more complex like Rye, whole wheat, and multi grain process slower through our bodies, therefore, making sugar levels rise slowly and steady and over long period of time.  WHITE carbohydrates such as white bread, white pasta and white rice digest quickly in your body and raise your glucose levels fast just like eating a candy bar.     TIP: Read the INGREDIENTS on each item to make sure the first ingredient is WHOLE WHEAT or Rye  (not enriched white flour) rather than reading front label of the product in order to verify whole wheat product.             Simple carbohydrates digest through your body QUICKLY causing the conversion of carbohydrates to become sugars if you are not using them immediately for energy.  Complex carbohydrates will become sugars SLOWLY over time as your body breaks them down in your digestive  system.  Below is a graph demonstrating these two concepts.  The rate of sugar breakdown can easily affect your energy, metabolism, ability to gain or lose weight and more.  The goal is a slow and steady release of sugar into your body and to avoid sugar spikes in order to feel your best and manage your health.        Simple Carbohydrates: Candy bar, white bread, white pasta, white rice, white flour, cookies, sweets, cakes, corn, string beans etc               A little information about carbs ...   ·         Try for a low carbohydrate diet. This means less than 100 grams per day.   ·         Try to get a high amount of protein per day - shoot for 60-70 grams per day.  ·         To really lose weight you may need to try an ultra low carb diet - this means 10-15 grams per meal. And 5 - 10 grams per snack. This is usually under the supervision of a physician weight loss program.   ·         Carbohydrates are in starches and turn to sugars. Lower your intake of bread, pasta, potatoes, rice. Never drink your carbs - switch to water. No juice or soda.   ·         When you do consume carbs, try for high fiber choices like fruits and veggies. Try for complex carbohydrates found in oats and whole grains. A daily fiber supplement can also be helpful.      Here is a lot more information …     Understanding Carbohydrates  A car needs the right type of fuel to run. And you need the right kind of food to function. To keep your energy level up, your body needs food that has carbohydrates. But carbohydrates raise blood sugar levels higher and faster than other kinds of food.      Starches - AVOID   Starches are found in grains, some vegetables, and beans. Grain products include bread, pasta, cereal, and tortillas. Starchy vegetables include potatoes, peas, corn, lima beans, yams, and squash. Kidney beans, ann beans, black beans, garbanzo beans, and lentils also contain starches.  Sugars - AVOID  Sugars are found naturally in many  foods. Or sugar can be added. Foods that contain natural sugar include fruits and fruit juices, dairy products, honey, and molasses. Added sugars are found in most desserts, processed foods, candy, regular soda, and fruit drinks. These are very helpful for treating low blood sugar, or hypoglycemia. They provide sugar quickly.  Fiber - A BETTER CHOICE  Fiber comes from plant foods. Most fiber isn’t digested by the body. Instead of raising blood sugar levels like other carbohydrates, it actually stops blood sugar from rising too fast. Fiber is found in fruits, vegetables, whole grains, beans, peas, and many nuts.  Carb counting  It’s important to keep track of the amount of carbohydrates you eat. This can help you keep the right balance of physical activity and medicine. The amount of carbohydrates needed will vary for each person. It depends on many things such as your health, the medicines you take, and how active you are. You may start with around 45 to 60 grams of carbohydrate per meal, depending on your situation. Counting your carbohydrates is a good way to control how many you eat. You can do this by keeping a food diary. There are great apps to help with this too like My [employer]itness Pal.      Carbohydrates come from a variety of foods. These include grains, starchy vegetables, fruit, milk, beans, and snack foods. You can either count carbohydrate grams or carbohydrate servings. When you count carbohydrate servings, 1 carbohydrate serving = 15 grams of carbohydrates.     Here are some examples of foods containing about 15 grams of carbohydrates (1 serving of carbohydrates):  ·      1/2 cup of canned or frozen fruit  ·      A small piece of fresh fruit (4 ounces)  ·      1 slice of bread  ·      1/2 cup of oatmeal  ·      1/3 cup of rice  ·      4 to 6 crackers  ·      1/2 English muffin  ·      1/2 cup of black beans  ·      1/4 of a large baked potato (3 ounces)  ·      2/3 cup of plain fat-free yogurt  ·      1  cup of soup  ·      1/2 cup of casserole  ·      6 chicken nuggets  ·      2-inch square brownie or cake without frosting  ·      2 small cookies  ·      1/2 cup of ice cream or sherbet     Carb counting is easier when food labels are available. Look at the label to see how many grams of total carbohydrates the food contains. Then you can figure out how much you should eat.  It’s also important to be consistent with the amount and time you eat when taking a fixed dose of diabetes medicine.     Make your meal plan  A meal plan gives guidelines for the types and amounts of food you should eat.  Watch serving sizes  Your meal plan will group foods by servings. To learn how much a serving is, start by measuring food portions at each meal. Soon you’ll know what a serving looks like on your plate. A quick rule is a serving size of meat is about the size of your fist.   Eat from all the food groups  The basis of a healthy meal plan is variety (eating lots of different foods). Choose lean meats, fresh fruits and vegetables, whole grains, and low-fat or nonfat dairy products. Eating a wide variety of foods provides the nutrients your body needs. It can also keep you from getting bored with your meal plan.  Learn about carbohydrates, fats, and protein  ·      Carbohydrates are starches, sugars, and fiber. They are found in many foods, including fruit, bread, pasta, milk, and sweets. Of all the foods you eat, carbohydrates have the most effect on your blood sugar.   ·      Fats have the most calories. They also have the most effect on your weight and your risk of heart disease. It’s important to control your weight and protect your heart. Foods that are high in fat include whole milk, cheese, snack foods, and desserts.  ·      Protein is important for building and repairing muscles and bones. Choose low-fat protein sources, such as fish, egg whites, and skinless chicken.  Reduce liquid sugars  Extra calories from sodas, sports  drinks, and fruit drinks make it hard to keep blood sugar in range. Cut as many liquid sugars from your meal plan as you can.  This includes most fruit juices, which are often high in natural or added sugar. Instead, drink plenty of water and other sugar-free beverages.  Eat less fat  If you need to lose weight, try to reduce the amount of fat in your diet. This can also help lower your cholesterol level to keep blood vessels healthier. Cut fat by using only small amounts of liquid oil for cooking. Read food labels carefully to avoid foods with unhealthy trans fats.  Timing your meals  When it comes to blood sugar control, when you eat is as important as what you eat. You may need to eat several small meals spaced evenly throughout the day to stay in your target range. So don’t skip breakfast or wait until late in the day to get most of your calories. Doing so can cause your blood sugar to rise too high or fall too low.         Understanding Carbohydrates, Fats, and Protein  Food is a source of fuel and nourishment for your body. It’s also a source of pleasure. Having diabetes doesn’t mean you have to eat special foods or give up desserts. Instead, your dietitian can show you how to plan meals to suit your body. To start, learn how different foods affect blood sugar.     Carbohydrates  Carbohydrates are the main source of fuel for the body. Carbohydrates raise blood sugar. Many people think carbohydrates are only found in pasta or bread. But carbohydrates are actually in many kinds of foods:  ·      Sugars occur naturally in foods such as fruit, milk, honey, and molasses. Sugars can also be added to many foods, from cereals and yogurt to candy and desserts. Sugars raise blood sugar.  ·      Starches are found in bread, cereals, pasta, and dried beans. They’re also found in corn, peas, potatoes, yam, acorn squash, and butternut squash. Starches also raise blood sugar.   ·      Fiber is found in foods such as  vegetables, fruits, beans, and whole grains. Unlike other carbs, fiber isn’t digested or absorbed. So it doesn’t raise blood sugar. In fact, fiber can help keep blood sugar from rising too fast. It also helps keep blood cholesterol at a healthy level.     Did you know?  Even though carbohydrates raise blood sugar, it’s best to have some in every meal. They are an important part of a healthy diet.      Fat  Fat is an energy source that can be stored until needed. Fat does not raise blood sugar. However, it can raise blood cholesterol, increasing the risk of heart disease. Fat is also high in calories, which can cause weight gain. Not all types of fat are the same.  More Healthy:  ·      Monounsaturated fats are mostly found in vegetable oils, such as olive, canola, and peanut oils. They are also found in avocados and some nuts. Monounsaturated fats are healthy for your heart. That’s because they lower LDL (unhealthy) cholesterol.  ·      Polyunsaturated fats are mostly found in vegetable oils, such as corn, safflower, and soybean oils. They are also found in some seeds, nuts, and fish. Polyunsaturated fats lower LDL (unhealthy) cholesterol. So, choosing them instead of saturated fats is healthy for your heart. Certain unsaturated fats can help lower triglycerides.   Less Healthy:  ·      Saturated fats are found in animal products, such as meat, poultry, whole milk, lard, and butter. Saturated fats raise LDL cholesterol and are not healthy for your heart.  ·      Hydrogenated oils and trans fats are formed when vegetable oils are processed into solid fats. They are found in many processed foods. Hydrogenated oils and trans fats raise LDL cholesterol and lower HDL (healthy) cholesterol. They are not healthy for your heart.  Protein  Protein helps the body build and repair muscle and other tissue. Protein has little or no effect on blood sugar. However, many foods that contain protein also contain saturated fat. By  choosing low-fat protein sources, you can get the benefits of protein without the extra fat:  ·      Plant protein is found in dry beans and peas, nuts, and soy products, such as tofu and soymilk. These sources tend to be cholesterol-free and low in saturated fat.  ·      Animal protein is found in fish, poultry, meat, cheese, milk, and eggs. These contain cholesterol and can be high in saturated fat. Aim for lean, lower-fat choices.     Reading food labels  Pay close attention to food labels. The information on them will help you choose healthy foods that make managing your blood sugar easier. Look for the Nutrition Facts label on packaged foods. This label tells you how many carbohydrates and how much sugar, fat, and fiber are in each serving. Then you can decide whether the food fits your meal plan.      Reading food labels helps you keep track of your carbohydrate intake. Remember to pay attention to serving sizes. If you eat this entire box, he will have eaten 34 grams of carbohydrate.   ·      Serving size: This number is very important and tells you how much of the food makes up a single serving. If you eat more than one serving, all other numbers, like calories and carbohydrates, also increase.  ·      Total carbohydrates: This number tells you how much carbohydrate is in each serving. If you are carb counting, this number will help you fit the food into your meal plan. Also, keep in mind the number of servings you eat. If  you eat two servings, you’ll need to double the amount of carbohydrates listed on the box.   ·      Sugars: This number includes both natural and added sugars. Sugars count as part of your carbohydrate intake, and are included in the total carbohydrate number on the label.   ·      Fat: This number tells you the total amount of fat in each serving. Watch out for saturated fats, which raise cholesterol. Limit fats, especially if you are trying to lose weight.  ·      Trans fat: This number  tells you if the food includes trans fat. Liquid oils made into a solid fat, such as margarine, have a lot of trans fat. Trans fat is bad for the heart. Try to avoid foods containing trans fat.  ·      Dietary fiber: This number tells you how much of the carbohydrate in the food is fiber. Foods high in fiber are healthy. They also help keep blood sugar levels steady.  Learning portion sizes  Portion control is an important part of healthy eating. How much food you eat affects your blood sugar.  And until you learn what portion sizes look like, use measuring cups and spoons to be sure portions are accurate.     Adapted from:  © 2537-6572 [employer] [address] [place] [address], [place] [address]. All rights reserved. This information is not intended as a substitute for professional medical care. Always follow your healthcare professional's instructions.

## 2024-03-28 NOTE — PROGRESS NOTES
"Assessment/Plan:    Problem List Items Addressed This Visit     Type 2 diabetes mellitus without complication, without long-term current use of insulin (HCC)    Relevant Medications    metFORMIN (GLUCOPHAGE) 1000 MG tablet    Other Relevant Orders    Ambulatory Referral to Endocrinology   Other Visit Diagnoses     Elevated glucose level    -  Primary    Relevant Orders    POCT hemoglobin A1c (Completed)           Diagnoses and all orders for this visit:    Elevated glucose level  -     POCT hemoglobin A1c    Type 2 diabetes mellitus without complication, without long-term current use of insulin (HCC)  -     metFORMIN (GLUCOPHAGE) 1000 MG tablet; Take 1 tablet (1,000 mg total) by mouth 2 (two) times a day with meals  -     Ambulatory Referral to Endocrinology; Future        No problem-specific Assessment & Plan notes found for this encounter.        Subjective:      Patient ID: Manuel Back is a 18 y.o. male.    Patient presents with:  Follow-up: B/w/ follow up & poss referral for cardiology.    Pt presents with \"hoe caregivers\". Care givers are \"unaware\" of purpose of visit. After speaking with RN that oversees facility, it is determined the was blood work ordered by another HCP which was not FWD to me with abnormalis. It is determined pt has an elevated FBG. A1c 8.2 was revealed today. Will start metformin 1000 mg BID with meals, refer to Endocrinology close to Pt current living home. Pt was living approximate to my office, however since he has been moved to [facility], I recommend that Pt establish with a closer PCP as coming to my office is a 3 hour travel time round trip. Care givers agree with the idea to establish with [facility] as well as a local Endocrinologist. Pt care givers have been advised of local PCPs and Endos.       60 minutes were spent speaking with , house RN, caregivers, and patient care.     Diabetes  He presents for his follow-up diabetic visit. He has type 2 " diabetes mellitus. His disease course has been stable. There are no hypoglycemic associated symptoms. Associated symptoms include polydipsia, polyphagia and polyuria. There are no diabetic complications. Risk factors for coronary artery disease include diabetes mellitus, hypertension, male sex and obesity. Current diabetic treatment includes oral agent (monotherapy). He is following a generally unhealthy diet. Meal planning includes calorie counting. He never participates in exercise. An ACE inhibitor/angiotensin II receptor blocker is being taken. He does not see a podiatrist.Eye exam is not current.       A1c noted   Lab Results   Component Value Date    HGBA1C 8.2 (A) 03/28/2024        Recommend lifestyle and dietary changes which include plant based low glycemic diet.    Will monitor in 3  months.         The following portions of the patient's history were reviewed and updated as appropriate:   He has a past medical history of Chronic headaches, Cognitive impairment, and Psychiatric illness.,  does not have any pertinent problems on file.,   has a past surgical history that includes Wrist surgery (Right).,  family history includes No Known Problems in his father and mother.,   reports that he has never smoked. He has been exposed to tobacco smoke. He has never used smokeless tobacco. He reports that he does not currently use drugs after having used the following drugs: Marijuana. He reports that he does not drink alcohol.,  is allergic to cholecalciferol and pollen extract..  Current Outpatient Medications   Medication Sig Dispense Refill   • Acetaminophen Extra Strength 500 MG TABS      • aluminum-magnesium hydroxide-simethicone (MAALOX MAX) 400-400-40 MG/5ML suspension Take 10 mL by mouth every 6 (six) hours as needed for indigestion or heartburn 355 mL 1   • chlorproMAZINE (THORAZINE) 100 mg tablet Take 1 tablet (100 mg total) by mouth 2 (two) times a day One in the morning, One in the afternoon. 60 tablet 1    • chlorproMAZINE (THORAZINE) 50 mg tablet Take 3 tablets (150 mg total) by mouth daily at bedtime 90 tablet 1   • cholecalciferol (VITAMIN D3) 1,000 units tablet Take 1 tablet (1,000 Units total) by mouth daily 90 tablet 3   • cloNIDine (CATAPRES) 0.2 mg tablet Take 1 tablet (0.2 mg total) by mouth 3 (three) times a day     • cyanocobalamin (VITAMIN B-12) 500 MCG tablet Take 1 tablet (500 mcg total) by mouth daily 30 tablet 11   • desmopressin (DDAVP) 0.2 mg tablet Take 2 tablets (0.4 mg total) by mouth daily at bedtime 60 tablet 1   • diphenhydrAMINE (GNP Allergy Relief) 12.5 MG chewable tablet Chew 1 tablet (12.5 mg total) in the morning 30 tablet 11   • divalproex sodium (DEPAKOTE ER) 250 mg 24 hr tablet Take 1 tablet (250 mg total) by mouth daily at bedtime     • divalproex sodium (Depakote) 500 mg DR tablet Take 3 tablets (1,500 mg total) by mouth every 8 (eight) hours     • ibuprofen (MOTRIN) 400 mg tablet Take 400 mg by mouth every 6 (six) hours as needed for mild pain     • metFORMIN (GLUCOPHAGE) 1000 MG tablet Take 1 tablet (1,000 mg total) by mouth 2 (two) times a day with meals 180 tablet 0   • metoprolol succinate (TOPROL-XL) 50 mg 24 hr tablet Take 1 tablet (50 mg total) by mouth daily 90 tablet 1   • OLANZapine (ZyPREXA) 20 MG tablet Take 1 tablet (20 mg total) by mouth daily at bedtime 30 tablet 1   • paliperidone (INVEGA) 6 MG 24 hr tablet Take 1 tablet (6 mg total) by mouth every morning     • polyethylene glycol (MIRALAX) 17 g packet Take 17 g by mouth daily 510 g 1   • senna-docusate sodium (SENOKOT S) 8.6-50 mg per tablet Take 1 tablet by mouth 2 (two) times a day 60 tablet 1   • acetaminophen (TYLENOL) 650 mg CR tablet Take 1 tablet (650 mg total) by mouth every 8 (eight) hours as needed for mild pain, moderate pain or headaches (Patient not taking: Reported on 2/16/2024) 30 tablet 0   • clonazePAM (KlonoPIN) 1 mg tablet Take 1 tablet (1 mg total) by mouth daily 30 tablet 0   • Incontinence  "Supply Disposable (Comfort Shield Adult Diapers) MISC Use 1 packet 4 (four) times a day (Patient not taking: Reported on 1/12/2023) 48 each 11     No current facility-administered medications for this visit.           Review of Systems   Endocrine: Positive for polydipsia, polyphagia and polyuria.   All other systems reviewed and are negative.        Objective:  Vitals:    03/28/24 1318   BP: 118/82   Pulse: 85   Temp: 98.1 °F (36.7 °C)   TempSrc: Tympanic   SpO2: 94%   Weight: (!) 142 kg (313 lb)   Height: 5' 8\" (1.727 m)     Body mass index is 47.59 kg/m².          Physical Exam  Vitals and nursing note reviewed.   Constitutional:       Appearance: He is well-developed. He is obese.   HENT:      Head: Normocephalic and atraumatic.      Right Ear: Tympanic membrane, ear canal and external ear normal.      Left Ear: Tympanic membrane, ear canal and external ear normal.      Nose: Nose normal.      Mouth/Throat:      Mouth: Mucous membranes are moist.      Pharynx: Uvula midline.   Eyes:      General: Lids are normal.      Conjunctiva/sclera: Conjunctivae normal.      Pupils: Pupils are equal, round, and reactive to light.   Neck:      Thyroid: No thyroid mass.      Vascular: No JVD.      Trachea: Trachea and phonation normal.   Cardiovascular:      Rate and Rhythm: Normal rate and regular rhythm.      Pulses: Normal pulses.      Heart sounds: Normal heart sounds, S1 normal and S2 normal. No murmur heard.     No friction rub. No gallop.   Pulmonary:      Effort: Pulmonary effort is normal.      Breath sounds: Normal breath sounds.   Abdominal:      General: Bowel sounds are normal.      Palpations: Abdomen is soft.      Tenderness: There is no abdominal tenderness.   Genitourinary:     Comments: Deferred  Musculoskeletal:         General: Normal range of motion.      Cervical back: Full passive range of motion without pain, normal range of motion and neck supple.      Right lower leg: No edema.      Left lower leg: " "No edema.   Lymphadenopathy:      Head:      Right side of head: No submental, submandibular, tonsillar, preauricular, posterior auricular or occipital adenopathy.      Left side of head: No submental, submandibular, tonsillar, preauricular, posterior auricular or occipital adenopathy.      Cervical: No cervical adenopathy.   Skin:     General: Skin is warm and dry.      Capillary Refill: Capillary refill takes less than 2 seconds.   Neurological:      General: No focal deficit present.      Mental Status: He is alert and oriented to person, place, and time.      Sensory: Sensation is intact.      Motor: Motor function is intact.      Coordination: Coordination is intact.      Gait: Gait is intact.   Psychiatric:         Attention and Perception: Attention and perception normal.         Mood and Affect: Mood and affect normal.         Speech: Speech normal.         Behavior: Behavior normal. Behavior is cooperative.         Thought Content: Thought content normal.         Cognition and Memory: Cognition normal.         Judgment: Judgment normal.             Portions of the record may have been created with voice recognition software. Occasional wrong word or \"sound a like\" substitutions may have occurred due to the inherent limitations of voice recognition software. Read the chart carefully and recognize, using context, where substitutions have occurred. Contact me with any questions.  "

## 2024-03-29 LAB — SL AMB POCT HEMOGLOBIN AIC: 8.2 (ref ?–6.5)

## 2024-03-30 PROBLEM — E11.9 TYPE 2 DIABETES MELLITUS WITHOUT COMPLICATION, WITHOUT LONG-TERM CURRENT USE OF INSULIN (HCC): Status: ACTIVE | Noted: 2024-03-30

## 2024-03-31 ENCOUNTER — HOSPITAL ENCOUNTER (EMERGENCY)
Facility: HOSPITAL | Age: 19
Discharge: HOME | End: 2024-04-01
Attending: EMERGENCY MEDICINE
Payer: COMMERCIAL

## 2024-03-31 VITALS
BODY MASS INDEX: 48.17 KG/M2 | HEART RATE: 109 BPM | TEMPERATURE: 98.1 F | WEIGHT: 315 LBS | RESPIRATION RATE: 18 BRPM | DIASTOLIC BLOOD PRESSURE: 93 MMHG | OXYGEN SATURATION: 98 % | SYSTOLIC BLOOD PRESSURE: 142 MMHG

## 2024-03-31 DIAGNOSIS — R10.13 EPIGASTRIC PAIN: Primary | ICD-10-CM

## 2024-03-31 LAB
BASOPHILS # BLD AUTO: 0.04 THOUSANDS/ÂΜL (ref 0–0.1)
BASOPHILS NFR BLD AUTO: 0 % (ref 0–1)
EOSINOPHIL # BLD AUTO: 0.07 THOUSAND/ÂΜL (ref 0–0.61)
EOSINOPHIL NFR BLD AUTO: 1 % (ref 0–6)
ERYTHROCYTE [DISTWIDTH] IN BLOOD BY AUTOMATED COUNT: 13.6 % (ref 11.6–15.1)
HCT VFR BLD AUTO: 42.4 % (ref 36.5–49.3)
HGB BLD-MCNC: 13.9 G/DL (ref 12–17)
IMM GRANULOCYTES # BLD AUTO: 0.07 THOUSAND/UL (ref 0–0.2)
IMM GRANULOCYTES NFR BLD AUTO: 1 % (ref 0–2)
LYMPHOCYTES # BLD AUTO: 2.76 THOUSANDS/ÂΜL (ref 0.6–4.47)
LYMPHOCYTES NFR BLD AUTO: 26 % (ref 14–44)
MCH RBC QN AUTO: 27.7 PG (ref 26.8–34.3)
MCHC RBC AUTO-ENTMCNC: 32.8 G/DL (ref 31.4–37.4)
MCV RBC AUTO: 85 FL (ref 82–98)
MONOCYTES # BLD AUTO: 1.26 THOUSAND/ÂΜL (ref 0.17–1.22)
MONOCYTES NFR BLD AUTO: 12 % (ref 4–12)
NEUTROPHILS # BLD AUTO: 6.55 THOUSANDS/ÂΜL (ref 1.85–7.62)
NEUTS SEG NFR BLD AUTO: 60 % (ref 43–75)
NRBC BLD AUTO-RTO: 0 /100 WBCS
PLATELET # BLD AUTO: 235 THOUSANDS/UL (ref 149–390)
PMV BLD AUTO: 10.5 FL (ref 8.9–12.7)
RBC # BLD AUTO: 5.02 MILLION/UL (ref 3.88–5.62)
WBC # BLD AUTO: 10.75 THOUSAND/UL (ref 4.31–10.16)

## 2024-03-31 PROCEDURE — 80053 COMPREHEN METABOLIC PANEL: CPT

## 2024-03-31 PROCEDURE — 93005 ELECTROCARDIOGRAM TRACING: CPT

## 2024-03-31 PROCEDURE — 83690 ASSAY OF LIPASE: CPT

## 2024-03-31 PROCEDURE — 99284 EMERGENCY DEPT VISIT MOD MDM: CPT

## 2024-03-31 PROCEDURE — 36415 COLL VENOUS BLD VENIPUNCTURE: CPT

## 2024-03-31 PROCEDURE — 85025 COMPLETE CBC W/AUTO DIFF WBC: CPT

## 2024-03-31 RX ORDER — MAGNESIUM HYDROXIDE/ALUMINUM HYDROXICE/SIMETHICONE 120; 1200; 1200 MG/30ML; MG/30ML; MG/30ML
30 SUSPENSION ORAL ONCE
Status: COMPLETED | OUTPATIENT
Start: 2024-03-31 | End: 2024-03-31

## 2024-03-31 RX ADMIN — ALUMINUM HYDROXIDE, MAGNESIUM HYDROXIDE, AND DIMETHICONE 30 ML: 200; 20; 200 SUSPENSION ORAL at 23:37

## 2024-04-01 DIAGNOSIS — K59.00 CONSTIPATION: ICD-10-CM

## 2024-04-01 LAB
ALBUMIN SERPL BCP-MCNC: 4.1 G/DL (ref 3.5–5)
ALP SERPL-CCNC: 111 U/L (ref 34–104)
ALT SERPL W P-5'-P-CCNC: 112 U/L (ref 7–52)
ANION GAP SERPL CALCULATED.3IONS-SCNC: 10 MMOL/L (ref 4–13)
AST SERPL W P-5'-P-CCNC: 113 U/L (ref 13–39)
ATRIAL RATE: 99 BPM
BILIRUB SERPL-MCNC: 0.27 MG/DL (ref 0.2–1)
BUN SERPL-MCNC: 14 MG/DL (ref 5–25)
CALCIUM SERPL-MCNC: 9.4 MG/DL (ref 8.4–10.2)
CHLORIDE SERPL-SCNC: 103 MMOL/L (ref 96–108)
CO2 SERPL-SCNC: 25 MMOL/L (ref 21–32)
CREAT SERPL-MCNC: 1.16 MG/DL (ref 0.6–1.3)
GFR SERPL CREATININE-BSD FRML MDRD: 90 ML/MIN/1.73SQ M
GLUCOSE SERPL-MCNC: 146 MG/DL (ref 65–140)
LIPASE SERPL-CCNC: 46 U/L (ref 11–82)
P AXIS: 75 DEGREES
POTASSIUM SERPL-SCNC: 4.2 MMOL/L (ref 3.5–5.3)
PR INTERVAL: 134 MS
PROT SERPL-MCNC: 7.9 G/DL (ref 6.4–8.4)
QRS AXIS: 80 DEGREES
QRSD INTERVAL: 82 MS
QT INTERVAL: 350 MS
QTC INTERVAL: 449 MS
SODIUM SERPL-SCNC: 138 MMOL/L (ref 135–147)
T WAVE AXIS: 21 DEGREES
VENTRICULAR RATE: 99 BPM

## 2024-04-01 PROCEDURE — 93010 ELECTROCARDIOGRAM REPORT: CPT | Performed by: INTERNAL MEDICINE

## 2024-04-01 RX ORDER — ALUMINUM HYDROXIDE, MAGNESIUM HYDROXIDE, SIMETHICONE 400; 400; 40 MG/10ML; MG/10ML; MG/10ML
30 SUSPENSION ORAL 2 TIMES DAILY PRN
Qty: 355 ML | Refills: 0 | Status: SHIPPED | OUTPATIENT
Start: 2024-04-01

## 2024-04-01 NOTE — DISCHARGE INSTRUCTIONS
Today I provided prescription for Maalox.  Use Maalox when experiencing acid reflux.  If symptoms persist, discuss with your primary care provider to see if further referral needed.  Please do return to ED for new or worsening symptoms.

## 2024-04-01 NOTE — ED PROVIDER NOTES
History  Chief Complaint   Patient presents with    Abdominal Pain     Pt reports abdominal pain starting at 2100. -n/v/d states symptoms began after eating rise. No meds pta.       19 yo male presents to ED for evaluation of abdominal pain.  Patient states that around 9 PM he started experiencing epigastric abdominal pain.  Pain started after he ate dinner which consisted of rice along with macaroni and cheese.  Denies prior history of similar symptoms.  Pain is located in the midsternal region.  Denies radiation of the pain.  Denies shortness of breath, fever, chills, nausea, vomiting, chest pain, diaphoresis, seizure, syncope, numbness tingling of extremities.  No medication prior to arrival.  No recent medication changes.  Denies history of cholelithiasis, cholecystitis, hepatitis, pancreatitis, diverticulitis.  No known history of GERD.  Does have history of constipation.  Last bowel movement was today.  Patient states that it was normal in appearance.  No other concerns at this time.         Prior to Admission Medications   Prescriptions Last Dose Informant Patient Reported? Taking?   Acetaminophen Extra Strength 500 MG TABS   Yes No   Incontinence Supply Disposable (Comfort Shield Adult Diapers) [facility] [place]  Outside Facility (Specify), Self No No   Sig: Use 1 packet 4 (four) times a day   Patient not taking: Reported on 1/12/2023   OLANZapine (ZyPREXA) 20 MG tablet   No No   Sig: Take 1 tablet (20 mg total) by mouth daily at bedtime   acetaminophen (TYLENOL) 650 mg CR tablet  Self, Outside Facility (Specify) No No   Sig: Take 1 tablet (650 mg total) by mouth every 8 (eight) hours as needed for mild pain, moderate pain or headaches   Patient not taking: Reported on 2/16/2024   aluminum-magnesium hydroxide-simethicone (MAALOX MAX) 400-400-40 MG/5ML suspension   No No   Sig: Take 10 mL by mouth every 6 (six) hours as needed for indigestion or heartburn   chlorproMAZINE (THORAZINE) 100 mg tablet   No No   Sig: Take 1  tablet (100 mg total) by mouth 2 (two) times a day One in the morning, One in the afternoon.   chlorproMAZINE (THORAZINE) 50 mg tablet   No No   Sig: Take 3 tablets (150 mg total) by mouth daily at bedtime   cholecalciferol (VITAMIN D3) 1,000 units tablet   No No   Sig: Take 1 tablet (1,000 Units total) by mouth daily   cloNIDine (CATAPRES) 0.2 mg tablet   No No   Sig: Take 1 tablet (0.2 mg total) by mouth 3 (three) times a day   clonazePAM (KlonoPIN) 1 mg tablet   No No   Sig: Take 1 tablet (1 mg total) by mouth daily   cyanocobalamin (VITAMIN B-12) 500 MCG tablet   No No   Sig: Take 1 tablet (500 mcg total) by mouth daily   desmopressin (DDAVP) 0.2 mg tablet   No No   Sig: Take 2 tablets (0.4 mg total) by mouth daily at bedtime   diphenhydrAMINE (GNP Allergy Relief) 12.5 MG chewable tablet   No No   Sig: Chew 1 tablet (12.5 mg total) in the morning   divalproex sodium (DEPAKOTE ER) 250 mg 24 hr tablet   No No   Sig: Take 1 tablet (250 mg total) by mouth daily at bedtime   divalproex sodium (Depakote) 500 mg DR tablet   No No   Sig: Take 3 tablets (1,500 mg total) by mouth every 8 (eight) hours   ibuprofen (MOTRIN) 400 mg tablet   Yes No   Sig: Take 400 mg by mouth every 6 (six) hours as needed for mild pain   metFORMIN (GLUCOPHAGE) 1000 MG tablet   No No   Sig: Take 1 tablet (1,000 mg total) by mouth 2 (two) times a day with meals   metoprolol succinate (TOPROL-XL) 50 mg 24 hr tablet   No No   Sig: Take 1 tablet (50 mg total) by mouth daily   paliperidone (INVEGA) 6 MG 24 hr tablet   No No   Sig: Take 1 tablet (6 mg total) by mouth every morning   polyethylene glycol (MIRALAX) 17 g packet   No No   Sig: Take 17 g by mouth daily   senna-docusate sodium (SENOKOT S) 8.6-50 mg per tablet   No No   Sig: Take 1 tablet by mouth 2 (two) times a day      Facility-Administered Medications: None       Past Medical History:   Diagnosis Date    Chronic headaches     Cognitive impairment     Psychiatric illness        Past  Surgical History:   Procedure Laterality Date    WRIST SURGERY Right        Family History   Problem Relation Age of Onset    No Known Problems Mother     No Known Problems Father      I have reviewed and agree with the history as documented.    E-Cigarette/Vaping    E-Cigarette Use Never User      E-Cigarette/Vaping Substances    Nicotine No     THC No     CBD No     Flavoring Yes     Other No     Unknown No      Social History     Tobacco Use    Smoking status: Never     Passive exposure: Past    Smokeless tobacco: Never   Vaping Use    Vaping status: Never Used   Substance Use Topics    Alcohol use: Never    Drug use: Not Currently     Types: Marijuana       Review of Systems   Constitutional:  Negative for chills, diaphoresis, fatigue and fever.   HENT:  Negative for ear pain and sore throat.    Eyes:  Negative for pain and visual disturbance.   Respiratory:  Negative for cough, shortness of breath, wheezing and stridor.    Cardiovascular:  Negative for chest pain and palpitations.   Gastrointestinal:  Positive for abdominal pain. Negative for blood in stool, constipation, diarrhea, nausea and vomiting.   Genitourinary:  Negative for dysuria and hematuria.   Musculoskeletal:  Negative for arthralgias and back pain.   Skin:  Negative for color change and rash.   Neurological:  Negative for seizures and syncope.   All other systems reviewed and are negative.      Physical Exam  Physical Exam  Vitals and nursing note reviewed.   Constitutional:       General: He is not in acute distress.     Appearance: He is well-developed. He is not ill-appearing or toxic-appearing.   HENT:      Head: Normocephalic and atraumatic.   Eyes:      Conjunctiva/sclera: Conjunctivae normal.   Cardiovascular:      Rate and Rhythm: Regular rhythm. Tachycardia present.      Heart sounds: Normal heart sounds. No murmur heard.     No friction rub. No gallop.   Pulmonary:      Effort: Pulmonary effort is normal. No respiratory distress.       Breath sounds: Normal breath sounds. No stridor. No wheezing, rhonchi or rales.   Abdominal:      Palpations: Abdomen is soft. There is no shifting dullness, fluid wave, hepatomegaly, splenomegaly or mass.      Tenderness: There is abdominal tenderness in the epigastric area. There is no right CVA tenderness, left CVA tenderness, guarding or rebound. Negative signs include Martinez's sign, Rovsing's sign and McBurney's sign.      Hernia: No hernia is present.   Musculoskeletal:         General: No swelling.      Cervical back: Neck supple.   Skin:     General: Skin is warm and dry.      Capillary Refill: Capillary refill takes less than 2 seconds.      Coloration: Skin is not cyanotic or jaundiced.      Findings: No rash.   Neurological:      Mental Status: He is alert.   Psychiatric:         Mood and Affect: Mood normal.         Vital Signs  ED Triage Vitals [03/31/24 2306]   Temperature Pulse Respirations Blood Pressure SpO2   98.1 °F (36.7 °C) (!) 109 18 142/93 98 %      Temp Source Heart Rate Source Patient Position - Orthostatic VS BP Location FiO2 (%)   Oral Monitor Sitting Left arm --      Pain Score       --           Vitals:    03/31/24 2306   BP: 142/93   Pulse: (!) 109   Patient Position - Orthostatic VS: Sitting         Visual Acuity      ED Medications  Medications   aluminum-magnesium hydroxide-simethicone (MAALOX) oral suspension 30 mL (30 mL Oral Given 3/31/24 2337)       Diagnostic Studies  Results Reviewed       Procedure Component Value Units Date/Time    Comprehensive metabolic panel [[id number]]  (Abnormal) Collected: 03/31/24 2337    Lab Status: Final result Specimen: Blood from Arm, Left Updated: 04/01/24 0006     Sodium 138 mmol/L      Potassium 4.2 mmol/L      Chloride 103 mmol/L      CO2 25 mmol/L      ANION GAP 10 mmol/L      BUN 14 mg/dL      Creatinine 1.16 mg/dL      Glucose 146 mg/dL      Calcium 9.4 mg/dL       U/L       U/L      Alkaline Phosphatase 111 U/L      Total  Protein 7.9 g/dL      Albumin 4.1 g/dL      Total Bilirubin 0.27 mg/dL      eGFR 90 ml/min/1.73sq m     Narrative:      National Kidney Disease Foundation guidelines for Chronic Kidney Disease (CKD):     Stage 1 with normal or high GFR (GFR > 90 mL/min/1.73 square meters)    Stage 2 Mild CKD (GFR = 60-89 mL/min/1.73 square meters)    Stage 3A Moderate CKD (GFR = 45-59 mL/min/1.73 square meters)    Stage 3B Moderate CKD (GFR = 30-44 mL/min/1.73 square meters)    Stage 4 Severe CKD (GFR = 15-29 mL/min/1.73 square meters)    Stage 5 End Stage CKD (GFR <15 mL/min/1.73 square meters)  Note: GFR calculation is accurate only with a steady state creatinine    Lipase [[id number]]  (Normal) Collected: 03/31/24 2337    Lab Status: Final result Specimen: Blood from Arm, Left Updated: 04/01/24 0006     Lipase 46 u/L     CBC and differential [[id number]]  (Abnormal) Collected: 03/31/24 2337    Lab Status: Final result Specimen: Blood from Arm, Left Updated: 03/31/24 2346     WBC 10.75 Thousand/uL      RBC 5.02 Million/uL      Hemoglobin 13.9 g/dL      Hematocrit 42.4 %      MCV 85 fL      MCH 27.7 pg      MCHC 32.8 g/dL      RDW 13.6 %      MPV 10.5 fL      Platelets 235 Thousands/uL      nRBC 0 /100 WBCs      Neutrophils Relative 60 %      Immature Grans % 1 %      Lymphocytes Relative 26 %      Monocytes Relative 12 %      Eosinophils Relative 1 %      Basophils Relative 0 %      Neutrophils Absolute 6.55 Thousands/µL      Absolute Immature Grans 0.07 Thousand/uL      Absolute Lymphocytes 2.76 Thousands/µL      Absolute Monocytes 1.26 Thousand/µL      Eosinophils Absolute 0.07 Thousand/µL      Basophils Absolute 0.04 Thousands/µL                    No orders to display              Procedures  Procedures         ED Course                                   Bradford' Criteria for PE      Flowsheet Row Most Recent Value   Bradford' Criteria for PE    Clinical signs and symptoms of DVT 0 Filed at: 03/31/2024 2316   PE is primary diagnosis  or equally likely 0 Filed at: 03/31/2024 2316   HR >100 1.5 Filed at: 03/31/2024 2316   Immobilization at least 3 days or Surgery in the previous 4 weeks 0 Filed at: 03/31/2024 2316   Previous, objectively diagnosed PE or DVT 0 Filed at: 03/31/2024 2316   Hemoptysis 0 Filed at: 03/31/2024 2316   Malignancy with treatment within 6 months or palliative 0 Filed at: 03/31/2024 2316   Wells' Criteria Total 1.5 Filed at: 03/31/2024 2316                  Medical Decision Making  18-year-old male presents to ED for evaluation of abdominal pain as above.  Around 9 PM he began experiencing epigastric pain after eating dinner consisting of rice, macaroni and cheese.  On physical examination patient vital signs stable.  Afebrile, normotensive.  Mildly tachycardic at 109 bpm.  Alert and responding question appropriately.  No murmur.  Normal breath sounds.  Noncyanotic.  No rash.  Mild tenderness palpation of epigastric region.  No overlying skin changes of abdomen.  Examination inconsistent with acute abdomen.  Suspect patient experiencing viral GI infection versus acid reflux.  Will check labs consisting of CBC, CMP, lipase.  Dose of Maalox for suspected acid reflux.    CBC returned without concerning values.  CMP returned with mildly elevated liver enzymes which is consistent with previous measures for patient.  Lipase returned WNL.    Patient reports feeling better after Maalox.  Plan to discharge patient with monitoring of symptoms.  Advised to discuss symptoms if they persist with primary care provider.  Providing patient with prescription for Maalox to use as needed for suspected acid reflux. Patient should certainly return to ED for any new or worsening symptoms.     Prior to discharge, discharge instructions were discussed with patient at bedside. Patient was provided both verbal and written instructions. Patient is understanding of the discharge instructions and is agreeable to plan of care. Return precautions were  discussed with patient bedside, patient verbalized understanding of signs and symptoms that would necessitate return to the ED. All questions were answered. Patient was comfortable with the plan of care and discharged to home.     Amount and/or Complexity of Data Reviewed  Labs: ordered.    Risk  OTC drugs.             Disposition  Final diagnoses:   Epigastric pain     Time reflects when diagnosis was documented in both MDM as applicable and the Disposition within this note       Time User Action Codes Description Comment    4/1/2024 12:25 AM Basilio Hubbard Add [R10.13] Epigastric pain           ED Disposition       ED Disposition   Discharge    Condition   Stable    Date/Time   Mon Apr 1, 2024 0025    Comment   Manuel HANDLEY Wong discharge to home/self care.                   Follow-up Information       Follow up With Specialties Details Why Contact Info    [facility], Nurse Practitioner   [address] [address] [phone]              Discharge Medication List as of 4/1/2024 12:28 AM        START taking these medications    Details   aluminum-magnesium hydroxide-simethicone (MAALOX) 200-200-20 MG/5ML SUSP Take 30 mL by mouth 2 (two) times a day as needed for heartburn, Starting Mon 4/1/2024, Normal           CONTINUE these medications which have NOT CHANGED    Details   acetaminophen (TYLENOL) 650 mg CR tablet Take 1 tablet (650 mg total) by mouth every 8 (eight) hours as needed for mild pain, moderate pain or headaches, Starting Wed 4/5/2023, Print      Acetaminophen Extra Strength 500 MG TABS Historical Med      aluminum-magnesium hydroxide-simethicone (MAALOX MAX) 400-400-40 MG/5ML suspension Take 10 mL by mouth every 6 (six) hours as needed for indigestion or heartburn, Starting Mon 3/11/2024, Normal      !! chlorproMAZINE (THORAZINE) 100 mg tablet Take 1 tablet (100 mg total) by mouth 2 (two) times a day One in the morning, One in the afternoon.,  Starting Tue 2/6/2024, Until Sat 4/6/2024, Normal      !! chlorproMAZINE (THORAZINE) 50 mg tablet Take 3 tablets (150 mg total) by mouth daily at bedtime, Starting Tue 2/6/2024, Until Sat 4/6/2024, Normal      cholecalciferol (VITAMIN D3) 1,000 units tablet Take 1 tablet (1,000 Units total) by mouth daily, Starting Thu 10/19/2023, Normal      clonazePAM (KlonoPIN) 1 mg tablet Take 1 tablet (1 mg total) by mouth daily, Starting Wed 2/7/2024, Until Fri 3/8/2024, Normal      cloNIDine (CATAPRES) 0.2 mg tablet Take 1 tablet (0.2 mg total) by mouth 3 (three) times a day, Starting Fri 2/16/2024, No Print      cyanocobalamin (VITAMIN B-12) 500 MCG tablet Take 1 tablet (500 mcg total) by mouth daily, Starting Tue 9/5/2023, Normal      desmopressin (DDAVP) 0.2 mg tablet Take 2 tablets (0.4 mg total) by mouth daily at bedtime, Starting Tue 2/6/2024, Until Sat 4/6/2024, Normal      diphenhydrAMINE (GNP Allergy Relief) 12.5 MG chewable tablet Chew 1 tablet (12.5 mg total) in the morning, Starting Tue 10/10/2023, Normal      divalproex sodium (DEPAKOTE ER) 250 mg 24 hr tablet Take 1 tablet (250 mg total) by mouth daily at bedtime, Starting Fri 2/16/2024, Until Tue 4/16/2024, No Print      divalproex sodium (Depakote) 500 mg DR tablet Take 3 tablets (1,500 mg total) by mouth every 8 (eight) hours, Starting Fri 2/16/2024, No Print      ibuprofen (MOTRIN) 400 mg tablet Take 400 mg by mouth every 6 (six) hours as needed for mild pain, Historical Med      Incontinence Supply Disposable (Comfort Shield Adult Diapers) MISC Use 1 packet 4 (four) times a day, Starting Mon 10/17/2022, Print      metFORMIN (GLUCOPHAGE) 1000 MG tablet Take 1 tablet (1,000 mg total) by mouth 2 (two) times a day with meals, Starting Thu 3/28/2024, Normal      metoprolol succinate (TOPROL-XL) 50 mg 24 hr tablet Take 1 tablet (50 mg total) by mouth daily, Starting Wed 3/13/2024, Normal      OLANZapine (ZyPREXA) 20 MG tablet Take 1 tablet (20 mg total) by mouth  daily at bedtime, Starting Wed 2/7/2024, Until Sun 4/7/2024, Normal      paliperidone (INVEGA) 6 MG 24 hr tablet Take 1 tablet (6 mg total) by mouth every morning, Starting Fri 2/16/2024, No Print      polyethylene glycol (MIRALAX) 17 g packet Take 17 g by mouth daily, Starting Wed 2/7/2024, Until Sun 4/7/2024, Normal      senna-docusate sodium (SENOKOT S) 8.6-50 mg per tablet Take 1 tablet by mouth 2 (two) times a day, Starting Tue 2/6/2024, Until Sat 4/6/2024, Normal       !! - Potential duplicate medications found. Please discuss with provider.          No discharge procedures on file.    PDMP Review         Value Time User    PDMP Reviewed  Yes 2/7/2024 12:47 PM Dimitris Grewal MD            ED Provider  Electronically Signed by             Basilio Hubbrad PA-C  04/01/24 0505

## 2024-04-05 ENCOUNTER — OFFICE VISIT (OUTPATIENT)
Dept: FAMILY MEDICINE CLINIC | Facility: CLINIC | Age: 19
End: 2024-04-05

## 2024-04-05 ENCOUNTER — TELEPHONE (OUTPATIENT)
Dept: FAMILY MEDICINE CLINIC | Facility: CLINIC | Age: 19
End: 2024-04-05

## 2024-04-05 VITALS
HEART RATE: 88 BPM | HEIGHT: 68 IN | RESPIRATION RATE: 18 BRPM | TEMPERATURE: 98.1 F | BODY MASS INDEX: 47.74 KG/M2 | WEIGHT: 315 LBS | OXYGEN SATURATION: 96 %

## 2024-04-05 DIAGNOSIS — K59.00 CONSTIPATION: ICD-10-CM

## 2024-04-05 DIAGNOSIS — F25.9 SCHIZOAFFECTIVE DISORDER (HCC): ICD-10-CM

## 2024-04-05 DIAGNOSIS — R10.84 GENERALIZED ABDOMINAL PAIN: Primary | ICD-10-CM

## 2024-04-05 PROCEDURE — 99213 OFFICE O/P EST LOW 20 MIN: CPT | Performed by: FAMILY MEDICINE

## 2024-04-05 RX ORDER — AMOXICILLIN 250 MG
1 CAPSULE ORAL 2 TIMES DAILY
Qty: 60 TABLET | Refills: 1 | Status: SHIPPED | OUTPATIENT
Start: 2024-04-05 | End: 2024-06-04

## 2024-04-05 RX ORDER — OLANZAPINE 20 MG/1
20 TABLET ORAL
Qty: 30 TABLET | Refills: 1 | Status: SHIPPED | OUTPATIENT
Start: 2024-04-05 | End: 2024-06-04

## 2024-04-05 NOTE — TELEPHONE ENCOUNTER
Folder (Dr Samuel) -     Name of Form - Person Directed Supports Medical Exam [place] FORM    Color folder (patient's caregiver to give to provider    Form to be  Picked up (Caregiver) -    Patient was made aware of the 7-10 business day form policy.

## 2024-04-06 NOTE — PROGRESS NOTES
Name: Manuel Back      : 2005      MRN: [id number]  Encounter Provider: Isak Samuel MD  Encounter Date: 2024   Encounter department: [facility]    Assessment & Plan     1. Generalized abdominal pain  Assessment & Plan:  Patient presenting for ED follow up after being seen on 3/31/24 for generalized abdominal pain. On chart review, workup in the ED was minimal and the pain ultimately resolved with Maalox. He was discharged in good condition. The pain has not returned and he now has maalox available prn at his facility. Caretakers had no additional concerns. Physical exam unremarkable.  - Continue Maalox prn      2. Schizoaffective disorder (HCC)  Assessment & Plan:  Caretakers requested refill of zyprexa  - Ordered    Orders:  -     OLANZapine (ZyPREXA) 20 MG tablet; Take 1 tablet (20 mg total) by mouth daily at bedtime    3. Constipation  Assessment & Plan:  Caretakers requested refill of senokot  - Ordered    Orders:  -     senna-docusate sodium (SENOKOT S) 8.6-50 mg per tablet; Take 1 tablet by mouth 2 (two) times a day           Subjective      17 yo M with intellectual disability and schizophrenia presenting for ED follow up after an episode of abdominal pain. Abdominal pain may have brought on by diet and resolved in the ED with Maalox. The pain has not returned. He or his caretakers did not have additional concerns today.       Review of Systems   Constitutional:  Negative for fever.   Eyes: Negative.    Respiratory: Negative.     Cardiovascular: Negative.    Gastrointestinal:  Positive for abdominal pain (resolved) and constipation (chronic). Negative for diarrhea and vomiting.   Genitourinary:  Positive for enuresis.   Musculoskeletal: Negative.    Skin: Negative.    Neurological: Negative.    Psychiatric/Behavioral:  Positive for behavioral problems and dysphoric mood.    All other systems reviewed and are negative.      Current  Outpatient Medications on File Prior to Visit   Medication Sig    Acetaminophen Extra Strength 500 MG TABS     aluminum-magnesium hydroxide-simethicone (MAALOX MAX) 400-400-40 MG/5ML suspension Take 10 mL by mouth every 6 (six) hours as needed for indigestion or heartburn    aluminum-magnesium hydroxide-simethicone (MAALOX) 200-200-20 MG/5ML SUSP Take 30 mL by mouth 2 (two) times a day as needed for heartburn    chlorproMAZINE (THORAZINE) 100 mg tablet Take 1 tablet (100 mg total) by mouth 2 (two) times a day One in the morning, One in the afternoon.    chlorproMAZINE (THORAZINE) 50 mg tablet Take 3 tablets (150 mg total) by mouth daily at bedtime    clonazePAM (KlonoPIN) 1 mg tablet Take 1 tablet (1 mg total) by mouth daily    cloNIDine (CATAPRES) 0.2 mg tablet Take 1 tablet (0.2 mg total) by mouth 3 (three) times a day    cyanocobalamin (VITAMIN B-12) 500 MCG tablet Take 1 tablet (500 mcg total) by mouth daily    desmopressin (DDAVP) 0.2 mg tablet Take 2 tablets (0.4 mg total) by mouth daily at bedtime    diphenhydrAMINE (GNP Allergy Relief) 12.5 MG chewable tablet Chew 1 tablet (12.5 mg total) in the morning    divalproex sodium (DEPAKOTE ER) 250 mg 24 hr tablet Take 1 tablet (250 mg total) by mouth daily at bedtime    divalproex sodium (Depakote) 500 mg DR tablet Take 3 tablets (1,500 mg total) by mouth every 8 (eight) hours    ibuprofen (MOTRIN) 400 mg tablet Take 400 mg by mouth every 6 (six) hours as needed for mild pain    metFORMIN (GLUCOPHAGE) 1000 MG tablet Take 1 tablet (1,000 mg total) by mouth 2 (two) times a day with meals    metoprolol succinate (TOPROL-XL) 50 mg 24 hr tablet Take 1 tablet (50 mg total) by mouth daily    paliperidone (INVEGA) 6 MG 24 hr tablet Take 1 tablet (6 mg total) by mouth every morning    polyethylene glycol (MIRALAX) 17 g packet Take 17 g by mouth daily    [DISCONTINUED] OLANZapine (ZyPREXA) 20 MG tablet Take 1 tablet (20 mg total) by mouth daily at bedtime    [DISCONTINUED]  "senna-docusate sodium (SENOKOT S) 8.6-50 mg per tablet Take 1 tablet by mouth 2 (two) times a day    acetaminophen (TYLENOL) 650 mg CR tablet Take 1 tablet (650 mg total) by mouth every 8 (eight) hours as needed for mild pain, moderate pain or headaches (Patient not taking: Reported on 2/16/2024)    cholecalciferol (VITAMIN D3) 1,000 units tablet Take 1 tablet (1,000 Units total) by mouth daily (Patient not taking: Reported on 4/5/2024)    Incontinence Supply Disposable (Comfort Shield Adult Diapers) MISC Use 1 packet 4 (four) times a day (Patient not taking: Reported on 1/12/2023)       Objective     Pulse 88   Temp 98.1 °F (36.7 °C) (Temporal)   Resp 18   Ht 5' 8.2\" (1.732 m)   Wt (!) 144 kg (317 lb 6.4 oz)   SpO2 96%   BMI 47.98 kg/m²     Physical Exam  Vitals reviewed.   Constitutional:       Appearance: He is obese.   HENT:      Head: Normocephalic.      Right Ear: Tympanic membrane, ear canal and external ear normal. There is no impacted cerumen.      Left Ear: Tympanic membrane, ear canal and external ear normal. There is no impacted cerumen.      Nose: Nose normal.      Mouth/Throat:      Mouth: Mucous membranes are moist.      Pharynx: Oropharynx is clear. No posterior oropharyngeal erythema.   Eyes:      Extraocular Movements: Extraocular movements intact.      Pupils: Pupils are equal, round, and reactive to light.   Cardiovascular:      Rate and Rhythm: Normal rate and regular rhythm.      Heart sounds: Normal heart sounds.   Pulmonary:      Comments: Poor effort, otherwise clear b/l  Abdominal:      General: Bowel sounds are normal. There is no distension.      Palpations: Abdomen is soft. There is no mass.      Tenderness: There is no abdominal tenderness. There is no right CVA tenderness, left CVA tenderness or guarding.   Musculoskeletal:      Right lower leg: No edema.      Left lower leg: No edema.   Lymphadenopathy:      Cervical: No cervical adenopathy.   Skin:     Findings: No rash. "   Neurological:      Mental Status: He is disoriented.   Psychiatric:         Attention and Perception: Attention normal.         Mood and Affect: Affect is flat.         Speech: Speech is delayed.         Behavior: Behavior is slowed. Behavior is cooperative.         Cognition and Memory: Cognition is impaired.         Judgment: Judgment is not inappropriate.       Isak Samuel MD

## 2024-04-06 NOTE — ASSESSMENT & PLAN NOTE
Patient presenting for ED follow up after being seen on 3/31/24 for generalized abdominal pain. On chart review, workup in the ED was minimal and the pain ultimately resolved with Maalox. He was discharged in good condition. The pain has not returned and he now has maalox available prn at his facility. Caretakers had no additional concerns. Physical exam unremarkable.  - Continue Maalox prn

## 2024-04-08 ENCOUNTER — TELEPHONE (OUTPATIENT)
Dept: FAMILY MEDICINE CLINIC | Facility: CLINIC | Age: 19
End: 2024-04-08

## 2024-04-08 RX ORDER — SENNOSIDES AND DOCUSATE SODIUM 8.6; 5 MG/1; MG/1
TABLET ORAL
Qty: 60 TABLET | Refills: 0 | OUTPATIENT
Start: 2024-04-08

## 2024-04-09 ENCOUNTER — VBI (OUTPATIENT)
Dept: ADMINISTRATIVE | Facility: OTHER | Age: 19
End: 2024-04-09

## 2024-04-11 ENCOUNTER — TELEPHONE (OUTPATIENT)
Dept: FAMILY MEDICINE CLINIC | Facility: CLINIC | Age: 19
End: 2024-04-11

## 2024-04-11 NOTE — TELEPHONE ENCOUNTER
Cathy, [facility] member services, called stating that a prior authorization is needed for Clonazepam. Transferred to pharmacy services, phone disconnected. Clonazepam not ordered by our office.

## 2024-04-11 NOTE — TELEPHONE ENCOUNTER
Completed prior authorization for Olanzapine 20 mg tablet and faxed to [facility] at [phone]. Will follow up in 2-3 business days.

## 2024-04-16 ENCOUNTER — CLINICAL SUPPORT (OUTPATIENT)
Dept: FAMILY MEDICINE CLINIC | Facility: CLINIC | Age: 19
End: 2024-04-16

## 2024-04-16 ENCOUNTER — TELEPHONE (OUTPATIENT)
Dept: FAMILY MEDICINE CLINIC | Facility: CLINIC | Age: 19
End: 2024-04-16

## 2024-04-16 DIAGNOSIS — Z11.1 ENCOUNTER FOR PPD TEST: Primary | ICD-10-CM

## 2024-04-16 PROCEDURE — 86580 TB INTRADERMAL TEST: CPT

## 2024-04-16 NOTE — TELEPHONE ENCOUNTER
Patient presented to office accompanied by caregivers to receive PPD placement. Tubersol 0.1mL, LOT number [id number]. Given in right lower forearm, patient tolerated well. Advised to return on Thursday 4/18 AFTER 3 pm for PPD reading. Patient DOES NOT have paperwork to complete for the reading.

## 2024-04-16 NOTE — TELEPHONE ENCOUNTER
Folder ([place]) -     Name of Form - PDS Medical Examination [place]     Color folder (Given to MA at time of visit    Form to be Faxed Picked up (Caregiver) -    Patient was made aware of the 7-10 business day form policy.

## 2024-04-16 NOTE — PROGRESS NOTES
Patient presented to office accompanied by caregivers to receive PPD placement. Tubersol 0.1mL, LOT number [id number]. Given in right lower forearm, patient tolerated well. Advised to return on Thursday 4/18 AFTER 3 pm for PPD reading.

## 2024-04-17 DIAGNOSIS — I10 ESSENTIAL HYPERTENSION: ICD-10-CM

## 2024-04-17 NOTE — TELEPHONE ENCOUNTER
Reason for call:   [x] Refill   [] Prior Auth  [] Other:     Office:   [] PCP/Provider -   [x] Specialty/Provider - Uro    Medication: desmopressin     Dose/Frequency: 0.2 mg 2 tabs daily    Quantity: 30D w refills     Pharmacy: [facility] on file     Does the patient have enough for 3 days?   [x] Yes   [] No - Send as HP to POD

## 2024-04-18 ENCOUNTER — CLINICAL SUPPORT (OUTPATIENT)
Dept: FAMILY MEDICINE CLINIC | Facility: CLINIC | Age: 19
End: 2024-04-18

## 2024-04-18 DIAGNOSIS — Z11.1 ENCOUNTER FOR PPD TEST: Primary | ICD-10-CM

## 2024-04-18 RX ORDER — DESMOPRESSIN ACETATE 0.2 MG/1
0.4 TABLET ORAL
Qty: 60 TABLET | Refills: 5 | Status: SHIPPED | OUTPATIENT
Start: 2024-04-18 | End: 2024-10-15

## 2024-05-07 ENCOUNTER — TELEPHONE (OUTPATIENT)
Dept: FAMILY MEDICINE CLINIC | Facility: CLINIC | Age: 19
End: 2024-05-07
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
## 2024-05-08 ENCOUNTER — CLINICAL SUPPORT (OUTPATIENT)
Dept: NUTRITION | Facility: HOSPITAL | Age: 19
End: 2024-05-08
Payer: COMMERCIAL

## 2024-05-08 VITALS — WEIGHT: 315 LBS | BODY MASS INDEX: 47.74 KG/M2

## 2024-05-08 DIAGNOSIS — E66.01 SEVERE CHILDHOOD OBESITY WITH BMI GREATER THAN 99TH PERCENTILE FOR AGE (HCC): Primary | ICD-10-CM

## 2024-05-08 NOTE — PROGRESS NOTES
" Nutrition Assessment Form    Patient Name: Manuel Back    YOB: 2005    Sex: Male     Assessment Date: 5/8/2024  Start Time: 1030 Stop Time: 11 Total Minutes: 30     Data:  Present at session: Self and 2 staff members   Parent/Patient Concerns/reason for visit: \"We are doing fine\"   Medical Dx/Reason for Referral: obesity   Past Medical History:   Diagnosis Date    Chronic headaches     Cognitive impairment     Psychiatric illness        Current Outpatient Medications   Medication Sig Dispense Refill    acetaminophen (TYLENOL) 650 mg CR tablet Take 1 tablet (650 mg total) by mouth every 8 (eight) hours as needed for mild pain, moderate pain or headaches (Patient not taking: Reported on 2/16/2024) 30 tablet 0    Acetaminophen Extra Strength 500 MG TABS       aluminum-magnesium hydroxide-simethicone (MAALOX MAX) 400-400-40 MG/5ML suspension Take 10 mL by mouth every 6 (six) hours as needed for indigestion or heartburn 355 mL 1    aluminum-magnesium hydroxide-simethicone (MAALOX) 200-200-20 MG/5ML SUSP Take 30 mL by mouth 2 (two) times a day as needed for heartburn 355 mL 0    chlorproMAZINE (THORAZINE) 100 mg tablet Take 1 tablet (100 mg total) by mouth 2 (two) times a day One in the morning, One in the afternoon. 60 tablet 1    chlorproMAZINE (THORAZINE) 50 mg tablet Take 3 tablets (150 mg total) by mouth daily at bedtime 90 tablet 1    cholecalciferol (VITAMIN D3) 1,000 units tablet Take 1 tablet (1,000 Units total) by mouth daily (Patient not taking: Reported on 4/5/2024) 90 tablet 3    clonazePAM (KlonoPIN) 1 mg tablet Take 1 tablet (1 mg total) by mouth daily 30 tablet 0    cloNIDine (CATAPRES) 0.2 mg tablet Take 1 tablet (0.2 mg total) by mouth 3 (three) times a day      cyanocobalamin (VITAMIN B-12) 500 MCG tablet Take 1 tablet (500 mcg total) by mouth daily 30 tablet 11    desmopressin (DDAVP) 0.2 mg tablet Take 2 tablets (0.4 mg total) by mouth daily at bedtime 60 tablet 5    " diphenhydrAMINE (GNP Allergy Relief) 12.5 MG chewable tablet Chew 1 tablet (12.5 mg total) in the morning 30 tablet 11    divalproex sodium (DEPAKOTE ER) 250 mg 24 hr tablet Take 1 tablet (250 mg total) by mouth daily at bedtime      divalproex sodium (Depakote) 500 mg DR tablet Take 3 tablets (1,500 mg total) by mouth every 8 (eight) hours      ibuprofen (MOTRIN) 400 mg tablet Take 400 mg by mouth every 6 (six) hours as needed for mild pain      Incontinence Supply Disposable (Comfort Shield Adult Diapers) MISC Use 1 packet 4 (four) times a day (Patient not taking: Reported on 1/12/2023) 48 each 11    metFORMIN (GLUCOPHAGE) 1000 MG tablet Take 1 tablet (1,000 mg total) by mouth 2 (two) times a day with meals 180 tablet 0    metoprolol succinate (TOPROL-XL) 50 mg 24 hr tablet Take 1 tablet (50 mg total) by mouth daily 90 tablet 1    OLANZapine (ZyPREXA) 20 MG tablet Take 1 tablet (20 mg total) by mouth daily at bedtime 30 tablet 1    paliperidone (INVEGA) 6 MG 24 hr tablet Take 1 tablet (6 mg total) by mouth every morning      polyethylene glycol (MIRALAX) 17 g packet Take 17 g by mouth daily 510 g 1    senna-docusate sodium (SENOKOT S) 8.6-50 mg per tablet Take 1 tablet by mouth 2 (two) times a day 60 tablet 1    Sunscreens (Coppertone Complete SPF30) LOTN Apply 1 Application topically if needed (for application before sun exposure) 207 mL 3     No current facility-administered medications for this visit.        Additional Meds/Supplements: N/a   Special Learning Needs/barriers to learning/any new barriers N/a   Height: HC Readings from Last 5 Encounters:   No data found for HC      Weight: Wt Readings from Last 10 Encounters:   04/05/24 (!) 144 kg (317 lb 6.4 oz) (>99%, Z= 3.25)*   03/31/24 (!) 144 kg (316 lb 12.8 oz) (>99%, Z= 3.25)*   03/28/24 (!) 142 kg (313 lb) (>99%, Z= 3.21)*   03/20/24 (!) 144 kg (317 lb) (>99%, Z= 3.25)*   03/13/24 (!) 143 kg (315 lb 9.6 oz) (>99%, Z= 3.23)*   03/07/24 (!) 142 kg (312 lb)  "(>99%, Z= 3.20)*   02/16/24 (!) 143 kg (314 lb 3.2 oz) (>99%, Z= 3.22)*   01/18/24 (!) 142 kg (313 lb 4.4 oz) (>99%, Z= 3.21)*   01/04/24 (!) 140 kg (308 lb) (>99%, Z= 3.15)*   12/17/23 (!) 140 kg (308 lb 6.8 oz) (>99%, Z= 3.15)*     * Growth percentiles are based on [facility] (Boys, 2-20 Years) data.     Estimated body mass index is 47.98 kg/m² as calculated from the following:    Height as of 4/5/24: 5' 8.2\" (1.732 m).    Weight as of 4/5/24: 144 kg (317 lb 6.4 oz).   Recent Weight Change: []Yes     [x]No  Amount:       Energy Needs: 1840cals (20cals/kg-1000 for 2#/wk wt loss)   Allergies   Allergen Reactions    Cholecalciferol Other (See Comments)    Pollen Extract Sneezing    or intolerances NKFA   Social History     Substance and Sexual Activity   Alcohol Use Never    N/a   Social History     Tobacco Use   Smoking Status Never    Passive exposure: Past   Smokeless Tobacco Never    N/a   Who shops? Group home   Who cooks/cooking methods/Eating out/take out habits   Group home  Cooking methods: bake/burroughs/air burroughs/grill/boil/other________    Minimal eating out   Exercise: Plays football in the spring and summer time   Other: ie: Sleep habits/ stress level/ work habits household-lives with ?/ food security Stress level 2/10 scale  Sleep schedule currently off- sleeping all day and up all night was in psychiatric hospital last month and that is when his sleep schedule changed- trying to get back on schedule now  Currently going to graduate [employer] this May- he is nervous   Prior Nutritional Counseling? []Yes     [x]No  When:      Why:         Diet Hx: smoothies and water 40oz approx and milk- 2% milk - fruit/hawaiian punch (20oz)  Breakfast: Diet:  mostly skips B and lunch because he is sleeping all day   Lunch: He will have letha side up eggs-        Dinner:  smoothie a few times a week maybe 3- 2-3c milk and few cups of frozen fruit and plain greek yogurt if it is avaiable, 24oz per smoothie and will have 3x/wk " 7-8pm- will have 2-3 thighs and 2-3 cups rice not many vegetables     Snacks:  will snack on fruits most days but candy on Wednesdays when he gest paid AM -   PM - 330pm   HS -    Other Notes/ Initial Assessment:  Manuel is her with 2 staff members from his group home.  He is a senior in high school and would like to lose weight, his sleep schedule is currently off because of his stay on the hospital last month.    Discussed importance of being active,  regular sleep schedule, regular meals, daily activity, encouraged trying new foods and increasing vegetables intake in general, minimizing calories from drinks, portions per plate method.  Provided Tips for wt loss and [address] sample menus and healthy snack ideas, RD name and number for home use.   Follow up scheduled for May 8th.  Pt is self pay.    Updated assessment (Follow up note only): 5/8/24 Manuel is here late for his appointment with 2 staff member since he did not want to wake up this morning.  He is sleeping too late sometimes and will miss breakfast.  Staff reporting he is sleeping until 2-3pm in the afternoon, going to bed at 4am.   He will also sometimes take a snack at night.  He has an appointment with his PCP next week and they plan to make a sleeping chart. Discussed no wt loss with Manuel he is reporting he did not make any changes.  Reinforced if he wants to lose weight he needs to make some changes. He has minimal activity.  He will snack on fruits if anything,  Staff reporting Manuel has total freedom with his food at his current house and he is unable to control it or himself.  He will just fix himself food if they do not feed him.they are asking for calorie limits for Manuel- however RN reporting unable to limit intake at home.  Discussed portions per the plate method with Manuel and staff.  Staff reporting drinking mainly water and a cup of juice every once in a while.  Follow up scheduled.       Nutrition Diagnosis:    Overweight/obesity  related to Excess energy intake as evidenced by  BMI more than normative standard for age and sex (obesity-grade III 40+)       Any change or new dx since previous visit:     Nutrition Diagnosis:         Medical Nutrition Therapy Intervention:  [x]Individualized Meal Plan-Discussed the importance of eating 3 meals daily and not skipping, and how metabolism is affected.  Also discussed adding in small snacks or 5-6 small meals daily if desired vs 3 larger ones, and appropriate options and portions.  Appropriate timing of meals, including eating within 1 hr of waking and eating meals slowly 20mins/meals, minimizing mindless/boredom or habitual eating, etc was also mentioned.  Discussed the plate method of portioning foods, including half a plate fruits and vegetables or a half plate all vegetables, 1/4 of the plate a lean protein source or meat, and a 1/4 of the plate being a whole grain carb- usually 1/2-1c.  This should be followed for at least 2 meals of the day, but could also be followed for all 3.  \Fluid intake discussed and appropriate amounts and choices, 16 oz with meals and additional 32oz during the day.  S/S dehydration also covered.Discussed the importance of trying new foods 12-16x, and retraining taste buds to like new foods.  Discussed how taste buds will change over the course of a lifetime.  Also included trying new foods at the beginning of a meal when you are the hungriest and more apt to like a new food and be more accepting of it. []Understanding Lab Values   []Basic Pathophysiology of Disease []Food/Medication Interactions   []Food Diary [x]Exercise-150 mins of moderate activity weekly, discussed importance of variety of activity, including wt bearing activities 2-3x/wk x 10-15mins. Discussed importance of activity throughout the lifecycle and its impact on overall health/stress management, etc.   [x]Lifestyle/Behavior Modification Techniques-Discussed the importance of  "adequate sleep, and ideal sleeping conditions, including a cool temperature 64-68 degrees F, and a dark and quiet room. Also discussed the importance of a regular and consistent sleep routine, including minimizing blue light exposure an hour before sleeping.  Weekend habits should include staying fairly consistent with weekday sleep habits to minimize disruption during the week.  A connection was made between getting adequate and good quality sleep and the ability to handle stress the next day, make healthy food choices, and be active. []Medication, Mechanism of Action   []Label Reading: CHO/ Na/ Fat/ other_________ []Self Blood Glucose Monitoring   []Weight/BMI Goals: gain/lose/maintain []Other -           Comprehension: []Excellent  []Very Good  [x]Good  []Fair   []Poor    Receptivity: []Excellent  []Very Good  [x]Good  []Fair   []Poor    Expected Compliance: []Excellent  []Very Good  []Good  [x]Fair   [x]Poor        Goals (initial)/ Progress made on previous goals/new goals:   3 meals daily no skipping   2.  Portions per plate method as dicussed with RD   3. Minimize calories from drinks       No follow-ups on file.  Labs:  CMP  Lab Results   Component Value Date    K 4.2 03/31/2024     03/31/2024    CO2 25 03/31/2024    BUN 14 03/31/2024    CREATININE 1.16 03/31/2024    GLUF 130 (H) 03/27/2024    CALCIUM 9.4 03/31/2024    CORRECTEDCA 9.7 05/04/2023     (H) 03/31/2024     (H) 03/31/2024    ALKPHOS 111 (H) 03/31/2024    EGFR 90 03/31/2024       BMP  Lab Results   Component Value Date    CALCIUM 9.4 03/31/2024    K 4.2 03/31/2024    CO2 25 03/31/2024     03/31/2024    BUN 14 03/31/2024    CREATININE 1.16 03/31/2024       Lipids  No results found for: \"CHOL\"  Lab Results   Component Value Date    HDL 27 (L) 03/27/2024    HDL 32 (L) 02/01/2024    HDL 27 (L) 01/20/2024     Lab Results   Component Value Date    LDLCALC 27 03/27/2024    LDLCALC 23 02/01/2024    LDLCALC 29 01/20/2024     Lab " "Results   Component Value Date    TRIG 333 (H) 03/27/2024    TRIG 357 (H) 02/01/2024    TRIG 245 (H) 01/20/2024     No results found for: \"CHOLHDL\"    Hemoglobin A1C  Lab Results   Component Value Date    HGBA1C 8.2 (A) 03/28/2024       Fasting Glucose  Lab Results   Component Value Date    GLUF 130 (H) 03/27/2024       Insulin     Thyroid  Lab Results   Component Value Date    TSH 1.50 05/21/2023       Hepatic Function Panel  Lab Results   Component Value Date     (H) 03/31/2024     (H) 03/31/2024    ALKPHOS 111 (H) 03/31/2024       Celiac Disease Antibody Panel  No results found for: \"ENDOMYSIAL IGA\", \"GLIADIN IGA\", \"GLIADIN IGG\", \"IGA\", \"TISSUE TRANSGLUT AB\", \"TTG IGA\"   Iron  No results found for: \"IRON\", \"TIBC\", \"FERRITIN\"         Asha Paez RD  [facility]IN[facility]  [address] [id number]    "

## 2024-05-13 ENCOUNTER — TELEPHONE (OUTPATIENT)
Dept: FAMILY MEDICINE CLINIC | Facility: CLINIC | Age: 19
End: 2024-05-13

## 2024-05-13 ENCOUNTER — OFFICE VISIT (OUTPATIENT)
Dept: FAMILY MEDICINE CLINIC | Facility: CLINIC | Age: 19
End: 2024-05-13

## 2024-05-13 VITALS
HEART RATE: 97 BPM | HEIGHT: 68 IN | SYSTOLIC BLOOD PRESSURE: 123 MMHG | WEIGHT: 315 LBS | DIASTOLIC BLOOD PRESSURE: 88 MMHG | BODY MASS INDEX: 47.74 KG/M2 | OXYGEN SATURATION: 94 % | RESPIRATION RATE: 20 BRPM | TEMPERATURE: 98.9 F

## 2024-05-13 DIAGNOSIS — H53.8 VISION BLURRING: ICD-10-CM

## 2024-05-13 DIAGNOSIS — E11.9 TYPE 2 DIABETES MELLITUS WITHOUT COMPLICATION, WITHOUT LONG-TERM CURRENT USE OF INSULIN (HCC): ICD-10-CM

## 2024-05-13 DIAGNOSIS — Z00.00 ANNUAL PHYSICAL EXAM: Primary | ICD-10-CM

## 2024-05-13 DIAGNOSIS — E66.01 MORBID OBESITY WITH BMI OF 45.0-49.9, ADULT (HCC): ICD-10-CM

## 2024-05-13 PROCEDURE — 99395 PREV VISIT EST AGE 18-39: CPT | Performed by: FAMILY MEDICINE

## 2024-05-13 NOTE — ASSESSMENT & PLAN NOTE
Patient typically wears glasses, has not had them for some time.  Would like to see an optometrist for an updated vision check and new eyeglass prescription.  - Ambulatory referral to optometry

## 2024-05-13 NOTE — PROGRESS NOTES
ADULT ANNUAL PHYSICAL  [facility]    NAME: Manuel Back  AGE: 18 y.o. SEX: male  : 2005     DATE: 2024     Assessment and Plan:     Problem List Items Addressed This Visit          Endocrine    Type 2 diabetes mellitus without complication, without long-term current use of insulin (HCC)     Patient has history of type 2 diabetes, last A1c 8.3%.  Patient taking metformin 1g twice daily.  - Next A1c due 24  - Continue metformin at current dose for now  - Continue dietary modifications, decrease carbohydrates. Decrease overall calories.  - Increase physical activity            Eye    Vision blurring     Patient typically wears glasses, has not had them for some time.  Would like to see an optometrist for an updated vision check and new eyeglass prescription.  - Ambulatory referral to optometry         Relevant Orders    Ambulatory Referral to Optometry       Other    Annual physical exam - Primary    Morbid obesity with BMI of 45.0-49.9, adult ([facility])     Patient was counseled on increasing physical activity and decreasing caloric intake.  Patient should lose 10 to 15% of body weight in the next 6 to 12 months.  Patient was counseled on specific approaches to weight loss, healthy eating, and increasing physical activity.   - Recommended 150 minutes of moderate physical activity weekly   - Recommended patient eat well balanced diet with focus on fruits, vegetable, whole grains, and fiber             Immunizations and preventive care screenings were discussed with patient today. Appropriate education was printed on patient's after visit summary.    Counseling:  Dental Health: discussed importance of regular tooth brushing, flossing, and dental visits.  Injury prevention: discussed safety/seat belts, safety helmets, smoke detectors, carbon dioxide detectors, and smoking near bedding or upholstery.  Sexual health: discussed  sexually transmitted diseases, partner selection, use of condoms, avoidance of unintended pregnancy, and contraceptive alternatives.  Exercise: the importance of regular exercise/physical activity was discussed. Recommend exercise 3-5 times per week for at least 30 minutes.     BMI Counseling: Body mass index is 47.92 kg/m². The BMI is above normal. Nutrition recommendations include encouraging healthy choices of fruits and vegetables. Exercise recommendations include moderate physical activity 150 minutes/week. Patient referred to PCP. Rationale for BMI follow-up plan is due to patient being overweight or obese.         Return in 6 months (on 11/13/2024) for Recheck.     Chief Complaint:     No chief complaint on file.     History of Present Illness:     Adult Annual Physical   Patient here for a comprehensive physical exam. The patient reports no problems. No issues since last visit.     Diet and Physical Activity  Diet/Nutrition: diabetic diet, low calorie diet, and limited fruits/vegetables.   Exercise: no formal exercise.      Depression Screening  PHQ-2/9 Depression Screening           General Health  Sleep: sleeps poorly.   Hearing: normal - bilateral.  Vision: wears glasses and needs updated optometrist visit .   Dental: regular dental visits and brushes teeth once daily.        Health  History of STDs?: no.    Advanced Care Planning  Do you have an advanced directive? no  Do you have a durable medical power of ? no  ACP document given to the patient? no     Review of Systems:     Review of Systems   Constitutional:  Positive for fatigue. Negative for fever.   HENT:  Positive for sinus pressure.    Respiratory: Negative.     Gastrointestinal: Negative.    Genitourinary:  Positive for enuresis.   Musculoskeletal: Negative.    Skin:  Negative for rash.   Neurological: Negative.    Psychiatric/Behavioral:  Positive for confusion and sleep disturbance. The patient is nervous/anxious.       Past Medical  History:     Past Medical History:   Diagnosis Date    Chronic headaches     Cognitive impairment     Psychiatric illness       Past Surgical History:     Past Surgical History:   Procedure Laterality Date    WRIST SURGERY Right       Social History:     Social History     Socioeconomic History    Marital status: Single     Spouse name: None    Number of children: None    Years of education: None    Highest education level: None   Occupational History    None   Tobacco Use    Smoking status: Never     Passive exposure: Past    Smokeless tobacco: Never   Vaping Use    Vaping status: Never Used   Substance and Sexual Activity    Alcohol use: Never    Drug use: Not Currently     Types: Marijuana    Sexual activity: Not Currently   Other Topics Concern    None   Social History Narrative    None     Social Determinants of Health     Financial Resource Strain: Low Risk  (1/23/2024)    Overall Financial Resource Strain (CARDIA)     Difficulty of Paying Living Expenses: Not hard at all   Food Insecurity: No Food Insecurity (1/23/2024)    Hunger Vital Sign     Worried About Running Out of Food in the Last Year: Never true     Ran Out of Food in the Last Year: Never true   Transportation Needs: No Transportation Needs (1/23/2024)    PRAPARE - Transportation     Lack of Transportation (Medical): No     Lack of Transportation (Non-Medical): No   Physical Activity: Insufficiently Active (4/5/2024)    Exercise Vital Sign     Days of Exercise per Week: 2 days     Minutes of Exercise per Session: 20 min   Stress: No Stress Concern Present (4/5/2024)    [employer] - Occupational Stress Questionnaire     Feeling of Stress : Only a little   Social Connections: Moderately Isolated (4/5/2024)    Social Connection and Isolation Panel [NHANES]     Frequency of Communication with Friends and Family: More than three times a week     Frequency of Social Gatherings with Friends and Family: More than three times a  week     Attends [employer] Services: 1 to 4 times per year     Active Member of Clubs or Organizations: No     Attends Club or Organization Meetings: Never     Marital Status: Never    Intimate Partner Violence: Not At Risk (1/23/2024)    Humiliation, Afraid, Rape, and Kick questionnaire     Fear of Current or Ex-Partner: No     Emotionally Abused: No     Physically Abused: No     Sexually Abused: No   Housing Stability: Low Risk  (1/23/2024)    Housing Stability Vital Sign     Unable to Pay for Housing in the Last Year: No     Number of Places Lived in the Last Year: 1     Unstable Housing in the Last Year: No      Family History:     Family History   Problem Relation Age of Onset    No Known Problems Mother     No Known Problems Father       Current Medications:     Current Outpatient Medications   Medication Sig Dispense Refill    chlorproMAZINE (THORAZINE) 100 mg tablet Take 1 tablet (100 mg total) by mouth 2 (two) times a day One in the morning, One in the afternoon. 60 tablet 1    chlorproMAZINE (THORAZINE) 50 mg tablet Take 3 tablets (150 mg total) by mouth daily at bedtime 90 tablet 1    cloNIDine (CATAPRES) 0.2 mg tablet Take 1 tablet (0.2 mg total) by mouth 3 (three) times a day      cyanocobalamin (VITAMIN B-12) 500 MCG tablet Take 1 tablet (500 mcg total) by mouth daily 30 tablet 11    metFORMIN (GLUCOPHAGE) 1000 MG tablet Take 1 tablet (1,000 mg total) by mouth 2 (two) times a day with meals 180 tablet 0    OLANZapine (ZyPREXA) 20 MG tablet Take 1 tablet (20 mg total) by mouth daily at bedtime 30 tablet 1    acetaminophen (TYLENOL) 650 mg CR tablet Take 1 tablet (650 mg total) by mouth every 8 (eight) hours as needed for mild pain, moderate pain or headaches (Patient not taking: Reported on 2/16/2024) 30 tablet 0    Acetaminophen Extra Strength 500 MG TABS       aluminum-magnesium hydroxide-simethicone (MAALOX MAX) 400-400-40 MG/5ML suspension Take 10 mL by mouth every 6 (six) hours as needed for  indigestion or heartburn 355 mL 1    aluminum-magnesium hydroxide-simethicone (MAALOX) 200-200-20 MG/5ML SUSP Take 30 mL by mouth 2 (two) times a day as needed for heartburn 355 mL 0    cholecalciferol (VITAMIN D3) 1,000 units tablet Take 1 tablet (1,000 Units total) by mouth daily (Patient not taking: Reported on 4/5/2024) 90 tablet 3    clonazePAM (KlonoPIN) 1 mg tablet Take 1 tablet (1 mg total) by mouth daily 30 tablet 0    desmopressin (DDAVP) 0.2 mg tablet Take 2 tablets (0.4 mg total) by mouth daily at bedtime 60 tablet 5    diphenhydrAMINE (GNP Allergy Relief) 12.5 MG chewable tablet Chew 1 tablet (12.5 mg total) in the morning 30 tablet 11    divalproex sodium (DEPAKOTE ER) 250 mg 24 hr tablet Take 1 tablet (250 mg total) by mouth daily at bedtime      divalproex sodium (Depakote) 500 mg DR tablet Take 3 tablets (1,500 mg total) by mouth every 8 (eight) hours      ibuprofen (MOTRIN) 400 mg tablet Take 400 mg by mouth every 6 (six) hours as needed for mild pain      Incontinence Supply Disposable (Comfort Shield Adult Diapers) MISC Use 1 packet 4 (four) times a day (Patient not taking: Reported on 1/12/2023) 48 each 11    metoprolol succinate (TOPROL-XL) 50 mg 24 hr tablet Take 1 tablet (50 mg total) by mouth daily 90 tablet 1    paliperidone (INVEGA) 6 MG 24 hr tablet Take 1 tablet (6 mg total) by mouth every morning      polyethylene glycol (MIRALAX) 17 g packet Take 17 g by mouth daily 510 g 1    senna-docusate sodium (SENOKOT S) 8.6-50 mg per tablet Take 1 tablet by mouth 2 (two) times a day 60 tablet 1    Sunscreens (Coppertone Complete SPF30) LOTN Apply 1 Application topically if needed (for application before sun exposure) 207 mL 3     No current facility-administered medications for this visit.      Allergies:     Allergies   Allergen Reactions    Cholecalciferol Other (See Comments)    Pollen Extract Sneezing      Physical Exam:     /88   Pulse 97   Temp 98.9 °F (37.2 °C) (Temporal)   Resp  "20   Ht 5' 8.2\" (1.732 m)   Wt (!) 144 kg (317 lb)   SpO2 94%   BMI 47.92 kg/m²     Physical Exam  Vitals and nursing note reviewed.   Constitutional:       General: He is not in acute distress.     Appearance: He is well-developed.   HENT:      Head: Normocephalic and atraumatic.      Right Ear: External ear normal.      Left Ear: External ear normal.      Nose: Nose normal.      Mouth/Throat:      Mouth: Mucous membranes are dry.   Eyes:      Extraocular Movements: Extraocular movements intact.   Cardiovascular:      Rate and Rhythm: Normal rate and regular rhythm.      Heart sounds: Normal heart sounds. No murmur heard.  Pulmonary:      Effort: Pulmonary effort is normal. No respiratory distress.      Breath sounds: Normal breath sounds.   Abdominal:      Palpations: Abdomen is soft.      Tenderness: There is no abdominal tenderness.   Musculoskeletal:         General: No swelling.      Cervical back: Neck supple.   Skin:     General: Skin is warm and dry.      Capillary Refill: Capillary refill takes less than 2 seconds.   Neurological:      Mental Status: He is alert and oriented to person, place, and time. Mental status is at baseline.   Psychiatric:         Mood and Affect: Mood normal.          Isak Samuel MD   [facility]    "

## 2024-05-13 NOTE — ASSESSMENT & PLAN NOTE
Patient was counseled on increasing physical activity and decreasing caloric intake.  Patient should lose 10 to 15% of body weight in the next 6 to 12 months.  Patient was counseled on specific approaches to weight loss, healthy eating, and increasing physical activity.   - Recommended 150 minutes of moderate physical activity weekly   - Recommended patient eat well balanced diet with focus on fruits, vegetable, whole grains, and fiber

## 2024-05-13 NOTE — TELEPHONE ENCOUNTER
Folder (To be completed by) - Dr. Samuel     Name of Form -PDS medical Examination casenote    Color folder (Blue    Form to be Faxed (Fax #), Mailed (Address), or Picked up (By whom) -    Patient was made aware of the 7-10 business day form policy.

## 2024-05-13 NOTE — ASSESSMENT & PLAN NOTE
Patient has history of type 2 diabetes, last A1c 8.3%.  Patient taking metformin 1g twice daily.  - Next A1c due 6/28/24  - Continue metformin at current dose for now  - Continue dietary modifications, decrease carbohydrates. Decrease overall calories.  - Increase physical activity

## 2024-05-17 DIAGNOSIS — F25.9 SCHIZOAFFECTIVE DISORDER (HCC): ICD-10-CM

## 2024-05-17 RX ORDER — OLANZAPINE 20 MG/1
20 TABLET ORAL
Qty: 30 TABLET | Refills: 5 | Status: SHIPPED | OUTPATIENT
Start: 2024-05-17 | End: 2024-11-13

## 2024-05-19 ENCOUNTER — APPOINTMENT (EMERGENCY)
Dept: RADIOLOGY | Facility: HOSPITAL | Age: 19
End: 2024-05-19
Payer: COMMERCIAL

## 2024-05-19 ENCOUNTER — HOSPITAL ENCOUNTER (EMERGENCY)
Facility: HOSPITAL | Age: 19
Discharge: HOME | End: 2024-05-19
Attending: EMERGENCY MEDICINE
Payer: COMMERCIAL

## 2024-05-19 VITALS
RESPIRATION RATE: 20 BRPM | DIASTOLIC BLOOD PRESSURE: 105 MMHG | OXYGEN SATURATION: 97 % | HEART RATE: 108 BPM | SYSTOLIC BLOOD PRESSURE: 157 MMHG

## 2024-05-19 DIAGNOSIS — S93.409A ANKLE SPRAIN: Primary | ICD-10-CM

## 2024-05-19 PROCEDURE — 99284 EMERGENCY DEPT VISIT MOD MDM: CPT

## 2024-05-19 PROCEDURE — 99283 EMERGENCY DEPT VISIT LOW MDM: CPT

## 2024-05-19 PROCEDURE — 73610 X-RAY EXAM OF ANKLE: CPT

## 2024-05-19 NOTE — ED PROVIDER NOTES
History  Chief Complaint   Patient presents with    Ankle Pain     Pt brought in by [facility] [place] staff. Pt c/o right ankle pain after fall earlier today.      18 YOM presents today with right ankle pain. Pt reports he was walking up the steps and missed a step and overturned his ankle. Was able to walk on it. No weakness, numbness or tingling.         Prior to Admission Medications   Prescriptions Last Dose Informant Patient Reported? Taking?   Acetaminophen Extra Strength 500 MG TABS   Yes No   Incontinence Supply Disposable (Comfort Shield Adult Diapers) MISC  Outside Facility (Specify), Self No No   Sig: Use 1 packet 4 (four) times a day   Patient not taking: Reported on 1/12/2023   OLANZapine (ZyPREXA) 20 MG tablet   No No   Sig: Take 1 tablet (20 mg total) by mouth daily at bedtime   Sunscreens (Coppertone Complete SPF30) LOTN   No No   Sig: Apply 1 Application topically if needed (for application before sun exposure)   acetaminophen (TYLENOL) 650 mg CR tablet  Self, Outside Facility (Specify) No No   Sig: Take 1 tablet (650 mg total) by mouth every 8 (eight) hours as needed for mild pain, moderate pain or headaches   Patient not taking: Reported on 2/16/2024   aluminum-magnesium hydroxide-simethicone (MAALOX MAX) 400-400-40 MG/5ML suspension   No No   Sig: Take 10 mL by mouth every 6 (six) hours as needed for indigestion or heartburn   aluminum-magnesium hydroxide-simethicone (MAALOX) 200-200-20 MG/5ML SUSP   No No   Sig: Take 30 mL by mouth 2 (two) times a day as needed for heartburn   chlorproMAZINE (THORAZINE) 100 mg tablet   No No   Sig: Take 1 tablet (100 mg total) by mouth 2 (two) times a day One in the morning, One in the afternoon.   chlorproMAZINE (THORAZINE) 50 mg tablet   No No   Sig: Take 3 tablets (150 mg total) by mouth daily at bedtime   cholecalciferol (VITAMIN D3) 1,000 units tablet   No No   Sig: Take 1 tablet (1,000 Units total) by mouth daily   Patient not taking: Reported on 4/5/2024    cloNIDine (CATAPRES) 0.2 mg tablet   No No   Sig: Take 1 tablet (0.2 mg total) by mouth 3 (three) times a day   clonazePAM (KlonoPIN) 1 mg tablet   No No   Sig: Take 1 tablet (1 mg total) by mouth daily   cyanocobalamin (VITAMIN B-12) 500 MCG tablet   No No   Sig: Take 1 tablet (500 mcg total) by mouth daily   desmopressin (DDAVP) 0.2 mg tablet   No No   Sig: Take 2 tablets (0.4 mg total) by mouth daily at bedtime   diphenhydrAMINE (GNP Allergy Relief) 12.5 MG chewable tablet   No No   Sig: Chew 1 tablet (12.5 mg total) in the morning   divalproex sodium (DEPAKOTE ER) 250 mg 24 hr tablet   No No   Sig: Take 1 tablet (250 mg total) by mouth daily at bedtime   divalproex sodium (Depakote) 500 mg DR tablet   No No   Sig: Take 3 tablets (1,500 mg total) by mouth every 8 (eight) hours   ibuprofen (MOTRIN) 400 mg tablet   Yes No   Sig: Take 400 mg by mouth every 6 (six) hours as needed for mild pain   metFORMIN (GLUCOPHAGE) 1000 MG tablet   No No   Sig: Take 1 tablet (1,000 mg total) by mouth 2 (two) times a day with meals   metoprolol succinate (TOPROL-XL) 50 mg 24 hr tablet   No No   Sig: Take 1 tablet (50 mg total) by mouth daily   paliperidone (INVEGA) 6 MG 24 hr tablet   No No   Sig: Take 1 tablet (6 mg total) by mouth every morning   polyethylene glycol (MIRALAX) 17 g packet   No No   Sig: Take 17 g by mouth daily   senna-docusate sodium (SENOKOT S) 8.6-50 mg per tablet   No No   Sig: Take 1 tablet by mouth 2 (two) times a day      Facility-Administered Medications: None       Past Medical History:   Diagnosis Date    Chronic headaches     Cognitive impairment     Psychiatric illness        Past Surgical History:   Procedure Laterality Date    WRIST SURGERY Right        Family History   Problem Relation Age of Onset    No Known Problems Mother     No Known Problems Father      I have reviewed and agree with the history as documented.    E-Cigarette/Vaping    E-Cigarette Use Never User      E-Cigarette/Vaping  Substances    Nicotine No     THC No     CBD No     Flavoring Yes     Other No     Unknown No      Social History     Tobacco Use    Smoking status: Never     Passive exposure: Past    Smokeless tobacco: Never   Vaping Use    Vaping status: Never Used   Substance Use Topics    Alcohol use: Never    Drug use: Not Currently     Types: Marijuana       Review of Systems   Musculoskeletal:  Positive for arthralgias.   All other systems reviewed and are negative.      Physical Exam  Physical Exam  Vitals and nursing note reviewed.   Constitutional:       General: He is not in acute distress.     Appearance: Normal appearance. He is well-developed. He is not ill-appearing.   HENT:      Head: Normocephalic and atraumatic.   Eyes:      Conjunctiva/sclera: Conjunctivae normal.   Cardiovascular:      Rate and Rhythm: Normal rate.   Pulmonary:      Effort: Pulmonary effort is normal.   Musculoskeletal:         General: No swelling, tenderness or deformity. Normal range of motion.      Cervical back: Normal range of motion and neck supple.   Skin:     General: Skin is warm and dry.   Neurological:      Mental Status: He is alert.         Vital Signs  ED Triage Vitals [05/19/24 1043]   Temp Pulse Respirations Blood Pressure SpO2   -- (!) 108 20 (!) 157/105 97 %      Temp src Heart Rate Source Patient Position - Orthostatic VS BP Location FiO2 (%)   -- Monitor -- Right arm --      Pain Score       --           Vitals:    05/19/24 1043   BP: (!) 157/105   Pulse: (!) 108         Visual Acuity      ED Medications  Medications - No data to display    Diagnostic Studies  Results Reviewed       None                   XR ankle 3+ views RIGHT   ED Interpretation by Beryl Sesay PA-C (05/19 1119)   No acute osseous abnormalities                  Procedures  Procedures         ED Course         CRAFFT      Flowsheet Row Most Recent Value   DREA Initial Screen: During the past 12 months, did you:    1. Drink any alcohol (more  "than a few sips)?  No Filed at: 05/19/2024 1100   2. Smoke any marijuana or hashish No Filed at: 05/19/2024 1100   3. Use anything else to get high? (\"anything else\" includes illegal drugs, over the counter and prescription drugs, and things that you sniff or 'mccloud')? No Filed at: 05/19/2024 1100                                            Medical Decision Making  Xray reviewed by me which showed no fractures or dislocations. Suspecting a sprain. Discussed supportive measures for at home.     I have discussed the plan to discharge pt from ED. The patient was discharged in stable condition.  Patient ambulated off the department.  Extensive return to emergency department precautions were discussed.  Follow up with appropriate providers including primary care physician was discussed.  Patient and/or their  primary decision maker expressed understanding.  Patient remained stable during entire emergency department stay.      Amount and/or Complexity of Data Reviewed  Radiology: ordered and independent interpretation performed.             Disposition  Final diagnoses:   Ankle sprain     Time reflects when diagnosis was documented in both MDM as applicable and the Disposition within this note       Time User Action Codes Description Comment    5/19/2024 11:20 AM Beryl Sesay Add [S93.409A] Ankle sprain           ED Disposition       ED Disposition   Discharge    Condition   Stable    Date/Time   Sun May 19, 2024 1120    Comment   Manuel Back discharge to home/self care.                   Follow-up Information    None         Patient's Medications   Discharge Prescriptions    No medications on file       No discharge procedures on file.    PDMP Review         Value Time User    PDMP Reviewed  Yes 2/7/2024 12:47 PM Dimitris Grewal MD            ED Provider  Electronically Signed by             Beryl Sesay PA-C  05/19/24 1139    "

## 2024-05-20 ENCOUNTER — TELEPHONE (OUTPATIENT)
Dept: FAMILY MEDICINE CLINIC | Facility: CLINIC | Age: 19
End: 2024-05-20

## 2024-05-20 NOTE — TELEPHONE ENCOUNTER
Hi, my name is Mariah. I'm calling from person directed support regarding Manuel Back's date of birth is [birth year]. I'm just calling to see if we can get an order put in for Sleep Medicine through his with his PCP. Again, we want to get him over to Sleep Medicine so we can get them checked out. So if you could please have an order put in or give me a call back. My number is [phone]. Thank you.

## 2024-05-22 ENCOUNTER — OFFICE VISIT (OUTPATIENT)
Dept: FAMILY MEDICINE CLINIC | Facility: CLINIC | Age: 19
End: 2024-05-22

## 2024-05-22 ENCOUNTER — TELEPHONE (OUTPATIENT)
Dept: FAMILY MEDICINE CLINIC | Facility: CLINIC | Age: 19
End: 2024-05-22

## 2024-05-22 VITALS
HEIGHT: 68 IN | RESPIRATION RATE: 24 BRPM | SYSTOLIC BLOOD PRESSURE: 135 MMHG | OXYGEN SATURATION: 96 % | BODY MASS INDEX: 47.74 KG/M2 | TEMPERATURE: 98.6 F | HEART RATE: 114 BPM | DIASTOLIC BLOOD PRESSURE: 82 MMHG | WEIGHT: 315 LBS

## 2024-05-22 DIAGNOSIS — E66.01 MORBID OBESITY WITH BMI OF 45.0-49.9, ADULT (HCC): ICD-10-CM

## 2024-05-22 DIAGNOSIS — M25.571 ACUTE RIGHT ANKLE PAIN: Primary | ICD-10-CM

## 2024-05-22 PROCEDURE — 99213 OFFICE O/P EST LOW 20 MIN: CPT | Performed by: FAMILY MEDICINE

## 2024-05-22 NOTE — ASSESSMENT & PLAN NOTE
Patient rolled ankle yesterday walking up the stairs.  He had some minor acute pain in the ankles in the emergency department.  Evaluation emergency department unremarkable.  X-rays were negative for any acute fracture or dislocation.  Pain is improved and patient is able to bear weight and ambulate regularly.  Only mild tenderness at the mid shin was found on exam.  - Continue monitoring  - No need for additional evaluation at this time. X-rays negative, patient near baseline ambulatory status  - Can use tylenol as needed

## 2024-05-22 NOTE — PROGRESS NOTES
Ambulatory Visit  Name: Manuel Back      : 2005      MRN: [id number]  Encounter Provider: Isak Samuel MD  Encounter Date: 2024   Encounter department: [facility]    Assessment & Plan   1. Acute right ankle pain  Assessment & Plan:  Patient rolled ankle yesterday walking up the stairs.  He had some minor acute pain in the ankles in the emergency department.  Evaluation emergency department unremarkable.  X-rays were negative for any acute fracture or dislocation.  Pain is improved and patient is able to bear weight and ambulate regularly.  Only mild tenderness at the mid shin was found on exam.  - Continue monitoring  - No need for additional evaluation at this time. X-rays negative, patient near baseline ambulatory status  - Can use tylenol as needed    2. Morbid obesity with BMI of 45.0-49.9, adult ([facility])  Assessment & Plan:  Patient's caregiver is requesting referral for assessment of sleep apnea.  - Referral provided for sleep medicine  Orders:  -     Ambulatory Referral to Sleep Medicine; Future       History of Present Illness     18-year-old male presenting for emergency department follow-up after a minor ankle injury.  He was walking up the stairs and rolled his right ankle.  He had some acute pain initially that has since improved.  After the injury he was evaluated in the emergency department.  ED evaluation was unremarkable; x-rays negative.  Patient is now near his baseline ambulatory status and denies any tenderness at the ankle joint.        Review of Systems   Constitutional:  Negative for chills and fever.   HENT:  Negative for ear pain and sore throat.    Eyes:  Negative for pain and visual disturbance.   Respiratory:  Negative for cough and shortness of breath.    Cardiovascular:  Negative for chest pain and palpitations.   Gastrointestinal:  Negative for abdominal pain and vomiting.   Genitourinary:  Negative for dysuria and  "hematuria.   Musculoskeletal:  Negative for arthralgias and back pain.   Skin:  Negative for color change and rash.   Neurological:  Negative for seizures and syncope.   All other systems reviewed and are negative.      Objective     /82   Pulse (!) 114   Temp 98.6 °F (37 °C) (Temporal)   Resp (!) 24   Ht 5' 8\" (1.727 m)   Wt (!) 145 kg (320 lb)   SpO2 96%   BMI 48.66 kg/m²     Physical Exam  Vitals reviewed.   Constitutional:       General: He is not in acute distress.     Appearance: He is well-developed.   HENT:      Head: Normocephalic.      Right Ear: External ear normal.      Left Ear: External ear normal.      Mouth/Throat:      Mouth: Mucous membranes are moist.   Eyes:      Extraocular Movements: Extraocular movements intact.   Pulmonary:      Effort: Pulmonary effort is normal. No respiratory distress.   Musculoskeletal:         General: No swelling, tenderness or signs of injury.      Right lower leg: No edema.      Left lower leg: No edema.      Comments: Mild tenderness at the mid shin.  No swelling or erythema noted at the site of tenderness   Skin:     General: Skin is warm and dry.      Capillary Refill: Capillary refill takes less than 2 seconds.   Neurological:      Mental Status: He is alert. Mental status is at baseline.   Psychiatric:         Mood and Affect: Mood normal.         Behavior: Behavior normal.       Administrative Statements     Isak Samuel MD    "

## 2024-05-28 ENCOUNTER — TELEPHONE (OUTPATIENT)
Dept: FAMILY MEDICINE CLINIC | Facility: CLINIC | Age: 19
End: 2024-05-28

## 2024-05-28 ENCOUNTER — CONSULT (OUTPATIENT)
Dept: ENDOCRINOLOGY | Facility: CLINIC | Age: 19
End: 2024-05-28
Payer: COMMERCIAL

## 2024-05-28 VITALS
DIASTOLIC BLOOD PRESSURE: 78 MMHG | WEIGHT: 304.4 LBS | SYSTOLIC BLOOD PRESSURE: 134 MMHG | BODY MASS INDEX: 46.13 KG/M2 | HEIGHT: 68 IN

## 2024-05-28 DIAGNOSIS — E11.9 TYPE 2 DIABETES MELLITUS WITHOUT COMPLICATION, WITHOUT LONG-TERM CURRENT USE OF INSULIN (HCC): ICD-10-CM

## 2024-05-28 PROBLEM — F79 INTELLECTUAL DISABILITY: Status: ACTIVE | Noted: 2024-05-28

## 2024-05-28 PROCEDURE — 99244 OFF/OP CNSLTJ NEW/EST MOD 40: CPT | Performed by: INTERNAL MEDICINE

## 2024-05-28 RX ORDER — BLOOD SUGAR DIAGNOSTIC
STRIP MISCELLANEOUS
Qty: 100 EACH | Refills: 1 | Status: SHIPPED | OUTPATIENT
Start: 2024-05-28

## 2024-05-28 RX ORDER — BLOOD-GLUCOSE METER
EACH MISCELLANEOUS
Qty: 1 KIT | Refills: 0 | Status: SHIPPED | OUTPATIENT
Start: 2024-05-28

## 2024-05-28 RX ORDER — LANCETS
EACH MISCELLANEOUS
Qty: 100 EACH | Refills: 1 | Status: SHIPPED | OUTPATIENT
Start: 2024-05-28

## 2024-05-28 NOTE — PROGRESS NOTES
5/28/2024    Assessment & Plan      Diagnoses and all orders for this visit:    Type 2 diabetes mellitus without complication, without long-term current use of insulin ([facility])  -     Ambulatory Referral to Endocrinology  -     Hemoglobin A1C; Future  -     Comprehensive metabolic panel; Future  -     Lipid Panel with Direct LDL reflex; Future  -     CBC and differential; Future  -     Albumin / creatinine urine ratio; Future  -     TSH, 3rd generation; Future        Assessment/Plan:  1.  Type 2 diabetes: This is uncontrolled based on recent labs.  Suspect we may need to add a second medication.  Prior to doing so will be due for labs over the next few weeks and I also recommended fingersticks 2 or 3 times per week are checked.  At that time we will consider medication such as once daily sulfonylurea or DPP 4 inhibitor.  Given BMI may benefit from GLP-1 in the future.  We will arrange for follow-up in 4 months.  Will be in touch as glucose logs and lab results are available.      CC: Diabetes Consult    History of Present Illness     HPI: Manuel Back is a 18 y.o. year old male with type 2 diabetes for < 1 year.  He is on oral agents at home and takes metformin 1000 mg twice daily. He does note any polyuria, polydipsia, nocturia and blurry vision.  He denies neuropathy, nephropathy, and retinopathy.  Caregivers do note increased fatigue, polyuria and polydipsia lately.  No change in bowel habits.    Hypoglycemic episodes: No.     Eye exam: Pt given referral to eye dr by PCP.    Foot exam: Performed today.    Blood Sugar/Glucometer/Pump/CGM review: No logs to review.  Does not have this checked.    Review of Systems   Constitutional:  Negative for fatigue.   HENT:  Negative for trouble swallowing and voice change.    Eyes:  Negative for visual disturbance.   Respiratory:  Negative for shortness of breath.    Cardiovascular:  Negative for palpitations and leg swelling.   Gastrointestinal:  Negative for abdominal  pain, nausea and vomiting.   Endocrine: Negative for polydipsia and polyuria.   Musculoskeletal:  Negative for arthralgias and myalgias.   Skin:  Negative for rash.   Neurological:  Negative for dizziness, tremors and weakness.   Hematological:  Negative for adenopathy.   Psychiatric/Behavioral:  Negative for agitation and confusion.        Historical Information   Past Medical History:   Diagnosis Date    Chronic headaches     Cognitive impairment     Psychiatric illness      Past Surgical History:   Procedure Laterality Date    WRIST SURGERY Right      Social History   Social History     Substance and Sexual Activity   Alcohol Use Never     Social History     Substance and Sexual Activity   Drug Use Not Currently    Types: Marijuana     Social History     Tobacco Use   Smoking Status Never    Passive exposure: Past   Smokeless Tobacco Never     Family History:   Family History   Problem Relation Age of Onset    No Known Problems Mother     No Known Problems Father        Meds/Allergies   Current Outpatient Medications   Medication Sig Dispense Refill    Acetaminophen Extra Strength 500 MG TABS       aluminum-magnesium hydroxide-simethicone (MAALOX) 200-200-20 MG/5ML SUSP Take 30 mL by mouth 2 (two) times a day as needed for heartburn 355 mL 0    cholecalciferol (VITAMIN D3) 1,000 units tablet Take 1 tablet (1,000 Units total) by mouth daily 90 tablet 3    cloNIDine (CATAPRES) 0.2 mg tablet Take 1 tablet (0.2 mg total) by mouth 3 (three) times a day      cyanocobalamin (VITAMIN B-12) 500 MCG tablet Take 1 tablet (500 mcg total) by mouth daily 30 tablet 11    desmopressin (DDAVP) 0.2 mg tablet Take 2 tablets (0.4 mg total) by mouth daily at bedtime 60 tablet 5    diphenhydrAMINE (GNP Allergy Relief) 12.5 MG chewable tablet Chew 1 tablet (12.5 mg total) in the morning 30 tablet 11    divalproex sodium (Depakote) 500 mg DR tablet Take 3 tablets (1,500 mg total) by mouth every 8 (eight) hours      ibuprofen (MOTRIN)  400 mg tablet Take 400 mg by mouth every 6 (six) hours as needed for mild pain      metFORMIN (GLUCOPHAGE) 1000 MG tablet Take 1 tablet (1,000 mg total) by mouth 2 (two) times a day with meals 180 tablet 0    metoprolol succinate (TOPROL-XL) 50 mg 24 hr tablet Take 1 tablet (50 mg total) by mouth daily 90 tablet 1    OLANZapine (ZyPREXA) 20 MG tablet Take 1 tablet (20 mg total) by mouth daily at bedtime 30 tablet 5    paliperidone (INVEGA) 6 MG 24 hr tablet Take 1 tablet (6 mg total) by mouth every morning      senna-docusate sodium (SENOKOT S) 8.6-50 mg per tablet Take 1 tablet by mouth 2 (two) times a day 60 tablet 1    Sunscreens (Coppertone Complete SPF30) LOTN Apply 1 Application topically if needed (for application before sun exposure) 207 mL 3    acetaminophen (TYLENOL) 650 mg CR tablet Take 1 tablet (650 mg total) by mouth every 8 (eight) hours as needed for mild pain, moderate pain or headaches (Patient not taking: Reported on 2/16/2024) 30 tablet 0    aluminum-magnesium hydroxide-simethicone (MAALOX MAX) 400-400-40 MG/5ML suspension Take 10 mL by mouth every 6 (six) hours as needed for indigestion or heartburn 355 mL 1    chlorproMAZINE (THORAZINE) 100 mg tablet Take 1 tablet (100 mg total) by mouth 2 (two) times a day One in the morning, One in the afternoon. 60 tablet 1    chlorproMAZINE (THORAZINE) 50 mg tablet Take 3 tablets (150 mg total) by mouth daily at bedtime 90 tablet 1    clonazePAM (KlonoPIN) 1 mg tablet Take 1 tablet (1 mg total) by mouth daily 30 tablet 0    divalproex sodium (DEPAKOTE ER) 250 mg 24 hr tablet Take 1 tablet (250 mg total) by mouth daily at bedtime      Incontinence Supply Disposable (Comfort Shield Adult Diapers) MISC Use 1 packet 4 (four) times a day (Patient not taking: Reported on 1/12/2023) 48 each 11    polyethylene glycol (MIRALAX) 17 g packet Take 17 g by mouth daily 510 g 1     No current facility-administered medications for this visit.     Allergies   Allergen  "Reactions    Cholecalciferol Other (See Comments)    Pollen Extract Sneezing       Objective   Vitals: Blood pressure 134/78, height 5' 7.5\" (1.715 m), weight (!) 138 kg (304 lb 6.4 oz).  Invasive Devices       None                   Physical Exam  Vitals reviewed.   Constitutional:       General: He is not in acute distress.     Appearance: He is well-developed. He is not diaphoretic.   HENT:      Head: Normocephalic and atraumatic.   Eyes:      Conjunctiva/sclera: Conjunctivae normal.      Pupils: Pupils are equal, round, and reactive to light.   Neck:      Thyroid: No thyromegaly.   Cardiovascular:      Rate and Rhythm: Normal rate and regular rhythm.      Pulses: no weak pulses.           Dorsalis pedis pulses are 2+ on the right side and 2+ on the left side.        Posterior tibial pulses are 2+ on the right side and 2+ on the left side.   Pulmonary:      Effort: Pulmonary effort is normal. No respiratory distress.      Breath sounds: Normal breath sounds.   Abdominal:      General: Bowel sounds are normal.      Palpations: Abdomen is soft.   Musculoskeletal:         General: Normal range of motion.      Cervical back: Normal range of motion and neck supple.   Feet:      Right foot:      Skin integrity: Dry skin present. No ulcer, skin breakdown, erythema, warmth or callus.      Left foot:      Skin integrity: Dry skin present. No ulcer, skin breakdown, erythema, warmth or callus.   Skin:     General: Skin is warm and dry.      Findings: No rash.   Neurological:      Mental Status: He is alert and oriented to person, place, and time.      Motor: No abnormal muscle tone.   Psychiatric:         Behavior: Behavior normal.     Patient's shoes and socks removed.    Right Foot/Ankle   Right Foot Inspection  Skin Exam: skin normal, skin intact and dry skin. No warmth, no callus, no erythema, no maceration, no abnormal color, no pre-ulcer, no ulcer and no callus.     Toe Exam: swelling.     Sensory   Vibration: " "intact  Monofilament testing: intact    Vascular  The right DP pulse is 2+. The right PT pulse is 2+.     Left Foot/Ankle  Left Foot Inspection  Skin Exam: skin normal, skin intact and dry skin. No warmth, no erythema, no maceration, normal color, no pre-ulcer, no ulcer and no callus.     Toe Exam: swelling.     Sensory   Vibration: intact  Monofilament testing: intact    Vascular  The left DP pulse is 2+. The left PT pulse is 2+.     Assign Risk Category  No deformity present  No loss of protective sensation  No weak pulses  Risk: 0        The history was obtained from the review of the chart and from the patient.    Lab Results:    Most recent Alc is  Lab Results   Component Value Date    HGBA1C 8.2 (A) 03/28/2024           No components found for: \"HA1C\"  No components found for: \"GLU\"    Lab Results   Component Value Date    CREATININE 1.16 03/31/2024    CREATININE 1.04 03/27/2024    CREATININE 1.11 02/01/2024    BUN 14 03/31/2024    K 4.2 03/31/2024     03/31/2024    CO2 25 03/31/2024     eGFRcr   Date Value Ref Range Status   05/21/2023 96 >59 Final     eGFR   Date Value Ref Range Status   03/31/2024 90 ml/min/1.73sq m Final     No components found for: \"MALBCRER\"    Lab Results   Component Value Date    HDL 27 (L) 03/27/2024    TRIG 333 (H) 03/27/2024       Lab Results   Component Value Date     (H) 03/31/2024     (H) 03/31/2024    ALKPHOS 111 (H) 03/31/2024       Lab Results   Component Value Date    TSH 1.50 05/21/2023    FREET4 0.68 03/27/2024           Future Appointments   Date Time Provider Department Center   5/31/2024  2:00 PM Yovany Smith PA-C [facility]   7/17/2024 11:00 AM AL DIETITIAN [facility]   11/14/2024  1:00 PM Isak Samuel MD [facility]       Portions of the record may have been created with voice recognition software. Occasional wrong word or \"sound a like\" substitutions may have occurred due to the inherent limitations of voice " recognition software. Read the chart carefully and recognize, using context, where substitutions have occurred.

## 2024-05-28 NOTE — TELEPHONE ENCOUNTER
Chart reviewed  Intellectual disability documented in chart  Added to problem list for group home documentation

## 2024-05-31 ENCOUNTER — OFFICE VISIT (OUTPATIENT)
Dept: UROLOGY | Facility: MEDICAL CENTER | Age: 19
End: 2024-05-31
Payer: COMMERCIAL

## 2024-05-31 VITALS
BODY MASS INDEX: 46.23 KG/M2 | HEIGHT: 68 IN | WEIGHT: 305 LBS | HEART RATE: 102 BPM | OXYGEN SATURATION: 95 % | SYSTOLIC BLOOD PRESSURE: 126 MMHG | DIASTOLIC BLOOD PRESSURE: 74 MMHG

## 2024-05-31 DIAGNOSIS — N39.44 URINARY INCONTINENCE, NOCTURNAL ENURESIS: ICD-10-CM

## 2024-05-31 DIAGNOSIS — N39.44 BED WETTING: Primary | ICD-10-CM

## 2024-05-31 PROCEDURE — 99213 OFFICE O/P EST LOW 20 MIN: CPT

## 2024-05-31 RX ORDER — UBIQUINOL 100 MG
CAPSULE ORAL
COMMUNITY
Start: 2024-05-29

## 2024-05-31 RX ORDER — TOLTERODINE 4 MG/1
4 CAPSULE, EXTENDED RELEASE ORAL DAILY
Qty: 30 CAPSULE | Refills: 3 | Status: SHIPPED | OUTPATIENT
Start: 2024-05-31

## 2024-05-31 NOTE — ASSESSMENT & PLAN NOTE
The patient has a longstanding history of nocturnal enuresis and bedwetting  Patient has been previously on desmopressin 0.2 mg 2 tablets p.o. nightly for his symptoms  We continue to monitor the patient for hyponatremia and most recently his sodium was found to be 138 on 3/31/2024 which is within the normal limits  We discussed today adding a anticholinergic such as tolterodine 4 mg once daily in combination with the desmopressin to further assist with decreasing the patient's symptoms  We reviewed the side effects of dry mouth, dry eyes, constipation, and urinary retention  I encouraged the patient today to refrain from drinking liquids 4 hours prior to bedtime to further assist in decreasing his occurrences of nocturnal enuresis  The patient will start tolterodine 4 mg once daily in the morning as well as continue desmopressin 0.4 mg daily at night.  Additionally, the patient will try to refrain from drinking liquids 4 hours prior to bedtime.

## 2024-05-31 NOTE — PROGRESS NOTES
5/31/2024      Assessment and Plan    18 y.o. male managed by AP team    Urinary incontinence, nocturnal enuresis  The patient has a longstanding history of nocturnal enuresis and bedwetting  Patient has been previously on desmopressin 0.2 mg 2 tablets p.o. nightly for his symptoms  We continue to monitor the patient for hyponatremia and most recently his sodium was found to be 138 on 3/31/2024 which is within the normal limits  We discussed today adding a anticholinergic such as tolterodine 4 mg once daily in combination with the desmopressin to further assist with decreasing the patient's symptoms  We reviewed the side effects of dry mouth, dry eyes, constipation, and urinary retention  I encouraged the patient today to refrain from drinking liquids 4 hours prior to bedtime to further assist in decreasing his occurrences of nocturnal enuresis  The patient will start tolterodine 4 mg once daily in the morning as well as continue desmopressin 0.4 mg daily at night.  Additionally, the patient will try to refrain from drinking liquids 4 hours prior to bedtime.        History of Present Illness  Manuel Back is a 18 y.o. male here for evaluation of nocturnal enuresis and bedwetting. He resides in group home due to psychiatric issues.  He has lifelong issues with bedwetting. He states at times he reports very little leakage at night and other times when he is completely saturated. He denies any daytime urinary issues.  Was started on desmopressin with some improvements per caregiver but occasional ongoing issues with bedwetting.  The patient's desmopressin has since been increased to 0.4 mg daily to further assist with bedwetting.  The patient continues to be monitored for hyponatremia while on desmopressin and most recently his sodium was found to be 138 on 3/31/2024.        Review of Systems   Constitutional:  Negative for chills and fever.   HENT:  Negative for ear pain and sore throat.    Eyes:  Negative  "for pain and visual disturbance.   Respiratory:  Negative for cough and shortness of breath.    Cardiovascular:  Negative for chest pain and palpitations.   Gastrointestinal:  Negative for abdominal pain and vomiting.   Genitourinary:  Positive for enuresis (Nocturnal). Negative for decreased urine volume, difficulty urinating, dysuria, frequency, hematuria and urgency.   Musculoskeletal:  Negative for arthralgias and back pain.   Skin:  Negative for color change and rash.   Neurological:  Negative for seizures and syncope.   All other systems reviewed and are negative.               Vitals  Vitals:    05/31/24 1344   BP: 126/74   BP Location: Left arm   Patient Position: Sitting   Cuff Size: Large   Pulse: 102   SpO2: 95%   Weight: (!) 138 kg (305 lb)   Height: 5' 7.5\" (1.715 m)       Physical Exam  Vitals reviewed.   Constitutional:       General: He is not in acute distress.     Appearance: Normal appearance. He is obese. He is not ill-appearing.   HENT:      Head: Normocephalic and atraumatic.      Nose: Nose normal.   Eyes:      General: No scleral icterus.  Pulmonary:      Effort: No respiratory distress.   Abdominal:      General: Abdomen is flat. There is no distension.      Palpations: Abdomen is soft.      Tenderness: There is no abdominal tenderness.   Skin:     General: Skin is warm.      Coloration: Skin is not jaundiced.   Neurological:      Mental Status: He is alert and oriented to person, place, and time.      Gait: Gait normal.   Psychiatric:         Attention and Perception: Attention normal.         Mood and Affect: Mood normal. Affect is flat.         Behavior: Behavior normal. Behavior is cooperative.           Past History  Past Medical History:   Diagnosis Date    Chronic headaches     Cognitive impairment     Psychiatric illness      Social History     Socioeconomic History    Marital status: Single     Spouse name: None    Number of children: None    Years of education: None    Highest " education level: None   Occupational History    None   Tobacco Use    Smoking status: Never     Passive exposure: Past    Smokeless tobacco: Never   Vaping Use    Vaping status: Never Used   Substance and Sexual Activity    Alcohol use: Never    Drug use: Not Currently     Types: Marijuana    Sexual activity: Not Currently   Other Topics Concern    None   Social History Narrative    None     Social Determinants of Health     Financial Resource Strain: Low Risk  (1/23/2024)    Overall Financial Resource Strain (CARDIA)     Difficulty of Paying Living Expenses: Not hard at all   Food Insecurity: No Food Insecurity (1/23/2024)    Hunger Vital Sign     Worried About Running Out of Food in the Last Year: Never true     Ran Out of Food in the Last Year: Never true   Transportation Needs: No Transportation Needs (1/23/2024)    PRAPARE - Transportation     Lack of Transportation (Medical): No     Lack of Transportation (Non-Medical): No   Physical Activity: Insufficiently Active (4/5/2024)    Exercise Vital Sign     Days of Exercise per Week: 2 days     Minutes of Exercise per Session: 20 min   Stress: No Stress Concern Present (4/5/2024)    [employer] - Occupational Stress Questionnaire     Feeling of Stress : Only a little   Social Connections: Moderately Isolated (4/5/2024)    Social Connection and Isolation Panel [NHANES]     Frequency of Communication with Friends and Family: More than three times a week     Frequency of Social Gatherings with Friends and Family: More than three times a week     Attends Zoroastrianism Services: 1 to 4 times per year     Active Member of Clubs or Organizations: No     Attends Club or Organization Meetings: Never     Marital Status: Never    Intimate Partner Violence: Not At Risk (1/23/2024)    Humiliation, Afraid, Rape, and Kick questionnaire     Fear of Current or Ex-Partner: No     Emotionally Abused: No     Physically Abused: No     Sexually Abused: No  "  Housing Stability: Low Risk  (1/23/2024)    Housing Stability Vital Sign     Unable to Pay for Housing in the Last Year: No     Number of Places Lived in the Last Year: 1     Unstable Housing in the Last Year: No     Social History     Tobacco Use   Smoking Status Never    Passive exposure: Past   Smokeless Tobacco Never     Family History   Problem Relation Age of Onset    No Known Problems Mother     No Known Problems Father        The following portions of the patient's history were reviewed and updated as appropriate: allergies, current medications, past medical history, past social history, past surgical history and problem list.    Results  No results found for this or any previous visit (from the past 1 hour(s)).]  No results found for: \"PSA\"  Lab Results   Component Value Date    CALCIUM 9.4 03/31/2024    K 4.2 03/31/2024    CO2 25 03/31/2024     03/31/2024    BUN 14 03/31/2024    CREATININE 1.16 03/31/2024     Lab Results   Component Value Date    WBC 10.75 (H) 03/31/2024    HGB 13.9 03/31/2024    HCT 42.4 03/31/2024    MCV 85 03/31/2024     03/31/2024      "

## 2024-06-03 DIAGNOSIS — F25.9 SCHIZOAFFECTIVE DISORDER (HCC): ICD-10-CM

## 2024-06-03 DIAGNOSIS — K59.00 CONSTIPATION: ICD-10-CM

## 2024-06-03 RX ORDER — AMOXICILLIN 250 MG
1 CAPSULE ORAL 2 TIMES DAILY
Qty: 60 TABLET | Refills: 1 | Status: SHIPPED | OUTPATIENT
Start: 2024-06-03 | End: 2024-08-02

## 2024-06-03 RX ORDER — OLANZAPINE 20 MG/1
20 TABLET ORAL
Qty: 30 TABLET | Refills: 5 | Status: SHIPPED | OUTPATIENT
Start: 2024-06-03 | End: 2024-11-30

## 2024-06-13 ENCOUNTER — TELEPHONE (OUTPATIENT)
Dept: ENDOCRINOLOGY | Facility: CLINIC | Age: 19
End: 2024-06-13

## 2024-06-13 DIAGNOSIS — E11.9 TYPE 2 DIABETES MELLITUS WITHOUT COMPLICATION, WITHOUT LONG-TERM CURRENT USE OF INSULIN (HCC): Primary | ICD-10-CM

## 2024-06-13 NOTE — TELEPHONE ENCOUNTER
Reviewed blood sugar values provided ranging from 140-180 in the morning.  Overall these appear to be slightly improved from recent A1c but still above goal.  I would recommend addition of a medication such as Januvia in addition to his metformin and request blood sugar logs be submitted for review in about 3-4 weeks after starting.  I will enter the prescription.

## 2024-06-19 ENCOUNTER — OFFICE VISIT (OUTPATIENT)
Dept: FAMILY MEDICINE CLINIC | Facility: CLINIC | Age: 19
End: 2024-06-19

## 2024-06-19 ENCOUNTER — TELEPHONE (OUTPATIENT)
Dept: FAMILY MEDICINE CLINIC | Facility: CLINIC | Age: 19
End: 2024-06-19

## 2024-06-19 VITALS
RESPIRATION RATE: 18 BRPM | HEART RATE: 98 BPM | WEIGHT: 315 LBS | HEIGHT: 68 IN | TEMPERATURE: 98 F | BODY MASS INDEX: 47.74 KG/M2 | SYSTOLIC BLOOD PRESSURE: 132 MMHG | OXYGEN SATURATION: 98 % | DIASTOLIC BLOOD PRESSURE: 84 MMHG

## 2024-06-19 DIAGNOSIS — E11.69 TYPE 2 DIABETES MELLITUS WITH OTHER SPECIFIED COMPLICATION, UNSPECIFIED WHETHER LONG TERM INSULIN USE (HCC): Primary | ICD-10-CM

## 2024-06-19 DIAGNOSIS — R10.13 DYSPEPSIA: ICD-10-CM

## 2024-06-19 DIAGNOSIS — R05.9 COUGH, UNSPECIFIED TYPE: ICD-10-CM

## 2024-06-19 DIAGNOSIS — K29.60 REFLUX GASTRITIS: ICD-10-CM

## 2024-06-19 DIAGNOSIS — K59.00 CONSTIPATION: ICD-10-CM

## 2024-06-19 LAB — SL AMB POCT HEMOGLOBIN AIC: 7.4 (ref ?–6.5)

## 2024-06-19 PROCEDURE — 83036 HEMOGLOBIN GLYCOSYLATED A1C: CPT | Performed by: FAMILY MEDICINE

## 2024-06-19 PROCEDURE — 99213 OFFICE O/P EST LOW 20 MIN: CPT | Performed by: FAMILY MEDICINE

## 2024-06-19 RX ORDER — POLYETHYLENE GLYCOL 3350 17 G/17G
17 POWDER, FOR SOLUTION ORAL DAILY
Qty: 510 G | Refills: 1 | Status: SHIPPED | OUTPATIENT
Start: 2024-06-19 | End: 2024-08-18

## 2024-06-19 RX ORDER — ALUMINA, MAGNESIA, AND SIMETHICONE 2400; 2400; 240 MG/30ML; MG/30ML; MG/30ML
10 SUSPENSION ORAL EVERY 6 HOURS PRN
Qty: 355 ML | Refills: 1 | Status: SHIPPED | OUTPATIENT
Start: 2024-06-19

## 2024-06-19 NOTE — TELEPHONE ENCOUNTER
Signature required.    Folder (to be completed by): Dr. Gonsalez    Name of Form: Person Directed Supports Medical Examination [place]      Color Folder: Pt brought to appt    Form to be faxed to: Pt will collect at appt

## 2024-06-19 NOTE — PROGRESS NOTES
"Ambulatory Visit  Name: Manuel Back      : 2005      MRN: [id number]  Encounter Provider: Gonzalo Gonsalez MD  Encounter Date: 2024   Encounter department: [facility]    Assessment & Plan   1. Type 2 diabetes mellitus with other specified complication, unspecified whether long term insulin use (HCC)  -     POCT hemoglobin A1c  2. Constipation  -     polyethylene glycol (MIRALAX) 17 g packet; Take 17 g by mouth daily  3. Dyspepsia  -     aluminum-magnesium hydroxide-simethicone (MAALOX MAX) 400-400-40 MG/5ML suspension; Take 10 mL by mouth every 6 (six) hours as needed for indigestion or heartburn  4. Reflux gastritis  -     aluminum-magnesium hydroxide-simethicone (MAALOX MAX) 400-400-40 MG/5ML suspension; Take 10 mL by mouth every 6 (six) hours as needed for indigestion or heartburn  5. Cough, unspecified type  -     guaiFENesin (ROBITUSSIN) 100 mg/5 mL syrup; Take 10 mL (200 mg total) by mouth 3 (three) times a day as needed for cough for up to 10 days       History of Present Illness     HPI  Patient is a group home patient who presents for a L toe pain follow up after urgent care visit. However, patient was uncooperative and refused exam and would not describe his pain despite multiple attempts. Caregivers present also unable to convice patient to cooperate but requested Miralax,Robatussin, and Malox refills. Hemoglobin A1c collected and improved from prior.    Review of Systems    Objective     /84 (BP Location: Right arm, Patient Position: Sitting, Cuff Size: Standard)   Pulse 98   Temp 98 °F (36.7 °C) (Temporal)   Resp 18   Ht 5' 7.5\" (1.715 m)   Wt (!) 143 kg (315 lb)   SpO2 98%   BMI 48.61 kg/m²     Physical Exam  Constitutional:       Appearance: Normal appearance. He is obese.      Comments: Patient refused physical exam   Neurological:      Mental Status: He is alert.   Psychiatric:      Comments: Cursing when asked for " permission to examine, insisting he not be touched   Administrative Statements

## 2024-06-24 ENCOUNTER — APPOINTMENT (OUTPATIENT)
Dept: LAB | Facility: CLINIC | Age: 19
End: 2024-06-24
Payer: COMMERCIAL

## 2024-06-24 DIAGNOSIS — E11.9 TYPE 2 DIABETES MELLITUS WITHOUT COMPLICATION, WITHOUT LONG-TERM CURRENT USE OF INSULIN (HCC): ICD-10-CM

## 2024-06-24 LAB
ALBUMIN SERPL BCG-MCNC: 3.8 G/DL (ref 3.5–5)
ALP SERPL-CCNC: 96 U/L (ref 34–104)
ALT SERPL W P-5'-P-CCNC: 136 U/L (ref 7–52)
ANION GAP SERPL CALCULATED.3IONS-SCNC: 13 MMOL/L (ref 4–13)
AST SERPL W P-5'-P-CCNC: 121 U/L (ref 13–39)
BASOPHILS # BLD AUTO: 0.03 THOUSANDS/ÂΜL (ref 0–0.1)
BASOPHILS NFR BLD AUTO: 0 % (ref 0–1)
BILIRUB SERPL-MCNC: 0.25 MG/DL (ref 0.2–1)
BUN SERPL-MCNC: 13 MG/DL (ref 5–25)
CALCIUM SERPL-MCNC: 9.5 MG/DL (ref 8.4–10.2)
CHLORIDE SERPL-SCNC: 103 MMOL/L (ref 96–108)
CHOLEST SERPL-MCNC: 134 MG/DL
CO2 SERPL-SCNC: 27 MMOL/L (ref 21–32)
CREAT SERPL-MCNC: 0.96 MG/DL (ref 0.6–1.3)
EOSINOPHIL # BLD AUTO: 0.06 THOUSAND/ÂΜL (ref 0–0.61)
EOSINOPHIL NFR BLD AUTO: 1 % (ref 0–6)
ERYTHROCYTE [DISTWIDTH] IN BLOOD BY AUTOMATED COUNT: 14.1 % (ref 11.6–15.1)
EST. AVERAGE GLUCOSE BLD GHB EST-MCNC: 169 MG/DL
GFR SERPL CREATININE-BSD FRML MDRD: 113 ML/MIN/1.73SQ M
GLUCOSE P FAST SERPL-MCNC: 115 MG/DL (ref 65–99)
HBA1C MFR BLD: 7.5 %
HCT VFR BLD AUTO: 43.2 % (ref 36.5–49.3)
HDLC SERPL-MCNC: 28 MG/DL
HGB BLD-MCNC: 13.8 G/DL (ref 12–17)
IMM GRANULOCYTES # BLD AUTO: 0.08 THOUSAND/UL (ref 0–0.2)
IMM GRANULOCYTES NFR BLD AUTO: 1 % (ref 0–2)
LDLC SERPL CALC-MCNC: 46 MG/DL (ref 0–100)
LYMPHOCYTES # BLD AUTO: 2.58 THOUSANDS/ÂΜL (ref 0.6–4.47)
LYMPHOCYTES NFR BLD AUTO: 32 % (ref 14–44)
MCH RBC QN AUTO: 29.1 PG (ref 26.8–34.3)
MCHC RBC AUTO-ENTMCNC: 31.9 G/DL (ref 31.4–37.4)
MCV RBC AUTO: 91 FL (ref 82–98)
MONOCYTES # BLD AUTO: 1.15 THOUSAND/ÂΜL (ref 0.17–1.22)
MONOCYTES NFR BLD AUTO: 14 % (ref 4–12)
NEUTROPHILS # BLD AUTO: 4.16 THOUSANDS/ÂΜL (ref 1.85–7.62)
NEUTS SEG NFR BLD AUTO: 52 % (ref 43–75)
NRBC BLD AUTO-RTO: 0 /100 WBCS
PLATELET # BLD AUTO: 225 THOUSANDS/UL (ref 149–390)
PMV BLD AUTO: 11.3 FL (ref 8.9–12.7)
POTASSIUM SERPL-SCNC: 4.3 MMOL/L (ref 3.5–5.3)
PROT SERPL-MCNC: 7.3 G/DL (ref 6.4–8.4)
RBC # BLD AUTO: 4.75 MILLION/UL (ref 3.88–5.62)
SODIUM SERPL-SCNC: 143 MMOL/L (ref 135–147)
TRIGL SERPL-MCNC: 301 MG/DL
TSH SERPL DL<=0.05 MIU/L-ACNC: 3.81 UIU/ML (ref 0.45–4.5)
WBC # BLD AUTO: 8.06 THOUSAND/UL (ref 4.31–10.16)

## 2024-06-24 PROCEDURE — 36415 COLL VENOUS BLD VENIPUNCTURE: CPT

## 2024-06-24 PROCEDURE — 83036 HEMOGLOBIN GLYCOSYLATED A1C: CPT

## 2024-06-24 PROCEDURE — 80053 COMPREHEN METABOLIC PANEL: CPT

## 2024-06-24 PROCEDURE — 85025 COMPLETE CBC W/AUTO DIFF WBC: CPT

## 2024-06-24 PROCEDURE — 80061 LIPID PANEL: CPT

## 2024-06-24 PROCEDURE — 84443 ASSAY THYROID STIM HORMONE: CPT

## 2024-06-25 ENCOUNTER — APPOINTMENT (OUTPATIENT)
Dept: LAB | Facility: CLINIC | Age: 19
End: 2024-06-25
Payer: COMMERCIAL

## 2024-06-25 DIAGNOSIS — E11.69 TYPE 2 DIABETES MELLITUS WITH OTHER SPECIFIED COMPLICATION, UNSPECIFIED WHETHER LONG TERM INSULIN USE (HCC): ICD-10-CM

## 2024-06-25 LAB
CREAT UR-MCNC: 240.2 MG/DL
MICROALBUMIN UR-MCNC: <7 MG/L

## 2024-06-25 PROCEDURE — 82570 ASSAY OF URINE CREATININE: CPT

## 2024-06-25 PROCEDURE — 82043 UR ALBUMIN QUANTITATIVE: CPT

## 2024-06-27 ENCOUNTER — TELEPHONE (OUTPATIENT)
Dept: ENDOCRINOLOGY | Facility: CLINIC | Age: 19
End: 2024-06-27

## 2024-06-27 NOTE — TELEPHONE ENCOUNTER
----- Message from Santiago Little DO sent at 6/26/2024 10:45 AM EDT -----    Please call patient regarding these results: Recent labs show A1c is 7.5.  Thyroid function is normal.  Urine microalbumin is fine.  Liver function numbers remain elevated.  We can consider other types of medications at next appointment to help both sugars and liver numbers at next appointment, but in the meantime, would recommend he discuss these liver function tests with his pcp if he has not done so before.

## 2024-07-17 ENCOUNTER — CLINICAL SUPPORT (OUTPATIENT)
Dept: NUTRITION | Facility: HOSPITAL | Age: 19
End: 2024-07-17
Payer: COMMERCIAL

## 2024-07-17 VITALS — WEIGHT: 315 LBS | BODY MASS INDEX: 48.67 KG/M2

## 2024-07-17 DIAGNOSIS — E66.01 SEVERE CHILDHOOD OBESITY WITH BMI GREATER THAN 99TH PERCENTILE FOR AGE (HCC): Primary | ICD-10-CM

## 2024-07-17 NOTE — PROGRESS NOTES
" Nutrition Assessment Form    Patient Name: Manuel Back    YOB: 2005    Sex: Male     Assessment Date: 7/17/2024  Start Time: 1050 Stop Time: 1120 Total Minutes: 30     Data:  Present at session: Self and 2 staff members   Parent/Patient Concerns/reason for visit: \"We are doing good\"   Medical Dx/Reason for Referral: obesity   Past Medical History:   Diagnosis Date    Chronic headaches     Cognitive impairment     Psychiatric illness        Current Outpatient Medications   Medication Sig Dispense Refill    acetaminophen (TYLENOL) 650 mg CR tablet Take 1 tablet (650 mg total) by mouth every 8 (eight) hours as needed for mild pain, moderate pain or headaches (Patient not taking: Reported on 2/16/2024) 30 tablet 0    Acetaminophen Extra Strength 500 MG TABS       Alcohol Swabs (Alcohol Prep) 70 % PADS       aluminum-magnesium hydroxide-simethicone (MAALOX MAX) 400-400-40 MG/5ML suspension Take 10 mL by mouth every 6 (six) hours as needed for indigestion or heartburn 355 mL 1    aluminum-magnesium hydroxide-simethicone (MAALOX) 200-200-20 MG/5ML SUSP Take 30 mL by mouth 2 (two) times a day as needed for heartburn (Patient not taking: Reported on 5/31/2024) 355 mL 0    Blood Glucose Monitoring Suppl (OneTouch Verio) w/Device KIT daily 1 kit 0    chlorproMAZINE (THORAZINE) 100 mg tablet Take 1 tablet (100 mg total) by mouth 2 (two) times a day One in the morning, One in the afternoon. 60 tablet 1    chlorproMAZINE (THORAZINE) 50 mg tablet Take 3 tablets (150 mg total) by mouth daily at bedtime 90 tablet 1    cholecalciferol (VITAMIN D3) 1,000 units tablet Take 1 tablet (1,000 Units total) by mouth daily 90 tablet 3    clonazePAM (KlonoPIN) 1 mg tablet Take 1 tablet (1 mg total) by mouth daily 30 tablet 0    cloNIDine (CATAPRES) 0.2 mg tablet Take 1 tablet (0.2 mg total) by mouth 3 (three) times a day      cyanocobalamin (VITAMIN B-12) 500 MCG tablet Take 1 tablet (500 mcg total) by mouth daily 30 " tablet 11    desmopressin (DDAVP) 0.2 mg tablet Take 2 tablets (0.4 mg total) by mouth daily at bedtime 60 tablet 5    diphenhydrAMINE (GNP Allergy Relief) 12.5 MG chewable tablet Chew 1 tablet (12.5 mg total) in the morning 30 tablet 11    divalproex sodium (DEPAKOTE ER) 250 mg 24 hr tablet Take 1 tablet (250 mg total) by mouth daily at bedtime      divalproex sodium (Depakote) 500 mg DR tablet Take 3 tablets (1,500 mg total) by mouth every 8 (eight) hours      glucose blood (OneTouch Verio) test strip Daily 100 each 1    ibuprofen (MOTRIN) 400 mg tablet Take 400 mg by mouth every 6 (six) hours as needed for mild pain      Incontinence Supply Disposable (Comfort Shield Adult Diapers) MISC Use 1 packet 4 (four) times a day (Patient not taking: Reported on 1/12/2023) 48 each 11    Lancets (onetouch ultrasoft) lancets Daily. 100 each 1    metFORMIN (GLUCOPHAGE) 1000 MG tablet Take 1 tablet (1,000 mg total) by mouth 2 (two) times a day with meals 180 tablet 0    metoprolol succinate (TOPROL-XL) 50 mg 24 hr tablet Take 1 tablet (50 mg total) by mouth daily 90 tablet 1    OLANZapine (ZyPREXA) 20 MG tablet Take 1 tablet (20 mg total) by mouth daily at bedtime 30 tablet 5    paliperidone (INVEGA) 6 MG 24 hr tablet Take 1 tablet (6 mg total) by mouth every morning      polyethylene glycol (MIRALAX) 17 g packet Take 17 g by mouth daily 510 g 1    senna-docusate sodium (SENOKOT S) 8.6-50 mg per tablet Take 1 tablet by mouth 2 (two) times a day 60 tablet 1    sitaGLIPtin (JANUVIA) 100 mg tablet 1 tab daily 30 tablet 2    Sunscreens (Coppertone Complete SPF30) LOTN Apply 1 Application topically if needed (for application before sun exposure) 207 mL 3    tolterodine (DETROL LA) 4 mg 24 hr capsule Take 1 capsule (4 mg total) by mouth daily 30 capsule 3     No current facility-administered medications for this visit.        Additional Meds/Supplements: N/a   Special Learning Needs/barriers to learning/any new barriers N/a  "  Height: HC Readings from Last 5 Encounters:   No data found for HC      Weight: Wt Readings from Last 10 Encounters:   06/19/24 (!) 143 kg (315 lb) (>99%, Z= 3.22)*   05/31/24 (!) 138 kg (305 lb) (>99%, Z= 3.11)*   05/28/24 (!) 138 kg (304 lb 6.4 oz) (>99%, Z= 3.11)*   05/22/24 (!) 145 kg (320 lb) (>99%, Z= 3.27)*   05/13/24 (!) 144 kg (317 lb) (>99%, Z= 3.24)*   05/08/24 (!) 143 kg (315 lb 12.8 oz) (>99%, Z= 3.23)*   04/05/24 (!) 144 kg (317 lb 6.4 oz) (>99%, Z= 3.25)*   03/31/24 (!) 144 kg (316 lb 12.8 oz) (>99%, Z= 3.25)*   03/28/24 (!) 142 kg (313 lb) (>99%, Z= 3.21)*   03/20/24 (!) 144 kg (317 lb) (>99%, Z= 3.25)*     * Growth percentiles are based on CDC (Boys, 2-20 Years) data.     Estimated body mass index is 48.61 kg/m² as calculated from the following:    Height as of 6/19/24: 5' 7.5\" (1.715 m).    Weight as of 6/19/24: 143 kg (315 lb).   Recent Weight Change: []Yes     [x]No  Amount:       Energy Needs: 1840cals (20cals/kg-1000 for 2#/wk wt loss)   Allergies   Allergen Reactions    Cholecalciferol Other (See Comments)    Pollen Extract Sneezing    or intolerances NKFA   Social History     Substance and Sexual Activity   Alcohol Use Never    N/a   Social History     Tobacco Use   Smoking Status Never    Passive exposure: Past   Smokeless Tobacco Never    N/a   Who shops? Group home   Who cooks/cooking methods/Eating out/take out habits   Group home  Cooking methods: bake/burroughs/air burroughs/grill/boil/other________    Minimal eating out   Exercise: Plays football in the spring and summer time   Other: ie: Sleep habits/ stress level/ work habits household-lives with ?/ food security Stress level 2/10 scale  Sleep schedule currently off- sleeping all day and up all night was in psychiatric hospital last month and that is when his sleep schedule changed- trying to get back on schedule now  Currently going to graduate [employer] this May- he is nervous   Prior Nutritional Counseling? []Yes     [x]No  When:  "     Why:         Diet Hx: smoothies and water 40oz approx and milk- 2% milk - fruit/hawaiian punch (20oz)  Breakfast: Diet:  mostly skips B and lunch because he is sleeping all day   Lunch: He will have letha side up eggs-        Dinner:  smoothie a few times a week maybe 3- 2-3c milk and few cups of frozen fruit and plain greek yogurt if it is avaiable, 24oz per smoothie and will have 3x/wk 7-8pm- will have 2-3 thighs and 2-3 cups rice not many vegetables     Snacks:  will snack on fruits most days but candy on Wednesdays when he gest paid AM -   PM - 330pm   HS -    Other Notes/ Initial Assessment:  Manuel is her with 2 staff members from his group home.  He is a senior in high school and would like to lose weight, his sleep schedule is currently off because of his stay on the hospital last month.    Discussed importance of being active,  regular sleep schedule, regular meals, daily activity, encouraged trying new foods and increasing vegetables intake in general, minimizing calories from drinks, portions per plate method.  Provided Tips for wt loss and [address] sample menus and healthy snack ideas, RD name and number for home use.   Follow up scheduled for May 8th.  Pt is self pay.    Updated assessment (Follow up note only): 5/8/24 Manuel is here late for his appointment with 2 staff member since he did not want to wake up this morning.  He is sleeping too late sometimes and will miss breakfast.  Staff reporting he is sleeping until 2-3pm in the afternoon, going to bed at 4am.   He will also sometimes take a snack at night.  He has an appointment with his PCP next week and they plan to make a sleeping chart. Discussed no wt loss with Manuel he is reporting he did not make any changes.  Reinforced if he wants to lose weight he needs to make some changes. He has minimal activity.  He will snack on fruits if anything,  Staff reporting Manuel has total freedom with his food at his current house and he is  unable to control it or himself.  He will just fix himself food if they do not feed him.they are asking for calorie limits for Manuel- however RN reporting unable to limit intake at home.  Discussed portions per the plate method with Manuel and staff.  Staff reporting drinking mainly water and a cup of juice every once in a while.  Follow up scheduled.    7/17/24  Manuel is here to follow up with his wt which shows no change.  He eats 3 meals a day, mainly - though staff reporting he will sometimes skip breakfast.  He will eat out a few times a week but he has small portions.  He likes to eat out at [employer] or other [facility] place.  He is trying to eat smoothies.  His activity includes walking 3x/wk .5 mile.  He might snack on fruits but otherwise he will not snack.  He does eat a lot of salads- he mainly drinks water during the day and some almond milk mixed with 1%.          Nutrition Diagnosis:   Overweight/obesity  related to Excess energy intake as evidenced by  BMI more than normative standard for age and sex (obesity-grade III 40+)       Any change or new dx since previous visit:     Nutrition Diagnosis:         Medical Nutrition Therapy Intervention:  [x]Individualized Meal Plan-Discussed the importance of eating 3 meals daily and not skipping, and how metabolism is affected.  Also discussed adding in small snacks or 5-6 small meals daily if desired vs 3 larger ones, and appropriate options and portions.  Appropriate timing of meals, including eating within 1 hr of waking and eating meals slowly 20mins/meals, minimizing mindless/boredom or habitual eating, etc was also mentioned.  Discussed the plate method of portioning foods, including half a plate fruits and vegetables or a half plate all vegetables, 1/4 of the plate a lean protein source or meat, and a 1/4 of the plate being a whole grain carb- usually 1/2-1c.  This should be followed for at least 2 meals of the day, but could also be followed for  all 3.  \Fluid intake discussed and appropriate amounts and choices, 16 oz with meals and additional 32oz during the day.  S/S dehydration also covered.Discussed the importance of trying new foods 12-16x, and retraining taste buds to like new foods.  Discussed how taste buds will change over the course of a lifetime.  Also included trying new foods at the beginning of a meal when you are the hungriest and more apt to like a new food and be more accepting of it. []Understanding Lab Values   []Basic Pathophysiology of Disease []Food/Medication Interactions   []Food Diary [x]Exercise-150 mins of moderate activity weekly, discussed importance of variety of activity, including wt bearing activities 2-3x/wk x 10-15mins. Discussed importance of activity throughout the lifecycle and its impact on overall health/stress management, etc.   [x]Lifestyle/Behavior Modification Techniques-Discussed the importance of adequate sleep, and ideal sleeping conditions, including a cool temperature 64-68 degrees F, and a dark and quiet room. Also discussed the importance of a regular and consistent sleep routine, including minimizing blue light exposure an hour before sleeping.  Weekend habits should include staying fairly consistent with weekday sleep habits to minimize disruption during the week.  A connection was made between getting adequate and good quality sleep and the ability to handle stress the next day, make healthy food choices, and be active. []Medication, Mechanism of Action   []Label Reading: CHO/ Na/ Fat/ other_________ []Self Blood Glucose Monitoring   []Weight/BMI Goals: gain/lose/maintain []Other -           Comprehension: []Excellent  []Very Good  [x]Good  []Fair   []Poor    Receptivity: []Excellent  []Very Good  [x]Good  []Fair   []Poor    Expected Compliance: []Excellent  []Very Good  []Good  [x]Fair   [x]Poor        Goals (initial)/ Progress made on previous goals/new goals:   3 meals daily no skipping- has achieved  "continue   2.  Portions per plate method as dicussed with RD- has achieved continue   3. Minimize calories from drinks  has achieved continue       No follow-ups on file.  Labs:  CMP  Lab Results   Component Value Date    K 4.3 06/24/2024     06/24/2024    CO2 27 06/24/2024    BUN 13 06/24/2024    CREATININE 0.96 06/24/2024    GLUF 115 (H) 06/24/2024    CALCIUM 9.5 06/24/2024    CORRECTEDCA 9.7 05/04/2023     (H) 06/24/2024     (H) 06/24/2024    ALKPHOS 96 06/24/2024    EGFR 113 06/24/2024       BMP  Lab Results   Component Value Date    CALCIUM 9.5 06/24/2024    K 4.3 06/24/2024    CO2 27 06/24/2024     06/24/2024    BUN 13 06/24/2024    CREATININE 0.96 06/24/2024       Lipids  No results found for: \"CHOL\"  Lab Results   Component Value Date    HDL 28 (L) 06/24/2024    HDL 27 (L) 03/27/2024    HDL 32 (L) 02/01/2024     Lab Results   Component Value Date    LDLCALC 46 06/24/2024    LDLCALC 27 03/27/2024    LDLCALC 23 02/01/2024     Lab Results   Component Value Date    TRIG 301 (H) 06/24/2024    TRIG 333 (H) 03/27/2024    TRIG 357 (H) 02/01/2024     No results found for: \"CHOLHDL\"    Hemoglobin A1C  Lab Results   Component Value Date    HGBA1C 7.5 (H) 06/24/2024       Fasting Glucose  Lab Results   Component Value Date    GLUF 115 (H) 06/24/2024       Insulin     Thyroid  Lab Results   Component Value Date    TSH 1.50 05/21/2023       Hepatic Function Panel  Lab Results   Component Value Date     (H) 06/24/2024     (H) 06/24/2024    ALKPHOS 96 06/24/2024       Celiac Disease Antibody Panel  No results found for: \"ENDOMYSIAL IGA\", \"GLIADIN IGA\", \"GLIADIN IGG\", \"IGA\", \"TISSUE TRANSGLUT AB\", \"TTG IGA\"   Iron  No results found for: \"IRON\", \"TIBC\", \"FERRITIN\"         Asha Paez RD  [facility] CLINICAL NUTRITION [facility]  [id number] [id number]    "

## 2024-07-19 PROBLEM — R05.9 COUGH: Status: RESOLVED | Noted: 2024-06-19 | Resolved: 2024-07-19

## 2024-07-22 DIAGNOSIS — K59.00 CONSTIPATION: ICD-10-CM

## 2024-07-23 RX ORDER — SENNOSIDES AND DOCUSATE SODIUM 8.6; 5 MG/1; MG/1
TABLET ORAL
Qty: 60 TABLET | Refills: 3 | Status: SHIPPED | OUTPATIENT
Start: 2024-07-23

## 2024-08-06 ENCOUNTER — APPOINTMENT (EMERGENCY)
Dept: CT IMAGING | Facility: HOSPITAL | Age: 19
End: 2024-08-06
Payer: COMMERCIAL

## 2024-08-06 ENCOUNTER — APPOINTMENT (EMERGENCY)
Dept: RADIOLOGY | Facility: HOSPITAL | Age: 19
End: 2024-08-06
Payer: COMMERCIAL

## 2024-08-06 ENCOUNTER — HOSPITAL ENCOUNTER (EMERGENCY)
Facility: HOSPITAL | Age: 19
Discharge: HOME | End: 2024-08-06
Attending: EMERGENCY MEDICINE | Admitting: EMERGENCY MEDICINE
Payer: COMMERCIAL

## 2024-08-06 VITALS
OXYGEN SATURATION: 98 % | WEIGHT: 310.85 LBS | HEART RATE: 105 BPM | DIASTOLIC BLOOD PRESSURE: 96 MMHG | BODY MASS INDEX: 47.97 KG/M2 | SYSTOLIC BLOOD PRESSURE: 148 MMHG | RESPIRATION RATE: 19 BRPM | TEMPERATURE: 98 F

## 2024-08-06 DIAGNOSIS — M25.512 LEFT SHOULDER PAIN: ICD-10-CM

## 2024-08-06 DIAGNOSIS — S09.90XA CLOSED HEAD INJURY, INITIAL ENCOUNTER: Primary | ICD-10-CM

## 2024-08-06 PROCEDURE — 70450 CT HEAD/BRAIN W/O DYE: CPT

## 2024-08-06 PROCEDURE — 72125 CT NECK SPINE W/O DYE: CPT

## 2024-08-06 PROCEDURE — 99284 EMERGENCY DEPT VISIT MOD MDM: CPT

## 2024-08-06 PROCEDURE — 73030 X-RAY EXAM OF SHOULDER: CPT

## 2024-08-06 NOTE — ED PROVIDER NOTES
History  Chief Complaint   Patient presents with    Fall     States was showering slipped and fell was complaining of R shoulder and head pain. Denies pain at this time. Denies thinners or LOC.     Patient is a 19-year-old male with a past medical history of cognitive impairment, constipation, DMII who is accompanied to the ED by staff for evaluation after fall.  Staff states that the patient was in the shower today when he slipped and hit his head/arm.  Staff state that he was complaining of some head pain and shoulder pain so they brought him for evaluation.  Patient states that he hit the right side of his head as well as the left shoulder.  No loss of consciousness.  He is not on blood thinners.  Patient is no longer complaining of pain.  Denies nausea, vomiting, numbness, weakness, change in behavior or mental status, urinary complaints, vision change.        Prior to Admission Medications   Prescriptions Last Dose Informant Patient Reported? Taking?   Acetaminophen Extra Strength 500 MG TABS   Yes No   Alcohol Swabs (Alcohol Prep) 70 % PADS   Yes No   Blood Glucose Monitoring Suppl (OneTouch Verio) w/Device KIT   No No   Sig: daily   Incontinence Supply Disposable (Comfort Shield Adult Diapers) [facility]  Outside Facility (Specify), Self No No   Sig: Use 1 packet 4 (four) times a day   Patient not taking: Reported on 1/12/2023   Lancets (onetouch ultrasoft) lancets   No No   Sig: Daily.   OLANZapine (ZyPREXA) 20 MG tablet   No No   Sig: Take 1 tablet (20 mg total) by mouth daily at bedtime   Sunscreens (Coppertone Complete SPF30) LOTN   No No   Sig: Apply 1 Application topically if needed (for application before sun exposure)   acetaminophen (TYLENOL) 650 mg CR tablet  Self, Outside Facility (Specify) No No   Sig: Take 1 tablet (650 mg total) by mouth every 8 (eight) hours as needed for mild pain, moderate pain or headaches   Patient not taking: Reported on 2/16/2024   aluminum-magnesium hydroxide-simethicone  (MAALOX MAX) 400-400-40 MG/5ML suspension   No No   Sig: Take 10 mL by mouth every 6 (six) hours as needed for indigestion or heartburn   aluminum-magnesium hydroxide-simethicone (MAALOX) 200-200-20 MG/5ML SUSP   No No   Sig: Take 30 mL by mouth 2 (two) times a day as needed for heartburn   Patient not taking: Reported on 5/31/2024   chlorproMAZINE (THORAZINE) 100 mg tablet   No No   Sig: Take 1 tablet (100 mg total) by mouth 2 (two) times a day One in the morning, One in the afternoon.   chlorproMAZINE (THORAZINE) 50 mg tablet   No No   Sig: Take 3 tablets (150 mg total) by mouth daily at bedtime   cholecalciferol (VITAMIN D3) 1,000 units tablet   No No   Sig: Take 1 tablet (1,000 Units total) by mouth daily   cloNIDine (CATAPRES) 0.2 mg tablet   No No   Sig: Take 1 tablet (0.2 mg total) by mouth 3 (three) times a day   clonazePAM (KlonoPIN) 1 mg tablet   No No   Sig: Take 1 tablet (1 mg total) by mouth daily   cyanocobalamin (VITAMIN B-12) 500 MCG tablet   No No   Sig: Take 1 tablet (500 mcg total) by mouth daily   desmopressin (DDAVP) 0.2 mg tablet   No No   Sig: Take 2 tablets (0.4 mg total) by mouth daily at bedtime   diphenhydrAMINE (GNP Allergy Relief) 12.5 MG chewable tablet   No No   Sig: Chew 1 tablet (12.5 mg total) in the morning   divalproex sodium (DEPAKOTE ER) 250 mg 24 hr tablet   No No   Sig: Take 1 tablet (250 mg total) by mouth daily at bedtime   divalproex sodium (Depakote) 500 mg DR tablet   No No   Sig: Take 3 tablets (1,500 mg total) by mouth every 8 (eight) hours   glucose blood (OneTouch Verio) test strip   No No   Sig: Daily   ibuprofen (MOTRIN) 400 mg tablet   Yes No   Sig: Take 400 mg by mouth every 6 (six) hours as needed for mild pain   metFORMIN (GLUCOPHAGE) 1000 MG tablet   No No   Sig: Take 1 tablet (1,000 mg total) by mouth 2 (two) times a day with meals   metoprolol succinate (TOPROL-XL) 50 mg 24 hr tablet   No No   Sig: Take 1 tablet (50 mg total) by mouth daily   paliperidone  (INVEGA) 6 MG 24 hr tablet   No No   Sig: Take 1 tablet (6 mg total) by mouth every morning   polyethylene glycol (MIRALAX) 17 g packet   No No   Sig: Take 17 g by mouth daily   senna-docusate sodium (Senna-Plus) 8.6-50 mg per tablet   No No   Sig: TAKE 1 TABLET BY MOUTH 2XDAILY@8A-8P(CONST) *ABIMBOLA   sitaGLIPtin (JANUVIA) 100 mg tablet   No No   Si tab daily   tolterodine (DETROL LA) 4 mg 24 hr capsule   No No   Sig: Take 1 capsule (4 mg total) by mouth daily      Facility-Administered Medications: None       Past Medical History:   Diagnosis Date    Chronic headaches     Cognitive impairment     Psychiatric illness        Past Surgical History:   Procedure Laterality Date    WRIST SURGERY Right        Family History   Problem Relation Age of Onset    No Known Problems Mother     No Known Problems Father      I have reviewed and agree with the history as documented.    E-Cigarette/Vaping    E-Cigarette Use Never User      E-Cigarette/Vaping Substances    Nicotine No     THC No     CBD No     Flavoring Yes     Other No     Unknown No      Social History     Tobacco Use    Smoking status: Never     Passive exposure: Past    Smokeless tobacco: Never   Vaping Use    Vaping status: Never Used   Substance Use Topics    Alcohol use: Never    Drug use: Not Currently     Types: Marijuana       Review of Systems   Constitutional:  Negative for chills and fever.   HENT:  Negative for ear pain and sore throat.         Right sided head pain   Eyes:  Negative for pain and visual disturbance.   Respiratory:  Negative for cough and shortness of breath.    Cardiovascular:  Negative for chest pain.   Gastrointestinal:  Negative for abdominal pain, nausea and vomiting.   Genitourinary:  Negative for difficulty urinating, dysuria and hematuria.   Musculoskeletal:  Positive for arthralgias. Negative for back pain and neck pain.   Skin:  Negative for color change, rash and wound.   Neurological:  Negative for seizures, syncope,  weakness, numbness and headaches.   All other systems reviewed and are negative.      Physical Exam  Physical Exam  Vitals and nursing note reviewed.   Constitutional:       General: He is not in acute distress.     Appearance: Normal appearance. He is well-developed. He is not ill-appearing, toxic-appearing or diaphoretic.   HENT:      Head: Normocephalic and atraumatic.      Comments: Patient states hit the right temple.  No abrasion, laceration, swelling, hematoma, deformity.  No tenderness to palpation.     Right Ear: External ear normal.      Left Ear: External ear normal.      Nose: Nose normal.      Mouth/Throat:      Mouth: Mucous membranes are moist.   Eyes:      Conjunctiva/sclera: Conjunctivae normal.   Cardiovascular:      Rate and Rhythm: Normal rate and regular rhythm.      Heart sounds: Normal heart sounds. No murmur heard.  Pulmonary:      Effort: Pulmonary effort is normal. No respiratory distress.      Breath sounds: Normal breath sounds. No stridor. No wheezing, rhonchi or rales.   Abdominal:      General: Abdomen is flat. Bowel sounds are normal. There is no distension.      Palpations: Abdomen is soft.      Tenderness: There is no abdominal tenderness.   Musculoskeletal:         General: No swelling. Normal range of motion.      Right shoulder: Normal.      Left shoulder: Tenderness present. No swelling, deformity, effusion, laceration or crepitus. Normal range of motion. Normal strength. Normal pulse.      Cervical back: Normal range of motion and neck supple. No rigidity.      Comments: Very mild left shoulder TTP. FROM intact. NVI distally. Radial pulse intact.    Skin:     General: Skin is warm and dry.      Capillary Refill: Capillary refill takes less than 2 seconds.   Neurological:      Mental Status: He is alert.      GCS: GCS eye subscore is 4. GCS verbal subscore is 5. GCS motor subscore is 6.      Sensory: Sensation is intact.      Motor: Motor function is intact.   Psychiatric:          Mood and Affect: Mood normal.         Vital Signs  ED Triage Vitals [08/06/24 1522]   Temperature Pulse Respirations Blood Pressure SpO2   98 °F (36.7 °C) 105 19 148/96 98 %      Temp Source Heart Rate Source Patient Position - Orthostatic VS BP Location FiO2 (%)   Oral Monitor Sitting Right arm --      Pain Score       No Pain           Vitals:    08/06/24 1522   BP: 148/96   Pulse: 105   Patient Position - Orthostatic VS: Sitting         Visual Acuity      ED Medications  Medications - No data to display    Diagnostic Studies  Results Reviewed       None                   XR shoulder 2+ views LEFT   Final Result by Dimitri Taylor MD (08/06 1701)      No acute osseous abnormality.         Computerized Assisted Algorithm (CAA) may have been used to analyze all applicable images.         Workstation performed: [id number]         CT head without contrast   Final Result by Raul Kwon MD (08/06 1629)      No acute intracranial abnormality.                  Workstation performed: [id number]         CT spine cervical without contrast   Final Result by Raul Kwon MD (08/06 1630)      No cervical spine fracture or traumatic malalignment.      Tiny thyroid nodule.            Workstation performed: [id number]                    Procedures  Procedures         ED Course                                               Medical Decision Making  Plan- will xray shoulder and CT head and c spine  Xray with no acute bony abnormality.   CT head-no acute intracranial abnormality.   CT c spine-  No cervical spine fracture or traumatic malalignment.  Tiny thyroid nodule.     Discussed results with patient and staff members.   Patient well appearing and in NAD. No pain at this time.     The management plan was discussed in detail with the patient at bedside and all questions were answered.  Prior to discharge, we provided both verbal and written instructions.  We discussed with the patient the signs and symptoms for  which to return to the emergency department.  All questions were answered and patient was comfortable with the plan of care and discharged to home.  Instructed the patient to follow up with the primary care provider and/or specialist provided and their written instructions.  The patient verbalized understanding of our discussion and plan of care, and agrees to return to the Emergency Department for concerns and progression of illness.     At discharge, I instructed the patient to:  -follow up with pcp  -follow up with the recommended specialists  -return to the ER if symptoms worsened or new symptoms arose  Patient agreed to this plan and was stable at time of discharge.     Amount and/or Complexity of Data Reviewed  Radiology: ordered and independent interpretation performed. Decision-making details documented in ED Course.                 Disposition  Final diagnoses:   Closed head injury, initial encounter   Left shoulder pain     Time reflects when diagnosis was documented in both MDM as applicable and the Disposition within this note       Time User Action Codes Description Comment    8/6/2024  5:02 PM Nahed Heaton [S09.90XA] Closed head injury, initial encounter     8/6/2024  5:02 PM Nahed Heaton [M25.512] Left shoulder pain           ED Disposition       ED Disposition   Discharge    Condition   Stable    Date/Time   Tue Aug 6, 2024  5:02 PM    Comment   Manuel Back discharge to home/self care.                   Follow-up Information       Follow up With Specialties Details Why Contact Info Additional Information    Follow up with your PCP in 1-2 days         [facility] Emergency Department Emergency Medicine  If symptoms worsen [address] [facility] [id number]  [phone] [facility] Emergency Department, [address], [place], [address]            Discharge Medication List as of 8/6/2024  5:02 PM        CONTINUE these  medications which have NOT CHANGED    Details   acetaminophen (TYLENOL) 650 mg CR tablet Take 1 tablet (650 mg total) by mouth every 8 (eight) hours as needed for mild pain, moderate pain or headaches, Starting Wed 4/5/2023, Print      Acetaminophen Extra Strength 500 MG TABS Historical Med      Alcohol Swabs (Alcohol Prep) 70 % PADS Historical Med      aluminum-magnesium hydroxide-simethicone (MAALOX MAX) 400-400-40 MG/5ML suspension Take 10 mL by mouth every 6 (six) hours as needed for indigestion or heartburn, Starting Wed 6/19/2024, Normal      aluminum-magnesium hydroxide-simethicone (MAALOX) 200-200-20 MG/5ML SUSP Take 30 mL by mouth 2 (two) times a day as needed for heartburn, Starting Mon 4/1/2024, Normal      Blood Glucose Monitoring Suppl (OneTouch Verio) w/Device KIT daily, Normal      chlorproMAZINE (THORAZINE) 100 mg tablet Take 1 tablet (100 mg total) by mouth 2 (two) times a day One in the morning, One in the afternoon., Starting Tue 2/6/2024, Until Mon 5/13/2024, Normal      chlorproMAZINE (THORAZINE) 50 mg tablet Take 3 tablets (150 mg total) by mouth daily at bedtime, Starting Tue 2/6/2024, Until Mon 5/13/2024, Normal      cholecalciferol (VITAMIN D3) 1,000 units tablet Take 1 tablet (1,000 Units total) by mouth daily, Starting Thu 10/19/2023, Normal      clonazePAM (KlonoPIN) 1 mg tablet Take 1 tablet (1 mg total) by mouth daily, Starting Wed 2/7/2024, Until Fri 5/31/2024, Normal      cloNIDine (CATAPRES) 0.2 mg tablet Take 1 tablet (0.2 mg total) by mouth 3 (three) times a day, Starting Fri 2/16/2024, No Print      cyanocobalamin (VITAMIN B-12) 500 MCG tablet Take 1 tablet (500 mcg total) by mouth daily, Starting Tue 9/5/2023, Normal      desmopressin (DDAVP) 0.2 mg tablet Take 2 tablets (0.4 mg total) by mouth daily at bedtime, Starting Thu 4/18/2024, Until Tue 10/15/2024, Normal      diphenhydrAMINE (GNP Allergy Relief) 12.5 MG chewable tablet Chew 1 tablet (12.5 mg total) in the morning,  Starting Tue 10/10/2023, Normal      divalproex sodium (DEPAKOTE ER) 250 mg 24 hr tablet Take 1 tablet (250 mg total) by mouth daily at bedtime, Starting Fri 2/16/2024, Until Fri 5/31/2024, No Print      divalproex sodium (Depakote) 500 mg DR tablet Take 3 tablets (1,500 mg total) by mouth every 8 (eight) hours, Starting Fri 2/16/2024, No Print      glucose blood (OneTouch Verio) test strip Daily, Normal      ibuprofen (MOTRIN) 400 mg tablet Take 400 mg by mouth every 6 (six) hours as needed for mild pain, Historical Med      Incontinence Supply Disposable (Comfort Shield Adult Diapers) MISC Use 1 packet 4 (four) times a day, Starting Mon 10/17/2022, Print      Lancets (onetouch ultrasoft) lancets Daily., Normal      metFORMIN (GLUCOPHAGE) 1000 MG tablet Take 1 tablet (1,000 mg total) by mouth 2 (two) times a day with meals, Starting Mon 6/24/2024, Normal      metoprolol succinate (TOPROL-XL) 50 mg 24 hr tablet Take 1 tablet (50 mg total) by mouth daily, Starting Wed 3/13/2024, Normal      OLANZapine (ZyPREXA) 20 MG tablet Take 1 tablet (20 mg total) by mouth daily at bedtime, Starting Mon 6/3/2024, Until Sat 11/30/2024, Normal      paliperidone (INVEGA) 6 MG 24 hr tablet Take 1 tablet (6 mg total) by mouth every morning, Starting Fri 2/16/2024, No Print      polyethylene glycol (MIRALAX) 17 g packet Take 17 g by mouth daily, Starting Wed 6/19/2024, Until Sun 8/18/2024, Normal      senna-docusate sodium (Senna-Plus) 8.6-50 mg per tablet TAKE 1 TABLET BY MOUTH 2XDAILY@8A-8P(CONST) *ABIMBOLA, Normal      sitaGLIPtin (JANUVIA) 100 mg tablet 1 tab daily, Normal      Sunscreens (Coppertone Complete SPF30) LOTN Apply 1 Application topically if needed (for application before sun exposure), Starting Tue 5/7/2024, Normal      tolterodine (DETROL LA) 4 mg 24 hr capsule Take 1 capsule (4 mg total) by mouth daily, Starting Fri 5/31/2024, Normal             No discharge procedures on file.    PDMP Review         Value Time User     PDMP Reviewed  Yes 2/7/2024 12:47 PM Dimitris Grewal MD            ED Provider  Electronically Signed by             Nahed Heaton PA-C  08/08/24 0992

## 2024-08-21 ENCOUNTER — OFFICE VISIT (OUTPATIENT)
Dept: FAMILY MEDICINE CLINIC | Facility: CLINIC | Age: 19
End: 2024-08-21

## 2024-08-21 ENCOUNTER — TELEPHONE (OUTPATIENT)
Dept: FAMILY MEDICINE CLINIC | Facility: CLINIC | Age: 19
End: 2024-08-21

## 2024-08-21 VITALS
BODY MASS INDEX: 47.74 KG/M2 | SYSTOLIC BLOOD PRESSURE: 139 MMHG | DIASTOLIC BLOOD PRESSURE: 89 MMHG | RESPIRATION RATE: 18 BRPM | OXYGEN SATURATION: 98 % | HEIGHT: 68 IN | TEMPERATURE: 98.6 F | WEIGHT: 315 LBS | HEART RATE: 100 BPM

## 2024-08-21 DIAGNOSIS — S09.90XD CLOSED HEAD INJURY, SUBSEQUENT ENCOUNTER: Primary | ICD-10-CM

## 2024-08-21 DIAGNOSIS — E04.1 THYROID NODULE: ICD-10-CM

## 2024-08-21 DIAGNOSIS — M25.512 LEFT SHOULDER PAIN, UNSPECIFIED CHRONICITY: ICD-10-CM

## 2024-08-21 DIAGNOSIS — I10 ESSENTIAL HYPERTENSION: ICD-10-CM

## 2024-08-21 PROBLEM — S09.90XA CLOSED HEAD INJURY: Status: ACTIVE | Noted: 2024-08-21

## 2024-08-21 PROCEDURE — 99214 OFFICE O/P EST MOD 30 MIN: CPT | Performed by: FAMILY MEDICINE

## 2024-08-21 NOTE — PROGRESS NOTES
Ambulatory Visit  Name: Manuel Back      : 2005      MRN: [id number]  Encounter Provider: Rosales Santoyo  Encounter Date: 2024   Encounter department: [facility]    Assessment & Plan   1. Closed head injury, subsequent encounter  Assessment & Plan:  Denies headache, nausea, vomiting, dizziness, visual disturbances, seizure, loss of consciousness, weakness, drowsy.   Patient oriented to time place person    2. Essential hypertension  Assessment & Plan:  Repeat blood pressure 139/89  Patient feels well.   He denies any headache, palpitations, visual disturbance, chest pain  Patient currently on metoprolol 50 mg daily    PLAN   -Continue metoprolol 50 mg daily  3. Thyroid nodule  Assessment & Plan:  CT head - 2024 -incidental finding of tiny thyroid nodule   -TSH -3.812      Plan  -Will evaluate it , TSH ordered.  -Advised to follow-up in 4 weeks with lab work done  Orders:  -     TSH, 3rd generation with Free T4 reflex; Future  4. Left shoulder pain, unspecified chronicity  Assessment & Plan:  Denies any pain.  Can move his left arm without any discomfort/pain.   Normal ROM of left shoulder       History of Present Illness     Phil 19-year-old male known case of diabetes, hypertension, schizoaffective disorder, acid reflux, overactive bladder was seen in clinic for follow-up from ED for closed head injury left shoulder pain.  Patient had experienced a fall in the shower, he slipped and hit his head/arm and had head pain and shoulder pain, and  was brought to ED.  Patient stays in group home and was accompanied by his caretakers. Patient states he feels well.  Denies headache, nausea, vomiting, loss of consciousness, visual disturbance, drowsy, seizure, weakness.  No tenderness over right temporal area, left shoulder. Denies any left shoulder pain. Able to move left arm without any restriction.  BP was elevated -158/101; repeat  "-139/89.  Caretaker states they give him his medications regularly.  No other complaints/concerns.   Medical examination case note for group home was filled.            Review of Systems   Constitutional:  Positive for activity change (lethargy). Negative for chills and fever.   HENT:  Negative for ear pain and sore throat.    Eyes:  Negative for pain and visual disturbance.   Respiratory:  Negative for cough and shortness of breath.    Cardiovascular:  Negative for chest pain and palpitations.   Gastrointestinal:  Negative for abdominal pain, nausea and vomiting.   Endocrine: Negative for polyuria.   Genitourinary:  Negative for dysuria and hematuria.   Musculoskeletal:  Negative for arthralgias, back pain, joint swelling and neck pain.   Skin:  Negative for color change and rash.   Neurological:  Negative for dizziness, seizures, syncope, light-headedness and headaches.   Psychiatric/Behavioral:  Negative for behavioral problems and confusion.        Objective     /89 (BP Location: Left arm, Patient Position: Sitting, Cuff Size: Large)   Pulse 100   Temp 98.6 °F (37 °C) (Temporal)   Resp 18   Ht 5' 7.5\" (1.715 m)   Wt (!) 143 kg (315 lb 6.4 oz)   SpO2 98%   BMI 48.67 kg/m²     Physical Exam  Vitals and nursing note reviewed.   Constitutional:       General: He is not in acute distress.     Appearance: He is well-developed. He is obese.   HENT:      Head: Normocephalic and atraumatic.   Eyes:      Conjunctiva/sclera: Conjunctivae normal.   Cardiovascular:      Rate and Rhythm: Normal rate and regular rhythm.      Heart sounds: No murmur heard.  Pulmonary:      Effort: Pulmonary effort is normal. No respiratory distress.      Breath sounds: Normal breath sounds.   Abdominal:      General: Bowel sounds are normal.      Palpations: Abdomen is soft.      Tenderness: There is no abdominal tenderness.   Musculoskeletal:         General: No swelling or tenderness. Normal range of motion.      Cervical back: " Normal range of motion and neck supple.   Skin:     General: Skin is warm and dry.      Capillary Refill: Capillary refill takes less than 2 seconds.   Neurological:      General: No focal deficit present.      Mental Status: He is alert and oriented to person, place, and time. Mental status is at baseline.   Psychiatric:         Mood and Affect: Mood normal.       Administrative Statements

## 2024-08-21 NOTE — ASSESSMENT & PLAN NOTE
Denies any pain.  Can move his left arm without any discomfort/pain.   Normal ROM of left shoulder

## 2024-08-21 NOTE — ASSESSMENT & PLAN NOTE
Repeat blood pressure 139/89  Patient feels well.   He denies any headache, palpitations, visual disturbance, chest pain  Patient currently on metoprolol 50 mg daily    PLAN   -Continue metoprolol 50 mg daily

## 2024-08-21 NOTE — TELEPHONE ENCOUNTER
Form was completed at time of visit. Original was given back to patients care givers at time of visit. Copy was placed in the blue clerical folder to be scanned to the chart.

## 2024-08-21 NOTE — ASSESSMENT & PLAN NOTE
CT head - 08/08/2024 -incidental finding of tiny thyroid nodule  06/24 -TSH -3.812      Plan  -Will evaluate it , TSH ordered.  -Advised to follow-up in 4 weeks with lab work done

## 2024-08-21 NOTE — ASSESSMENT & PLAN NOTE
Denies headache, nausea, vomiting, dizziness, visual disturbances, seizure, loss of consciousness, weakness, drowsy.   Patient oriented to time place person

## 2024-08-21 NOTE — TELEPHONE ENCOUNTER
Folder (To be completed by) -Dr. Santoyo     Name of Form - [facility] [place]    Color folder (Blue)    Form to be Faxed (Fax #), Mailed (Address), or Picked up (By whom) -    Patient was made aware of the 7-10 business day form policy.

## 2024-08-29 ENCOUNTER — TELEPHONE (OUTPATIENT)
Dept: FAMILY MEDICINE CLINIC | Facility: CLINIC | Age: 19
End: 2024-08-29

## 2024-08-29 DIAGNOSIS — E53.8 VITAMIN B12 DEFICIENCY: ICD-10-CM

## 2024-09-06 DIAGNOSIS — E11.9 TYPE 2 DIABETES MELLITUS WITHOUT COMPLICATION, WITHOUT LONG-TERM CURRENT USE OF INSULIN (HCC): ICD-10-CM

## 2024-09-06 NOTE — TELEPHONE ENCOUNTER
Reason for call:   [x] Refill   [] Prior Auth  [] Other:     Office:   [] PCP/Provider -   [x] Specialty/Provider - [facility]     Medication: sitaGLIPtin (JANUVIA) 100 mg 1 tablet daily       Pharmacy: [facility]     Does the patient have enough for 3 days?   [x] Yes   [] No - Send as HP to POD

## 2024-09-10 DIAGNOSIS — K59.00 CONSTIPATION: ICD-10-CM

## 2024-09-11 ENCOUNTER — HOSPITAL ENCOUNTER (EMERGENCY)
Facility: HOSPITAL | Age: 19
Discharge: HOME | End: 2024-09-11
Attending: INTERNAL MEDICINE
Payer: COMMERCIAL

## 2024-09-11 VITALS
DIASTOLIC BLOOD PRESSURE: 96 MMHG | WEIGHT: 307.32 LBS | OXYGEN SATURATION: 98 % | TEMPERATURE: 98 F | HEART RATE: 85 BPM | RESPIRATION RATE: 17 BRPM | SYSTOLIC BLOOD PRESSURE: 124 MMHG | BODY MASS INDEX: 47.42 KG/M2

## 2024-09-11 DIAGNOSIS — R73.9 HYPERGLYCEMIA: Primary | ICD-10-CM

## 2024-09-11 LAB
ALBUMIN SERPL BCG-MCNC: 4 G/DL (ref 3.5–5)
ALP SERPL-CCNC: 121 U/L (ref 34–104)
ALT SERPL W P-5'-P-CCNC: 105 U/L (ref 7–52)
ANION GAP SERPL CALCULATED.3IONS-SCNC: 8 MMOL/L (ref 4–13)
AST SERPL W P-5'-P-CCNC: 54 U/L (ref 13–39)
B-OH-BUTYR SERPL-MCNC: <0.05 MMOL/L (ref 0.02–0.27)
BACTERIA UR QL AUTO: NORMAL /HPF
BASE EX.OXY STD BLDV CALC-SCNC: 93.6 % (ref 60–80)
BASE EXCESS BLDV CALC-SCNC: -1.1 MMOL/L
BILIRUB SERPL-MCNC: 0.34 MG/DL (ref 0.2–1)
BILIRUB UR QL STRIP: NEGATIVE
BUN SERPL-MCNC: 8 MG/DL (ref 5–25)
CALCIUM SERPL-MCNC: 9.8 MG/DL (ref 8.4–10.2)
CHLORIDE SERPL-SCNC: 94 MMOL/L (ref 96–108)
CLARITY UR: CLEAR
CO2 SERPL-SCNC: 28 MMOL/L (ref 21–32)
COLOR UR: COLORLESS
CREAT SERPL-MCNC: 1.01 MG/DL (ref 0.6–1.3)
ERYTHROCYTE [DISTWIDTH] IN BLOOD BY AUTOMATED COUNT: 12.5 % (ref 11.6–15.1)
GFR SERPL CREATININE-BSD FRML MDRD: 106 ML/MIN/1.73SQ M
GLUCOSE SERPL-MCNC: 289 MG/DL (ref 65–140)
GLUCOSE SERPL-MCNC: 392 MG/DL (ref 65–140)
GLUCOSE SERPL-MCNC: 534 MG/DL (ref 65–140)
GLUCOSE UR STRIP-MCNC: ABNORMAL MG/DL
HCO3 BLDV-SCNC: 23 MMOL/L (ref 24–30)
HCT VFR BLD AUTO: 43.1 % (ref 36.5–49.3)
HGB BLD-MCNC: 14.7 G/DL (ref 12–17)
HGB UR QL STRIP.AUTO: NEGATIVE
KETONES UR STRIP-MCNC: NEGATIVE MG/DL
LEUKOCYTE ESTERASE UR QL STRIP: ABNORMAL
MCH RBC QN AUTO: 29.9 PG (ref 26.8–34.3)
MCHC RBC AUTO-ENTMCNC: 34.1 G/DL (ref 31.4–37.4)
MCV RBC AUTO: 88 FL (ref 82–98)
NITRITE UR QL STRIP: NEGATIVE
NON-SQ EPI CELLS URNS QL MICRO: NORMAL /HPF
O2 CT BLDV-SCNC: 20.2 ML/DL
PCO2 BLDV: 36.7 MM HG (ref 42–50)
PH BLDV: 7.42 [PH] (ref 7.3–7.4)
PH UR STRIP.AUTO: 5 [PH]
PLATELET # BLD AUTO: 214 THOUSANDS/UL (ref 149–390)
PMV BLD AUTO: 11.2 FL (ref 8.9–12.7)
PO2 BLDV: 82.6 MM HG (ref 35–45)
POTASSIUM SERPL-SCNC: 4.3 MMOL/L (ref 3.5–5.3)
PROT SERPL-MCNC: 7.5 G/DL (ref 6.4–8.4)
PROT UR STRIP-MCNC: NEGATIVE MG/DL
RBC # BLD AUTO: 4.91 MILLION/UL (ref 3.88–5.62)
RBC #/AREA URNS AUTO: NORMAL /HPF
SODIUM SERPL-SCNC: 130 MMOL/L (ref 135–147)
SP GR UR STRIP.AUTO: 1.03 (ref 1–1.03)
UROBILINOGEN UR STRIP-ACNC: <2 MG/DL
WBC # BLD AUTO: 7.29 THOUSAND/UL (ref 4.31–10.16)
WBC #/AREA URNS AUTO: NORMAL /HPF

## 2024-09-11 PROCEDURE — 99283 EMERGENCY DEPT VISIT LOW MDM: CPT

## 2024-09-11 PROCEDURE — 96360 HYDRATION IV INFUSION INIT: CPT

## 2024-09-11 PROCEDURE — 81001 URINALYSIS AUTO W/SCOPE: CPT | Performed by: INTERNAL MEDICINE

## 2024-09-11 PROCEDURE — 36415 COLL VENOUS BLD VENIPUNCTURE: CPT | Performed by: INTERNAL MEDICINE

## 2024-09-11 PROCEDURE — 82948 REAGENT STRIP/BLOOD GLUCOSE: CPT

## 2024-09-11 PROCEDURE — 82805 BLOOD GASES W/O2 SATURATION: CPT | Performed by: INTERNAL MEDICINE

## 2024-09-11 PROCEDURE — 85027 COMPLETE CBC AUTOMATED: CPT | Performed by: INTERNAL MEDICINE

## 2024-09-11 PROCEDURE — 99285 EMERGENCY DEPT VISIT HI MDM: CPT | Performed by: INTERNAL MEDICINE

## 2024-09-11 PROCEDURE — 82010 KETONE BODYS QUAN: CPT | Performed by: INTERNAL MEDICINE

## 2024-09-11 PROCEDURE — 80053 COMPREHEN METABOLIC PANEL: CPT | Performed by: INTERNAL MEDICINE

## 2024-09-11 PROCEDURE — 96372 THER/PROPH/DIAG INJ SC/IM: CPT

## 2024-09-11 RX ORDER — POLYETHYLENE GLYCOL 3350 17 G/17G
17 POWDER, FOR SOLUTION ORAL DAILY
Qty: 510 G | Refills: 1 | Status: SHIPPED | OUTPATIENT
Start: 2024-09-11 | End: 2024-11-10

## 2024-09-11 RX ORDER — SODIUM CHLORIDE 9 MG/ML
3 INJECTION INTRAVENOUS
Status: DISCONTINUED | OUTPATIENT
Start: 2024-09-11 | End: 2024-09-11 | Stop reason: HOSPADM

## 2024-09-11 RX ADMIN — INSULIN HUMAN 10 UNITS: 100 INJECTION, SOLUTION PARENTERAL at 14:41

## 2024-09-11 RX ADMIN — SODIUM CHLORIDE 1000 ML: 0.9 INJECTION, SOLUTION INTRAVENOUS at 13:41

## 2024-09-11 NOTE — DISCHARGE INSTRUCTIONS
Please follow-up with your PCP tomorrow   Please increase your blood glucose checks to at least 2 times daily  Please return to the ER if your blood sugars continue to be elevated   Please return to the ER if you develop any nausea, vomiting, abdominal pain, diarrhea or other symptoms

## 2024-09-11 NOTE — ED PROVIDER NOTES
1. Hyperglycemia      ED Disposition       ED Disposition   Discharge    Condition   Stable    Date/Time   Wed Sep 11, 2024  3:19 PM    Comment   Manuel Delgadoon discharge to home/self care.                   Assessment & Plan       Medical Decision Making  Parental diagnosis includes DKA, HHS, hyperglycemia, medication noncompliance, dietary noncompliance, metabolic disturbance, NITA, CKD, pancreatitis, UTI    Amount and/or Complexity of Data Reviewed  Labs: ordered. Decision-making details documented in ED Course.    Risk  OTC drugs.  Prescription drug management.                  ED Course as of 09/11/24 1523   Wed Sep 11, 2024   1144 POC Glucose(!!): 534   1225 pH, Scott(!): 7.415   1225 WBC: 7.29   1225 Hemoglobin: 14.7   1225 Platelet Count: 214   1342 CBC grossly unremarkable   1405 Corrected sodium is 135 ([place]in[place] 1973)   1428 I discussed the patient's results with the patient, his group home staff and called his nurse at the group home.  The nurse at the group home, Luz, states he has a PCP and he can be seen soon.  She will also change his glucose checks to twice daily instead of 3 times per week.  He will return to the ER if blood glucose readings continue to remain elevated or if he is unable to see a PCP very shortly   1521 POC Glucose(!): 289       Medications   sodium chloride (PF) 0.9 % injection 3 mL (has no administration in time range)   insulin regular (HumuLIN R,NovoLIN R) injection 10 Units (10 Units Subcutaneous Given 9/11/24 1441)       History of Present Illness       19-year-old man with non-insulin-dependent diabetes mellitus on metformin presents to the ED with his group home staff for evaluation of high blood glucose readings at home.  Blood glucose readings were over 500.  Patient reports headache that he describes as mild.  It was not sudden and maximal in onset.  It is not the worst headache of his life.  He denies any associated visual changes or dizziness.  Has not tried any  medications or therapies for the headache.  He denies other physical complaints or concerns at this time. Patient denies fevers, chills, chest pain, palpitations, abdominal pain, diarrhea, melena, hematochezia, dysuria, new skin rashes or numbness or tingling of the extremities.          History provided by:  Patient and caregiver   used: No            Review of Systems   All other systems reviewed and are negative.          Objective     ED Triage Vitals   Temperature Pulse Blood Pressure Respirations SpO2 Patient Position - Orthostatic VS   09/11/24 1120 09/11/24 1118 09/11/24 1120 09/11/24 1118 09/11/24 1118 09/11/24 1118   98 °F (36.7 °C) 104 153/91 20 97 % Sitting      Temp Source Heart Rate Source BP Location FiO2 (%) Pain Score    09/11/24 1120 09/11/24 1216 09/11/24 1118 -- --    Tympanic Monitor Right arm          Physical Exam  PHYSICAL EXAM    Constitutional:  Well developed, no acute distress  HEENT:  Conjunctiva normal. Oropharynx moist  Respiratory:  No respiratory distress  Cardiovascular:  Normal rate  GI:  Soft, obese, nondistended, nontender  :  No costovertebral angle tenderness   Musculoskeletal:  No edema, no tenderness, no deformities  Integument:  Well hydrated, no rash   Lymphatic:  No lymphadenopathy noted   Neurologic:  Alert & oriented x 3, normal motor function, no focal deficits noted   Psychiatric:  Speech and behavior appropriate    Labs Reviewed   COMPREHENSIVE METABOLIC PANEL - Abnormal       Result Value    Sodium 130 (*)     Potassium 4.3      Chloride 94 (*)     CO2 28      ANION GAP 8      BUN 8      Creatinine 1.01      Glucose 392 (*)     Calcium 9.8      AST 54 (*)      (*)     Alkaline Phosphatase 121 (*)     Total Protein 7.5      Albumin 4.0      Total Bilirubin 0.34      eGFR 106      Narrative:     National Kidney Disease Foundation guidelines for Chronic Kidney Disease (CKD):     Stage 1 with normal or high GFR (GFR > 90 mL/min/1.73 square  meters)    Stage 2 Mild CKD (GFR = 60-89 mL/min/1.73 square meters)    Stage 3A Moderate CKD (GFR = 45-59 mL/min/1.73 square meters)    Stage 3B Moderate CKD (GFR = 30-44 mL/min/1.73 square meters)    Stage 4 Severe CKD (GFR = 15-29 mL/min/1.73 square meters)    Stage 5 End Stage CKD (GFR <15 mL/min/1.73 square meters)  Note: GFR calculation is accurate only with a steady state creatinine   BLOOD GAS, VENOUS - Abnormal    pH, Scott 7.415 (*)     pCO2, Scott 36.7 (*)     pO2, Scott 82.6 (*)     HCO3, Scott 23.0 (*)     Base Excess, Scott -1.1      O2 Content, Scott 20.2      O2 HGB, VENOUS 93.6 (*)    UA W REFLEX TO MICROSCOPIC WITH REFLEX TO CULTURE - Abnormal    Color, UA Colorless      Clarity, UA Clear      Specific Gravity, UA 1.028      pH, UA 5.0      Leukocytes, UA   (*)     Value: Elevated glucose may cause decreased leukocyte values. See urine microscopic for UWBC result    Nitrite, UA Negative      Protein, UA Negative      Glucose, UA >=1000 (1%) (*)     Ketones, UA Negative      Urobilinogen, UA <2.0      Bilirubin, UA Negative      Occult Blood, UA Negative     POCT GLUCOSE - Abnormal    POC Glucose 534 (*)    POCT GLUCOSE - Abnormal    POC Glucose 289 (*)    CBC AND PLATELET - Normal    WBC 7.29      RBC 4.91      Hemoglobin 14.7      Hematocrit 43.1      MCV 88      MCH 29.9      MCHC 34.1      RDW 12.5      Platelets 214      MPV 11.2     BETA HYDROXYBUTYRATE - Normal    Beta- Hydroxybutyrate <0.05     URINE MICROSCOPIC     No orders to display       Procedures       Jennifer Cole MD  09/11/24 2275

## 2024-09-12 ENCOUNTER — OFFICE VISIT (OUTPATIENT)
Dept: SLEEP CENTER | Facility: CLINIC | Age: 19
End: 2024-09-12
Payer: COMMERCIAL

## 2024-09-12 VITALS
DIASTOLIC BLOOD PRESSURE: 63 MMHG | HEIGHT: 67 IN | SYSTOLIC BLOOD PRESSURE: 139 MMHG | WEIGHT: 307 LBS | HEART RATE: 101 BPM | BODY MASS INDEX: 48.18 KG/M2

## 2024-09-12 DIAGNOSIS — G47.10 HYPERSOMNIA: ICD-10-CM

## 2024-09-12 DIAGNOSIS — E66.01 MORBID OBESITY WITH BMI OF 45.0-49.9, ADULT (HCC): ICD-10-CM

## 2024-09-12 DIAGNOSIS — G47.19 EXCESSIVE DAYTIME SLEEPINESS: Primary | ICD-10-CM

## 2024-09-12 PROCEDURE — 99203 OFFICE O/P NEW LOW 30 MIN: CPT | Performed by: PSYCHIATRY & NEUROLOGY

## 2024-09-12 NOTE — ASSESSMENT & PLAN NOTE
-Unfortunately history is very limited but the patient has a significant risk for obstructive sleep apnea given loud snoring, elevated BMI, hypersomnolence.  He has a crowded airway on exam and may have tonsillar hypertrophy, it was hard to fully visualize as he did not fully cooperate with the exam  -He also has risk for obesity hypoventilation syndrome with a high CO2 a BMI of 48.  Therefore, I ordered a diagnostic sleep study with transcutaneous CO2 monitoring.  -If obstructive sleep apnea is identified, likely would recommend treatment with CPAP.  This may be challenging due to his intellectual disability and mental health issues, therefore I did not initially order a split study.  -Certainly weight loss is recommended, if obstructive sleep apnea is identified, significant weight loss may result in improvement  -Cannot exclude a component of hypersomnolence due to medication as he is on many sedating medications that may be contributing to the clinical picture

## 2024-09-12 NOTE — PATIENT INSTRUCTIONS
"Thank you for trusting me with your care!    I know we often  cover a lot of information at the visit, so if you have follow-up questions, are unclear about the plan, or feel there were important items that we did not discuss or you did not receive clarity on, please don't hesitate to reach out to me.     [employer] messages are preferred for routine matters.  Please make sure to call for urgent matters as there can be a delay in responding to questions over [employer].    I ordered a test called a sleep study-  staff needs to stay with you for the test     IMPORTANT- Prior to a sleep study (at home or in the sleep lab), I strongly recommend contacting your medical insurance first to understand your benefits (including deductible if applicable), coverage for this test, and out of pocket costs.  Even if the test is approved by medical insurance, the cost to you is determined by your medical benefits.  You can also use [employer] on [facility]'s website, under the drop down \"Patients and Visitors\".  If you have concerns about your out of pocket costs for sleep testing, please contact me/the office before you complete the test and we can discuss if there are alternate options.     I recommend following this advice in general before any lab test, imaging test, doctor visit, surgery, or ordering CPAP supplies as it is best to understand your coverage to avoid unexpected bills after the fact.       Nursing Support:  When: Monday through Friday 7:30A-4:30PM except holidays  Where: Our direct line is [phone]  *3  *1.      If you are having a true emergency please call 911.  In the event that the line is busy or it is after hours please leave a voice message and we will return your call.  Please speak clearly, leaving your full name, birth date, best number to reach you and the reason for your call.   Medication refills: We will need the name of the medication, the dosage, the ordering provider, whether you get a 30 or 90 day " refill, and the pharmacy name and address.  Medications will be ordered by the provider only.  Nurses cannot call in prescriptions.  Please allow 7 days for medication refills.  Physician requested updates: If your provider requested that you call with an update after starting medication, please be ready to provide us the medication and dosage, what time you take your medication, the time you attempt to fall asleep, time you fall asleep, when you wake up, and what time you get out of bed.  Sleep Study Results: We will contact you with sleep study results and/or next steps after the physician has reviewed your testing.

## 2024-09-12 NOTE — PROGRESS NOTES
Assessment/Plan:    1. Excessive daytime sleepiness  2. Morbid obesity with BMI of 45.0-49.9, adult (HCC)  -     Ambulatory Referral to Sleep Medicine  -     Diagnostic w/ Transcutaneous; Future  3. Hypersomnia  Assessment & Plan:  -Unfortunately history is very limited but the patient has a significant risk for obstructive sleep apnea given loud snoring, elevated BMI, hypersomnolence.  He has a crowded airway on exam and may have tonsillar hypertrophy, it was hard to fully visualize as he did not fully cooperate with the exam  -He also has risk for obesity hypoventilation syndrome with a high CO2 a BMI of 48.  Therefore, I ordered a diagnostic sleep study with transcutaneous CO2 monitoring.  -If obstructive sleep apnea is identified, likely would recommend treatment with CPAP.  This may be challenging due to his intellectual disability and mental health issues, therefore I did not initially order a split study.  -Certainly weight loss is recommended, if obstructive sleep apnea is identified, significant weight loss may result in improvement  -Cannot exclude a component of hypersomnolence due to medication as he is on many sedating medications that may be contributing to the clinical picture  Orders:  -     Diagnostic w/ Transcutaneous; Future        Subjective:      Patient ID: Manuel Back is a 19 y.o. male.    HPI  Historian- patient and support staff    This is a 19-year-old male referred for a sleep assessment, referred by his family medicine physician at a visit in May 2024.  He has a complicated medical history notable for depression, schizophrenia, hypertension, vitamin D deficiency, morbid obesity, intellectual disability, thyroid nodule    The patient unfortunately cannot provide much details of the history.  Per his staff, they are concerned as he needs to sleep a lot.  He goes to bed around midnight, wakes up around 8 AM for medications but then falls back asleep until noon.  He may be sleepy  "after that, this was a little bit unclear but he is describes to doze unintentionally at times.    He has very loud snoring that is heard outside the room.  He does not have witnessed gasping or breathing pauses.          W[place] Sleepiness Scale  Sitting and reading: Would never doze  Watching TV: High chance of dozing  Sitting, inactive in a public place (e.g. a theatre or a meeting): Moderate chance of dozing  As a passenger in a car for an hour without a break: Moderate chance of dozing  Lying down to rest in the afternoon when circumstances permit: Would never doze  Sitting and talking to someone: Would never doze  Sitting quietly after a lunch without alcohol: Would never doze  In a car, while stopped for a few minutes in traffic: Would never doze  Total score: 7      Review of Systems  (as above unless noted)  As above  Objective:      Visit Vitals  /63 (BP Location: Left arm, Patient Position: Sitting, Cuff Size: Large)   Pulse 101   Ht 5' 7\" (1.702 m)   Wt (!) 139 kg (307 lb)   BMI 48.08 kg/m²   Smoking Status Never   BSA 2.42 m²             Physical Exam    He seemed very lethargic during the visit, answers to my questions but not all  -He has a Mallampati 4 airway with a large tongue.  It seems that he has tonsillar hypertrophy but he would not fully cooperate my exam so I cannot fully visualize the posterior airway.  He has increased neck circumference  Heart is regular, lungs are clear, he is in no distress    Reviewed-blood work shows a CO2 level of 27 mmol/L, it was in the range of 30 to 31 mmol/L earlier in 2024.  "

## 2024-09-16 ENCOUNTER — HOSPITAL ENCOUNTER (EMERGENCY)
Facility: HOSPITAL | Age: 19
Discharge: HOME | End: 2024-09-16
Attending: EMERGENCY MEDICINE
Payer: COMMERCIAL

## 2024-09-16 ENCOUNTER — TELEPHONE (OUTPATIENT)
Dept: FAMILY MEDICINE CLINIC | Facility: CLINIC | Age: 19
End: 2024-09-16

## 2024-09-16 VITALS
HEART RATE: 101 BPM | TEMPERATURE: 98.7 F | RESPIRATION RATE: 18 BRPM | WEIGHT: 303.13 LBS | SYSTOLIC BLOOD PRESSURE: 135 MMHG | OXYGEN SATURATION: 97 % | BODY MASS INDEX: 47.48 KG/M2 | DIASTOLIC BLOOD PRESSURE: 83 MMHG

## 2024-09-16 DIAGNOSIS — R73.9 HYPERGLYCEMIA: Primary | ICD-10-CM

## 2024-09-16 LAB
ANION GAP SERPL CALCULATED.3IONS-SCNC: 8 MMOL/L (ref 4–13)
ATRIAL RATE: 98 BPM
BASOPHILS # BLD AUTO: 0.03 THOUSANDS/ΜL (ref 0–0.1)
BASOPHILS NFR BLD AUTO: 1 % (ref 0–1)
BUN SERPL-MCNC: 11 MG/DL (ref 5–25)
CALCIUM SERPL-MCNC: 9.5 MG/DL (ref 8.4–10.2)
CHLORIDE SERPL-SCNC: 98 MMOL/L (ref 96–108)
CO2 SERPL-SCNC: 30 MMOL/L (ref 21–32)
CREAT SERPL-MCNC: 1.02 MG/DL (ref 0.6–1.3)
EOSINOPHIL # BLD AUTO: 0.06 THOUSAND/ΜL (ref 0–0.61)
EOSINOPHIL NFR BLD AUTO: 1 % (ref 0–6)
ERYTHROCYTE [DISTWIDTH] IN BLOOD BY AUTOMATED COUNT: 12.5 % (ref 11.6–15.1)
GFR SERPL CREATININE-BSD FRML MDRD: 105 ML/MIN/1.73SQ M
GLUCOSE SERPL-MCNC: 271 MG/DL (ref 65–140)
GLUCOSE SERPL-MCNC: 321 MG/DL (ref 65–140)
GLUCOSE SERPL-MCNC: 324 MG/DL (ref 65–140)
HCT VFR BLD AUTO: 41.9 % (ref 36.5–49.3)
HGB BLD-MCNC: 14 G/DL (ref 12–17)
IMM GRANULOCYTES # BLD AUTO: 0.03 THOUSAND/UL (ref 0–0.2)
IMM GRANULOCYTES NFR BLD AUTO: 1 % (ref 0–2)
LIPASE SERPL-CCNC: 42 U/L (ref 11–82)
LYMPHOCYTES # BLD AUTO: 2.11 THOUSANDS/ΜL (ref 0.6–4.47)
LYMPHOCYTES NFR BLD AUTO: 32 % (ref 14–44)
MCH RBC QN AUTO: 29 PG (ref 26.8–34.3)
MCHC RBC AUTO-ENTMCNC: 33.4 G/DL (ref 31.4–37.4)
MCV RBC AUTO: 87 FL (ref 82–98)
MONOCYTES # BLD AUTO: 0.76 THOUSAND/ΜL (ref 0.17–1.22)
MONOCYTES NFR BLD AUTO: 12 % (ref 4–12)
NEUTROPHILS # BLD AUTO: 3.64 THOUSANDS/ΜL (ref 1.85–7.62)
NEUTS SEG NFR BLD AUTO: 53 % (ref 43–75)
NRBC BLD AUTO-RTO: 0 /100 WBCS
P AXIS: 58 DEGREES
PLATELET # BLD AUTO: 222 THOUSANDS/UL (ref 149–390)
PMV BLD AUTO: 11.4 FL (ref 8.9–12.7)
POTASSIUM SERPL-SCNC: 3.8 MMOL/L (ref 3.5–5.3)
PR INTERVAL: 132 MS
QRS AXIS: 89 DEGREES
QRSD INTERVAL: 80 MS
QT INTERVAL: 358 MS
QTC INTERVAL: 457 MS
RBC # BLD AUTO: 4.83 MILLION/UL (ref 3.88–5.62)
SODIUM SERPL-SCNC: 136 MMOL/L (ref 135–147)
T WAVE AXIS: 42 DEGREES
VENTRICULAR RATE: 98 BPM
WBC # BLD AUTO: 6.63 THOUSAND/UL (ref 4.31–10.16)

## 2024-09-16 PROCEDURE — 82948 REAGENT STRIP/BLOOD GLUCOSE: CPT

## 2024-09-16 PROCEDURE — 85025 COMPLETE CBC W/AUTO DIFF WBC: CPT | Performed by: EMERGENCY MEDICINE

## 2024-09-16 PROCEDURE — 96360 HYDRATION IV INFUSION INIT: CPT

## 2024-09-16 PROCEDURE — 93010 ELECTROCARDIOGRAM REPORT: CPT | Performed by: INTERNAL MEDICINE

## 2024-09-16 PROCEDURE — 36415 COLL VENOUS BLD VENIPUNCTURE: CPT | Performed by: EMERGENCY MEDICINE

## 2024-09-16 PROCEDURE — 80048 BASIC METABOLIC PNL TOTAL CA: CPT | Performed by: EMERGENCY MEDICINE

## 2024-09-16 PROCEDURE — 93005 ELECTROCARDIOGRAM TRACING: CPT

## 2024-09-16 PROCEDURE — 99284 EMERGENCY DEPT VISIT MOD MDM: CPT

## 2024-09-16 PROCEDURE — 99284 EMERGENCY DEPT VISIT MOD MDM: CPT | Performed by: EMERGENCY MEDICINE

## 2024-09-16 PROCEDURE — 83690 ASSAY OF LIPASE: CPT | Performed by: EMERGENCY MEDICINE

## 2024-09-16 RX ORDER — MAGNESIUM HYDROXIDE/ALUMINUM HYDROXICE/SIMETHICONE 120; 1200; 1200 MG/30ML; MG/30ML; MG/30ML
30 SUSPENSION ORAL ONCE
Status: COMPLETED | OUTPATIENT
Start: 2024-09-16 | End: 2024-09-16

## 2024-09-16 RX ADMIN — SODIUM CHLORIDE 1000 ML: 0.9 INJECTION, SOLUTION INTRAVENOUS at 14:21

## 2024-09-16 RX ADMIN — ALUMINUM HYDROXIDE, MAGNESIUM HYDROXIDE, AND DIMETHICONE 30 ML: 200; 20; 200 SUSPENSION ORAL at 14:45

## 2024-09-16 NOTE — ED PROVIDER NOTES
1. Hyperglycemia      ED Disposition       ED Disposition   Discharge    Condition   Stable    Date/Time   Mon Sep 16, 2024  2:52 PM    Comment   Manuel Back discharge to home/self care.                   Assessment & Plan       Medical Decision Making  Patient is a 19-year-old male, history of diabetes, brought from group home by caregivers at that she was for 400, patient took his metformin, blood sugar coming down on arrival to 300s, according to caregiver, patient has been known to eat increased calories, patient also asking for soda on arrival, complains of mild upper abdominal discomfort, no nausea or vomiting, no fever, no chest pain or dyspnea.  On exam, patient is well-appearing, no acute distress, benign physical exam, no focal tenderness in the abdomen.  Differential diagnosis: Hyperglycemia, known history of diabetes on metformin, rule out electrolyte derangement, dehydration, possible mild GERD/gastritis, will try Maalox.    Problems Addressed:  Hyperglycemia: acute illness or injury    Amount and/or Complexity of Data Reviewed  Labs: ordered. Decision-making details documented in ED Course.    Risk  OTC drugs.                  ED Course as of 09/17/24 2125   Mon Sep 16, 2024   1451 WBC: 6.63   1451 Hemoglobin: 14.0   1451 Platelet Count: 222   1451 Sodium: 136   1451 Potassium: 3.8   1451 BUN: 11   1451 Creatinine: 1.02   1451 GLUCOSE(!): 321   1451 LIPASE: 42  Labs reviewed.       Medications   sodium chloride 0.9 % bolus 1,000 mL (0 mL Intravenous Stopped 9/16/24 1501)   aluminum-magnesium hydroxide-simethicone (MAALOX) oral suspension 30 mL (30 mL Oral Given 9/16/24 1445)     Chief Complaint   Patient presents with    Hyperglycemia - no symptoms     Pt arrives via EMS from group home, protocol at the facility is for pt to be seen is blood sugar is over 400, at the group home blood sugar was 422, with EMS blood sugar was 346. Report is pt took his metformin this morning but hasn't eaten  lunch or dinner.       History of Present Illness         History provided by:  Patient and caregiver   used: No    Hyperglycemia - no symptoms  Blood sugar level PTA:  422  Severity:  Moderate  Onset quality:  Unable to specify  Duration:  1 day  Timing:  Intermittent  Progression:  Waxing and waning  Chronicity:  New  Diabetes status:  Controlled with oral medications  Current diabetic therapy:  Metformin  Time since last antidiabetic medication: Earlier today morning.  Context: not change in medication    Relieved by:  Nothing  Ineffective treatments:  None tried  Associated symptoms: no abdominal pain, no chest pain, no dizziness, no dysuria, no fever, no nausea, no shortness of breath and no vomiting    Risk factors: obesity    Risk factors comment:  DM          Review of Systems   Constitutional:  Negative for chills and fever.   HENT:  Negative for facial swelling, sore throat and trouble swallowing.    Eyes:  Negative for pain and visual disturbance.   Respiratory:  Negative for cough, chest tightness and shortness of breath.    Cardiovascular:  Negative for chest pain and leg swelling.   Gastrointestinal:  Negative for abdominal pain, diarrhea, nausea and vomiting.   Genitourinary:  Negative for dysuria and flank pain.   Musculoskeletal:  Negative for back pain, neck pain and neck stiffness.   Skin:  Negative for pallor and rash.   Allergic/Immunologic: Negative for environmental allergies and immunocompromised state.   Neurological:  Negative for dizziness and headaches.   Hematological:  Negative for adenopathy. Does not bruise/bleed easily.   Psychiatric/Behavioral:  Negative for agitation and behavioral problems.    All other systems reviewed and are negative.          Objective     ED Triage Vitals   Temperature Pulse Blood Pressure Respirations SpO2 Patient Position - Orthostatic VS   09/16/24 1351 09/16/24 1349 09/16/24 1349 09/16/24 1349 09/16/24 1349 09/16/24 1349   98.7 °F  (37.1 °C) 101 135/83 18 97 % Lying      Temp src Heart Rate Source BP Location FiO2 (%) Pain Score    -- 09/16/24 1349 09/16/24 1349 -- --     Monitor Right arm          Physical Exam  Vitals and nursing note reviewed.   Constitutional:       General: He is not in acute distress.     Appearance: He is well-developed.   HENT:      Head: Normocephalic and atraumatic.   Eyes:      Extraocular Movements: Extraocular movements intact.   Cardiovascular:      Rate and Rhythm: Normal rate and regular rhythm.   Pulmonary:      Effort: Pulmonary effort is normal. No respiratory distress.   Abdominal:      Palpations: Abdomen is soft.      Tenderness: There is no abdominal tenderness. There is no guarding or rebound.   Musculoskeletal:         General: Normal range of motion.      Cervical back: Normal range of motion and neck supple.   Skin:     General: Skin is warm and dry.   Neurological:      General: No focal deficit present.      Mental Status: He is alert and oriented to person, place, and time.   Psychiatric:         Mood and Affect: Mood normal.         Behavior: Behavior normal.         Labs Reviewed   BASIC METABOLIC PANEL - Abnormal       Result Value    Sodium 136      Potassium 3.8      Chloride 98      CO2 30      ANION GAP 8      BUN 11      Creatinine 1.02      Glucose 321 (*)     Calcium 9.5      eGFR 105      Narrative:     National Kidney Disease Foundation guidelines for Chronic Kidney Disease (CKD):     Stage 1 with normal or high GFR (GFR > 90 mL/min/1.73 square meters)    Stage 2 Mild CKD (GFR = 60-89 mL/min/1.73 square meters)    Stage 3A Moderate CKD (GFR = 45-59 mL/min/1.73 square meters)    Stage 3B Moderate CKD (GFR = 30-44 mL/min/1.73 square meters)    Stage 4 Severe CKD (GFR = 15-29 mL/min/1.73 square meters)    Stage 5 End Stage CKD (GFR <15 mL/min/1.73 square meters)  Note: GFR calculation is accurate only with a steady state creatinine   POCT GLUCOSE - Abnormal    POC Glucose 324 (*)     LIPASE - Normal    Lipase 42     CBC AND DIFFERENTIAL    WBC 6.63      RBC 4.83      Hemoglobin 14.0      Hematocrit 41.9      MCV 87      MCH 29.0      MCHC 33.4      RDW 12.5      MPV 11.4      Platelets 222      nRBC 0      Segmented % 53      Immature Grans % 1      Lymphocytes % 32      Monocytes % 12      Eosinophils Relative 1      Basophils Relative 1      Absolute Neutrophils 3.64      Absolute Immature Grans 0.03      Absolute Lymphocytes 2.11      Absolute Monocytes 0.76      Eosinophils Absolute 0.06      Basophils Absolute 0.03       No orders to display       Procedures       Paco Pradhan MD  09/17/24 5085

## 2024-09-16 NOTE — TELEPHONE ENCOUNTER
Patient's caregiver called to notify that patient needed to go to ED (had blood sugar over 400 this afternoon)

## 2024-09-17 ENCOUNTER — HOSPITAL ENCOUNTER (INPATIENT)
Facility: HOSPITAL | Age: 19
LOS: 1 days | Discharge: HOME | DRG: 420 | End: 2024-09-21
Attending: EMERGENCY MEDICINE | Admitting: INTERNAL MEDICINE
Payer: COMMERCIAL

## 2024-09-17 ENCOUNTER — APPOINTMENT (OUTPATIENT)
Dept: LAB | Facility: CLINIC | Age: 19
End: 2024-09-17
Payer: COMMERCIAL

## 2024-09-17 DIAGNOSIS — E11.65 DIABETES MELLITUS WITH HYPERGLYCEMIA (HCC): Primary | ICD-10-CM

## 2024-09-17 DIAGNOSIS — E04.1 THYROID NODULE: ICD-10-CM

## 2024-09-17 LAB
ALBUMIN SERPL BCG-MCNC: 3.9 G/DL (ref 3.5–5)
ALP SERPL-CCNC: 110 U/L (ref 34–104)
ALT SERPL W P-5'-P-CCNC: 98 U/L (ref 7–52)
ANION GAP SERPL CALCULATED.3IONS-SCNC: 9 MMOL/L (ref 4–13)
AST SERPL W P-5'-P-CCNC: 83 U/L (ref 13–39)
B-OH-BUTYR SERPL-MCNC: 0.15 MMOL/L (ref 0.02–0.27)
BASE EX.OXY STD BLDV CALC-SCNC: 83.7 % (ref 60–80)
BASE EXCESS BLDV CALC-SCNC: -3.2 MMOL/L
BILIRUB SERPL-MCNC: 0.3 MG/DL (ref 0.2–1)
BUN SERPL-MCNC: 10 MG/DL (ref 5–25)
CALCIUM SERPL-MCNC: 8.8 MG/DL (ref 8.4–10.2)
CHLORIDE SERPL-SCNC: 98 MMOL/L (ref 96–108)
CO2 SERPL-SCNC: 25 MMOL/L (ref 21–32)
CREAT SERPL-MCNC: 0.96 MG/DL (ref 0.6–1.3)
ERYTHROCYTE [DISTWIDTH] IN BLOOD BY AUTOMATED COUNT: 12.4 % (ref 11.6–15.1)
GFR SERPL CREATININE-BSD FRML MDRD: 113 ML/MIN/1.73SQ M
GLUCOSE SERPL-MCNC: 390 MG/DL (ref 65–140)
GLUCOSE SERPL-MCNC: 449 MG/DL (ref 65–140)
HCO3 BLDV-SCNC: 21.9 MMOL/L (ref 24–30)
HCT VFR BLD AUTO: 38.7 % (ref 36.5–49.3)
HGB BLD-MCNC: 12.9 G/DL (ref 12–17)
LACTATE SERPL-SCNC: 2.2 MMOL/L (ref 0.5–2)
LACTATE SERPL-SCNC: 2.4 MMOL/L (ref 0.5–2)
LIPASE SERPL-CCNC: 39 U/L (ref 11–82)
MCH RBC QN AUTO: 29.1 PG (ref 26.8–34.3)
MCHC RBC AUTO-ENTMCNC: 33.3 G/DL (ref 31.4–37.4)
MCV RBC AUTO: 87 FL (ref 82–98)
O2 CT BLDV-SCNC: 16 ML/DL
PCO2 BLDV: 39.7 MM HG (ref 42–50)
PH BLDV: 7.36 [PH] (ref 7.3–7.4)
PLATELET # BLD AUTO: 203 THOUSANDS/UL (ref 149–390)
PMV BLD AUTO: 11.5 FL (ref 8.9–12.7)
PO2 BLDV: 51.1 MM HG (ref 35–45)
POTASSIUM SERPL-SCNC: 4 MMOL/L (ref 3.5–5.3)
PROT SERPL-MCNC: 6.8 G/DL (ref 6.4–8.4)
RBC # BLD AUTO: 4.44 MILLION/UL (ref 3.88–5.62)
SODIUM SERPL-SCNC: 132 MMOL/L (ref 135–147)
T4 FREE SERPL-MCNC: 0.93 NG/DL (ref 0.61–1.12)
TSH SERPL DL<=0.05 MIU/L-ACNC: 6.23 UIU/ML (ref 0.45–4.5)
WBC # BLD AUTO: 7.56 THOUSAND/UL (ref 4.31–10.16)

## 2024-09-17 PROCEDURE — 82010 KETONE BODYS QUAN: CPT | Performed by: EMERGENCY MEDICINE

## 2024-09-17 PROCEDURE — 80053 COMPREHEN METABOLIC PANEL: CPT | Performed by: EMERGENCY MEDICINE

## 2024-09-17 PROCEDURE — 82948 REAGENT STRIP/BLOOD GLUCOSE: CPT

## 2024-09-17 PROCEDURE — 96365 THER/PROPH/DIAG IV INF INIT: CPT

## 2024-09-17 PROCEDURE — 83605 ASSAY OF LACTIC ACID: CPT | Performed by: EMERGENCY MEDICINE

## 2024-09-17 PROCEDURE — 85027 COMPLETE CBC AUTOMATED: CPT | Performed by: EMERGENCY MEDICINE

## 2024-09-17 PROCEDURE — 36415 COLL VENOUS BLD VENIPUNCTURE: CPT

## 2024-09-17 PROCEDURE — 84443 ASSAY THYROID STIM HORMONE: CPT

## 2024-09-17 PROCEDURE — 82805 BLOOD GASES W/O2 SATURATION: CPT | Performed by: EMERGENCY MEDICINE

## 2024-09-17 PROCEDURE — 99285 EMERGENCY DEPT VISIT HI MDM: CPT | Performed by: EMERGENCY MEDICINE

## 2024-09-17 PROCEDURE — 83036 HEMOGLOBIN GLYCOSYLATED A1C: CPT | Performed by: EMERGENCY MEDICINE

## 2024-09-17 PROCEDURE — 84439 ASSAY OF FREE THYROXINE: CPT

## 2024-09-17 PROCEDURE — 96366 THER/PROPH/DIAG IV INF ADDON: CPT

## 2024-09-17 PROCEDURE — 83690 ASSAY OF LIPASE: CPT | Performed by: EMERGENCY MEDICINE

## 2024-09-17 PROCEDURE — 99283 EMERGENCY DEPT VISIT LOW MDM: CPT

## 2024-09-17 RX ORDER — SODIUM CHLORIDE, SODIUM GLUCONATE, SODIUM ACETATE, POTASSIUM CHLORIDE, MAGNESIUM CHLORIDE, SODIUM PHOSPHATE, DIBASIC, AND POTASSIUM PHOSPHATE .53; .5; .37; .037; .03; .012; .00082 G/100ML; G/100ML; G/100ML; G/100ML; G/100ML; G/100ML; G/100ML
100 INJECTION, SOLUTION INTRAVENOUS CONTINUOUS
Status: DISCONTINUED | OUTPATIENT
Start: 2024-09-18 | End: 2024-09-18

## 2024-09-17 RX ORDER — SODIUM CHLORIDE 9 MG/ML
3 INJECTION INTRAVENOUS
Status: DISCONTINUED | OUTPATIENT
Start: 2024-09-17 | End: 2024-09-21 | Stop reason: HOSPADM

## 2024-09-17 RX ADMIN — SODIUM CHLORIDE, SODIUM LACTATE, POTASSIUM CHLORIDE, AND CALCIUM CHLORIDE 1000 ML: .6; .31; .03; .02 INJECTION, SOLUTION INTRAVENOUS at 23:04

## 2024-09-17 RX ADMIN — SODIUM CHLORIDE, SODIUM LACTATE, POTASSIUM CHLORIDE, AND CALCIUM CHLORIDE 1000 ML: .6; .31; .03; .02 INJECTION, SOLUTION INTRAVENOUS at 21:37

## 2024-09-18 DIAGNOSIS — E11.9 TYPE 2 DIABETES MELLITUS WITHOUT COMPLICATION, WITHOUT LONG-TERM CURRENT USE OF INSULIN (HCC): Primary | ICD-10-CM

## 2024-09-18 PROBLEM — R79.89 ELEVATED TSH: Status: ACTIVE | Noted: 2024-09-18

## 2024-09-18 PROBLEM — R74.01 TRANSAMINITIS: Status: ACTIVE | Noted: 2024-09-18

## 2024-09-18 LAB
EST. AVERAGE GLUCOSE BLD GHB EST-MCNC: 229 MG/DL
GLUCOSE SERPL-MCNC: 190 MG/DL (ref 65–140)
GLUCOSE SERPL-MCNC: 306 MG/DL (ref 65–140)
GLUCOSE SERPL-MCNC: 309 MG/DL (ref 65–140)
GLUCOSE SERPL-MCNC: 311 MG/DL (ref 65–140)
HBA1C MFR BLD: 9.6 %

## 2024-09-18 PROCEDURE — 99222 1ST HOSP IP/OBS MODERATE 55: CPT | Performed by: INTERNAL MEDICINE

## 2024-09-18 PROCEDURE — 82948 REAGENT STRIP/BLOOD GLUCOSE: CPT

## 2024-09-18 RX ORDER — OXYBUTYNIN CHLORIDE 10 MG/1
10 TABLET, EXTENDED RELEASE ORAL DAILY
Status: DISCONTINUED | OUTPATIENT
Start: 2024-09-18 | End: 2024-09-21 | Stop reason: HOSPADM

## 2024-09-18 RX ORDER — CHLORPROMAZINE HYDROCHLORIDE 25 MG/1
100 TABLET, FILM COATED ORAL
Status: DISCONTINUED | OUTPATIENT
Start: 2024-09-18 | End: 2024-09-21 | Stop reason: HOSPADM

## 2024-09-18 RX ORDER — CLONIDINE HYDROCHLORIDE 0.2 MG/1
0.2 TABLET ORAL 3 TIMES DAILY
Status: DISCONTINUED | OUTPATIENT
Start: 2024-09-18 | End: 2024-09-21 | Stop reason: HOSPADM

## 2024-09-18 RX ORDER — DIVALPROEX SODIUM 250 MG/1
250 TABLET, FILM COATED, EXTENDED RELEASE ORAL
Status: DISCONTINUED | OUTPATIENT
Start: 2024-09-18 | End: 2024-09-21 | Stop reason: HOSPADM

## 2024-09-18 RX ORDER — INSULIN LISPRO 100 [IU]/ML
1-6 INJECTION, SOLUTION INTRAVENOUS; SUBCUTANEOUS
Status: DISCONTINUED | OUTPATIENT
Start: 2024-09-18 | End: 2024-09-21 | Stop reason: HOSPADM

## 2024-09-18 RX ORDER — OLANZAPINE 5 MG/1
20 TABLET ORAL
Status: DISCONTINUED | OUTPATIENT
Start: 2024-09-18 | End: 2024-09-21 | Stop reason: HOSPADM

## 2024-09-18 RX ORDER — DIVALPROEX SODIUM 500 MG/1
1500 TABLET, FILM COATED, EXTENDED RELEASE ORAL
Status: DISCONTINUED | OUTPATIENT
Start: 2024-09-18 | End: 2024-09-21 | Stop reason: HOSPADM

## 2024-09-18 RX ORDER — UBIQUINOL 100 MG
CAPSULE ORAL
Qty: 100 EACH | Refills: 0 | Status: SHIPPED | OUTPATIENT
Start: 2024-09-18

## 2024-09-18 RX ORDER — CHLORPROMAZINE HYDROCHLORIDE 25 MG/1
150 TABLET, FILM COATED ORAL
Status: DISCONTINUED | OUTPATIENT
Start: 2024-09-18 | End: 2024-09-21 | Stop reason: HOSPADM

## 2024-09-18 RX ORDER — CLONAZEPAM 1 MG/1
1 TABLET ORAL DAILY
Status: DISCONTINUED | OUTPATIENT
Start: 2024-09-18 | End: 2024-09-21 | Stop reason: HOSPADM

## 2024-09-18 RX ORDER — PALIPERIDONE 3 MG/1
12 TABLET, EXTENDED RELEASE ORAL EVERY MORNING
Status: DISCONTINUED | OUTPATIENT
Start: 2024-09-18 | End: 2024-09-21 | Stop reason: HOSPADM

## 2024-09-18 RX ORDER — METOPROLOL SUCCINATE 50 MG/1
50 TABLET, EXTENDED RELEASE ORAL DAILY
Status: DISCONTINUED | OUTPATIENT
Start: 2024-09-18 | End: 2024-09-21 | Stop reason: HOSPADM

## 2024-09-18 RX ORDER — ONDANSETRON 2 MG/ML
4 INJECTION INTRAMUSCULAR; INTRAVENOUS EVERY 6 HOURS PRN
Status: DISCONTINUED | OUTPATIENT
Start: 2024-09-18 | End: 2024-09-21 | Stop reason: HOSPADM

## 2024-09-18 RX ORDER — DESMOPRESSIN ACETATE 0.1 MG/1
0.4 TABLET ORAL
Status: DISCONTINUED | OUTPATIENT
Start: 2024-09-18 | End: 2024-09-21 | Stop reason: HOSPADM

## 2024-09-18 RX ORDER — INSULIN LISPRO 100 [IU]/ML
1-5 INJECTION, SOLUTION INTRAVENOUS; SUBCUTANEOUS
Status: DISCONTINUED | OUTPATIENT
Start: 2024-09-18 | End: 2024-09-18

## 2024-09-18 RX ORDER — INSULIN ASPART 100 [IU]/ML
24 INJECTION, SUSPENSION SUBCUTANEOUS
Status: DISCONTINUED | OUTPATIENT
Start: 2024-09-18 | End: 2024-09-19

## 2024-09-18 RX ORDER — ACETAMINOPHEN 325 MG/1
650 TABLET ORAL EVERY 4 HOURS PRN
Status: DISCONTINUED | OUTPATIENT
Start: 2024-09-18 | End: 2024-09-21 | Stop reason: HOSPADM

## 2024-09-18 RX ADMIN — INSULIN LISPRO 4 UNITS: 100 INJECTION, SOLUTION INTRAVENOUS; SUBCUTANEOUS at 16:53

## 2024-09-18 RX ADMIN — INSULIN LISPRO 3 UNITS: 100 INJECTION, SOLUTION INTRAVENOUS; SUBCUTANEOUS at 12:30

## 2024-09-18 RX ADMIN — INSULIN LISPRO 3 UNITS: 100 INJECTION, SOLUTION INTRAVENOUS; SUBCUTANEOUS at 08:20

## 2024-09-18 RX ADMIN — OLANZAPINE 20 MG: 5 TABLET, FILM COATED ORAL at 21:20

## 2024-09-18 RX ADMIN — CHLORPROMAZINE HYDROCHLORIDE 100 MG: 25 TABLET, FILM COATED ORAL at 08:25

## 2024-09-18 RX ADMIN — CHLORPROMAZINE HYDROCHLORIDE 150 MG: 25 TABLET, FILM COATED ORAL at 21:20

## 2024-09-18 RX ADMIN — CLONIDINE HYDROCHLORIDE 0.2 MG: 0.2 TABLET ORAL at 16:31

## 2024-09-18 RX ADMIN — DIVALPROEX SODIUM 1500 MG: 500 TABLET, FILM COATED, EXTENDED RELEASE ORAL at 21:19

## 2024-09-18 RX ADMIN — CHLORPROMAZINE HYDROCHLORIDE 150 MG: 25 TABLET, FILM COATED ORAL at 03:03

## 2024-09-18 RX ADMIN — INSULIN ASPART 24 UNITS: 100 INJECTION, SUSPENSION SUBCUTANEOUS at 16:32

## 2024-09-18 RX ADMIN — SITAGLIPTIN 100 MG: 100 TABLET, FILM COATED ORAL at 08:22

## 2024-09-18 RX ADMIN — PALIPERIDONE 12 MG: 3 TABLET, EXTENDED RELEASE ORAL at 08:26

## 2024-09-18 RX ADMIN — CLONIDINE HYDROCHLORIDE 0.2 MG: 0.2 TABLET ORAL at 21:20

## 2024-09-18 RX ADMIN — CLONIDINE HYDROCHLORIDE 0.2 MG: 0.2 TABLET ORAL at 08:24

## 2024-09-18 RX ADMIN — SODIUM CHLORIDE, SODIUM GLUCONATE, SODIUM ACETATE, POTASSIUM CHLORIDE, MAGNESIUM CHLORIDE, SODIUM PHOSPHATE, DIBASIC, AND POTASSIUM PHOSPHATE 100 ML/HR: .53; .5; .37; .037; .03; .012; .00082 INJECTION, SOLUTION INTRAVENOUS at 01:46

## 2024-09-18 RX ADMIN — DIVALPROEX SODIUM 1500 MG: 500 TABLET, FILM COATED, EXTENDED RELEASE ORAL at 03:07

## 2024-09-18 RX ADMIN — DESMOPRESSIN ACETATE 0.4 MG: 0.1 TABLET ORAL at 03:04

## 2024-09-18 RX ADMIN — METFORMIN HYDROCHLORIDE 1000 MG: 500 TABLET ORAL at 08:23

## 2024-09-18 RX ADMIN — METOPROLOL SUCCINATE 50 MG: 50 TABLET, EXTENDED RELEASE ORAL at 08:24

## 2024-09-18 RX ADMIN — DIVALPROEX SODIUM 250 MG: 250 TABLET, EXTENDED RELEASE ORAL at 03:07

## 2024-09-18 RX ADMIN — OLANZAPINE 20 MG: 5 TABLET, FILM COATED ORAL at 03:03

## 2024-09-18 RX ADMIN — CLONAZEPAM 1 MG: 1 TABLET ORAL at 08:23

## 2024-09-18 RX ADMIN — DESMOPRESSIN ACETATE 0.4 MG: 0.1 TABLET ORAL at 21:21

## 2024-09-18 RX ADMIN — DIVALPROEX SODIUM 250 MG: 250 TABLET, EXTENDED RELEASE ORAL at 21:22

## 2024-09-18 RX ADMIN — INSULIN LISPRO 2 UNITS: 100 INJECTION, SOLUTION INTRAVENOUS; SUBCUTANEOUS at 21:22

## 2024-09-18 RX ADMIN — METFORMIN HYDROCHLORIDE 1000 MG: 500 TABLET ORAL at 16:32

## 2024-09-18 RX ADMIN — CHLORPROMAZINE HYDROCHLORIDE 100 MG: 25 TABLET, FILM COATED ORAL at 14:18

## 2024-09-18 RX ADMIN — OXYBUTYNIN CHLORIDE 10 MG: 10 TABLET, EXTENDED RELEASE ORAL at 08:24

## 2024-09-18 NOTE — ED PROVIDER NOTES
1. Diabetes mellitus with hyperglycemia (HCC)      ED Disposition       ED Disposition   Admit    Condition   Stable    Date/Time   Tue Sep 17, 2024 11:47 PM    Comment   Case was discussed with MICHAEL and the patient's admission status was agreed to be Admission Status: observation status to the service of Dr. Horowitz .               Assessment & Plan       Medical Decision Making  Patient presents with hyperglycemia.  Will repeat DKA labs, treat with IV fluids and disposition depending on lab results.  Patient has no symptoms at this time so no further supportive medications needed other than IV fluids.    10:39 PM  Lactic acid is 2.2 but no evidence of DKA and other labs are normal for the elevated blood sugar.  Will continue with IV fluids, repeat the lactic acid and have internal medicine consult to give further medication recommendations and possible need for admission based on their evaluation.    11:48 PM  BS is improving with IVF.  Medicine evaluated the patient.  They suggest admission for observation for better diabetic teaching and control of blood sugar.    Amount and/or Complexity of Data Reviewed  Labs: ordered. Decision-making details documented in ED Course.    Risk  Prescription drug management.  Decision regarding hospitalization.                  ED Course as of 09/17/24 2348   Tue Sep 17, 2024   2227 Lactic acid, plasma (w/reflex if result > 2.0)(!)  Elevated.  Will continue with IVF   2228 Lipase  Normal    2228 Beta Hydroxybutyrate  Not elevated or c/w DKA   2228 Comprehensive metabolic panel(!!)  Hyperglycemia with pseudohyponatremia.  No metabolic disturbance to suggest DKA.  Electrolytes are normal.   2228 Blood gas, venous(!)  Not acidotic        Medications   sodium chloride (PF) 0.9 % injection 3 mL (has no administration in time range)   lactated ringers bolus 1,000 mL (1,000 mL Intravenous New Bag 9/17/24 2304)   lactated ringers bolus 1,000 mL (0 mL Intravenous Stopped 9/17/24 2303)        History of Present Illness       Patient is a 19-year-old male with a history of diabetes on metformin that presents from his group home for persistent elevated blood sugars.  Patient has been here 3 times in the past week for similar issues.  Has been evaluated and worked up for DKA.  Those workups have been negative and the patient was cleared for discharge with PCP follow-up.  Group home staff states that the nurse with the family doctor advised taking his blood sugar twice a day.  Unsure of when his follow-up appointment is.  Patient does not have any insulin or other medications for diabetes.  Blood sugar was over 500 again tonight and he was referred back to the emergency department after speaking with the family physician office.  Patient has no symptoms at this time.      History provided by:  Caregiver  History limited by: cognitive impairment.  Hyperglycemia - no symptoms  Associated symptoms: no vomiting            Review of Systems   Unable to perform ROS: Other   Gastrointestinal:  Negative for vomiting.           Objective     ED Triage Vitals   Temperature Pulse Blood Pressure Respirations SpO2 Patient Position - Orthostatic VS   09/17/24 2109 09/17/24 2105 09/17/24 2105 09/17/24 2105 09/17/24 2105 09/17/24 2105   97.9 °F (36.6 °C) 85 123/75 20 97 % Sitting      Temp Source Heart Rate Source BP Location FiO2 (%) Pain Score    09/17/24 2105 09/17/24 2105 09/17/24 2105 -- --    Oral Monitor Right arm          Physical Exam  Vitals and nursing note reviewed.   Constitutional:       General: He is not in acute distress.     Appearance: He is well-developed. He is not ill-appearing, toxic-appearing or diaphoretic.   HENT:      Head: Normocephalic and atraumatic.      Right Ear: External ear normal.      Left Ear: External ear normal.      Nose: No congestion.   Eyes:      General: No scleral icterus.     Conjunctiva/sclera: Conjunctivae normal.      Right eye: Right conjunctiva is not injected.       Left eye: Left conjunctiva is not injected.   Neck:      Trachea: No tracheal deviation.   Cardiovascular:      Rate and Rhythm: Normal rate.      Pulses: Normal pulses.   Pulmonary:      Effort: Pulmonary effort is normal. No respiratory distress.      Breath sounds: No stridor.   Skin:     General: Skin is warm and dry.      Capillary Refill: Capillary refill takes less than 2 seconds.      Coloration: Skin is not pale.      Findings: No erythema or rash.   Neurological:      Mental Status: He is alert. Mental status is at baseline.      Motor: No abnormal muscle tone.   Psychiatric:         Mood and Affect: Mood normal.         Behavior: Behavior normal.         Labs Reviewed   COMPREHENSIVE METABOLIC PANEL - Abnormal       Result Value    Sodium 132 (*)     Potassium 4.0      Chloride 98      CO2 25      ANION GAP 9      BUN 10      Creatinine 0.96      Glucose 449 (*)     Calcium 8.8      AST 83 (*)     ALT 98 (*)     Alkaline Phosphatase 110 (*)     Total Protein 6.8      Albumin 3.9      Total Bilirubin 0.30      eGFR 113      Narrative:     National Kidney Disease Foundation guidelines for Chronic Kidney Disease (CKD):     Stage 1 with normal or high GFR (GFR > 90 mL/min/1.73 square meters)    Stage 2 Mild CKD (GFR = 60-89 mL/min/1.73 square meters)    Stage 3A Moderate CKD (GFR = 45-59 mL/min/1.73 square meters)    Stage 3B Moderate CKD (GFR = 30-44 mL/min/1.73 square meters)    Stage 4 Severe CKD (GFR = 15-29 mL/min/1.73 square meters)    Stage 5 End Stage CKD (GFR <15 mL/min/1.73 square meters)  Note: GFR calculation is accurate only with a steady state creatinine   BLOOD GAS, VENOUS - Abnormal    pH, Scott 7.360      pCO2, Scott 39.7 (*)     pO2, Scott 51.1 (*)     HCO3, Scott 21.9 (*)     Base Excess, Scott -3.2      O2 Content, Scott 16.0      O2 HGB, VENOUS 83.7 (*)    LACTIC ACID, PLASMA (W/REFLEX IF RESULT > 2.0) - Abnormal    LACTIC ACID 2.2 (*)     Narrative:     Result may be elevated if tourniquet was used  during collection.   POCT GLUCOSE - Abnormal    POC Glucose 390 (*)    CBC AND PLATELET - Normal    WBC 7.56      RBC 4.44      Hemoglobin 12.9      Hematocrit 38.7      MCV 87      MCH 29.1      MCHC 33.3      RDW 12.4      Platelets 203      MPV 11.5     BETA HYDROXYBUTYRATE - Normal    Beta- Hydroxybutyrate 0.15     LIPASE - Normal    Lipase 39     HEMOGLOBIN A1C   LACTIC ACID 2 HOUR     No orders to display       Procedures       Sahil Jean-Baptiste Jr., DO  09/17/24 1839

## 2024-09-18 NOTE — H&P
H&P - Hospitalist   Name: Manuel Back 19 y.o. male I MRN: [id number]  Unit/Bed#: ED-12 I Date of Admission: 9/17/2024   Date of Service: 9/18/2024 I Hospital Day: 0     Assessment & Plan  Type 2 diabetes mellitus with other specified complication (HCC)  Lab Results   Component Value Date    HGBA1C 7.5 (H) 06/24/2024       Recent Labs     09/16/24  1348 09/16/24  1356 09/17/24  2336   POCGLU 271* 324* 390*       Blood Sugar Average: Last 72 hrs:  (P) 390  W/hyperglycemia on metformin/januvia.  This is third ED visit for same from group home.  No evidence of DKA/HHS  Likely just worsening control of known DM.  Continue metformin 1000mg bid januvia 100mg daily  Continue ivf ssi/poc bs for now f/u hgb A1c.   If <9% may benefit third oral agent admit to observation  Op f/u with endocrine known to Dr. Henderson    Morbid obesity with body mass index (BMI) of 40.0 to 49.9 (HCC)  Bmi 45 noted  Essential hypertension  Continue home regimen  Schizoaffective disorder (HCC)  Minimally verbal at group home at baseline per group home staff  Continue op regimen  Elevated TSH  Modestly elevated tsh F/u free t4  Transaminitis  Mild suspect from fatty liver disease.  Op referral to GI    VTE Pharmacologic Prophylaxis: VTE Score: 2 Low Risk (Score 0-2) - Encourage Ambulation.  Code Status: fc  Discussion with family:  group home staff at bedside.     Anticipated Length of Stay: Patient will be admitted on an observation basis with an anticipated length of stay of less than 2 midnights secondary to hyperglycemia.    History of Present Illness   Chief Complaint: high bs    Manuel Back is a 19 y.o. male with a PMH of NIDDM obesity bmi >40 schizoaffective d/o intellectual disability htn coming to hospital for group from home for asymptomatic hyperglycemia.  Pt is nearly nonverbal and offers no complaints staff noted high BS and per their protocol came to ED for evaluation. They have not noticed any complaints or  "gesturing towards head or headache stomach or stomach pain no n/v/f/c.  May be slightly more drowsy than normal but other staff reports near baseline.  They have a PCP appointment at some point in the near future but uncertain when.  It appears they have 4 month follow up on 9/27/24 w/endocrinology Dr. Sheikh.  Given this is third ed visit in 1 week admission was requested.  Review of Systems   Unable to perform ROS: Patient nonverbal         Social History:  Marital Status: Single   Occupation:   Patient Pre-hospital Living Situation:   Patient Pre-hospital Level of Mobility:   Patient Pre-hospital Diet Restrictions:     Objective     Vitals:   Blood Pressure: 137/63 (09/17/24 2359)  Pulse: 77 (09/17/24 2359)  Temperature: 97.9 °F (36.6 °C) (09/17/24 2109)  Temp Source: Oral (09/17/24 2109)  Respirations: 20 (09/17/24 2359)  Height: 5' 7\" (170.2 cm) (09/17/24 2359)  Weight - Scale: 132 kg (291 lb 0.1 oz) (09/17/24 2136)  SpO2: 97 % (09/17/24 2359)    Physical Exam  Constitutional:       General: He is not in acute distress.     Appearance: He is obese. He is not ill-appearing, toxic-appearing or diaphoretic.      Comments: Awake and follows simple commands. No distress.  Appears drowsy   HENT:      Head: Normocephalic and atraumatic.      Right Ear: External ear normal.      Left Ear: External ear normal.      Mouth/Throat:      Mouth: Mucous membranes are dry.   Cardiovascular:      Rate and Rhythm: Normal rate and regular rhythm.   Abdominal:      General: There is no distension.      Palpations: There is no mass.      Tenderness: There is no abdominal tenderness. There is no guarding or rebound.      Hernia: No hernia is present.   Musculoskeletal:      Right lower leg: No edema.      Left lower leg: No edema.   Skin:     General: Skin is warm and dry.   Neurological:      Mental Status: He is alert. Mental status is at baseline.   Psychiatric:         Mood and Affect: Mood normal. "         Lines/Drains:  Lines/Drains/Airways       Active Status       None                        Additional Data:   Lab Results: I have reviewed the following results:   Results from last 7 days   Lab Units 09/17/24  2135 09/16/24  1418   WBC Thousand/uL 7.56 6.63   HEMOGLOBIN g/dL 12.9 14.0   HEMATOCRIT % 38.7 41.9   PLATELETS Thousands/uL 203 222   SEGS PCT %  --  53   LYMPHO PCT %  --  32   MONO PCT %  --  12   EOS PCT %  --  1     Results from last 7 days   Lab Units 09/17/24  2135   SODIUM mmol/L 132*   POTASSIUM mmol/L 4.0   CHLORIDE mmol/L 98   CO2 mmol/L 25   BUN mg/dL 10   CREATININE mg/dL 0.96   ANION GAP mmol/L 9   CALCIUM mg/dL 8.8   ALBUMIN g/dL 3.9   TOTAL BILIRUBIN mg/dL 0.30   ALK PHOS U/L 110*   ALT U/L 98*   AST U/L 83*   GLUCOSE RANDOM mg/dL 449*         Results from last 7 days   Lab Units 09/17/24  2336 09/16/24  1356 09/16/24  1348 09/11/24  1517 09/11/24  1144   POC GLUCOSE mg/dl 390* 324* 271* 289* 534*     Lab Results   Component Value Date    HGBA1C 7.5 (H) 06/24/2024    HGBA1C 7.4 (A) 06/19/2024    HGBA1C 8.2 (A) 03/28/2024     Results from last 7 days   Lab Units 09/17/24  2337 09/17/24  2135   LACTIC ACID mmol/L 2.4* 2.2*       Imaging Review:   Other Studies:     Administrative Statements       ** Please Note: This note has been constructed using a voice recognition system. **

## 2024-09-18 NOTE — NURSING NOTE
RN introduced herself to the patient and the carer.  Patient is unwilling for IV cannula insertion.  SLIM is agreeable to keep patient without IV access for now.  Explained to the carer that patient will not be discharged today.  And will continue to medicate for better blood glucose control.

## 2024-09-18 NOTE — ASSESSMENT & PLAN NOTE
History of mood disorder from group home hypertension and diabetes not on insulin presents with hyperglycemia  Elevated A1c uncontrolled with oral medications  Continue metformin and sitagliptin  Started on 70/30 insulin.  Will increase to 32 units.  Recheck in AM.    Lab Results   Component Value Date    HGBA1C 9.6 (H) 09/17/2024     Recent Labs     09/18/24 2020 09/19/24  0623 09/19/24  1152 09/19/24  1622   POCGLU 190* 239* 350* 276*

## 2024-09-18 NOTE — ASSESSMENT & PLAN NOTE
May be fatty liver disease.  Recent triglycerides 301.    Results from last 7 days   Lab Units 09/19/24  0536 09/17/24  2135   AST U/L 75* 83*   ALT U/L 88* 98*   TOTAL BILIRUBIN mg/dL 0.29 0.30

## 2024-09-18 NOTE — UTILIZATION REVIEW
Initial Clinical Review    Admission: Date/Time/Statement:   Admission Orders (From admission, onward)       Ordered        09/17/24 2347  Place in Observation  Once                          Orders Placed This Encounter   Procedures    Place in Observation     Standing Status:   Standing     Number of Occurrences:   1     Order Specific Question:   Level of Care     Answer:   Med Surg [16]     ED Arrival Information       Expected   -    Arrival   9/17/2024 21:01    Acuity   Urgent              Means of arrival   Ambulance    Escorted by   [facility]    Service   Hospitalist    Admission type   Emergency              Arrival complaint   -             Chief Complaint   Patient presents with    Hyperglycemia - no symptoms     Pt arriving EMS from group home. Pt presenting w sugars in the 500s. Pt only prescribed metformin @ home and has no care plan set in place regarding high sugars. Discharged last night for the same issues.        Initial Presentation: 19 y.o. male presents to ED  via  EMS  from group home with asymptomatic  hyperglycemia.  Staff noted  high blood sugar.  Staff  did not notice  any  unusual behavior.  Drowsy  but  baseline per staff.  PMH  is  NIDDM, schizoaffective  d/o, intellectual disability, HTN  and nearly nonverbal.  Now  3rd  ED visit  in 1 week.  Blood sugar  390.  Admit   Observation with  DM2  and plan is monitor labs,  no evidence of  DKA  and continue home meds.        Anticipated Length of Stay/Certification Statement: Patient will be admitted on an observation basis with an anticipated length of stay of less than 2 midnights secondary to hyperglycemia.       ED Triage Vitals   Temperature Pulse Respirations Blood Pressure SpO2 Pain Score   09/17/24 2109 09/17/24 2105 09/17/24 2105 09/17/24 2105 09/17/24 2105 09/17/24 2359   97.9 °F (36.6 °C) 85 20 123/75 97 % No Pain     Weight (last 2 days)       Date/Time Weight    09/17/24 2136 132 (291.01)            Vital Signs  (last 3 days)       Date/Time Temp Pulse Resp BP MAP (mmHg) SpO2 O2 Device Patient Position - Orthostatic VS [place] Coma Scale Score Pain    09/18/24 0711 96.9 °F (36.1 °C) 82 18 151/89 112 98 % None (Room air) Sitting -- --    09/18/24 0137 97.6 °F (36.4 °C) 78 20 138/102 109 97 % None (Room air) Lying 15 No Pain    09/17/24 2359 -- 77 20 137/63 -- 97 % None (Room air) Lying -- No Pain    09/17/24 2111 -- -- -- -- -- -- -- -- 15 --    09/17/24 2109 97.9 °F (36.6 °C) -- -- -- -- -- -- -- -- --    09/17/24 2105 -- 85 20 123/75 -- 97 % None (Room air) Sitting -- --              Pertinent Labs/Diagnostic Test Results:   Radiology:      Results from last 7 days   Lab Units 09/17/24 2135 09/16/24 1418 09/11/24  1159   WBC Thousand/uL 7.56 6.63 7.29   HEMOGLOBIN g/dL 12.9 14.0 14.7   HEMATOCRIT % 38.7 41.9 43.1   PLATELETS Thousands/uL 203 222 214   TOTAL NEUT ABS Thousands/µL  --  3.64  --          Results from last 7 days   Lab Units 09/17/24 2135 09/16/24 1418 09/11/24  1336   SODIUM mmol/L 132* 136 130*   POTASSIUM mmol/L 4.0 3.8 4.3   CHLORIDE mmol/L 98 98 94*   CO2 mmol/L 25 30 28   ANION GAP mmol/L 9 8 8   BUN mg/dL 10 11 8   CREATININE mg/dL 0.96 1.02 1.01   EGFR ml/min/1.73sq m 113 105 106   CALCIUM mg/dL 8.8 9.5 9.8     Results from last 7 days   Lab Units 09/17/24 2135 09/11/24  1336   AST U/L 83* 54*   ALT U/L 98* 105*   ALK PHOS U/L 110* 121*   TOTAL PROTEIN g/dL 6.8 7.5   ALBUMIN g/dL 3.9 4.0   TOTAL BILIRUBIN mg/dL 0.30 0.34     Results from last 7 days   Lab Units 09/18/24  0631 09/17/24  2336 09/16/24  1356 09/16/24  1348 09/11/24  1517 09/11/24  1144   POC GLUCOSE mg/dl 309* 390* 324* 271* 289* 534*     Results from last 7 days   Lab Units 09/17/24  2135 09/16/24  1418 09/11/24  1336   GLUCOSE RANDOM mg/dL 449* 321* 392*         Results from last 7 days   Lab Units 09/17/24  2135   HEMOGLOBIN A1C % 9.6*   EAG mg/dl 229     Beta- Hydroxybutyrate   Date Value Ref Range Status   09/17/2024 0.15 0.02 -  0.27 mmol/L Final   09/11/2024 <0.05 0.02 - 0.27 mmol/L Final          Results from last 7 days   Lab Units 09/17/24 2135 09/11/24  1159   PH STEFAN  7.360 7.415*   PCO2 STEFAN mm Hg 39.7* 36.7*   PO2 STEFAN mm Hg 51.1* 82.6*   HCO3 STEFAN mmol/L 21.9* 23.0*   BASE EXC STEFAN mmol/L -3.2 -1.1   O2 CONTENT STEFAN ml/dL 16.0 20.2   O2 HGB, VENOUS % 83.7* 93.6*                         Results from last 7 days   Lab Units 09/17/24  0935   TSH 3RD GENERATON uIU/mL 6.229*         Results from last 7 days   Lab Units 09/17/24  2337 09/17/24  2135   LACTIC ACID mmol/L 2.4* 2.2*           Results from last 7 days   Lab Units 09/17/24 2135 09/16/24  1418   LIPASE u/L 39 42                 Results from last 7 days   Lab Units 09/11/24  1313   CLARITY UA  Clear   COLOR UA  Colorless   SPEC GRAV UA  1.028   PH UA  5.0   GLUCOSE UA mg/dl >=1000 (1%)*   KETONES UA mg/dl Negative   BLOOD UA  Negative   PROTEIN UA mg/dl Negative   NITRITE UA  Negative   BILIRUBIN UA  Negative   UROBILINOGEN UA (BE) mg/dl <2.0   LEUKOCYTES UA  Elevated glucose may cause decreased leukocyte values. See urine microscopic for UWBC result*   WBC UA /hpf None Seen   RBC UA /hpf None Seen   BACTERIA UA /hpf None Seen   EPITHELIAL CELLS WET PREP /hpf None Seen         ED Treatment-Medication Administration from 09/17/2024 2101 to 09/18/2024 0127         Date/Time Order Dose Route Action     09/17/2024 2137 lactated ringers bolus 1,000 mL 1,000 mL Intravenous New Bag     09/17/2024 2304 lactated ringers bolus 1,000 mL 1,000 mL Intravenous New Bag              Present on Admission:   Morbid obesity with body mass index (BMI) of 40.0 to 49.9 (HCC)   Essential hypertension   Type 2 diabetes mellitus with other specified complication (HCC)   Schizoaffective disorder (HCC)      Admitting Diagnosis: Hyperglycemia [R73.9]  Diabetes mellitus with hyperglycemia (HCC) [E11.65]  Age/Sex: 19 y.o. male  Admission Orders:  Scheduled Medications:  chlorproMAZINE, 100 mg, Oral, BID (AM &  Afternoon)  chlorproMAZINE, 150 mg, Oral, HS  clonazePAM, 1 mg, Oral, Daily  cloNIDine, 0.2 mg, Oral, TID  desmopressin, 0.4 mg, Oral, HS  divalproex sodium, 1,500 mg, Oral, HS  divalproex sodium, 250 mg, Oral, HS  insulin lispro, 1-5 Units, Subcutaneous, 4x Daily (AC & HS)  metFORMIN, 1,000 mg, Oral, BID With Meals  metoprolol succinate, 50 mg, Oral, Daily  OLANZapine, 20 mg, Oral, HS  oxybutynin, 10 mg, Oral, Daily  paliperidone, 12 mg, Oral, QAM  sitaGLIPtin, 100 mg, Oral, Daily      Continuous IV Infusions:  multi-electrolyte, 100 mL/hr, Intravenous, Continuous      PRN Meds:  ondansetron, 4 mg, Intravenous, Q6H PRN  sodium chloride (PF), 3 mL, Intravenous, Q1H PRN        IP CONSULT TO INTERNAL MEDICINE    Network Utilization Review Department  ATTENTION: Please call with any questions or concerns to [phone] and carefully listen to the prompts so that you are directed to the right person. All voicemails are confidential.   For Discharge needs, contact Care Management DC Support Team at [phone] opt. 2  Send all requests for admission clinical reviews, approved or denied determinations and any other requests to dedicated fax number below belonging to the campus where the patient is receiving treatment. List of dedicated fax numbers for the Facilities:  FACILITY NAME UR FAX NUMBER   ADMISSION DENIALS (Administrative/Medical Necessity) [phone]   DISCHARGE SUPPORT TEAM (NETWORK) [phone]   PARENT CHILD HEALTH (Maternity/NICU/Pediatrics) [phone]   [facility] [phone]   [facility] [phone]   [facility] [phone]   [facility] [phone]   [facility] [phone]   [facility] [phone]   [facility] [phone]   [facility] [phone]   [facility]  [facility] [phone]   [facility] [phone]   [facility] [phone]   [facility] [phone]

## 2024-09-18 NOTE — ASSESSMENT & PLAN NOTE
Lab Results   Component Value Date    HGBA1C 7.5 (H) 06/24/2024       Recent Labs     09/16/24  1348 09/16/24  1356 09/17/24  2336   POCGLU 271* 324* 390*       Blood Sugar Average: Last 72 hrs:  (P) 390  W/hyperglycemia on metformin/januvia.  This is third ED visit for same from group home.  No evidence of DKA/HHS  Likely just worsening control of known DM.  Continue metformin 1000mg bid januvia 100mg daily  Continue ivf ssi/poc bs for now f/u hgb A1c.   If <9% may benefit third oral agent admit to observation  Op f/u with endocrine known to Dr. Henderson

## 2024-09-18 NOTE — PLAN OF CARE
Problem: METABOLIC, FLUID AND ELECTROLYTES - ADULT  Goal: Electrolytes maintained within normal limits  Description: INTERVENTIONS:  - Monitor labs and assess patient for signs and symptoms of electrolyte imbalances  - Administer electrolyte replacement as ordered  - Monitor response to electrolyte replacements, including repeat lab results as appropriate  - Instruct patient on fluid and nutrition as appropriate  Outcome: Progressing  Goal: Fluid balance maintained  Description: INTERVENTIONS:  - Monitor labs   - Monitor I/O and WT  - Instruct patient on fluid and nutrition as appropriate  - Assess for signs & symptoms of volume excess or deficit  Outcome: Progressing  Goal: Glucose maintained within target range  Description: INTERVENTIONS:  - Monitor Blood Glucose as ordered  - Assess for signs and symptoms of hyperglycemia and hypoglycemia  - Administer ordered medications to maintain glucose within target range  - Assess nutritional intake and initiate nutrition service referral as needed  Outcome: Progressing     Problem: Potential for Falls  Goal: Patient will remain free of falls  Description: INTERVENTIONS:  - Educate patient/family on patient safety including physical limitations  - Instruct patient to call for assistance with activity   - Consult OT/PT to assist with strengthening/mobility   - Keep Call bell within reach  - Keep bed low and locked with side rails adjusted as appropriate  - Keep care items and personal belongings within reach  - Initiate and maintain comfort rounds  - Make Fall Risk Sign visible to staff  - Offer Toileting every two Hours, in advance of need  - Initiate/Maintain bed  alarm  - Obtain necessary fall risk management equipment: bed alarm.   - Apply yellow socks and bracelet for high fall risk patients  - Consider moving patient to room near nurses station  Outcome: Progressing

## 2024-09-19 LAB
ALBUMIN SERPL BCG-MCNC: 3.6 G/DL (ref 3.5–5)
ALP SERPL-CCNC: 96 U/L (ref 34–104)
ALT SERPL W P-5'-P-CCNC: 88 U/L (ref 7–52)
ANION GAP SERPL CALCULATED.3IONS-SCNC: 8 MMOL/L (ref 4–13)
AST SERPL W P-5'-P-CCNC: 75 U/L (ref 13–39)
BILIRUB SERPL-MCNC: 0.29 MG/DL (ref 0.2–1)
BUN SERPL-MCNC: 8 MG/DL (ref 5–25)
CALCIUM SERPL-MCNC: 9 MG/DL (ref 8.4–10.2)
CHLORIDE SERPL-SCNC: 100 MMOL/L (ref 96–108)
CO2 SERPL-SCNC: 26 MMOL/L (ref 21–32)
CREAT SERPL-MCNC: 0.79 MG/DL (ref 0.6–1.3)
ERYTHROCYTE [DISTWIDTH] IN BLOOD BY AUTOMATED COUNT: 12.1 % (ref 11.6–15.1)
GFR SERPL CREATININE-BSD FRML MDRD: 129 ML/MIN/1.73SQ M
GLUCOSE SERPL-MCNC: 216 MG/DL (ref 65–140)
GLUCOSE SERPL-MCNC: 229 MG/DL (ref 65–140)
GLUCOSE SERPL-MCNC: 239 MG/DL (ref 65–140)
GLUCOSE SERPL-MCNC: 276 MG/DL (ref 65–140)
GLUCOSE SERPL-MCNC: 350 MG/DL (ref 65–140)
HCT VFR BLD AUTO: 39.9 % (ref 36.5–49.3)
HGB BLD-MCNC: 13.8 G/DL (ref 12–17)
MCH RBC QN AUTO: 29.8 PG (ref 26.8–34.3)
MCHC RBC AUTO-ENTMCNC: 34.6 G/DL (ref 31.4–37.4)
MCV RBC AUTO: 86 FL (ref 82–98)
PLATELET # BLD AUTO: 213 THOUSANDS/UL (ref 149–390)
PMV BLD AUTO: 11.2 FL (ref 8.9–12.7)
POTASSIUM SERPL-SCNC: 3.5 MMOL/L (ref 3.5–5.3)
PROT SERPL-MCNC: 6.3 G/DL (ref 6.4–8.4)
RBC # BLD AUTO: 4.63 MILLION/UL (ref 3.88–5.62)
SODIUM SERPL-SCNC: 134 MMOL/L (ref 135–147)
WBC # BLD AUTO: 7.72 THOUSAND/UL (ref 4.31–10.16)

## 2024-09-19 PROCEDURE — 99232 SBSQ HOSP IP/OBS MODERATE 35: CPT | Performed by: INTERNAL MEDICINE

## 2024-09-19 PROCEDURE — 85027 COMPLETE CBC AUTOMATED: CPT | Performed by: INTERNAL MEDICINE

## 2024-09-19 PROCEDURE — 80053 COMPREHEN METABOLIC PANEL: CPT | Performed by: INTERNAL MEDICINE

## 2024-09-19 PROCEDURE — 82948 REAGENT STRIP/BLOOD GLUCOSE: CPT

## 2024-09-19 RX ORDER — PEN NEEDLE, DIABETIC 32GX 5/32"
NEEDLE, DISPOSABLE MISCELLANEOUS
Qty: 100 EACH | Refills: 0 | Status: SHIPPED | OUTPATIENT
Start: 2024-09-19

## 2024-09-19 RX ORDER — INSULIN HUMAN 100 [IU]/ML
32 INJECTION, SUSPENSION SUBCUTANEOUS
Qty: 21 ML | Refills: 0 | Status: SHIPPED | OUTPATIENT
Start: 2024-09-19 | End: 2024-09-20

## 2024-09-19 RX ORDER — INSULIN ASPART 100 [IU]/ML
32 INJECTION, SUSPENSION SUBCUTANEOUS
Status: DISCONTINUED | OUTPATIENT
Start: 2024-09-20 | End: 2024-09-21 | Stop reason: HOSPADM

## 2024-09-19 RX ADMIN — INSULIN LISPRO 3 UNITS: 100 INJECTION, SOLUTION INTRAVENOUS; SUBCUTANEOUS at 09:33

## 2024-09-19 RX ADMIN — CLONAZEPAM 1 MG: 1 TABLET ORAL at 09:38

## 2024-09-19 RX ADMIN — CHLORPROMAZINE HYDROCHLORIDE 100 MG: 25 TABLET, FILM COATED ORAL at 14:36

## 2024-09-19 RX ADMIN — INSULIN LISPRO 6 UNITS: 100 INJECTION, SOLUTION INTRAVENOUS; SUBCUTANEOUS at 12:18

## 2024-09-19 RX ADMIN — SITAGLIPTIN 100 MG: 100 TABLET, FILM COATED ORAL at 09:43

## 2024-09-19 RX ADMIN — DESMOPRESSIN ACETATE 0.4 MG: 0.1 TABLET ORAL at 21:12

## 2024-09-19 RX ADMIN — CLONIDINE HYDROCHLORIDE 0.2 MG: 0.2 TABLET ORAL at 09:38

## 2024-09-19 RX ADMIN — INSULIN ASPART 24 UNITS: 100 INJECTION, SUSPENSION SUBCUTANEOUS at 16:41

## 2024-09-19 RX ADMIN — DIVALPROEX SODIUM 250 MG: 250 TABLET, EXTENDED RELEASE ORAL at 21:16

## 2024-09-19 RX ADMIN — CHLORPROMAZINE HYDROCHLORIDE 150 MG: 25 TABLET, FILM COATED ORAL at 21:26

## 2024-09-19 RX ADMIN — CLONIDINE HYDROCHLORIDE 0.2 MG: 0.2 TABLET ORAL at 21:22

## 2024-09-19 RX ADMIN — INSULIN LISPRO 2 UNITS: 100 INJECTION, SOLUTION INTRAVENOUS; SUBCUTANEOUS at 21:28

## 2024-09-19 RX ADMIN — METFORMIN HYDROCHLORIDE 1000 MG: 500 TABLET ORAL at 16:39

## 2024-09-19 RX ADMIN — OLANZAPINE 20 MG: 5 TABLET, FILM COATED ORAL at 21:23

## 2024-09-19 RX ADMIN — DIVALPROEX SODIUM 1500 MG: 500 TABLET, FILM COATED, EXTENDED RELEASE ORAL at 21:14

## 2024-09-19 RX ADMIN — METFORMIN HYDROCHLORIDE 1000 MG: 500 TABLET ORAL at 09:41

## 2024-09-19 RX ADMIN — INSULIN LISPRO 4 UNITS: 100 INJECTION, SOLUTION INTRAVENOUS; SUBCUTANEOUS at 16:41

## 2024-09-19 RX ADMIN — PALIPERIDONE 12 MG: 3 TABLET, EXTENDED RELEASE ORAL at 09:43

## 2024-09-19 RX ADMIN — METOPROLOL SUCCINATE 50 MG: 50 TABLET, EXTENDED RELEASE ORAL at 09:37

## 2024-09-19 RX ADMIN — INSULIN ASPART 24 UNITS: 100 INJECTION, SUSPENSION SUBCUTANEOUS at 09:34

## 2024-09-19 RX ADMIN — CLONIDINE HYDROCHLORIDE 0.2 MG: 0.2 TABLET ORAL at 16:39

## 2024-09-19 RX ADMIN — CHLORPROMAZINE HYDROCHLORIDE 100 MG: 25 TABLET, FILM COATED ORAL at 09:44

## 2024-09-19 RX ADMIN — OXYBUTYNIN CHLORIDE 10 MG: 10 TABLET, EXTENDED RELEASE ORAL at 09:37

## 2024-09-19 NOTE — PLAN OF CARE
Problem: METABOLIC, FLUID AND ELECTROLYTES - ADULT  Goal: Electrolytes maintained within normal limits  Description: INTERVENTIONS:  - Monitor labs and assess patient for signs and symptoms of electrolyte imbalances  - Administer electrolyte replacement as ordered  - Monitor response to electrolyte replacements, including repeat lab results as appropriate  - Instruct patient on fluid and nutrition as appropriate  9/18/2024 2016 by Danna Smith RN  Outcome: Progressing  9/18/2024 2016 by Danna Smith RN  Outcome: Progressing  Goal: Fluid balance maintained  Description: INTERVENTIONS:  - Monitor labs   - Monitor I/O and WT  - Instruct patient on fluid and nutrition as appropriate  - Assess for signs & symptoms of volume excess or deficit  9/18/2024 2016 by Danna Smith RN  Outcome: Progressing  9/18/2024 2016 by Danna Smith RN  Outcome: Progressing  Goal: Glucose maintained within target range  Description: INTERVENTIONS:  - Monitor Blood Glucose as ordered  - Assess for signs and symptoms of hyperglycemia and hypoglycemia  - Administer ordered medications to maintain glucose within target range  - Assess nutritional intake and initiate nutrition service referral as needed  9/18/2024 2016 by Danna Smith RN  Outcome: Progressing  9/18/2024 2016 by Danna Smith RN  Outcome: Progressing     Problem: Potential for Falls  Goal: Patient will remain free of falls  Description: INTERVENTIONS:  - Educate patient/family on patient safety including physical limitations  - Instruct patient to call for assistance with activity   - Consult OT/PT to assist with strengthening/mobility   - Keep Call bell within reach  - Keep bed low and locked with side rails adjusted as appropriate  - Keep care items and personal belongings within reach  - Initiate and maintain comfort rounds  - Make Fall Risk Sign visible to staff  - Offer Toileting every  Hours, in advance of need  - Initiate/Maintain  alarm  - Obtain necessary fall risk management equipment:   - Apply yellow socks and bracelet for high fall risk patients  - Consider moving patient to room near nurses station  9/18/2024 2016 by Danna Smith RN  Outcome: Progressing  9/18/2024 2016 by Danna Smith RN  Outcome: Progressing     Problem: PAIN - ADULT  Goal: Verbalizes/displays adequate comfort level or baseline comfort level  Description: Interventions:  - Encourage patient to monitor pain and request assistance  - Assess pain using appropriate pain scale  - Administer analgesics based on type and severity of pain and evaluate response  - Implement non-pharmacological measures as appropriate and evaluate response  - Consider cultural and social influences on pain and pain management  - Notify physician/advanced practitioner if interventions unsuccessful or patient reports new pain  Outcome: Progressing     Problem: INFECTION - ADULT  Goal: Absence or prevention of progression during hospitalization  Description: INTERVENTIONS:  - Assess and monitor for signs and symptoms of infection  - Monitor lab/diagnostic results  - Monitor all insertion sites, i.e. indwelling lines, tubes, and drains  - Monitor endotracheal if appropriate and nasal secretions for changes in amount and color  - [place] appropriate cooling/warming therapies per order  - Administer medications as ordered  - Instruct and encourage patient and family to use good hand hygiene technique  - Identify and instruct in appropriate isolation precautions for identified infection/condition  Outcome: Progressing  Goal: Absence of fever/infection during neutropenic period  Description: INTERVENTIONS:  - Monitor WBC    Outcome: Progressing     Problem: SAFETY ADULT  Goal: Patient will remain free of falls  Description: INTERVENTIONS:  - Educate patient/family on patient safety including physical limitations  - Instruct patient to call for assistance with activity   - Consult OT/PT to  assist with strengthening/mobility   - Keep Call bell within reach  - Keep bed low and locked with side rails adjusted as appropriate  - Keep care items and personal belongings within reach  - Initiate and maintain comfort rounds  - Make Fall Risk Sign visible to staff  - Offer Toileting every  Hours, in advance of need  - Initiate/Maintain alarm  - Obtain necessary fall risk management equipment:   - Apply yellow socks and bracelet for high fall risk patients  - Consider moving patient to room near nurses station  9/18/2024 2016 by Danna Smith RN  Outcome: Progressing  9/18/2024 2016 by Danna Smith RN  Outcome: Progressing  Goal: Maintain or return to baseline ADL function  Description: INTERVENTIONS:  -  Assess patient's ability to carry out ADLs; assess patient's baseline for ADL function and identify physical deficits which impact ability to perform ADLs (bathing, care of mouth/teeth, toileting, grooming, dressing, etc.)  - Assess/evaluate cause of self-care deficits   - Assess range of motion  - Assess patient's mobility; develop plan if impaired  - Assess patient's need for assistive devices and provide as appropriate  - Encourage maximum independence but intervene and supervise when necessary  - Involve family in performance of ADLs  - Assess for home care needs following discharge   - Consider OT consult to assist with ADL evaluation and planning for discharge  - Provide patient education as appropriate  Outcome: Progressing  Goal: Maintains/Returns to pre admission functional level  Description: INTERVENTIONS:  - Perform AM-PAC 6 Click Basic Mobility/ Daily Activity assessment daily.  - Set and communicate daily mobility goal to care team and patient/family/caregiver.   - Collaborate with rehabilitation services on mobility goals if consulted  - Perform Range of Motion  times a day.  - Reposition patient every  hours.  - Dangle patient  times a day  - Stand patient  times a day  - Ambulate  patient  times a day  - Out of bed to chair  times a day   - Out of bed for meals times a day  - Out of bed for toileting  - Record patient progress and toleration of activity level   Outcome: Progressing     Problem: DISCHARGE PLANNING  Goal: Discharge to home or other facility with appropriate resources  Description: INTERVENTIONS:  - Identify barriers to discharge w/patient and caregiver  - Arrange for needed discharge resources and transportation as appropriate  - Identify discharge learning needs (meds, wound care, etc.)  - Arrange for interpretive services to assist at discharge as needed  - Refer to Case Management Department for coordinating discharge planning if the patient needs post-hospital services based on physician/advanced practitioner order or complex needs related to functional status, cognitive ability, or social support system  Outcome: Progressing     Problem: Knowledge Deficit  Goal: Patient/family/caregiver demonstrates understanding of disease process, treatment plan, medications, and discharge instructions  Description: Complete learning assessment and assess knowledge base.  Interventions:  - Provide teaching at level of understanding  - Provide teaching via preferred learning methods  Outcome: Progressing

## 2024-09-19 NOTE — PLAN OF CARE
Problem: METABOLIC, FLUID AND ELECTROLYTES - ADULT  Goal: Electrolytes maintained within normal limits  Description: INTERVENTIONS:  - Monitor labs and assess patient for signs and symptoms of electrolyte imbalances  - Administer electrolyte replacement as ordered  - Monitor response to electrolyte replacements, including repeat lab results as appropriate  - Instruct patient on fluid and nutrition as appropriate  9/19/2024 1957 by Maki Adame RN  Outcome: Progressing  9/19/2024 1956 by Maki Adame RN  Outcome: Progressing  Goal: Fluid balance maintained  Description: INTERVENTIONS:  - Monitor labs   - Monitor I/O and WT  - Instruct patient on fluid and nutrition as appropriate  - Assess for signs & symptoms of volume excess or deficit  9/19/2024 1957 by Maki Adame RN  Outcome: Progressing  9/19/2024 1956 by Maki Adame RN  Outcome: Progressing  Goal: Glucose maintained within target range  Description: INTERVENTIONS:  - Monitor Blood Glucose as ordered  - Assess for signs and symptoms of hyperglycemia and hypoglycemia  - Administer ordered medications to maintain glucose within target range  - Assess nutritional intake and initiate nutrition service referral as needed  9/19/2024 1957 by Maki Adame RN  Outcome: Progressing  9/19/2024 1956 by Maki Adame RN  Outcome: Progressing     Problem: Potential for Falls  Goal: Patient will remain free of falls  Description: INTERVENTIONS:  - Educate patient/family on patient safety including physical limitations  - Instruct patient to call for assistance with activity   - Consult OT/PT to assist with strengthening/mobility   - Keep Call bell within reach  - Keep bed low and locked with side rails adjusted as appropriate  - Keep care items and personal belongings within reach  - Initiate and maintain comfort rounds  - Make Fall Risk Sign visible to staff  - Offer Toileting every 2 Hours, in advance of need  - Initiate/Maintain bed alarm  - Obtain necessary  fall risk management equipment:  socks  - Apply yellow socks and bracelet for high fall risk patients  - Consider moving patient to room near nurses station  9/19/2024 1957 by Maki Adame RN  Outcome: Progressing  9/19/2024 1956 by Maki Adame RN  Outcome: Progressing     Problem: PAIN - ADULT  Goal: Verbalizes/displays adequate comfort level or baseline comfort level  Description: Interventions:  - Encourage patient to monitor pain and request assistance  - Assess pain using appropriate pain scale  - Administer analgesics based on type and severity of pain and evaluate response  - Implement non-pharmacological measures as appropriate and evaluate response  - Consider cultural and social influences on pain and pain management  - Notify physician/advanced practitioner if interventions unsuccessful or patient reports new pain  9/19/2024 1957 by Maki Adame RN  Outcome: Progressing  9/19/2024 1956 by Maki Adame RN  Outcome: Progressing     Problem: INFECTION - ADULT  Goal: Absence or prevention of progression during hospitalization  Description: INTERVENTIONS:  - Assess and monitor for signs and symptoms of infection  - Monitor lab/diagnostic results  - Monitor all insertion sites, i.e. indwelling lines, tubes, and drains  - Monitor endotracheal if appropriate and nasal secretions for changes in amount and color  - [place] appropriate cooling/warming therapies per order  - Administer medications as ordered  - Instruct and encourage patient and family to use good hand hygiene technique  - Identify and instruct in appropriate isolation precautions for identified infection/condition  9/19/2024 1957 by Maki Adame RN  Outcome: Progressing  9/19/2024 1956 by Maki Adame RN  Outcome: Progressing  Goal: Absence of fever/infection during neutropenic period  Description: INTERVENTIONS:  - Monitor WBC    9/19/2024 1957 by Maki Adame RN  Outcome: Progressing  9/19/2024 1956 by Maki Adame  RN  Outcome: Progressing     Problem: SAFETY ADULT  Goal: Patient will remain free of falls  Description: INTERVENTIONS:  - Educate patient/family on patient safety including physical limitations  - Instruct patient to call for assistance with activity   - Consult OT/PT to assist with strengthening/mobility   - Keep Call bell within reach  - Keep bed low and locked with side rails adjusted as appropriate  - Keep care items and personal belongings within reach  - Initiate and maintain comfort rounds  - Make Fall Risk Sign visible to staff  - Offer Toileting every 2 Hours, in advance of need  - Initiate/Maintain bed alarm  - Obtain necessary fall risk management equipment:  socks  - Apply yellow socks and bracelet for high fall risk patients  - Consider moving patient to room near nurses station  9/19/2024 1957 by Maki Adame RN  Outcome: Progressing  9/19/2024 1956 by Maki Adame RN  Outcome: Progressing  Goal: Maintain or return to baseline ADL function  Description: INTERVENTIONS:  - Educate patient/family on patient safety including physical limitations  - Instruct patient to call for assistance with activity   - Consult OT/PT to assist with strengthening/mobility   - Keep Call bell within reach  - Keep bed low and locked with side rails adjusted as appropriate  - Keep care items and personal belongings within reach  - Initiate and maintain comfort rounds  - Make Fall Risk Sign visible to staff  - Offer Toileting every 2 Hours, in advance of need  - Initiate/Maintain bed alarm  - Obtain necessary fall risk management equipment:  socks  - Apply yellow socks and bracelet for high fall risk patients  - Consider moving patient to room near nurses station  9/19/2024 1957 by Maki Adame RN  Outcome: Progressing  9/19/2024 1956 by Maki Adame RN  Outcome: Progressing  Goal: Maintains/Returns to pre admission functional level  Description: INTERVENTIONS:  - Perform AM-PAC 6 Click Basic Mobility/ Daily  Activity assessment daily.  - Set and communicate daily mobility goal to care team and patient/family/caregiver.   - Collaborate with rehabilitation services on mobility goals if consulted  - Perform Range of Motion 3 times a day.  - Reposition patient every 2 hours.  - Dangle patient 3 times a day  - Stand patient 3 times a day  - Ambulate patient 3 times a day  - Out of bed to chair 3 times a day   - Out of bed for meals 3 times a day  - Out of bed for toileting  - Record patient progress and toleration of activity level   9/19/2024 1957 by Maki Adame RN  Outcome: Progressing  9/19/2024 1956 by Maki Adame RN  Outcome: Progressing     Problem: DISCHARGE PLANNING  Goal: Discharge to home or other facility with appropriate resources  Description: INTERVENTIONS:  - Identify barriers to discharge w/patient and caregiver  - Arrange for needed discharge resources and transportation as appropriate  - Identify discharge learning needs (meds, wound care, etc.)  - Arrange for interpretive services to assist at discharge as needed  - Refer to Case Management Department for coordinating discharge planning if the patient needs post-hospital services based on physician/advanced practitioner order or complex needs related to functional status, cognitive ability, or social support system  9/19/2024 1957 by Maki Adame RN  Outcome: Progressing  9/19/2024 1956 by Maki Adame RN  Outcome: Progressing     Problem: Knowledge Deficit  Goal: Patient/family/caregiver demonstrates understanding of disease process, treatment plan, medications, and discharge instructions  Description: Complete learning assessment and assess knowledge base.  Interventions:  - Provide teaching at level of understanding  - Provide teaching via preferred learning methods  9/19/2024 1957 by Maki Adame RN  Outcome: Progressing  9/19/2024 1956 by Maki Adame RN  Outcome: Progressing

## 2024-09-19 NOTE — DISCHARGE INSTR - AVS FIRST PAGE
Continue all previous oral medications as previously prescribed with exception of the following which has been added:  Insulin 75/25 twice a day

## 2024-09-19 NOTE — PROGRESS NOTES
Progress Note - Hospitalist   Name: Manuel Back 19 y.o. male I MRN: [id number]  Unit/Bed#: E2 -01 I Date of Admission: 9/17/2024   Date of Service: 9/19/2024 I Hospital Day: 0    Assessment & Plan  Type 2 diabetes mellitus with other specified complication (HCC)  History of mood disorder from group home hypertension and diabetes not on insulin presents with hyperglycemia  Elevated A1c uncontrolled with oral medications  Continue metformin and sitagliptin  Started on 70/30 insulin.  Will increase to 32 units.  Recheck in AM.    Lab Results   Component Value Date    HGBA1C 9.6 (H) 09/17/2024     Recent Labs     09/18/24  2020 09/19/24  0623 09/19/24  1152 09/19/24  1622   POCGLU 190* 239* 350* 276*     Morbid obesity with body mass index (BMI) of 40.0 to 49.9 (HCC)  Body mass index is 45.58 kg/m².  Essential hypertension  Stable continue with metoprolol and clonidine  Schizoaffective disorder (HCC)  Stable.  Continue thorazine olanzapine paliperidone depakote and clonazepam  Elevated TSH  Elevated TSH but free T4 within limits.  Transaminitis  May be fatty liver disease.  Recent triglycerides 301.    Results from last 7 days   Lab Units 09/19/24  0536 09/17/24  2135   AST U/L 75* 83*   ALT U/L 88* 98*   TOTAL BILIRUBIN mg/dL 0.29 0.30       VTE Pharmacologic Prophylaxis: VTE Score: 2 Low Risk (Score 0-2) - Encourage Ambulation.    Mobility:   Basic Mobility Inpatient Raw Score: 24  JH-HLM Goal: 8: Walk 250 feet or more  JH-HLM Achieved: 6: Walk 10 steps or more  JH-HLM Goal NOT achieved. Continue with multidisciplinary rounding and encourage appropriate mobility to improve upon JH-HLM goals.    Patient Centered Rounds: I have performed bedside rounds with nursing staff today.  Discussions with Specialists or Other Care Team Provider: Case management, outpatient group home nurse    Education and Discussions with Family / Patient:  Group home nurse on telephone.     Current Length of Stay: 0  day(s)  Current Patient Status: Observation   Certification Statement:   Discharge Plan: Anticipate discharge tomorrow to nursing home.    Code Status: Level 1 - Full Code    Subjective   Patient seen and examined.  Walking around no complaints.  Asking about discharge home.    Objective     Vitals:   Temp (24hrs), Av.6 °F (37 °C), Min:98.6 °F (37 °C), Max:98.7 °F (37.1 °C)    Temp:  [98.6 °F (37 °C)-98.7 °F (37.1 °C)] 98.7 °F (37.1 °C)  HR:  [] 99  Resp:  [18-22] 18  BP: (130-150)/() 150/89  SpO2:  [95 %-99 %] 99 %  Body mass index is 45.58 kg/m².     Input and Output Summary (last 24 hours):     Intake/Output Summary (Last 24 hours) at 2024 1647  Last data filed at 2024 1655  Gross per 24 hour   Intake 240 ml   Output --   Net 240 ml       Physical Exam  Vitals reviewed.   Constitutional:       General: He is not in acute distress.  HENT:      Head: Atraumatic.   Cardiovascular:      Rate and Rhythm: Regular rhythm.      Heart sounds: Normal heart sounds.   Pulmonary:      Effort: Pulmonary effort is normal.      Breath sounds: No wheezing.   Abdominal:      General: Bowel sounds are normal.      Palpations: Abdomen is soft.      Tenderness: There is no abdominal tenderness. There is no rebound.   Musculoskeletal:         General: No swelling.   Skin:     General: Skin is warm and dry.   Neurological:      General: No focal deficit present.      Mental Status: He is alert.      Cranial Nerves: No cranial nerve deficit.   Psychiatric:         Mood and Affect: Mood normal.       Lines/Drains:  Lines/Drains/Airways       Active Status       None                        Lab Results: I have reviewed the following results:    Results from last 7 days   Lab Units 24  0536 24  1418   WBC Thousand/uL 7.72 7.56 6.63   HEMOGLOBIN g/dL 13.8 12.9 14.0   PLATELETS Thousands/uL 213 203 222   MCV fL 86 87 87     Results from last 7 days   Lab Units 24  0536 24  09/16/24  1418   SODIUM mmol/L 134* 132* 136   POTASSIUM mmol/L 3.5 4.0 3.8   CHLORIDE mmol/L 100 98 98   CO2 mmol/L 26 25 30   ANION GAP mmol/L 8 9 8   BUN mg/dL 8 10 11   CREATININE mg/dL 0.79 0.96 1.02   CALCIUM mg/dL 9.0 8.8 9.5   ALBUMIN g/dL 3.6 3.9  --    TOTAL BILIRUBIN mg/dL 0.29 0.30  --    ALK PHOS U/L 96 110*  --    ALT U/L 88* 98*  --    AST U/L 75* 83*  --    EGFR ml/min/1.73sq m 129 113 105   GLUCOSE RANDOM mg/dL 229* 449* 321*                      Results from last 7 days   Lab Units 09/17/24  2337 09/17/24  2135   LACTIC ACID mmol/L 2.4* 2.2*     Results from last 7 days   Lab Units 09/19/24  1622 09/19/24  1152 09/19/24  0623 09/18/24  2020 09/18/24  1544 09/18/24  1124 09/18/24  0631 09/17/24  2336 09/16/24  1356 09/16/24  1348   POC GLUCOSE mg/dl 276* 350* 239* 190* 306* 311* 309* 390* 324* 271*     Results from last 7 days   Lab Units 09/17/24  2135   HEMOGLOBIN A1C % 9.6*     Results from last 7 days   Lab Units 09/17/24  0935   TSH 3RD GENERATON uIU/mL 6.229*   FREE T4 ng/dL 0.93       Recent Cultures (last 7 days):         Imaging:  Reviewed radiology reports from this admission including:  No results found.    Last 24 Hours Medication List:     Current Facility-Administered Medications:     acetaminophen (TYLENOL) tablet 650 mg, Q4H PRN    chlorproMAZINE (THORAZINE) tablet 100 mg, BID (AM & Afternoon)    chlorproMAZINE (THORAZINE) tablet 150 mg, HS    clonazePAM (KlonoPIN) tablet 1 mg, Daily    cloNIDine (CATAPRES) tablet 0.2 mg, TID    desmopressin (DDAVP) tablet 0.4 mg, HS    divalproex sodium (DEPAKOTE ER) 24 hr tablet 1,500 mg, HS    divalproex sodium (DEPAKOTE ER) 24 hr tablet 250 mg, HS    insulin aspart protamine-insulin aspart (NovoLOG 70/30) 100 units/mL subcutaneous injection 24 Units, BID AC    insulin lispro (HumALOG/ADMELOG) 100 units/mL subcutaneous injection 1-6 Units, 4x Daily (AC & HS) **AND** Fingerstick Glucose (POCT), 4x Daily AC and at bedtime    metFORMIN (GLUCOPHAGE)  tablet 1,000 mg, BID With Meals    metoprolol succinate (TOPROL-XL) 24 hr tablet 50 mg, Daily    OLANZapine (ZyPREXA) tablet 20 mg, HS    ondansetron (ZOFRAN) injection 4 mg, Q6H PRN    oxybutynin (DITROPAN-XL) 24 hr tablet 10 mg, Daily    paliperidone (INVEGA) 24 hr tablet 12 mg, QAM    sitaGLIPtin (JANUVIA) tablet 100 mg, Daily    Insert peripheral IV, Once **AND** sodium chloride (PF) 0.9 % injection 3 mL, Q1H PRN    Administrative Statements   Today, Patient Was Seen By: Geraldo Nails, DO  I have spent a total time of 35 minutes in caring for this patient on the day of the visit/encounter including Diagnostic results, Impressions, Counseling / Coordination of care, Documenting in the medical record, Reviewing / ordering tests, medicine, procedures  , Obtaining or reviewing history  , and Communicating with other healthcare professionals .    **Please Note: This note may have been constructed using a voice recognition system.**

## 2024-09-19 NOTE — PLAN OF CARE
Problem: METABOLIC, FLUID AND ELECTROLYTES - ADULT  Goal: Electrolytes maintained within normal limits  Description: INTERVENTIONS:  - Monitor labs and assess patient for signs and symptoms of electrolyte imbalances  - Administer electrolyte replacement as ordered  - Monitor response to electrolyte replacements, including repeat lab results as appropriate  - Instruct patient on fluid and nutrition as appropriate  Outcome: Progressing  Goal: Fluid balance maintained  Description: INTERVENTIONS:  - Monitor labs   - Monitor I/O and WT  - Instruct patient on fluid and nutrition as appropriate  - Assess for signs & symptoms of volume excess or deficit  Outcome: Progressing  Goal: Glucose maintained within target range  Description: INTERVENTIONS:  - Monitor Blood Glucose as ordered  - Assess for signs and symptoms of hyperglycemia and hypoglycemia  - Administer ordered medications to maintain glucose within target range  - Assess nutritional intake and initiate nutrition service referral as needed  Outcome: Progressing     Problem: Potential for Falls  Goal: Patient will remain free of falls  Description: INTERVENTIONS:  - Educate patient/family on patient safety including physical limitations  - Instruct patient to call for assistance with activity   - Consult OT/PT to assist with strengthening/mobility   - Keep Call bell within reach  - Keep bed low and locked with side rails adjusted as appropriate  - Keep care items and personal belongings within reach  - Initiate and maintain comfort rounds  - Make Fall Risk Sign visible to staff  - Offer Toileting every 2 Hours, in advance of need  - Initiate/Maintain bed alarm  - Obtain necessary fall risk management equipment: yellow socks  - Apply yellow socks and bracelet for high fall risk patients  - Consider moving patient to room near nurses station  Outcome: Progressing     Problem: PAIN - ADULT  Goal: Verbalizes/displays adequate comfort level or baseline comfort  level  Description: Interventions:  - Encourage patient to monitor pain and request assistance  - Assess pain using appropriate pain scale  - Administer analgesics based on type and severity of pain and evaluate response  - Implement non-pharmacological measures as appropriate and evaluate response  - Consider cultural and social influences on pain and pain management  - Notify physician/advanced practitioner if interventions unsuccessful or patient reports new pain  Outcome: Progressing     Problem: INFECTION - ADULT  Goal: Absence or prevention of progression during hospitalization  Description: INTERVENTIONS:  - Assess and monitor for signs and symptoms of infection  - Monitor lab/diagnostic results  - Monitor all insertion sites, i.e. indwelling lines, tubes, and drains  - Monitor endotracheal if appropriate and nasal secretions for changes in amount and color  - [place] appropriate cooling/warming therapies per order  - Administer medications as ordered  - Instruct and encourage patient and family to use good hand hygiene technique  - Identify and instruct in appropriate isolation precautions for identified infection/condition  Outcome: Progressing  Goal: Absence of fever/infection during neutropenic period  Description: INTERVENTIONS:  - Monitor WBC    Outcome: Progressing     Problem: SAFETY ADULT  Goal: Patient will remain free of falls  Description: INTERVENTIONS:  - Educate patient/family on patient safety including physical limitations  - Instruct patient to call for assistance with activity   - Consult OT/PT to assist with strengthening/mobility   - Keep Call bell within reach  - Keep bed low and locked with side rails adjusted as appropriate  - Keep care items and personal belongings within reach  - Initiate and maintain comfort rounds  - Make Fall Risk Sign visible to staff  - Offer Toileting every 2 Hours, in advance of need  - Initiate/Maintain bed alarm  - Obtain necessary fall risk management  equipment: yellow socks  - Apply yellow socks and bracelet for high fall risk patients  - Consider moving patient to room near nurses station  Outcome: Progressing  Goal: Maintain or return to baseline ADL function  Description: INTERVENTIONS:  -  Assess patient's ability to carry out ADLs; assess patient's baseline for ADL function and identify physical deficits which impact ability to perform ADLs (bathing, care of mouth/teeth, toileting, grooming, dressing, etc.)  - Assess/evaluate cause of self-care deficits   - Assess range of motion  - Assess patient's mobility; develop plan if impaired  - Assess patient's need for assistive devices and provide as appropriate  - Encourage maximum independence but intervene and supervise when necessary  - Involve family in performance of ADLs  - Assess for home care needs following discharge   - Consider OT consult to assist with ADL evaluation and planning for discharge  - Provide patient education as appropriate  Outcome: Progressing  Goal: Maintains/Returns to pre admission functional level  Description: INTERVENTIONS:  - Perform AM-PAC 6 Click Basic Mobility/ Daily Activity assessment daily.  - Set and communicate daily mobility goal to care team and patient/family/caregiver.   - Collaborate with rehabilitation services on mobility goals if consulted  - Perform Range of Motion 3 times a day.  - Reposition patient every 2 hours.  - Dangle patient 3 times a day  - Stand patient 3 times a day  - Ambulate patient 3 times a day  - Out of bed to chair 3 times a day   - Out of bed for meals 3 times a day  - Out of bed for toileting  - Record patient progress and toleration of activity level   Outcome: Progressing     Problem: DISCHARGE PLANNING  Goal: Discharge to home or other facility with appropriate resources  Description: INTERVENTIONS:  - Identify barriers to discharge w/patient and caregiver  - Arrange for needed discharge resources and transportation as appropriate  -  Identify discharge learning needs (meds, wound care, etc.)  - Arrange for interpretive services to assist at discharge as needed  - Refer to Case Management Department for coordinating discharge planning if the patient needs post-hospital services based on physician/advanced practitioner order or complex needs related to functional status, cognitive ability, or social support system  Outcome: Progressing     Problem: Knowledge Deficit  Goal: Patient/family/caregiver demonstrates understanding of disease process, treatment plan, medications, and discharge instructions  Description: Complete learning assessment and assess knowledge base.  Interventions:  - Provide teaching at level of understanding  - Provide teaching via preferred learning methods  Outcome: Progressing

## 2024-09-20 ENCOUNTER — PATIENT MESSAGE (OUTPATIENT)
Dept: OTHER | Facility: HOSPITAL | Age: 19
End: 2024-09-20

## 2024-09-20 LAB
GLUCOSE SERPL-MCNC: 184 MG/DL (ref 65–140)
GLUCOSE SERPL-MCNC: 210 MG/DL (ref 65–140)
GLUCOSE SERPL-MCNC: 250 MG/DL (ref 65–140)
GLUCOSE SERPL-MCNC: 280 MG/DL (ref 65–140)

## 2024-09-20 PROCEDURE — 99232 SBSQ HOSP IP/OBS MODERATE 35: CPT | Performed by: INTERNAL MEDICINE

## 2024-09-20 PROCEDURE — 82948 REAGENT STRIP/BLOOD GLUCOSE: CPT

## 2024-09-20 RX ORDER — INSULIN ASPART 100 [IU]/ML
32 INJECTION, SUSPENSION SUBCUTANEOUS
Qty: 15 ML | Refills: 0 | Status: SHIPPED | OUTPATIENT
Start: 2024-09-20 | End: 2024-09-20

## 2024-09-20 RX ORDER — INSULIN LISPRO 100 [IU]/ML
32 INJECTION, SUSPENSION SUBCUTANEOUS 2 TIMES DAILY WITH MEALS
Qty: 15 ML | Refills: 0 | Status: SHIPPED | OUTPATIENT
Start: 2024-09-20

## 2024-09-20 RX ADMIN — PALIPERIDONE 12 MG: 3 TABLET, EXTENDED RELEASE ORAL at 08:02

## 2024-09-20 RX ADMIN — INSULIN LISPRO 2 UNITS: 100 INJECTION, SOLUTION INTRAVENOUS; SUBCUTANEOUS at 22:21

## 2024-09-20 RX ADMIN — CLONIDINE HYDROCHLORIDE 0.2 MG: 0.2 TABLET ORAL at 21:07

## 2024-09-20 RX ADMIN — SITAGLIPTIN 100 MG: 100 TABLET, FILM COATED ORAL at 08:02

## 2024-09-20 RX ADMIN — OLANZAPINE 20 MG: 5 TABLET, FILM COATED ORAL at 21:07

## 2024-09-20 RX ADMIN — METFORMIN HYDROCHLORIDE 1000 MG: 500 TABLET ORAL at 16:56

## 2024-09-20 RX ADMIN — OXYBUTYNIN CHLORIDE 10 MG: 10 TABLET, EXTENDED RELEASE ORAL at 08:02

## 2024-09-20 RX ADMIN — METFORMIN HYDROCHLORIDE 1000 MG: 500 TABLET ORAL at 08:02

## 2024-09-20 RX ADMIN — CLONAZEPAM 1 MG: 1 TABLET ORAL at 08:02

## 2024-09-20 RX ADMIN — INSULIN LISPRO 3 UNITS: 100 INJECTION, SOLUTION INTRAVENOUS; SUBCUTANEOUS at 16:56

## 2024-09-20 RX ADMIN — INSULIN LISPRO 4 UNITS: 100 INJECTION, SOLUTION INTRAVENOUS; SUBCUTANEOUS at 11:44

## 2024-09-20 RX ADMIN — INSULIN LISPRO 1 UNITS: 100 INJECTION, SOLUTION INTRAVENOUS; SUBCUTANEOUS at 08:01

## 2024-09-20 RX ADMIN — INSULIN ASPART 32 UNITS: 100 INJECTION, SUSPENSION SUBCUTANEOUS at 08:02

## 2024-09-20 RX ADMIN — DIVALPROEX SODIUM 250 MG: 250 TABLET, EXTENDED RELEASE ORAL at 21:08

## 2024-09-20 RX ADMIN — INSULIN ASPART 32 UNITS: 100 INJECTION, SUSPENSION SUBCUTANEOUS at 16:57

## 2024-09-20 RX ADMIN — DESMOPRESSIN ACETATE 0.4 MG: 0.1 TABLET ORAL at 21:07

## 2024-09-20 RX ADMIN — DIVALPROEX SODIUM 1500 MG: 500 TABLET, FILM COATED, EXTENDED RELEASE ORAL at 21:08

## 2024-09-20 RX ADMIN — CLONIDINE HYDROCHLORIDE 0.2 MG: 0.2 TABLET ORAL at 16:56

## 2024-09-20 RX ADMIN — CHLORPROMAZINE HYDROCHLORIDE 100 MG: 25 TABLET, FILM COATED ORAL at 08:02

## 2024-09-20 RX ADMIN — CHLORPROMAZINE HYDROCHLORIDE 100 MG: 25 TABLET, FILM COATED ORAL at 13:12

## 2024-09-20 RX ADMIN — CHLORPROMAZINE HYDROCHLORIDE 150 MG: 25 TABLET, FILM COATED ORAL at 21:05

## 2024-09-20 RX ADMIN — METOPROLOL SUCCINATE 50 MG: 50 TABLET, EXTENDED RELEASE ORAL at 08:02

## 2024-09-20 RX ADMIN — CLONIDINE HYDROCHLORIDE 0.2 MG: 0.2 TABLET ORAL at 08:02

## 2024-09-20 NOTE — PLAN OF CARE
Problem: METABOLIC, FLUID AND ELECTROLYTES - ADULT  Goal: Electrolytes maintained within normal limits  Description: INTERVENTIONS:  - Monitor labs and assess patient for signs and symptoms of electrolyte imbalances  - Administer electrolyte replacement as ordered  - Monitor response to electrolyte replacements, including repeat lab results as appropriate  - Instruct patient on fluid and nutrition as appropriate  Outcome: Progressing     Problem: PAIN - ADULT  Goal: Verbalizes/displays adequate comfort level or baseline comfort level  Description: Interventions:  - Encourage patient to monitor pain and request assistance  - Assess pain using appropriate pain scale  - Administer analgesics based on type and severity of pain and evaluate response  - Implement non-pharmacological measures as appropriate and evaluate response  - Consider cultural and social influences on pain and pain management  - Notify physician/advanced practitioner if interventions unsuccessful or patient reports new pain  Outcome: Progressing     Problem: INFECTION - ADULT  Goal: Absence or prevention of progression during hospitalization  Description: INTERVENTIONS:  - Assess and monitor for signs and symptoms of infection  - Monitor lab/diagnostic results  - Monitor all insertion sites, i.e. indwelling lines, tubes, and drains  - Monitor endotracheal if appropriate and nasal secretions for changes in amount and color  - [place] appropriate cooling/warming therapies per order  - Administer medications as ordered  - Instruct and encourage patient and family to use good hand hygiene technique  - Identify and instruct in appropriate isolation precautions for identified infection/condition  Outcome: Progressing

## 2024-09-20 NOTE — PROGRESS NOTES
Progress Note - Hospitalist   Name: Manuel Back 19 y.o. male I MRN: [id number]  Unit/Bed#: E2 -01 I Date of Admission: 9/17/2024   Date of Service: 9/20/2024 I Hospital Day: 0    Assessment & Plan  Type 2 diabetes mellitus with other specified complication (HCC)  History of mood disorder from group home hypertension and diabetes not on insulin presents with hyperglycemia  Elevated A1c uncontrolled with oral medications  Continue metformin and sitagliptin  Started on 70/30 insulin.  Will increase to 32 units.    Extensive discussion with [facility] pharmacy as well as [facility] insurance.  75/25 insulin covered.  Prescription sent and will be available tomorrow morning    Lab Results   Component Value Date    HGBA1C 9.6 (H) 09/17/2024     Recent Labs     09/19/24  1622 09/19/24  2102 09/20/24  0636 09/20/24  1105   POCGLU 276* 216* 184* 280*     Morbid obesity with body mass index (BMI) of 40.0 to 49.9 (HCC)  Body mass index is 45.58 kg/m².  Essential hypertension  Stable continue with metoprolol and clonidine  Schizoaffective disorder (HCC)  Stable.  Continue thorazine olanzapine paliperidone depakote and clonazepam  Elevated TSH  Elevated TSH but free T4 within limits.  Transaminitis  May be fatty liver disease.  Recent triglycerides 301.    Results from last 7 days   Lab Units 09/19/24  0536 09/17/24  2135   AST U/L 75* 83*   ALT U/L 88* 98*   TOTAL BILIRUBIN mg/dL 0.29 0.30       VTE Pharmacologic Prophylaxis: VTE Score: 2 Low Risk (Score 0-2) - Encourage Ambulation.    Mobility:   Basic Mobility Inpatient Raw Score: 24  JH-HLM Goal: 8: Walk 250 feet or more  JH-HLM Achieved: 3: Sit at edge of bed  JH-HLM Goal NOT achieved. Continue with multidisciplinary rounding and encourage appropriate mobility to improve upon JH-HLM goals.    Patient Centered Rounds: I have performed bedside rounds with nursing staff today.  Discussions with Specialists or Other Care Team Provider: Management [facility] pharmacy and  [facility] insurance on several occasions    Education and Discussions with Family / Patient: Updated  ([facility] RN) at bedside.    Current Length of Stay: 0 day(s)  Current Patient Status: Observation   Certification Statement: The patient, who was admitted as an inpatient, is being discharged sooner than originally anticipated due to insulin  Discharge Plan:  Pharmacy will order insulin and will be available tomorrow morning.  Patient is to stay to get insulin    Code Status: Level 1 - Full Code    Subjective   Patient seen and examined.  No complaints.  Playing on iPad.  Was hoping for discharge.    Objective     Vitals:   Temp (24hrs), Av.7 °F (37.1 °C), Min:98.6 °F (37 °C), Max:98.7 °F (37.1 °C)    Temp:  [98.6 °F (37 °C)-98.7 °F (37.1 °C)] 98.7 °F (37.1 °C)  HR:  [81-99] 89  Resp:  [18] 18  BP: (111-150)/(59-90) 121/74  SpO2:  [94 %-99 %] 96 %  Body mass index is 45.58 kg/m².     Input and Output Summary (last 24 hours):     Intake/Output Summary (Last 24 hours) at 2024 1331  Last data filed at 2024 0800  Gross per 24 hour   Intake 240 ml   Output --   Net 240 ml       Physical Exam  Vitals reviewed.   Constitutional:       General: He is not in acute distress.  HENT:      Head: Atraumatic.   Cardiovascular:      Rate and Rhythm: Regular rhythm.   Pulmonary:      Effort: Pulmonary effort is normal.      Breath sounds: Decreased breath sounds present. No wheezing.   Abdominal:      General: Bowel sounds are normal.      Palpations: Abdomen is soft.      Tenderness: There is no abdominal tenderness.   Musculoskeletal:         General: No swelling.   Skin:     General: Skin is warm and dry.   Neurological:      Mental Status: He is alert. Mental status is at baseline.   Psychiatric:         Mood and Affect: Mood normal.       Lines/Drains:  Lines/Drains/Airways       Active Status       None                            Lab Results: I have reviewed the following results:    Results from  last 7 days   Lab Units 09/19/24  0536 09/17/24  2135 09/16/24  1418   WBC Thousand/uL 7.72 7.56 6.63   HEMOGLOBIN g/dL 13.8 12.9 14.0   PLATELETS Thousands/uL 213 203 222   MCV fL 86 87 87     Results from last 7 days   Lab Units 09/19/24  0536 09/17/24  2135 09/16/24  1418   SODIUM mmol/L 134* 132* 136   POTASSIUM mmol/L 3.5 4.0 3.8   CHLORIDE mmol/L 100 98 98   CO2 mmol/L 26 25 30   ANION GAP mmol/L 8 9 8   BUN mg/dL 8 10 11   CREATININE mg/dL 0.79 0.96 1.02   CALCIUM mg/dL 9.0 8.8 9.5   ALBUMIN g/dL 3.6 3.9  --    TOTAL BILIRUBIN mg/dL 0.29 0.30  --    ALK PHOS U/L 96 110*  --    ALT U/L 88* 98*  --    AST U/L 75* 83*  --    EGFR ml/min/1.73sq m 129 113 105   GLUCOSE RANDOM mg/dL 229* 449* 321*       Results from last 7 days   Lab Units 09/17/24  2337 09/17/24  2135   LACTIC ACID mmol/L 2.4* 2.2*     Results from last 7 days   Lab Units 09/20/24  1105 09/20/24  0636 09/19/24  2102 09/19/24  1622 09/19/24  1152 09/19/24  0623 09/18/24  2020 09/18/24  1544 09/18/24  1124 09/18/24  0631 09/17/24  2336 09/16/24  1356   POC GLUCOSE mg/dl 280* 184* 216* 276* 350* 239* 190* 306* 311* 309* 390* 324*     Results from last 7 days   Lab Units 09/17/24  2135   HEMOGLOBIN A1C % 9.6*     Results from last 7 days   Lab Units 09/17/24  0935   TSH 3RD GENERATON uIU/mL 6.229*   FREE T4 ng/dL 0.93       Recent Cultures (last 7 days):         Imaging:  Reviewed radiology reports from this admission including:  No results found.    Last 24 Hours Medication List:     Current Facility-Administered Medications:     acetaminophen (TYLENOL) tablet 650 mg, Q4H PRN    chlorproMAZINE (THORAZINE) tablet 100 mg, BID (AM & Afternoon)    chlorproMAZINE (THORAZINE) tablet 150 mg, HS    clonazePAM (KlonoPIN) tablet 1 mg, Daily    cloNIDine (CATAPRES) tablet 0.2 mg, TID    desmopressin (DDAVP) tablet 0.4 mg, HS    divalproex sodium (DEPAKOTE ER) 24 hr tablet 1,500 mg, HS    divalproex sodium (DEPAKOTE ER) 24 hr tablet 250 mg, HS    insulin aspart  protamine-insulin aspart (NovoLOG 70/30) 100 units/mL subcutaneous injection 32 Units, BID AC    insulin lispro (HumALOG/ADMELOG) 100 units/mL subcutaneous injection 1-6 Units, 4x Daily (AC & HS) **AND** Fingerstick Glucose (POCT), 4x Daily AC and at bedtime    metFORMIN (GLUCOPHAGE) tablet 1,000 mg, BID With Meals    metoprolol succinate (TOPROL-XL) 24 hr tablet 50 mg, Daily    OLANZapine (ZyPREXA) tablet 20 mg, HS    ondansetron (ZOFRAN) injection 4 mg, Q6H PRN    oxybutynin (DITROPAN-XL) 24 hr tablet 10 mg, Daily    paliperidone (INVEGA) 24 hr tablet 12 mg, QAM    sitaGLIPtin (JANUVIA) tablet 100 mg, Daily    Insert peripheral IV, Once **AND** sodium chloride (PF) 0.9 % injection 3 mL, Q1H PRN    Administrative Statements   Today, Patient Was Seen By: Geraldo Nails, DO  I have spent a total time of 50 minutes in caring for this patient on the day of the visit/encounter including Risks and benefits of tx options, Patient and family education, Risk factor reductions, Impressions, Counseling / Coordination of care, Documenting in the medical record, Reviewing / ordering tests, medicine, procedures  , Obtaining or reviewing history  , and Communicating with other healthcare professionals .    **Please Note: This note may have been constructed using a voice recognition system.**

## 2024-09-20 NOTE — PATIENT COMMUNICATION
Messaged pt on mychart. TSH level is high but T4 is normal. Kindly follow up in 3-6 months. Repeat  TSH level in 3-6 months.

## 2024-09-20 NOTE — ASSESSMENT & PLAN NOTE
History of mood disorder from group home hypertension and diabetes not on insulin presents with hyperglycemia  Elevated A1c uncontrolled with oral medications  Continue metformin and sitagliptin  Started on 70/30 insulin.  Will increase to 32 units.    [facility] well as [facility] insurance.  75/25 insulin covered.  Prescription sent and will be available tomorrow morning    Lab Results   Component Value Date    HGBA1C 9.6 (H) 09/17/2024     Recent Labs     09/19/24  1622 09/19/24  2102 09/20/24  0636 09/20/24  1105   POCGLU 276* 216* 184* 280*

## 2024-09-20 NOTE — UTILIZATION REVIEW
Continued Stay Review    SEE INITIAL REVIEW AT BOTTOM    Pt initially admitted as Observation on 9/17 converted to Inpatient on 9/20.  Pt requiring continued stay due to need for T2DM control and discharge with proper insulin.     Date: 9/20/24                          Current Patient Class: Inpatient  Current Level of Care: med surg    HPI:19 y.o. male initially admitted on 9/17/24 OBSERVATION    9/20/24 INPATIENT Assessment/Plan: Patient needs continued admission due to need for proper insulin prior to discharge.  Pharmacy unable to obtain correct insulin until tomorrow, 9/21/24. Continue accuchecks w/ SSI, monitor VS and labs.     Date:  9/21/24  Day 3: Has surpassed a 2nd midnight with active treatments and services. During hospitalization he was started on insulin. He was transitioned over to 70/30 and did relatively well. Due to insurance formulary 75/25 is covered and he will be discharged with equivalent dosing 32 units twice daily. No other changes to his medication regimen.    Vital Signs (last 3 days)       Date/Time Temp Pulse Resp BP MAP (mmHg) SpO2 O2 Device Patient Position - Orthostatic VS Darron Coma Scale Score Pain    09/21/24 0837 -- 107 -- 142/81 -- -- -- Sitting -- --    09/21/24 0830 -- -- -- -- -- -- -- -- 15 No Pain    09/21/24 0824 97.3 °F (36.3 °C) 93 16 111/66 75 98 % None (Room air) Lying -- --    09/20/24 2245 98.6 °F (37 °C) 97 18 135/102 109 100 % None (Room air) Sitting -- --    09/20/24 2141 -- -- -- -- -- -- -- -- -- No Pain    09/20/24 2019 -- -- -- -- -- -- None (Room air) -- -- --    09/20/24 1509 98 °F (36.7 °C) 98 18 146/96 -- 98 % None (Room air) Sitting -- --    09/20/24 0800 -- 89 18 121/74 -- -- -- Lying -- --    09/20/24 0759 -- -- -- -- -- -- -- -- 15 No Pain    09/20/24 0711 98.7 °F (37.1 °C) 81 18 111/59 81 96 % None (Room air) Lying -- --    09/20/24 0637 98.6 °F (37 °C) 86 18 121/69 -- 94 % None (Room air) Lying -- --    09/19/24 2112 -- 92 18 140/90 109 98 % None  (Room air) Sitting -- --    09/19/24 1946 -- -- -- -- -- -- -- -- -- No Pain    09/19/24 1508 98.7 °F (37.1 °C) 99 18 150/89 103 99 % None (Room air) Sitting -- --    09/19/24 0950 -- -- -- -- -- -- -- -- 15 No Pain    09/19/24 0806 98.6 °F (37 °C) 102 22 130/69 103 95 % None (Room air) Lying -- --    09/18/24 2119 98.6 °F (37 °C) 98 18 141/106 114 98 % -- Sitting -- --    09/18/24 2014 -- -- -- -- -- -- None (Room air) -- 14 No Pain    09/18/24 1540 97.5 °F (36.4 °C) 81 18 121/73 93 98 % None (Room air) Sitting -- --    09/18/24 0800 -- -- -- -- -- -- -- -- 14 No Pain    09/18/24 0711 96.9 °F (36.1 °C) 82 18 151/89 112 98 % None (Room air) Sitting -- --    09/18/24 0137 97.6 °F (36.4 °C) 78 20 138/102 109 97 % None (Room air) Lying 15 No Pain          Weight (last 2 days)       None              Pertinent Labs/Diagnostic Results:   Radiology:  No orders to display     Cardiology:  No orders to display     GI:  No orders to display           Results from last 7 days   Lab Units 09/19/24 0536 09/17/24 2135 09/16/24  1418   WBC Thousand/uL 7.72 7.56 6.63   HEMOGLOBIN g/dL 13.8 12.9 14.0   HEMATOCRIT % 39.9 38.7 41.9   PLATELETS Thousands/uL 213 203 222   TOTAL NEUT ABS Thousands/µL  --   --  3.64         Results from last 7 days   Lab Units 09/19/24 0536 09/17/24 2135 09/16/24  1418   SODIUM mmol/L 134* 132* 136   POTASSIUM mmol/L 3.5 4.0 3.8   CHLORIDE mmol/L 100 98 98   CO2 mmol/L 26 25 30   ANION GAP mmol/L 8 9 8   BUN mg/dL 8 10 11   CREATININE mg/dL 0.79 0.96 1.02   EGFR ml/min/1.73sq m 129 113 105   CALCIUM mg/dL 9.0 8.8 9.5     Results from last 7 days   Lab Units 09/19/24  0536 09/17/24  2135   AST U/L 75* 83*   ALT U/L 88* 98*   ALK PHOS U/L 96 110*   TOTAL PROTEIN g/dL 6.3* 6.8   ALBUMIN g/dL 3.6 3.9   TOTAL BILIRUBIN mg/dL 0.29 0.30     Results from last 7 days   Lab Units 09/21/24  1125 09/21/24  0645 09/20/24  2054 09/20/24  1627 09/20/24  1105 09/20/24  0636 09/19/24  2102 09/19/24  1622  09/19/24  1152 09/19/24  0623 09/18/24 2020 09/18/24  1544   POC GLUCOSE mg/dl 219* 131 210* 250* 280* 184* 216* 276* 350* 239* 190* 306*     Results from last 7 days   Lab Units 09/19/24  0536 09/17/24 2135 09/16/24  1418   GLUCOSE RANDOM mg/dL 229* 449* 321*         Results from last 7 days   Lab Units 09/17/24 2135   HEMOGLOBIN A1C % 9.6*   EAG mg/dl 229     Beta- Hydroxybutyrate   Date Value Ref Range Status   09/17/2024 0.15 0.02 - 0.27 mmol/L Final   09/11/2024 <0.05 0.02 - 0.27 mmol/L Final          Results from last 7 days   Lab Units 09/17/24  2135   PH STEFAN  7.360   PCO2 STEFAN mm Hg 39.7*   PO2 STEFAN mm Hg 51.1*   HCO3 STEFAN mmol/L 21.9*   BASE EXC STEFAN mmol/L -3.2   O2 CONTENT STEFAN ml/dL 16.0   O2 HGB, VENOUS % 83.7*     Results from last 7 days   Lab Units 09/17/24  0935   TSH 3RD GENERATON uIU/mL 6.229*         Results from last 7 days   Lab Units 09/17/24  2337 09/17/24  2135   LACTIC ACID mmol/L 2.4* 2.2*     Results from last 7 days   Lab Units 09/17/24 2135 09/16/24  1418   LIPASE u/L 39 42     Medications:   Scheduled Medications:  chlorproMAZINE, 100 mg, Oral, BID (AM & Afternoon)  chlorproMAZINE, 150 mg, Oral, HS  clonazePAM, 1 mg, Oral, Daily  cloNIDine, 0.2 mg, Oral, TID  desmopressin, 0.4 mg, Oral, HS  divalproex sodium, 1,500 mg, Oral, HS  divalproex sodium, 250 mg, Oral, HS  insulin aspart protamine-insulin aspart, 32 Units, Subcutaneous, BID AC  insulin lispro, 1-6 Units, Subcutaneous, 4x Daily (AC & HS)  metFORMIN, 1,000 mg, Oral, BID With Meals  metoprolol succinate, 50 mg, Oral, Daily  OLANZapine, 20 mg, Oral, HS  oxybutynin, 10 mg, Oral, Daily  paliperidone, 12 mg, Oral, QAM  sitaGLIPtin, 100 mg, Oral, Daily      Continuous IV Infusions:   multi-electrolyte (PLASMALYTE-A/ISOLYTE-S PH 7.4) IV solution  Rate: 100 mL/hr Dose: 100 mL/hr  Freq: Continuous Route: IV  Last Dose: Stopped (09/18/24 1000)  Start: 09/18/24 0000 End: 09/18/24 1310     PRN Meds:  acetaminophen, 650 mg, Oral, Q4H  PRN  Artificial Tears, 2 drop, Both Eyes, Q4H PRN  ondansetron, 4 mg, Intravenous, Q6H PRN  sodium chloride (PF), 3 mL, Intravenous, Q1H PRN        Discharge Plan:  discharged to home on 9/21/24    Network Utilization Review Department  ATTENTION: Please call with any questions or concerns to [phone] and carefully listen to the prompts so that you are directed to the right person. All voicemails are confidential.   For Discharge needs, contact Care Management DC Support Team at [phone] opt. [phone]  Send all requests for admission clinical reviews, approved or denied determinations and any other requests to dedicated fax number below belonging to the campus where the patient is receiving treatment. List of dedicated fax numbers for the Facilities:  FACILITY NAME UR FAX NUMBER   ADMISSION DENIALS (Administrative/Medical Necessity) [phone]   DISCHARGE SUPPORT TEAM (NETWORK) [phone]   PARENT CHILD HEALTH (Maternity/NICU/Pediatrics) [phone]   [facility] [phone]   [facility] [phone]   [facility] [phone]   [facility] [phone]   [facility] [phone]   [facility] [phone]   [facility] [phone]   [facility] [phone]   [facility] [phone]   [facility] [phone]   [facility] [phone]   [facility] [phone]

## 2024-09-21 VITALS
RESPIRATION RATE: 16 BRPM | TEMPERATURE: 97.3 F | OXYGEN SATURATION: 98 % | SYSTOLIC BLOOD PRESSURE: 142 MMHG | HEIGHT: 67 IN | HEART RATE: 107 BPM | DIASTOLIC BLOOD PRESSURE: 81 MMHG | WEIGHT: 291.01 LBS | BODY MASS INDEX: 45.67 KG/M2

## 2024-09-21 LAB
GLUCOSE SERPL-MCNC: 131 MG/DL (ref 65–140)
GLUCOSE SERPL-MCNC: 219 MG/DL (ref 65–140)

## 2024-09-21 PROCEDURE — 82948 REAGENT STRIP/BLOOD GLUCOSE: CPT

## 2024-09-21 PROCEDURE — 99239 HOSP IP/OBS DSCHRG MGMT >30: CPT | Performed by: INTERNAL MEDICINE

## 2024-09-21 RX ADMIN — PALIPERIDONE 12 MG: 3 TABLET, EXTENDED RELEASE ORAL at 08:38

## 2024-09-21 RX ADMIN — INSULIN ASPART 32 UNITS: 100 INJECTION, SUSPENSION SUBCUTANEOUS at 08:39

## 2024-09-21 RX ADMIN — CLONAZEPAM 1 MG: 1 TABLET ORAL at 08:38

## 2024-09-21 RX ADMIN — METOPROLOL SUCCINATE 50 MG: 50 TABLET, EXTENDED RELEASE ORAL at 08:38

## 2024-09-21 RX ADMIN — INSULIN LISPRO 2 UNITS: 100 INJECTION, SOLUTION INTRAVENOUS; SUBCUTANEOUS at 11:36

## 2024-09-21 RX ADMIN — CHLORPROMAZINE HYDROCHLORIDE 100 MG: 25 TABLET, FILM COATED ORAL at 08:38

## 2024-09-21 RX ADMIN — OXYBUTYNIN CHLORIDE 10 MG: 10 TABLET, EXTENDED RELEASE ORAL at 08:38

## 2024-09-21 RX ADMIN — METFORMIN HYDROCHLORIDE 1000 MG: 500 TABLET ORAL at 08:38

## 2024-09-21 RX ADMIN — CLONIDINE HYDROCHLORIDE 0.2 MG: 0.2 TABLET ORAL at 08:38

## 2024-09-21 RX ADMIN — SITAGLIPTIN 100 MG: 100 TABLET, FILM COATED ORAL at 08:38

## 2024-09-21 NOTE — CASE MANAGEMENT
Case Management Assessment & Discharge Planning Note    Patient name Manuel Back  Location [facility] 2 /E2 -* MRN [id number]  : 2005 Date 2024       Current Admission Date: 2024  Current Admission Diagnosis:Type 2 diabetes mellitus with other specified complication (HCC)   Patient Active Problem List    Diagnosis Date Noted Date Diagnosed    Elevated TSH 2024     Transaminitis 2024     Hypersomnia 2024     Closed head injury 2024     Thyroid nodule 2024     Left shoulder pain 2024     Dyspepsia 2024     Reflux gastritis 2024     Intellectual disability 2024     Acute right ankle pain 2024     Annual physical exam 2024     Morbid obesity with BMI of 45.0-49.9, adult ([facility]) 2024     Vision blurring 2024     Generalized abdominal pain 2024     Constipation 2024     Type 2 diabetes mellitus with other specified complication (HCC) 2024     Schizoaffective disorder ([facility]) 2024     Morbid obesity with body mass index (BMI) of 40.0 to 49.9 ([facility]) 2024     Essential hypertension 2024     Vitamin D deficiency 2024     Chronic pain of right knee 2024     Urinary incontinence, nocturnal enuresis 2023     Disorganized schizophrenia ([facility]) 2023     Moderate episode of recurrent major depressive disorder ([facility]) 10/12/2022     Obesity, unspecified 2014       LOS (days): 1  Geometric Mean LOS (GMLOS) (days):   Days to GMLOS:     OBJECTIVE:    Risk of Unplanned Readmission Score: 20.15         Current admission status: Inpatient       [facility]   [facility] [address]  [address] [facility] [address]  Phone: [phone] Fax: [phone]    [facility] [place] [facility] [address]  [address] [facility] [address]  Phone: [phone] Fax: [phone]    Primary Care Provider: Isak  Yeyo Samuel MD    Primary Insurance: [facility]  Secondary Insurance:     ASSESSMENT:  Active Health Care Proxies    There are no active Health Care Proxies on file.       Readmission Root Cause  30 Day Readmission: No    Patient Information  Admitted from:: Home  Mental Status: Alert  During Assessment patient was accompanied by: Other-Comment (Caregivers)  Assessment information provided by:: Other - please comment (Caregivers)  Primary Caregiver: Other (Comment)  Caregiver's Name:: Caregivers thru [employer]. Nurse Chatman  Caregiver's Relationship to Patient:: Other (Specify)  Caregiver's Telephone Number:: [phone]  Support Systems: Other (Comment) (Caregivers)  Living Arrangements: Other (Comment) (Lives with caregivers)  Is patient a ?: No    Activities of Daily Living Prior to Admission  Functional Status: Independent  Completes ADLs independently?: Yes  Ambulates independently?: Yes  Does patient use assisted devices?: No  Does patient currently own DME?: Yes  What DME does the patient currently own?: Other (Comment) ([employer])  Does patient have a history of Outpatient Therapy (PT/OT)?: No  Does the patient have a history of Short-Term Rehab?: No  Does patient have a history of HHC?: No  Does patient currently have HHC?: No         Patient Information Continued  Income Source: SSI/SSD  Does patient have prescription coverage?: Yes  Does patient receive dialysis treatments?: No  Does patient have a history of substance abuse?: No  Does patient have a history of Mental Health Diagnosis?: Yes (Schizoaffective disorder)  Is patient receiving treatment for mental health?: Yes  Has patient received inpatient treatment related to mental health in the last 2 years?: Yes ([facility] 1/18-2/7/24 and [facility] 6/19-7/12/23)    Means of Transportation  Means of Transport to Appts:: Other (Comment) (Caregivers)    Social Determinants of Health (SDOH)      Flowsheet Row Most Recent Value   Housing  Stability    In the last 12 months, was there a time when you were not able to pay the mortgage or rent on time? N   In the past 12 months, how many times have you moved where you were living? 0   At any time in the past 12 months, were you homeless or living in a shelter (including now)? N   Transportation Needs    In the past 12 months, has lack of transportation kept you from medical appointments or from getting medications? no   In the past 12 months, has lack of transportation kept you from meetings, work, or from getting things needed for daily living? No   Food Insecurity    Within the past 12 months, you worried that your food would run out before you got the money to buy more. Never true   Within the past 12 months, the food you bought just didn't last and you didn't have money to get more. Never true   Utilities    In the past 12 months has the electric, gas, oil, or water company threatened to shut off services in your home? No          DISCHARGE DETAILS:    Discharge planning discussed with:: Nurse Chatman and Nurse Escobar  Freedom of Choice: Yes     CM contacted family/caregiver?: Yes  Were Treatment Team discharge recommendations reviewed with patient/caregiver?: Yes  Did patient/caregiver verbalize understanding of patient care needs?: Yes  Were patient/caregiver advised of the risks associated with not following Treatment Team discharge recommendations?: Yes    Contacts  Patient Contacts: Nurse Chatman  Relationship to Patient:: Treatment Provider  Contact Method: Phone  Phone Number: [phone]  Reason/Outcome: Discharge Planning, Emergency Contact    Requested Home Health Care         Is the patient interested in HHC at discharge?: No    DME Referral Provided  Referral made for DME?: No    Other Referral/Resources/Interventions Provided:  Interventions: Other (Specify) ([facility] through [employer])    Treatment Team Recommendation: Other (Support Residential  Living Program through [employer])  Discharge Destination Plan:: Other ([facility]idential Living Program through [employer])  Transport at Discharge : Other (Comment) (Caregivers)       Additional Comments: Talked to Nurse Compa and nurse Escobar and discussed discharge.  They are unsure if [facility] will have insulin today.  Updated Dr Jesu loya.  Per MD he has spoke with pharmacy this am and they should have insulin today.  Request MD to call Nurse Escobar with medical update.  Met with patient and caregivers at bedside.  Caregivers will transport home

## 2024-09-21 NOTE — PLAN OF CARE
Problem: METABOLIC, FLUID AND ELECTROLYTES - ADULT  Goal: Electrolytes maintained within normal limits  Description: INTERVENTIONS:  - Monitor labs and assess patient for signs and symptoms of electrolyte imbalances  - Administer electrolyte replacement as ordered  - Monitor response to electrolyte replacements, including repeat lab results as appropriate  - Instruct patient on fluid and nutrition as appropriate  Outcome: Progressing     Problem: PAIN - ADULT  Goal: Verbalizes/displays adequate comfort level or baseline comfort level  Description: Interventions:  - Encourage patient to monitor pain and request assistance  - Assess pain using appropriate pain scale  - Administer analgesics based on type and severity of pain and evaluate response  - Implement non-pharmacological measures as appropriate and evaluate response  - Consider cultural and social influences on pain and pain management  - Notify physician/advanced practitioner if interventions unsuccessful or patient reports new pain  Outcome: Progressing     Problem: INFECTION - ADULT  Goal: Absence or prevention of progression during hospitalization  Description: INTERVENTIONS:  - Assess and monitor for signs and symptoms of infection  - Monitor lab/diagnostic results  - Monitor all insertion sites, i.e. indwelling lines, tubes, and drains  - Monitor endotracheal if appropriate and nasal secretions for changes in amount and color  - [place] appropriate cooling/warming therapies per order  - Administer medications as ordered  - Instruct and encourage patient and family to use good hand hygiene technique  - Identify and instruct in appropriate isolation precautions for identified infection/condition  Outcome: Progressing     Problem: DISCHARGE PLANNING  Goal: Discharge to home or other facility with appropriate resources  Description: INTERVENTIONS:  - Identify barriers to discharge w/patient and caregiver  - Arrange for needed discharge resources and  transportation as appropriate  - Identify discharge learning needs (meds, wound care, etc.)  - Arrange for interpretive services to assist at discharge as needed  - Refer to Case Management Department for coordinating discharge planning if the patient needs post-hospital services based on physician/advanced practitioner order or complex needs related to functional status, cognitive ability, or social support system  Outcome: Progressing

## 2024-09-21 NOTE — PLAN OF CARE
Problem: METABOLIC, FLUID AND ELECTROLYTES - ADULT  Goal: Electrolytes maintained within normal limits  Description: INTERVENTIONS:  - Monitor labs and assess patient for signs and symptoms of electrolyte imbalances  - Administer electrolyte replacement as ordered  - Monitor response to electrolyte replacements, including repeat lab results as appropriate  - Instruct patient on fluid and nutrition as appropriate  Outcome: Progressing  Goal: Fluid balance maintained  Description: INTERVENTIONS:  - Monitor labs   - Monitor I/O and WT  - Instruct patient on fluid and nutrition as appropriate  - Assess for signs & symptoms of volume excess or deficit  Outcome: Progressing  Goal: Glucose maintained within target range  Description: INTERVENTIONS:  - Monitor Blood Glucose as ordered  - Assess for signs and symptoms of hyperglycemia and hypoglycemia  - Administer ordered medications to maintain glucose within target range  - Assess nutritional intake and initiate nutrition service referral as needed  Outcome: Progressing     Problem: Potential for Falls  Goal: Patient will remain free of falls  Description: INTERVENTIONS:  - Educate patient/family on patient safety including physical limitations  - Instruct patient to call for assistance with activity   - Consult OT/PT to assist with strengthening/mobility   - Keep Call bell within reach  - Keep bed low and locked with side rails adjusted as appropriate  - Keep care items and personal belongings within reach  - Initiate and maintain comfort rounds  - Make Fall Risk Sign visible to staff  - Offer Toileting every 2 Hours, in advance of need  - Initiate/Maintain bed alarm  - Obtain necessary fall risk management equipment:  socks  - Apply yellow socks and bracelet for high fall risk patients  - Consider moving patient to room near nurses station  Outcome: Progressing     Problem: PAIN - ADULT  Goal: Verbalizes/displays adequate comfort level or baseline comfort  level  Description: Interventions:  - Encourage patient to monitor pain and request assistance  - Assess pain using appropriate pain scale  - Administer analgesics based on type and severity of pain and evaluate response  - Implement non-pharmacological measures as appropriate and evaluate response  - Consider cultural and social influences on pain and pain management  - Notify physician/advanced practitioner if interventions unsuccessful or patient reports new pain  Outcome: Progressing     Problem: INFECTION - ADULT  Goal: Absence or prevention of progression during hospitalization  Description: INTERVENTIONS:  - Assess and monitor for signs and symptoms of infection  - Monitor lab/diagnostic results  - Monitor all insertion sites, i.e. indwelling lines, tubes, and drains  - Monitor endotracheal if appropriate and nasal secretions for changes in amount and color  - [place] appropriate cooling/warming therapies per order  - Administer medications as ordered  - Instruct and encourage patient and family to use good hand hygiene technique  - Identify and instruct in appropriate isolation precautions for identified infection/condition  Outcome: Progressing  Goal: Absence of fever/infection during neutropenic period  Description: INTERVENTIONS:  - Monitor WBC    Outcome: Progressing     Problem: SAFETY ADULT  Goal: Patient will remain free of falls  Description: INTERVENTIONS:  - Educate patient/family on patient safety including physical limitations  - Instruct patient to call for assistance with activity   - Consult OT/PT to assist with strengthening/mobility   - Keep Call bell within reach  - Keep bed low and locked with side rails adjusted as appropriate  - Keep care items and personal belongings within reach  - Initiate and maintain comfort rounds  - Make Fall Risk Sign visible to staff  - Offer Toileting every 2 Hours, in advance of need  - Initiate/Maintain bed alarm  - Obtain necessary fall risk management  equipment:  socks  - Apply yellow socks and bracelet for high fall risk patients  - Consider moving patient to room near nurses station  Outcome: Progressing  Goal: Maintain or return to baseline ADL function  Description: INTERVENTIONS:  - Educate patient/family on patient safety including physical limitations  - Instruct patient to call for assistance with activity   - Consult OT/PT to assist with strengthening/mobility   - Keep Call bell within reach  - Keep bed low and locked with side rails adjusted as appropriate  - Keep care items and personal belongings within reach  - Initiate and maintain comfort rounds  - Make Fall Risk Sign visible to staff  - Offer Toileting every 2 Hours, in advance of need  - Initiate/Maintain bed alarm  - Obtain necessary fall risk management equipment:  socks  - Apply yellow socks and bracelet for high fall risk patients  - Consider moving patient to room near nurses station  Outcome: Progressing  Goal: Maintains/Returns to pre admission functional level  Description: INTERVENTIONS:  - Perform AM-PAC 6 Click Basic Mobility/ Daily Activity assessment daily.  - Set and communicate daily mobility goal to care team and patient/family/caregiver.   - Collaborate with rehabilitation services on mobility goals if consulted  - Perform Range of Motion 3 times a day.  - Reposition patient every 2 hours.  - Dangle patient 3 times a day  - Stand patient 3 times a day  - Ambulate patient 3 times a day  - Out of bed to chair 3 times a day   - Out of bed for meals 3 times a day  - Out of bed for toileting  - Record patient progress and toleration of activity level   Outcome: Progressing     Problem: DISCHARGE PLANNING  Goal: Discharge to home or other facility with appropriate resources  Description: INTERVENTIONS:  - Identify barriers to discharge w/patient and caregiver  - Arrange for needed discharge resources and transportation as appropriate  - Identify discharge learning needs (meds,  wound care, etc.)  - Arrange for interpretive services to assist at discharge as needed  - Refer to Case Management Department for coordinating discharge planning if the patient needs post-hospital services based on physician/advanced practitioner order or complex needs related to functional status, cognitive ability, or social support system  Outcome: Progressing     Problem: Knowledge Deficit  Goal: Patient/family/caregiver demonstrates understanding of disease process, treatment plan, medications, and discharge instructions  Description: Complete learning assessment and assess knowledge base.  Interventions:  - Provide teaching at level of understanding  - Provide teaching via preferred learning methods  Outcome: Progressing

## 2024-09-21 NOTE — ASSESSMENT & PLAN NOTE
History of mood disorder from group home hypertension and diabetes not on insulin presents with hyperglycemia  Elevated A1c uncontrolled with oral medications  Continue metformin and sitagliptin  Started on 70/30 insulin which has been increased to 32 units.    Extensive discussion with [facility] pharmacy as well as [facility] insurance.  75/25 insulin covered.  Prescription sent and has been confirmed with pharmacist at [facility] that it is available for pickup.    Lab Results   Component Value Date    HGBA1C 9.6 (H) 09/17/2024     Recent Labs     09/20/24  1627 09/20/24  2054 09/21/24  0645 09/21/24  1125   POCGLU 250* 210* 131 219*

## 2024-09-21 NOTE — DISCHARGE SUMMARY
DIscharge Summary - Hospitalist   Name: Manuel Back 19 y.o. male I MRN: [id number]  Unit/Bed#: E2 -01 I Date of Admission: 9/17/2024   Date of Service: 9/21/2024 I Hospital Day: 1    Assessment & Plan  Type 2 diabetes mellitus with other specified complication (HCC)  History of mood disorder from group home hypertension and diabetes not on insulin presents with hyperglycemia  Elevated A1c uncontrolled with oral medications  Continue metformin and sitagliptin  Started on 70/30 insulin which has been increased to 32 units.    Extensive discussion with [facility] pharmacy as well as [facility] insurance.  75/25 insulin covered.  Prescription sent and has been confirmed with pharmacist at [facility] that it is available for pickup.    Lab Results   Component Value Date    HGBA1C 9.6 (H) 09/17/2024     Recent Labs     09/20/24  1627 09/20/24  2054 09/21/24  0645 09/21/24  1125   POCGLU 250* 210* 131 219*     Morbid obesity with body mass index (BMI) of 40.0 to 49.9 (HCC)  Body mass index is 45.58 kg/m².  Essential hypertension  Stable continue with metoprolol and clonidine  Schizoaffective disorder (HCC)  Stable.  Continue thorazine olanzapine paliperidone depakote and clonazepam  Elevated TSH  Elevated TSH but free T4 within limits.  Transaminitis  May be fatty liver disease.  Recent triglycerides 301.    Results from last 7 days   Lab Units 09/19/24  0536 09/17/24  2135   AST U/L 75* 83*   ALT U/L 88* 98*   TOTAL BILIRUBIN mg/dL 0.29 0.30         Medical Problems       Resolved Problems  Date Reviewed: 5/13/2024   None        Discharging Physician / Practitioner: Geraldo Nails DO  PCP: Isak Samuel MD  Admission Date:   Admission Orders (From admission, onward)       Ordered        09/20/24 1337  INPATIENT ADMISSION  Once            09/17/24 2347  Place in Observation  Once                        Discharge Date: 09/21/24    Consultations During Hospital Stay:  None     Procedures Performed:    * No surgery found *     Images:   No results found.    Lab Results: I have reviewed the following results:  Results from last 7 days   Lab Units 09/19/24  0536 09/17/24 2135 09/16/24  1418   WBC Thousand/uL 7.72 7.56 6.63   HEMOGLOBIN g/dL 13.8 12.9 14.0   HEMATOCRIT % 39.9 38.7 41.9   MCV fL 86 87 87   PLATELETS Thousands/uL 213 203 222     Results from last 7 days   Lab Units 09/19/24  0536 09/17/24 2135 09/16/24  1418   SODIUM mmol/L 134* 132* 136   POTASSIUM mmol/L 3.5 4.0 3.8   CHLORIDE mmol/L 100 98 98   CO2 mmol/L 26 25 30   BUN mg/dL 8 10 11   CREATININE mg/dL 0.79 0.96 1.02   CALCIUM mg/dL 9.0 8.8 9.5   ALBUMIN g/dL 3.6 3.9  --    TOTAL BILIRUBIN mg/dL 0.29 0.30  --    ALK PHOS U/L 96 110*  --    ALT U/L 88* 98*  --    AST U/L 75* 83*  --    EGFR ml/min/1.73sq m 129 113 105   GLUCOSE RANDOM mg/dL 229* 449* 321*             Results from last 7 days   Lab Units 09/21/24  1125 09/21/24  0645 09/20/24  2054 09/20/24  1627 09/20/24  1105 09/20/24  0636 09/19/24  2102 09/19/24  1622 09/19/24  1152 09/19/24  0623   POC GLUCOSE mg/dl 219* 131 210* 250* 280* 184* 216* 276* 350* 239*     Results from last 7 days   Lab Units 09/17/24  2135   HEMOGLOBIN A1C % 9.6*     Results from last 7 days   Lab Units 09/17/24  0935   TSH 3RD GENERATON uIU/mL 6.229*   FREE T4 ng/dL 0.93     Results from last 7 days   Lab Units 09/17/24  2337 09/17/24  2135   LACTIC ACID mmol/L 2.4* 2.2*       Incidental Findings:        Test Results Pending at Discharge (will require follow up):      Reason for Admission:   Hyperglycemia - no symptoms (Pt arriving EMS from group home. Pt presenting w sugars in the 500s. Pt only prescribed metformin @ home and has no care plan set in place regarding high sugars. Discharged last night for the same issues. )    Hospital Course:   Manuel Back is a 19 y.o. male patient who originally presented to the hospital on 9/17/2024 due to hyperglycemia.  The patient is on metformin and sitagliptin.   "During hospitalization he was started on insulin.  He was transitioned over to 70/30 and did relatively well.  Due to insurance formulary 75/25 is covered and he will be discharged with equivalent dosing 32 units twice daily.  No other changes to his medication regimen    Please see above list of diagnoses and related plan for additional information.     Condition at Discharge: stable     Discharge Day Visit / Exam:   Subjective: Patient seen and examined    Vitals: Blood Pressure: 142/81 (09/21/24 0837)  Pulse: (!) 107 (09/21/24 0837)  Temperature: (!) 97.3 °F (36.3 °C) (09/21/24 0824)  Temp Source: Temporal (09/21/24 0824)  Respirations: 16 (09/21/24 0824)  Height: 5' 7\" (170.2 cm) (09/17/24 2359)  Weight - Scale: 132 kg (291 lb 0.1 oz) (09/17/24 2136)  SpO2: 98 % (09/21/24 0824)    Exam:   Physical Exam  Vitals reviewed.   Constitutional:       General: He is not in acute distress.     Appearance: He is obese.   HENT:      Head: Atraumatic.   Eyes:      General: No scleral icterus.  Cardiovascular:      Rate and Rhythm: Regular rhythm.      Heart sounds: Normal heart sounds.   Pulmonary:      Effort: Pulmonary effort is normal.      Breath sounds: Decreased breath sounds present. No wheezing.   Abdominal:      General: Bowel sounds are normal.      Palpations: Abdomen is soft.      Tenderness: There is no abdominal tenderness.   Musculoskeletal:         General: No swelling.   Skin:     General: Skin is warm and dry.   Neurological:      General: No focal deficit present.      Mental Status: He is alert. Mental status is at baseline.   Psychiatric:         Mood and Affect: Mood normal.     Discussion with Family: Group home staff at bedside as well as group home nurse Luz Damico instructions/Information to patient and family:   See after visit summary for information provided to patient and family.      Provisions for Follow-Up Care:  See after visit summary for information related to follow-up care and " any pertinent home health orders.      Mobility at time of Discharge:  Basic Mobility Inpatient Raw Score: 24  JH-HLM Goal: 8: Walk 250 feet or more  JH-HLM Achieved: 7: Walk 25 feet or more  JH-HLM Goal achieved. Continue to encourage appropriate mobility.    Disposition:   Group Home / Personal Care Home at      Planned Readmission: No     Discharge Medications:  See after visit summary for reconciled discharge medications provided to patient and family.      Administrative Statements   I spent 35 minutes discharging the patient. This time was spent on the day of discharge. I had direct contact with the patient on the day of discharge. Greater than 50% of the total time was spent examining patient, answering all patient questions, arranging and discussing plan of care with patient as well as directly providing post-discharge instructions.  Additional time then spent on discharge activities.    **Please Note: This note may have been constructed using a voice recognition system**

## 2024-09-23 ENCOUNTER — TRANSITIONAL CARE MANAGEMENT (OUTPATIENT)
Dept: FAMILY MEDICINE CLINIC | Facility: CLINIC | Age: 19
End: 2024-09-23

## 2024-09-23 NOTE — UTILIZATION REVIEW
NOTIFICATION OF ADMISSION DISCHARGE   This is a Notification of Discharge from [facility]. Please be advised that this patient has been discharge from our facility. Below you will find the admission and discharge date and time including the patient’s disposition.   UTILIZATION REVIEW CONTACT:  Holli Medeiros MA  Utilization   Network Utilization Review Department  Phone: [phone] x carefully listen to the prompts. All voicemails are confidential.  Email: [email or web address]     ADMISSION INFORMATION  PRESENTATION DATE: 9/17/2024  9:01 PM  OBERVATION ADMISSION DATE: 09/17/2024 2347  INPATIENT ADMISSION DATE: 9/20/24  1:37 PM   DISCHARGE DATE: 9/21/2024  1:27 PM   DISPOSITION:Home/Self Care    Network Utilization Review Department  ATTENTION: Please call with any questions or concerns to [phone] and carefully listen to the prompts so that you are directed to the right person. All voicemails are confidential.   For Discharge needs, contact Care Management DC Support Team at [phone] opt. 2  Send all requests for admission clinical reviews, approved or denied determinations and any other requests to dedicated fax number below belonging to the campus where the patient is receiving treatment. List of dedicated fax numbers for the Facilities:  FACILITY NAME UR FAX NUMBER   ADMISSION DENIALS (Administrative/Medical Necessity) [phone]   DISCHARGE SUPPORT TEAM ([facility]) [phone]   PARENT CHILD HEALTH (Maternity/NICU/Pediatrics) [phone]   [facility] [phone]   [facility] [phone]   [facility] [phone]   [facility] [phone]   [facility] [phone]   [facility] [phone]   [facility] [phone]   [facility]  [place] [phone]   [facility] [phone]   [facility] [phone]   [facility] [phone]   [facility] [phone]

## 2024-09-24 DIAGNOSIS — E11.9 TYPE 2 DIABETES MELLITUS WITHOUT COMPLICATION, WITHOUT LONG-TERM CURRENT USE OF INSULIN (HCC): ICD-10-CM

## 2024-09-27 ENCOUNTER — OFFICE VISIT (OUTPATIENT)
Dept: ENDOCRINOLOGY | Facility: CLINIC | Age: 19
End: 2024-09-27
Payer: COMMERCIAL

## 2024-09-27 VITALS
DIASTOLIC BLOOD PRESSURE: 80 MMHG | HEIGHT: 67 IN | OXYGEN SATURATION: 98 % | SYSTOLIC BLOOD PRESSURE: 120 MMHG | BODY MASS INDEX: 46.77 KG/M2 | WEIGHT: 298 LBS | HEART RATE: 65 BPM

## 2024-09-27 DIAGNOSIS — N39.44 URINARY INCONTINENCE, NOCTURNAL ENURESIS: ICD-10-CM

## 2024-09-27 DIAGNOSIS — I10 ESSENTIAL HYPERTENSION: ICD-10-CM

## 2024-09-27 DIAGNOSIS — R79.89 ELEVATED TSH: ICD-10-CM

## 2024-09-27 DIAGNOSIS — E11.69 TYPE 2 DIABETES MELLITUS WITH OTHER SPECIFIED COMPLICATION, UNSPECIFIED WHETHER LONG TERM INSULIN USE (HCC): Primary | ICD-10-CM

## 2024-09-27 PROCEDURE — 99214 OFFICE O/P EST MOD 30 MIN: CPT

## 2024-09-27 RX ORDER — TOLTERODINE 4 MG/1
4 CAPSULE, EXTENDED RELEASE ORAL DAILY
Qty: 30 CAPSULE | Refills: 5 | Status: SHIPPED | OUTPATIENT
Start: 2024-09-27

## 2024-09-27 RX ORDER — ACYCLOVIR 400 MG/1
1 TABLET ORAL SEE ADMIN INSTRUCTIONS
Qty: 1 EACH | Refills: 0 | Status: SHIPPED | OUTPATIENT
Start: 2024-09-27 | End: 2024-09-27

## 2024-09-27 RX ORDER — ACYCLOVIR 400 MG/1
1 TABLET ORAL SEE ADMIN INSTRUCTIONS
Qty: 3 EACH | Refills: 5 | Status: SHIPPED | OUTPATIENT
Start: 2024-09-27 | End: 2024-09-27

## 2024-09-27 RX ORDER — ACYCLOVIR 400 MG/1
1 TABLET ORAL SEE ADMIN INSTRUCTIONS
Qty: 3 EACH | Refills: 5 | Status: SHIPPED | OUTPATIENT
Start: 2024-09-27

## 2024-09-27 RX ORDER — ACYCLOVIR 400 MG/1
1 TABLET ORAL SEE ADMIN INSTRUCTIONS
Qty: 1 EACH | Refills: 0 | Status: SHIPPED | OUTPATIENT
Start: 2024-09-27

## 2024-09-27 NOTE — PATIENT INSTRUCTIONS
Stop Januvia and reduce insulin to 28 units twice daily once starting Mounjaro 2.5 mg/week  If tolerating ( no nausea, vomiting, diarrhea, constipation), increase to 5 mg/week  If the insurance prefers an alternative, we will change the medication and let you know.  Start using Dexcom. Set up education with diabetes educator across the [place]

## 2024-09-27 NOTE — ASSESSMENT & PLAN NOTE
Review of blood sugar log shows most recent control has improved since hospitalization.  Patient would benefit from GLP-1 receptor agonist due to morbid obesity.    Recommend:  Stop Januvia, replace with Mounjaro or Ozempic (whichever is covered by insurance. For now, I have sent for Mounjaro).  Effects reviewed including nausea vomiting diarrhea constipation.  Would also advise reducing insulin to 28 units twice daily once he starts this.    He denies any history of pancreatitis, M EN syndrome or medullary thyroid cancer.    He also benefit from use of CGM. Dexcom ordered. Referral to diabetes education also placed    Repeat lab work prior to next visit    Routine eye report requested    Lab Results   Component Value Date    HGBA1C 9.6 (H) 09/17/2024

## 2024-09-27 NOTE — PROGRESS NOTES
Established Patient Progress Note    Chief Complaint   Patient presents with    Diabetes Type 2       Impression & Plan:    Problem List Items Addressed This Visit       Essential hypertension     Controlled in office.  Continue with regimen         Type 2 diabetes mellitus with other specified complication (HCC) - Primary     Review of blood sugar log shows most recent control has improved since hospitalization.  Patient would benefit from GLP-1 receptor agonist due to morbid obesity.    Recommend:  Stop Januvia, replace with Mounjaro or Ozempic (whichever is covered by insurance. For now, I have sent for Mounjaro).  Effects reviewed including nausea vomiting diarrhea constipation.  Would also advise reducing insulin to 28 units twice daily once he starts this.    He denies any history of pancreatitis, M EN syndrome or medullary thyroid cancer.    He also benefit from use of CGM. Dexcom ordered. Referral to diabetes education also placed    Repeat lab work prior to next visit    Routine eye report requested    Lab Results   Component Value Date    HGBA1C 9.6 (H) 09/17/2024            Relevant Medications    tirzepatide (Mounjaro) 5 MG/0.5ML    tirzepatide (Mounjaro) 2.5 MG/0.5ML    Continuous Glucose  (Dexcom G7 ) DMITRY    Continuous Glucose Sensor (Dexcom G7 Sensor)    Other Relevant Orders    Hemoglobin A1C    Lipid panel    Ambulatory referral to Diabetic Education    Elevated TSH     TSH mildly elevated while in the hospital  - advise he repeat prior to next appointment         Relevant Orders    TSH + Free T4       Orders Placed This Encounter   Procedures    TSH + Free T4     Standing Status:   Future     Standing Expiration Date:   9/27/2025    Hemoglobin A1C     Standing Status:   Future     Standing Expiration Date:   9/27/2025    Lipid panel     This is a patient instruction: This test requires patient fasting for 10-12 hours or longer. Drinking of black coffee or black tea is acceptable.      Standing Status:   Future     Standing Expiration Date:   9/27/2025    Ambulatory referral to Diabetic Education     Standing Status:   Future     Standing Expiration Date:   9/27/2025     Referral Priority:   Routine     Referral Type:   Consult - AMB     Referral Reason:   Specialty Services Required     Requested Specialty:   Diabetes Services     Number of Visits Requested:   1     Expiration Date:   9/27/2025       History of Present Illness:     Manuel Back is a 19 y.o. male with a history of type 2 diabetes, hypertension, schizoaffective disorder, morbid obesity. Complications:  none. Last A1C was 9.6. Home glucose monitoring: monitored by group home. Lives in group home    Recent hospitalizations or illnesses: yes- 9/17 to 9/21 for hyperglycemia. Insulin regimen changed. BG have improved since. Following most recent hospitalization, blood sugars have improved and range from 108-191. Prior to hospitalization, range from 266-447.    Current regimen:   Lispro 75/25: 32 units BID- 28   Metformin 1,000 mg BID  Januvia 100 mg daily    Eye exam: UTD, Aug 2024  Foot exam: UTD, May 2024  History of pancreatitis: denies    Patient Active Problem List   Diagnosis    Obesity, unspecified    Moderate episode of recurrent major depressive disorder (HCC)    Disorganized schizophrenia (HCC)    Urinary incontinence, nocturnal enuresis    Morbid obesity with body mass index (BMI) of 40.0 to 49.9 (HCC)    Essential hypertension    Vitamin D deficiency    Chronic pain of right knee    Schizoaffective disorder (HCC)    Type 2 diabetes mellitus with other specified complication (HCC)    Generalized abdominal pain    Constipation    Annual physical exam    Morbid obesity with BMI of 45.0-49.9, adult ([facility])    Vision blurring    Acute right ankle pain    Intellectual disability    Dyspepsia    Reflux gastritis    Closed head injury    Thyroid nodule    Left shoulder pain    Hypersomnia    Elevated TSH    Transaminitis       Past Medical History:   Diagnosis Date    Chronic headaches     Cognitive impairment     Diabetes type 2, controlled (HCC)     Psychiatric illness       Past Surgical History:   Procedure Laterality Date    WRIST SURGERY Right       Family History   Problem Relation Age of Onset    No Known Problems Mother     No Known Problems Father      Social History     Tobacco Use    Smoking status: Never     Passive exposure: Past    Smokeless tobacco: Never   Substance Use Topics    Alcohol use: Never     Allergies   Allergen Reactions    Bee Pollen Other (See Comments)    Cholecalciferol Other (See Comments)    Pollen Extract Sneezing         Current Outpatient Medications:     acetaminophen (TYLENOL) 650 mg CR tablet, Take 1 tablet (650 mg total) by mouth every 8 (eight) hours as needed for mild pain, moderate pain or headaches, Disp: 30 tablet, Rfl: 0    Alcohol Swabs (Alcohol Prep) 70 % PADS, USE AS DIRECTED TO TEST BLOOD SUGAR DAILY @8AM (DM), Disp: 100 each, Rfl: 0    aluminum-magnesium hydroxide-simethicone (MAALOX) 200-200-20 MG/5ML SUSP, Take 30 mL by mouth 2 (two) times a day as needed for heartburn, Disp: 355 mL, Rfl: 0    Blood Glucose Monitoring Suppl (OneTouch Verio) w/Device KIT, daily, Disp: 1 kit, Rfl: 0    cholecalciferol (VITAMIN D3) 1,000 units tablet, Take 1 tablet (1,000 Units total) by mouth daily, Disp: 90 tablet, Rfl: 3    clonazePAM (KlonoPIN) 1 mg tablet, Take 1 tablet (1 mg total) by mouth daily, Disp: 30 tablet, Rfl: 0    cloNIDine (CATAPRES) 0.2 mg tablet, Take 1 tablet (0.2 mg total) by mouth 3 (three) times a day, Disp: , Rfl:     Continuous Glucose  (Dexcom G7 ) DMITRY, Use 1 each see administration instructions Use daily with dexcom G7 sensors, Disp: 1 each, Rfl: 0    Continuous Glucose Sensor (Dexcom G7 Sensor), Use 1 Device see administration instructions Use 1 sensor every 10 days., Disp: 3 each, Rfl: 5    cyanocobalamin (VITAMIN B-12) 500 MCG tablet, Take 1 tablet (500  mcg total) by mouth daily, Disp: 30 tablet, Rfl: 11    desmopressin (DDAVP) 0.2 mg tablet, Take 2 tablets (0.4 mg total) by mouth daily at bedtime, Disp: 60 tablet, Rfl: 5    diphenhydrAMINE (GNP Allergy Relief) 12.5 MG chewable tablet, Chew 1 tablet (12.5 mg total) in the morning, Disp: 30 tablet, Rfl: 11    divalproex sodium (DEPAKOTE ER) 250 mg 24 hr tablet, Take 1 tablet (250 mg total) by mouth daily at bedtime, Disp: , Rfl:     divalproex sodium (Depakote) 500 mg DR tablet, Take 3 tablets (1,500 mg total) by mouth every 8 (eight) hours, Disp: , Rfl:     glucose blood (OneTouch Verio) test strip, Daily, Disp: 100 each, Rfl: 1    ibuprofen (MOTRIN) 400 mg tablet, Take 400 mg by mouth every 6 (six) hours as needed for mild pain, Disp: , Rfl:     Incontinence Supply Disposable (Comfort Shield Adult Diapers) MISC, Use 1 packet 4 (four) times a day, Disp: 48 each, Rfl: 11    Insulin Lispro Prot & Lispro (Insulin Lispro Prot & Lispro) (75-25) 100 units/mL injection pen, Inject 32 Units under the skin 2 (two) times a day with meals, Disp: 15 mL, Rfl: 0    Insulin Pen Needle (BD Pen Needle Gianna 2nd Gen) 32G X 4 MM MISC, For use with insulin pen. Pharmacy may dispense brand covered by insurance., Disp: 100 each, Rfl: 0    Lancets (onetouch ultrasoft) lancets, Daily., Disp: 100 each, Rfl: 1    metFORMIN (GLUCOPHAGE) 1000 MG tablet, TAKE 1 TABLET BY MOUTH TWICE DAILY W/ MEALS AT 8AM-5PM (DM) *FREDDY, Disp: 62 tablet, Rfl: 0    metoprolol succinate (TOPROL-XL) 50 mg 24 hr tablet, Take 1 tablet (50 mg total) by mouth daily, Disp: 90 tablet, Rfl: 1    OLANZapine (ZyPREXA) 20 MG tablet, Take 1 tablet (20 mg total) by mouth daily at bedtime, Disp: 30 tablet, Rfl: 5    paliperidone (INVEGA) 6 MG 24 hr tablet, Take 1 tablet (6 mg total) by mouth every morning, Disp: , Rfl:     polyethylene glycol (MIRALAX) 17 g packet, Take 17 g by mouth daily, Disp: 510 g, Rfl: 1    senna-docusate sodium (Senna-Plus) 8.6-50 mg per tablet, TAKE 1  "TABLET BY MOUTH 2XDAILY@8A-8P(CONST) *ABIMBOLA, Disp: 60 tablet, Rfl: 3    Sunscreens (Coppertone Complete SPF30) LOTN, Apply 1 Application topically if needed (for application before sun exposure), Disp: 207 mL, Rfl: 3    tirzepatide (Mounjaro) 2.5 MG/0.5ML, Inject 0.5 mL (2.5 mg total) under the skin every 7 days, Disp: 2 mL, Rfl: 0    tirzepatide (Mounjaro) 5 MG/0.5ML, Inject 0.5 mL (5 mg total) under the skin every 7 days, Disp: 2 mL, Rfl: 2    tolterodine (DETROL LA) 4 mg 24 hr capsule, Take 1 capsule (4 mg total) by mouth daily, Disp: 30 capsule, Rfl: 5    chlorproMAZINE (THORAZINE) 100 mg tablet, Take 1 tablet (100 mg total) by mouth 2 (two) times a day One in the morning, One in the afternoon., Disp: 60 tablet, Rfl: 1    chlorproMAZINE (THORAZINE) 50 mg tablet, Take 3 tablets (150 mg total) by mouth daily at bedtime (Patient not taking: Reported on 9/12/2024), Disp: 90 tablet, Rfl: 1    Review of Systems   Constitutional:  Negative for chills and fever.   HENT:  Negative for ear pain and sore throat.    Eyes:  Negative for pain and visual disturbance.   Respiratory:  Negative for cough and shortness of breath.    Cardiovascular:  Negative for chest pain and palpitations.   Gastrointestinal:  Negative for abdominal pain and vomiting.   Genitourinary:  Negative for dysuria and hematuria.   Musculoskeletal:  Negative for arthralgias and back pain.   Skin:  Negative for color change and rash.   Neurological:  Negative for seizures and syncope.   All other systems reviewed and are negative.      Physical Exam:  Body mass index is 46.67 kg/m².  /80   Pulse 65   Ht 5' 7\" (1.702 m)   Wt 135 kg (298 lb)   SpO2 98%   BMI 46.67 kg/m²    Wt Readings from Last 3 Encounters:   09/27/24 135 kg (298 lb)   09/17/24 132 kg (291 lb 0.1 oz)   09/16/24 (!) 138 kg (303 lb 2.1 oz)       Physical Exam  Vitals reviewed.   Constitutional:       Appearance: Normal appearance.   HENT:      Head: Normocephalic and atraumatic. "   Cardiovascular:      Rate and Rhythm: Normal rate.   Pulmonary:      Effort: Pulmonary effort is normal.   Neurological:      Mental Status: He is alert and oriented to person, place, and time.   Psychiatric:         Mood and Affect: Mood normal.         Behavior: Behavior normal.           Labs:   Lab Results   Component Value Date    HGBA1C 9.6 (H) 09/17/2024    HGBA1C 7.5 (H) 06/24/2024    HGBA1C 7.4 (A) 06/19/2024     Lab Results   Component Value Date    CREATININE 0.79 09/19/2024    CREATININE 0.96 09/17/2024    CREATININE 1.02 09/16/2024    BUN 8 09/19/2024    K 3.5 09/19/2024     09/19/2024    CO2 26 09/19/2024     eGFRcr   Date Value Ref Range Status   05/21/2023 96 >59 Final     eGFR   Date Value Ref Range Status   09/19/2024 129 ml/min/1.73sq m Final     Lab Results   Component Value Date    HDL 28 (L) 06/24/2024    TRIG 301 (H) 06/24/2024     Lab Results   Component Value Date    ALT 88 (H) 09/19/2024    AST 75 (H) 09/19/2024    ALKPHOS 96 09/19/2024     Lab Results   Component Value Date    QMD3ZTWPGUXC 6.229 (H) 09/17/2024    BNN1RIBWDCDG 3.812 06/24/2024    RUC5HMTRPBCG 6.981 (H) 03/27/2024     Lab Results   Component Value Date    FREET4 0.93 09/17/2024         Patient Instructions   Stop Januvia and reduce insulin to 28 units twice daily once starting Mounjaro 2.5 mg/week  If tolerating ( no nausea, vomiting, diarrhea, constipation), increase to 5 mg/week  If the insurance prefers an alternative, we will change the medication and let you know.  Start using Dexcom. Set up education with diabetes educator across the [place]       Discussed with the patient and all questioned fully answered. He will call me if any problems arise.

## 2024-09-30 ENCOUNTER — OFFICE VISIT (OUTPATIENT)
Dept: FAMILY MEDICINE CLINIC | Facility: CLINIC | Age: 19
End: 2024-09-30

## 2024-09-30 ENCOUNTER — TELEPHONE (OUTPATIENT)
Dept: FAMILY MEDICINE CLINIC | Facility: CLINIC | Age: 19
End: 2024-09-30

## 2024-09-30 VITALS
WEIGHT: 303 LBS | BODY MASS INDEX: 47.56 KG/M2 | DIASTOLIC BLOOD PRESSURE: 81 MMHG | TEMPERATURE: 97.7 F | SYSTOLIC BLOOD PRESSURE: 119 MMHG | HEART RATE: 87 BPM | OXYGEN SATURATION: 98 % | HEIGHT: 67 IN | RESPIRATION RATE: 18 BRPM

## 2024-09-30 DIAGNOSIS — R00.0 TACHYCARDIA: ICD-10-CM

## 2024-09-30 DIAGNOSIS — I10 ESSENTIAL HYPERTENSION: ICD-10-CM

## 2024-09-30 DIAGNOSIS — E11.69 TYPE 2 DIABETES MELLITUS WITH OTHER SPECIFIED COMPLICATION, UNSPECIFIED WHETHER LONG TERM INSULIN USE (HCC): Primary | ICD-10-CM

## 2024-09-30 PROCEDURE — 99495 TRANSJ CARE MGMT MOD F2F 14D: CPT | Performed by: FAMILY MEDICINE

## 2024-09-30 RX ORDER — METOPROLOL SUCCINATE 50 MG/1
50 TABLET, EXTENDED RELEASE ORAL DAILY
Qty: 90 TABLET | Refills: 1 | Status: SHIPPED | OUTPATIENT
Start: 2024-09-30 | End: 2024-09-30 | Stop reason: SDUPTHER

## 2024-09-30 RX ORDER — METOPROLOL SUCCINATE 50 MG/1
50 TABLET, EXTENDED RELEASE ORAL DAILY
Qty: 90 TABLET | Refills: 1 | Status: SHIPPED | OUTPATIENT
Start: 2024-09-30

## 2024-09-30 RX ORDER — DESMOPRESSIN ACETATE 0.2 MG/1
0.4 TABLET ORAL
Qty: 60 TABLET | Refills: 5 | Status: SHIPPED | OUTPATIENT
Start: 2024-09-30 | End: 2025-03-29

## 2024-09-30 NOTE — TELEPHONE ENCOUNTER
Signature required.    Folder (to be completed by):  Dr. Rodriguez    Name of Form Medical Examination [place]      Color Folder: Farida    Form to be faxed to:    Eddie huber to appt, will collect at appt

## 2024-09-30 NOTE — ASSESSMENT & PLAN NOTE
Patient with recent admission to [facility] for hyperglycemia uncontrolled on oral medications. Ultimately discharged with transition to insuline at 75/25 with Metformin 1000mg BID and Januvia 100mg QD. Evaluated by Endocrinology following discharge and januvia was discontinued in favor of Mounjaro for his morbid obesity. They also plan to adjust his insulin to 28 units BID following the change to mounjaro. He has not yet started taking the medication because insurance has not authorized it. His current glucose logs demonstrate good glycemic control with his current regimen, ~100-140s. Will continue with current regimen, transition to mounjaro with 28u BID insulin following insurance authorization   Lab Results   Component Value Date    HGBA1C 9.6 (H) 09/17/2024

## 2024-09-30 NOTE — TELEPHONE ENCOUNTER
Reason for call:   [x] Refill   [] Prior Auth  [] Other:     Office:   [] PCP/Provider -   [x] Specialty/Provider - Urology/    Medication: Desmopressin 0.2mg    Dose/Frequency: 2 tabs hs    Quantity: 60    Pharmacy: [facility] [place] - [address] [place] [address] [phone]     Does the patient have enough for 3 days?   [] Yes   [x] No - Send as HP to POD

## 2024-09-30 NOTE — PROGRESS NOTES
Transition of Care Visit  Name: Manuel Back      : 2005      MRN: [id number]  Encounter Provider: Justin Rodriguez MD  Encounter Date: 2024   Encounter department: [facility]    Assessment & Plan  Type 2 diabetes mellitus with other specified complication, unspecified whether long term insulin use (HCC)  Patient with recent admission to [facility] for hyperglycemia uncontrolled on oral medications. Ultimately discharged with transition to insuline at 75/25 with Metformin 1000mg BID and Januvia 100mg QD. Evaluated by Endocrinology following discharge and januvia was discontinued in favor of Mounjaro for his morbid obesity. They also plan to adjust his insulin to 28 units BID following the change to mounjaro. He has not yet started taking the medication because insurance has not authorized it. His current glucose logs demonstrate good glycemic control with his current regimen, ~100-140s. Will continue with current regimen, transition to mounjaro with 28u BID insulin following insurance authorization   Lab Results   Component Value Date    HGBA1C 9.6 (H) 2024          Tachycardia    Orders:    metoprolol succinate (TOPROL-XL) 50 mg 24 hr tablet; Take 1 tablet (50 mg total) by mouth daily         History of Present Illness     Transitional Care Management Review:   Manuel Back is a 19 y.o. male here for TCM follow up.     During the TCM phone call patient stated:  TCM Call       Date and time call was made  2024  2:08 PM    Hospital care reviewed  Records reviewed    Patient was hospitialized at  [facility]    Date of Admission  24    Date of discharge  24    Diagnosis  Type 2 Diabetes Mellitus, Morbid Obesity, Hypertension    Disposition  Home    Were the patients medications reviewed and updated  Yes    Current Symptoms  None          TCM Call       Post hospital issues  None    Should patient be enrolled  in anticoag monitoring?  No    Scheduled for follow up?  Yes    Referrals needed  Yuli Araujo LPN    Did you obtain your prescribed medications  Yes    Do you need help managing your prescriptions or medications  No    Is transportation to your appointment needed  No    I have advised the patient to call PCP with any new or worsening symptoms  Yuli Araujo LPN    Support System  Home care staff    The type of support provided  Emotional; Physical    Do you have social support  Yes, as much as I need    Are you recieving any outpatient services  No    Are you recieving home care services  No    Are you using any community resources  No    Current waiver services  No    Have you fallen in the last 12 months  No    Interperter language line needed  No    Counseling  Caregiver    Counseling topics  Importance of RX compliance; instructions for management; Risk factor reduction          Patient here for TCM following recent hospitalization for hyperglycemia. Suspected to be 2.2 patients multiple antipsychotic medications. His oral medications were adjusted and he was transitioned to therapy with insulin. He has been doing well with same following discharge and his guardians present a log of his blood sugars demonstrating good glycemic control. He was due to be started on mounjaro by endocrinology after the discharge, however he has not been able to receive authorization from insurance at this time. He does not have any concerns today and states that he feels 'better'.       Review of Systems   Constitutional:  Negative for chills and fever.   HENT:  Negative for sore throat.    Respiratory:  Negative for cough and shortness of breath.    Cardiovascular:  Negative for chest pain and palpitations.   Gastrointestinal:  Negative for abdominal pain and vomiting.   Musculoskeletal:  Negative for arthralgias and back pain.   Skin:  Negative for rash.   Neurological:  Negative for seizures, syncope and headaches.   All other  "systems reviewed and are negative.    Objective     /81 (BP Location: Right arm, Patient Position: Sitting, Cuff Size: Large)   Pulse 87   Temp 97.7 °F (36.5 °C)   Resp 18   Ht 5' 7\" (1.702 m)   Wt (!) 137 kg (303 lb)   SpO2 98%   BMI 47.46 kg/m²     Physical Exam  Vitals and nursing note reviewed.   Constitutional:       General: He is not in acute distress.     Appearance: He is well-developed. He is morbidly obese.   HENT:      Head: Normocephalic and atraumatic.   Eyes:      Conjunctiva/sclera: Conjunctivae normal.   Cardiovascular:      Rate and Rhythm: Normal rate and regular rhythm.      Heart sounds: No murmur heard.  Pulmonary:      Effort: Pulmonary effort is normal. No respiratory distress.      Breath sounds: Normal breath sounds.   Abdominal:      Palpations: Abdomen is soft.      Tenderness: There is no abdominal tenderness.   Musculoskeletal:         General: No swelling.      Cervical back: Neck supple.   Skin:     General: Skin is warm and dry.      Capillary Refill: Capillary refill takes less than 2 seconds.   Neurological:      Mental Status: He is alert.   Psychiatric:         Mood and Affect: Mood normal.       Medications have been reviewed by provider in current encounter    Administrative Statements     "

## 2024-10-03 ENCOUNTER — TELEPHONE (OUTPATIENT)
Age: 19
End: 2024-10-03

## 2024-10-03 DIAGNOSIS — E11.69 TYPE 2 DIABETES MELLITUS WITH OTHER SPECIFIED COMPLICATION, UNSPECIFIED WHETHER LONG TERM INSULIN USE (HCC): Primary | ICD-10-CM

## 2024-10-03 RX ORDER — BLOOD-GLUCOSE SENSOR
EACH MISCELLANEOUS
Qty: 2 EACH | Refills: 2 | Status: SHIPPED | OUTPATIENT
Start: 2024-10-03 | End: 2024-10-04 | Stop reason: RX

## 2024-10-03 RX ORDER — KETOROLAC TROMETHAMINE 30 MG/ML
INJECTION, SOLUTION INTRAMUSCULAR; INTRAVENOUS
Qty: 1 EACH | Refills: 0 | Status: SHIPPED | OUTPATIENT
Start: 2024-10-03 | End: 2024-10-04 | Stop reason: RX

## 2024-10-03 NOTE — TELEPHONE ENCOUNTER
Pt  caregiver called. Asking since the insurance rejected the dexcom and the monjauro.   Can u please order the deysi and also ozempic please let them know when done

## 2024-10-04 ENCOUNTER — TELEPHONE (OUTPATIENT)
Dept: ENDOCRINOLOGY | Facility: CLINIC | Age: 19
End: 2024-10-04

## 2024-10-04 ENCOUNTER — TELEPHONE (OUTPATIENT)
Age: 19
End: 2024-10-04

## 2024-10-04 DIAGNOSIS — E11.69 TYPE 2 DIABETES MELLITUS WITH OTHER SPECIFIED COMPLICATION, UNSPECIFIED WHETHER LONG TERM INSULIN USE (HCC): Primary | ICD-10-CM

## 2024-10-04 NOTE — TELEPHONE ENCOUNTER
[facility] pharmacy calling. They are out of stock of the [employer] 3 sensor and . They are asking if the script can be changed to the Alin 2 sensor and .  Please advise

## 2024-10-07 ENCOUNTER — TELEPHONE (OUTPATIENT)
Age: 19
End: 2024-10-07

## 2024-10-07 DIAGNOSIS — R52 PAIN: ICD-10-CM

## 2024-10-07 DIAGNOSIS — R51.9 INTRACTABLE HEADACHE, UNSPECIFIED CHRONICITY PATTERN, UNSPECIFIED HEADACHE TYPE: ICD-10-CM

## 2024-10-07 RX ORDER — IBUPROFEN 400 MG/1
400 TABLET, FILM COATED ORAL EVERY 6 HOURS PRN
Qty: 90 TABLET | Refills: 0 | Status: SHIPPED | OUTPATIENT
Start: 2024-10-07

## 2024-10-07 RX ORDER — SENNOSIDES 8.6 MG
650 CAPSULE ORAL EVERY 8 HOURS PRN
Qty: 30 TABLET | Refills: 0 | Status: SHIPPED | OUTPATIENT
Start: 2024-10-07

## 2024-10-07 NOTE — TELEPHONE ENCOUNTER
RN from [employer] calling she states they had a nurse with the patient today going over education on the Ozempic pen. The patient received Januvia today and they D/C it off the med list. He will start Ozempic Wednesday. They will need an order faxed to them stating Januvia is Discontinued today. Fax # [phone]  Please advise

## 2024-10-07 NOTE — TELEPHONE ENCOUNTER
Suyapa from [employer] called asking for refills of the following, please review thank you

## 2024-10-08 DIAGNOSIS — E11.9 TYPE 2 DIABETES MELLITUS WITHOUT COMPLICATION, WITHOUT LONG-TERM CURRENT USE OF INSULIN (HCC): ICD-10-CM

## 2024-10-08 RX ORDER — BLOOD SUGAR DIAGNOSTIC
STRIP MISCELLANEOUS
Qty: 100 EACH | Refills: 1 | Status: SHIPPED | OUTPATIENT
Start: 2024-10-08

## 2024-10-09 ENCOUNTER — CLINICAL SUPPORT (OUTPATIENT)
Dept: NUTRITION | Facility: HOSPITAL | Age: 19
End: 2024-10-09
Payer: COMMERCIAL

## 2024-10-09 VITALS — WEIGHT: 302.2 LBS | BODY MASS INDEX: 47.33 KG/M2

## 2024-10-09 DIAGNOSIS — E66.01 MORBID OBESITY (HCC): Primary | ICD-10-CM

## 2024-10-09 NOTE — PROGRESS NOTES
" Nutrition Assessment Form    Patient Name: Manuel Back    YOB: 2005    Sex: Male     Assessment Date: 10/9/2024  Start Time: 1135 Stop Time: 1205 Total Minutes: 30     Data:  Present at session: Self and 2 staff members   Parent/Patient Concerns/reason for visit: \"We are doing good\"   Medical Dx/Reason for Referral: obesity   Past Medical History:   Diagnosis Date    Chronic headaches     Cognitive impairment     Diabetes type 2, controlled (HCC)     Psychiatric illness        Current Outpatient Medications   Medication Sig Dispense Refill    acetaminophen (TYLENOL) 650 mg CR tablet Take 1 tablet (650 mg total) by mouth every 8 (eight) hours as needed for mild pain, moderate pain or headaches 30 tablet 0    Alcohol Swabs (Alcohol Prep) 70 % PADS USE AS DIRECTED TO TEST BLOOD SUGAR DAILY @8AM (DM) 100 each 0    aluminum-magnesium hydroxide-simethicone (MAALOX) 200-200-20 MG/5ML SUSP Take 30 mL by mouth 2 (two) times a day as needed for heartburn 355 mL 0    Blood Glucose Monitoring Suppl (OneTouch Verio) w/Device KIT daily 1 kit 0    chlorproMAZINE (THORAZINE) 100 mg tablet Take 1 tablet (100 mg total) by mouth 2 (two) times a day One in the morning, One in the afternoon. 60 tablet 1    chlorproMAZINE (THORAZINE) 50 mg tablet Take 3 tablets (150 mg total) by mouth daily at bedtime (Patient not taking: Reported on 9/12/2024) 90 tablet 1    cholecalciferol (VITAMIN D3) 1,000 units tablet Take 1 tablet (1,000 Units total) by mouth daily 90 tablet 3    clonazePAM (KlonoPIN) 1 mg tablet Take 1 tablet (1 mg total) by mouth daily 30 tablet 0    cloNIDine (CATAPRES) 0.2 mg tablet Take 1 tablet (0.2 mg total) by mouth 3 (three) times a day      Continuous Glucose  (FreeStyle Alin 2 [place]) DMITRY Use per  guidelines 1 each 0    Continuous Glucose Sensor (FreeStyle Alin 2 Sensor) MISC Change every 14 days per  guidelines 2 each 2    cyanocobalamin (VITAMIN B-12) 500 MCG " tablet Take 1 tablet (500 mcg total) by mouth daily 30 tablet 11    desmopressin (DDAVP) 0.2 mg tablet Take 2 tablets (0.4 mg total) by mouth daily at bedtime 60 tablet 5    diphenhydrAMINE (GNP Allergy Relief) 12.5 MG chewable tablet Chew 1 tablet (12.5 mg total) in the morning 30 tablet 11    divalproex sodium (DEPAKOTE ER) 250 mg 24 hr tablet Take 1 tablet (250 mg total) by mouth daily at bedtime      divalproex sodium (Depakote) 500 mg DR tablet Take 3 tablets (1,500 mg total) by mouth every 8 (eight) hours      glucose blood (OneTouch Verio) test strip Daily 100 each 1    ibuprofen (MOTRIN) 400 mg tablet Take 1 tablet (400 mg total) by mouth every 6 (six) hours as needed for mild pain 90 tablet 0    Incontinence Supply Disposable (Comfort Shield Adult Diapers) MISC Use 1 packet 4 (four) times a day 48 each 11    Insulin Lispro Prot & Lispro (Insulin Lispro Prot & Lispro) (75-25) 100 units/mL injection pen Inject 32 Units under the skin 2 (two) times a day with meals 15 mL 0    Insulin Pen Needle (BD Pen Needle Gianna 2nd Gen) 32G X 4 MM MISC For use with insulin pen. Pharmacy may dispense brand covered by insurance. 100 each 0    Lancets (onetouch ultrasoft) lancets Daily. 100 each 1    metFORMIN (GLUCOPHAGE) 1000 MG tablet TAKE 1 TABLET BY MOUTH TWICE DAILY W/ MEALS AT 8AM-5PM (DM) *FREDDY 62 tablet 0    metoprolol succinate (TOPROL-XL) 50 mg 24 hr tablet Take 1 tablet (50 mg total) by mouth daily 90 tablet 1    OLANZapine (ZyPREXA) 20 MG tablet Take 1 tablet (20 mg total) by mouth daily at bedtime 30 tablet 5    paliperidone (INVEGA) 6 MG 24 hr tablet Take 1 tablet (6 mg total) by mouth every morning      polyethylene glycol (MIRALAX) 17 g packet Take 17 g by mouth daily 510 g 1    semaglutide, 0.25 or 0.5 mg/dose, (Ozempic, 0.25 or 0.5 MG/DOSE,) 2 mg/3 mL injection pen Inject 0.375 mL (0.25 mg total) under the skin every 7 days 2 mL 0    semaglutide, 0.25 or 0.5 mg/dose, (Ozempic, 0.25 or 0.5 MG/DOSE,) 2 mg/3  "mL injection pen Inject 0.75 mL (0.5 mg total) under the skin every 7 days 2 mL 2    senna-docusate sodium (Senna-Plus) 8.6-50 mg per tablet TAKE 1 TABLET BY MOUTH 2XDAILY@8A-8P(CONST) *ABIMBOLA 60 tablet 3    Sunscreens (Coppertone Complete SPF30) LOTN Apply 1 Application topically if needed (for application before sun exposure) 207 mL 3    tolterodine (DETROL LA) 4 mg 24 hr capsule Take 1 capsule (4 mg total) by mouth daily 30 capsule 5     No current facility-administered medications for this visit.        Additional Meds/Supplements: N/a   Special Learning Needs/barriers to learning/any new barriers N/a   Height: HC Readings from Last 5 Encounters:   No data found for HC      Weight: Wt Readings from Last 10 Encounters:   09/30/24 (!) 137 kg (303 lb)   09/27/24 135 kg (298 lb)   09/17/24 132 kg (291 lb 0.1 oz)   09/16/24 (!) 138 kg (303 lb 2.1 oz)   09/12/24 (!) 139 kg (307 lb)   09/11/24 (!) 139 kg (307 lb 5.1 oz)   08/21/24 (!) 143 kg (315 lb 6.4 oz)   08/06/24 (!) 141 kg (310 lb 13.6 oz)   07/17/24 (!) 143 kg (315 lb 6.4 oz)   06/19/24 (!) 143 kg (315 lb) (>99%, Z= 3.22)*     * Growth percentiles are based on CDC (Boys, 2-20 Years) data.     Estimated body mass index is 47.46 kg/m² as calculated from the following:    Height as of 9/30/24: 5' 7\" (1.702 m).    Weight as of 9/30/24: 137 kg (303 lb).   Recent Weight Change: [x]Yes     []No  Amount: 13# loss desired and intentional      Energy Needs: 1840cals (20cals/kg-1000 for 2#/wk wt loss)   Allergies   Allergen Reactions    Bee Pollen Other (See Comments)    Cholecalciferol Other (See Comments)    Pollen Extract Sneezing    or intolerances NKFA   Social History     Substance and Sexual Activity   Alcohol Use Never    N/a   Social History     Tobacco Use   Smoking Status Never    Passive exposure: Past   Smokeless Tobacco Never    N/a   Who shops? Group home   Who cooks/cooking methods/Eating out/take out habits   Group home  Cooking methods: bake/burroughs/air " heike/paxton/boil/other________    Minimal eating out   Exercise: Plays football in the spring and summer time   Other: ie: Sleep habits/ stress level/ work habits household-lives with ?/ food security Stress level 2/10 scale  Sleep schedule currently off- sleeping all day and up all night was in psychiatric hospital last month and that is when his sleep schedule changed- trying to get back on schedule now  Currently going to graduate [employer] this May- he is nervous   Prior Nutritional Counseling? []Yes     [x]No  When:      Why:         Diet Hx: smoothies and water 40oz approx and milk- 2% milk - fruit/hawaiian punch (20oz)  Breakfast: Diet:  mostly skips B and lunch because he is sleeping all day   Lunch: He will have letha side up eggs-        Dinner:  smoothie a few times a week maybe 3- 2-3c milk and few cups of frozen fruit and plain greek yogurt if it is avaiable, 24oz per smoothie and will have 3x/wk 7-8pm- will have 2-3 thighs and 2-3 cups rice not many vegetables     Snacks:  will snack on fruits most days but candy on Wednesdays when he gest paid AM -   PM - 330pm   HS -    Other Notes/ Initial Assessment:  Manuel is here with 2 staff members from his group home.  He is a senior in high school and would like to lose weight, his sleep schedule is currently off because of his stay on the hospital last month.    Discussed importance of being active,  regular sleep schedule, regular meals, daily activity, encouraged trying new foods and increasing vegetables intake in general, minimizing calories from drinks, portions per plate method.  Provided Tips for wt loss and [address] sample menus and healthy snack ideas, RD name and number for home use.   Follow up scheduled for May 8th.  Pt is self pay.    Updated assessment (Follow up note only): 5/8/24 Manuel is here late for his appointment with 2 staff member since he did not want to wake up this morning.  He is sleeping too late sometimes and will miss  breakfast.  Staff reporting he is sleeping until 2-3pm in the afternoon, going to bed at 4am.   He will also sometimes take a snack at night.  He has an appointment with his PCP next week and they plan to make a sleeping chart. Discussed no wt loss with Manuel he is reporting he did not make any changes.  Reinforced if he wants to lose weight he needs to make some changes. He has minimal activity.  He will snack on fruits if anything,  Staff reporting Manuel has total freedom with his food at his current house and he is unable to control it or himself.  He will just fix himself food if they do not feed him.they are asking for calorie limits for Manuel- however RN reporting unable to limit intake at home.  Discussed portions per the plate method with Manuel and staff.  Staff reporting drinking mainly water and a cup of juice every once in a while.  Follow up scheduled.    7/17/24  Manuel is here to follow up with his wt which shows no change.  He eats 3 meals a day, mainly - though staff reporting he will sometimes skip breakfast.  He will eat out a few times a week but he has small portions.  He likes to eat out at [employer] or other [place] place.  He is trying to eat smoothies.  His activity includes walking 3x/wk .5 mile.  He might snack on fruits but otherwise he will not snack.  He does eat a lot of salads- he mainly drinks water during the day and some almond milk mixed with 1%.       10/9/24  Manuel is her with a couple staff members to follow up with his weight.  He has lost 13# which he is pleased with, he has increased his walking. He states he is feeling better.  He has now become a diabetic. Staff is asking for information on low sodium low carbs and sugar free food items.  He now has ozempic once a week added and metformin.  Provided 1800 cals information and sample menus and portions per the plate method.  Recommending 2407-0460 cals, 30-60gms carbs per meal, and 15gms carbs per snack.  Also  encouraged activity 30 mins most days.   Follow up made for 3 moths.        Nutrition Diagnosis:   Overweight/obesity  related to Excess energy intake as evidenced by  BMI more than normative standard for age and sex (obesity-grade III 40+)       Any change or new dx since previous visit:     Nutrition Diagnosis:         Medical Nutrition Therapy Intervention:  [x]Individualized Meal Plan-Discussed the importance of eating 3 meals daily and not skipping, and how metabolism is affected.  Also discussed adding in small snacks or 5-6 small meals daily if desired vs 3 larger ones, and appropriate options and portions.  Appropriate timing of meals, including eating within 1 hr of waking and eating meals slowly 20mins/meals, minimizing mindless/boredom or habitual eating, etc was also mentioned.  Discussed the plate method of portioning foods, including half a plate fruits and vegetables or a half plate all vegetables, 1/4 of the plate a lean protein source or meat, and a 1/4 of the plate being a whole grain carb- usually 1/2-1c.  This should be followed for at least 2 meals of the day, but could also be followed for all 3.  \Fluid intake discussed and appropriate amounts and choices, 16 oz with meals and additional 32oz during the day.  S/S dehydration also covered.Discussed the importance of trying new foods 12-16x, and retraining taste buds to like new foods.  Discussed how taste buds will change over the course of a lifetime.  Also included trying new foods at the beginning of a meal when you are the hungriest and more apt to like a new food and be more accepting of it. []Understanding Lab Values   []Basic Pathophysiology of Disease []Food/Medication Interactions   []Food Diary [x]Exercise-150 mins of moderate activity weekly, discussed importance of variety of activity, including wt bearing activities 2-3x/wk x 10-15mins. Discussed importance of activity throughout the lifecycle and its impact on overall  "health/stress management, etc.   [x]Lifestyle/Behavior Modification Techniques-Discussed the importance of adequate sleep, and ideal sleeping conditions, including a cool temperature 64-68 degrees F, and a dark and quiet room. Also discussed the importance of a regular and consistent sleep routine, including minimizing blue light exposure an hour before sleeping.  Weekend habits should include staying fairly consistent with weekday sleep habits to minimize disruption during the week.  A connection was made between getting adequate and good quality sleep and the ability to handle stress the next day, make healthy food choices, and be active. []Medication, Mechanism of Action   []Label Reading: CHO/ Na/ Fat/ other_________ []Self Blood Glucose Monitoring   []Weight/BMI Goals: gain/lose/maintain []Other -           Comprehension: []Excellent  []Very Good  [x]Good  []Fair   []Poor    Receptivity: []Excellent  []Very Good  [x]Good  []Fair   []Poor    Expected Compliance: []Excellent  []Very Good  []Good  [x]Fair   [x]Poor        Goals (initial)/ Progress made on previous goals/new goals:   3 meals daily no skipping- has achieved continue   2.  Portions per plate method as dicussed with RD- has achieved continue   3. Minimize calories from drinks  has achieved continue  4. 30-60gms carbs per meal and 15gms at snack       No follow-ups on file.  Labs:  CMP  Lab Results   Component Value Date    K 3.5 09/19/2024     09/19/2024    CO2 26 09/19/2024    BUN 8 09/19/2024    CREATININE 0.79 09/19/2024    GLUF 115 (H) 06/24/2024    CALCIUM 9.0 09/19/2024    CORRECTEDCA 9.7 05/04/2023    AST 75 (H) 09/19/2024    ALT 88 (H) 09/19/2024    ALKPHOS 96 09/19/2024    EGFR 129 09/19/2024       BMP  Lab Results   Component Value Date    CALCIUM 9.0 09/19/2024    K 3.5 09/19/2024    CO2 26 09/19/2024     09/19/2024    BUN 8 09/19/2024    CREATININE 0.79 09/19/2024       Lipids  No results found for: \"CHOL\"  Lab Results " "  Component Value Date    HDL 28 (L) 06/24/2024    HDL 27 (L) 03/27/2024    HDL 32 (L) 02/01/2024     Lab Results   Component Value Date    LDLCALC 46 06/24/2024    LDLCALC 27 03/27/2024    LDLCALC 23 02/01/2024     Lab Results   Component Value Date    TRIG 301 (H) 06/24/2024    TRIG 333 (H) 03/27/2024    TRIG 357 (H) 02/01/2024     No results found for: \"CHOLHDL\"    Hemoglobin A1C  Lab Results   Component Value Date    HGBA1C 9.6 (H) 09/17/2024       Fasting Glucose  Lab Results   Component Value Date    GLUF 115 (H) 06/24/2024       Insulin     Thyroid  Lab Results   Component Value Date    TSH 1.50 05/21/2023       Hepatic Function Panel  Lab Results   Component Value Date    ALT 88 (H) 09/19/2024    AST 75 (H) 09/19/2024    ALKPHOS 96 09/19/2024       Celiac Disease Antibody Panel  No results found for: \"ENDOMYSIAL IGA\", \"GLIADIN IGA\", \"GLIADIN IGG\", \"IGA\", \"TISSUE TRANSGLUT AB\", \"TTG IGA\"   Iron  No results found for: \"IRON\", \"TIBC\", \"FERRITIN\"         Asha Paez RD  [facility]  [address] [address] [facility] [id number]    "

## 2024-10-10 NOTE — TELEPHONE ENCOUNTER
Diego nurse from [employer] calling in about this, he asks if the letter can be resent to them but please add the date that the Januvia was discontinued it was 10/8.  Please fax to same fax #.

## 2024-10-11 ENCOUNTER — TELEPHONE (OUTPATIENT)
Dept: FAMILY MEDICINE CLINIC | Facility: CLINIC | Age: 19
End: 2024-10-11

## 2024-10-11 NOTE — TELEPHONE ENCOUNTER
Folder (To be completed by) -  Dr. Samuel     Name of Form - PDS Update Dx     Color folder ([place])    Form to be Faxed (Fax #), [phone]      Patient was made aware of the 7-10 business day form policy.

## 2024-10-14 DIAGNOSIS — E11.65 DIABETES MELLITUS WITH HYPERGLYCEMIA (HCC): ICD-10-CM

## 2024-10-14 RX ORDER — PEN NEEDLE, DIABETIC 32GX 5/32"
NEEDLE, DISPOSABLE MISCELLANEOUS
Qty: 100 EACH | Refills: 0 | Status: SHIPPED | OUTPATIENT
Start: 2024-10-14

## 2024-10-16 DIAGNOSIS — J30.2 SEASONAL ALLERGIES: ICD-10-CM

## 2024-10-16 RX ORDER — DIPHENHYDRAMINE HYDROCHLORIDE 12.5 MG/1
12.5 BAR, CHEWABLE ORAL DAILY
Qty: 30 TABLET | Refills: 5 | Status: SHIPPED | OUTPATIENT
Start: 2024-10-16

## 2024-10-18 ENCOUNTER — APPOINTMENT (OUTPATIENT)
Dept: LAB | Facility: CLINIC | Age: 19
End: 2024-10-18
Payer: COMMERCIAL

## 2024-10-18 ENCOUNTER — TRANSCRIBE ORDERS (OUTPATIENT)
Dept: LAB | Facility: CLINIC | Age: 19
End: 2024-10-18

## 2024-10-18 DIAGNOSIS — Z13.29 SCREENING FOR THYROID DISORDER: ICD-10-CM

## 2024-10-18 DIAGNOSIS — Z13.1 SCREENING FOR DIABETES MELLITUS: ICD-10-CM

## 2024-10-18 DIAGNOSIS — Z79.899 NEED FOR PROPHYLACTIC CHEMOTHERAPY: ICD-10-CM

## 2024-10-18 DIAGNOSIS — Z00.00 ROUTINE GENERAL MEDICAL EXAMINATION AT A HEALTH CARE FACILITY: Primary | ICD-10-CM

## 2024-10-18 DIAGNOSIS — Z00.00 ROUTINE GENERAL MEDICAL EXAMINATION AT A HEALTH CARE FACILITY: ICD-10-CM

## 2024-10-18 LAB
ALBUMIN SERPL BCG-MCNC: 3.8 G/DL (ref 3.5–5)
ALP SERPL-CCNC: 84 U/L (ref 34–104)
ALT SERPL W P-5'-P-CCNC: 29 U/L (ref 7–52)
ANION GAP SERPL CALCULATED.3IONS-SCNC: 11 MMOL/L (ref 4–13)
AST SERPL W P-5'-P-CCNC: 29 U/L (ref 13–39)
BASOPHILS # BLD AUTO: 0.03 THOUSANDS/ΜL (ref 0–0.1)
BASOPHILS NFR BLD AUTO: 0 % (ref 0–1)
BILIRUB SERPL-MCNC: 0.23 MG/DL (ref 0.2–1)
BUN SERPL-MCNC: 11 MG/DL (ref 5–25)
CALCIUM SERPL-MCNC: 9.2 MG/DL (ref 8.4–10.2)
CHLORIDE SERPL-SCNC: 102 MMOL/L (ref 96–108)
CHOLEST SERPL-MCNC: 112 MG/DL
CO2 SERPL-SCNC: 27 MMOL/L (ref 21–32)
CREAT SERPL-MCNC: 0.94 MG/DL (ref 0.6–1.3)
EOSINOPHIL # BLD AUTO: 0.11 THOUSAND/ΜL (ref 0–0.61)
EOSINOPHIL NFR BLD AUTO: 1 % (ref 0–6)
ERYTHROCYTE [DISTWIDTH] IN BLOOD BY AUTOMATED COUNT: 12.7 % (ref 11.6–15.1)
GFR SERPL CREATININE-BSD FRML MDRD: 116 ML/MIN/1.73SQ M
GLUCOSE P FAST SERPL-MCNC: 106 MG/DL (ref 65–99)
HCT VFR BLD AUTO: 41.5 % (ref 36.5–49.3)
HDLC SERPL-MCNC: 26 MG/DL
HGB BLD-MCNC: 13.2 G/DL (ref 12–17)
IMM GRANULOCYTES # BLD AUTO: 0.04 THOUSAND/UL (ref 0–0.2)
IMM GRANULOCYTES NFR BLD AUTO: 1 % (ref 0–2)
LDLC SERPL CALC-MCNC: 28 MG/DL (ref 0–100)
LYMPHOCYTES # BLD AUTO: 2.67 THOUSANDS/ΜL (ref 0.6–4.47)
LYMPHOCYTES NFR BLD AUTO: 31 % (ref 14–44)
MCH RBC QN AUTO: 29.1 PG (ref 26.8–34.3)
MCHC RBC AUTO-ENTMCNC: 31.8 G/DL (ref 31.4–37.4)
MCV RBC AUTO: 91 FL (ref 82–98)
MONOCYTES # BLD AUTO: 1.34 THOUSAND/ΜL (ref 0.17–1.22)
MONOCYTES NFR BLD AUTO: 16 % (ref 4–12)
NEUTROPHILS # BLD AUTO: 4.46 THOUSANDS/ΜL (ref 1.85–7.62)
NEUTS SEG NFR BLD AUTO: 51 % (ref 43–75)
NRBC BLD AUTO-RTO: 0 /100 WBCS
PLATELET # BLD AUTO: 230 THOUSANDS/UL (ref 149–390)
PMV BLD AUTO: 11 FL (ref 8.9–12.7)
POTASSIUM SERPL-SCNC: 4 MMOL/L (ref 3.5–5.3)
PROT SERPL-MCNC: 7.3 G/DL (ref 6.4–8.4)
RBC # BLD AUTO: 4.54 MILLION/UL (ref 3.88–5.62)
SODIUM SERPL-SCNC: 140 MMOL/L (ref 135–147)
TRIGL SERPL-MCNC: 291 MG/DL
WBC # BLD AUTO: 8.65 THOUSAND/UL (ref 4.31–10.16)

## 2024-10-18 PROCEDURE — 36415 COLL VENOUS BLD VENIPUNCTURE: CPT

## 2024-10-18 PROCEDURE — 84443 ASSAY THYROID STIM HORMONE: CPT

## 2024-10-18 PROCEDURE — 85025 COMPLETE CBC W/AUTO DIFF WBC: CPT

## 2024-10-18 PROCEDURE — 80053 COMPREHEN METABOLIC PANEL: CPT

## 2024-10-18 PROCEDURE — 80061 LIPID PANEL: CPT

## 2024-10-19 LAB — TSH SERPL DL<=0.05 MIU/L-ACNC: 3.59 UIU/ML (ref 0.45–4.5)

## 2024-10-23 DIAGNOSIS — N39.44 URINARY INCONTINENCE, NOCTURNAL ENURESIS: ICD-10-CM

## 2024-10-23 RX ORDER — TOLTERODINE 4 MG/1
4 CAPSULE, EXTENDED RELEASE ORAL DAILY
Qty: 30 CAPSULE | Refills: 5 | Status: SHIPPED | OUTPATIENT
Start: 2024-10-23

## 2024-10-25 ENCOUNTER — TELEPHONE (OUTPATIENT)
Dept: FAMILY MEDICINE CLINIC | Facility: CLINIC | Age: 19
End: 2024-10-25

## 2024-11-01 DIAGNOSIS — E11.9 TYPE 2 DIABETES MELLITUS WITHOUT COMPLICATION, WITHOUT LONG-TERM CURRENT USE OF INSULIN (HCC): ICD-10-CM

## 2024-11-04 DIAGNOSIS — E11.9 TYPE 2 DIABETES MELLITUS WITHOUT COMPLICATION, WITHOUT LONG-TERM CURRENT USE OF INSULIN (HCC): ICD-10-CM

## 2024-11-05 DIAGNOSIS — E11.69 TYPE 2 DIABETES MELLITUS WITH OTHER SPECIFIED COMPLICATION, UNSPECIFIED WHETHER LONG TERM INSULIN USE (HCC): ICD-10-CM

## 2024-11-05 RX ORDER — UBIQUINOL 100 MG
CAPSULE ORAL
Qty: 100 EACH | Refills: 1 | Status: SHIPPED | OUTPATIENT
Start: 2024-11-05 | End: 2024-11-14 | Stop reason: SDUPTHER

## 2024-11-08 ENCOUNTER — TELEPHONE (OUTPATIENT)
Age: 19
End: 2024-11-08

## 2024-11-08 ENCOUNTER — TELEPHONE (OUTPATIENT)
Dept: ENDOCRINOLOGY | Facility: CLINIC | Age: 19
End: 2024-11-08

## 2024-11-08 DIAGNOSIS — E11.65 DIABETES MELLITUS WITH HYPERGLYCEMIA (HCC): ICD-10-CM

## 2024-11-08 RX ORDER — INSULIN LISPRO 100 [IU]/ML
INJECTION, SUSPENSION SUBCUTANEOUS
Qty: 30 ML | Refills: 3 | Status: SHIPPED | OUTPATIENT
Start: 2024-11-08

## 2024-11-08 NOTE — TELEPHONE ENCOUNTER
Pharmacy wanted to call and state that they have been giving the patient the Novolog 70/30 pen and not the Humalog 70/30 pen that was being prescribed.   Pharmacy stated that his insurance does not cover the humalog.    Yanna, the [facility] at the pharmacy relayed the message.    If there's any questions about this, please contact   [facility] [place] [facility] [place]   [address] [place] [id number]   Phone: [phone]  Fax: [phone]

## 2024-11-08 NOTE — TELEPHONE ENCOUNTER
Diego, nurse from [employer] calling in states that patient has lost 8 lbs recently and has had some lower blood sugar numbers in the morning such as 68, 72, 74.  Has a Alin but he said they also have a list of finger sticks that he will fax over. He is asking if patient should reduce his insulin at all?  Currently takes Novolog 70/30 28 units BID, also takes Ozempic 0.25 mg weekly and Metformin 1000 mg BID.  Please advise and call nurse Cortez.

## 2024-11-08 NOTE — TELEPHONE ENCOUNTER
Requested medication(s) are due for refill today: Yes  Patient has already received a courtesy refill: No  Other reason request has been forwarded to provider: needs provider review

## 2024-11-13 ENCOUNTER — TRANSCRIBE ORDERS (OUTPATIENT)
Dept: LAB | Facility: CLINIC | Age: 19
End: 2024-11-13

## 2024-11-13 ENCOUNTER — APPOINTMENT (OUTPATIENT)
Dept: LAB | Facility: CLINIC | Age: 19
End: 2024-11-13
Payer: COMMERCIAL

## 2024-11-13 DIAGNOSIS — Z13.1 SCREENING FOR DIABETES MELLITUS: ICD-10-CM

## 2024-11-13 DIAGNOSIS — R79.89 HYPOURICEMIA: Primary | ICD-10-CM

## 2024-11-13 DIAGNOSIS — Z79.899 NEED FOR PROPHYLACTIC CHEMOTHERAPY: ICD-10-CM

## 2024-11-13 DIAGNOSIS — Z00.00 ROUTINE GENERAL MEDICAL EXAMINATION AT A HEALTH CARE FACILITY: ICD-10-CM

## 2024-11-13 DIAGNOSIS — Z13.29 SCREENING FOR THYROID DISORDER: ICD-10-CM

## 2024-11-13 LAB
ALBUMIN SERPL BCG-MCNC: 3.9 G/DL (ref 3.5–5)
ALP SERPL-CCNC: 77 U/L (ref 34–104)
ALT SERPL W P-5'-P-CCNC: 43 U/L (ref 7–52)
ANION GAP SERPL CALCULATED.3IONS-SCNC: 9 MMOL/L (ref 4–13)
AST SERPL W P-5'-P-CCNC: 32 U/L (ref 13–39)
BASOPHILS # BLD AUTO: 0.02 THOUSANDS/ÂΜL (ref 0–0.1)
BASOPHILS NFR BLD AUTO: 0 % (ref 0–1)
BILIRUB SERPL-MCNC: 0.2 MG/DL (ref 0.2–1)
BUN SERPL-MCNC: 16 MG/DL (ref 5–25)
CALCIUM SERPL-MCNC: 9.1 MG/DL (ref 8.4–10.2)
CHLORIDE SERPL-SCNC: 102 MMOL/L (ref 96–108)
CHOLEST SERPL-MCNC: 114 MG/DL (ref ?–200)
CO2 SERPL-SCNC: 29 MMOL/L (ref 21–32)
CREAT SERPL-MCNC: 1.02 MG/DL (ref 0.6–1.3)
EOSINOPHIL # BLD AUTO: 0.05 THOUSAND/ÂΜL (ref 0–0.61)
EOSINOPHIL NFR BLD AUTO: 1 % (ref 0–6)
ERYTHROCYTE [DISTWIDTH] IN BLOOD BY AUTOMATED COUNT: 12.6 % (ref 11.6–15.1)
GFR SERPL CREATININE-BSD FRML MDRD: 105 ML/MIN/1.73SQ M
GLUCOSE P FAST SERPL-MCNC: 124 MG/DL (ref 65–99)
HCT VFR BLD AUTO: 43.4 % (ref 36.5–49.3)
HDLC SERPL-MCNC: 23 MG/DL
HGB BLD-MCNC: 14.3 G/DL (ref 12–17)
IMM GRANULOCYTES # BLD AUTO: 0.04 THOUSAND/UL (ref 0–0.2)
IMM GRANULOCYTES NFR BLD AUTO: 1 % (ref 0–2)
LDLC SERPL CALC-MCNC: 34 MG/DL (ref 0–100)
LYMPHOCYTES # BLD AUTO: 2.31 THOUSANDS/ÂΜL (ref 0.6–4.47)
LYMPHOCYTES NFR BLD AUTO: 30 % (ref 14–44)
MCH RBC QN AUTO: 29.7 PG (ref 26.8–34.3)
MCHC RBC AUTO-ENTMCNC: 32.9 G/DL (ref 31.4–37.4)
MCV RBC AUTO: 90 FL (ref 82–98)
MONOCYTES # BLD AUTO: 1.06 THOUSAND/ÂΜL (ref 0.17–1.22)
MONOCYTES NFR BLD AUTO: 14 % (ref 4–12)
NEUTROPHILS # BLD AUTO: 4.34 THOUSANDS/ÂΜL (ref 1.85–7.62)
NEUTS SEG NFR BLD AUTO: 54 % (ref 43–75)
NONHDLC SERPL-MCNC: 91 MG/DL
NRBC BLD AUTO-RTO: 0 /100 WBCS
PLATELET # BLD AUTO: 228 THOUSANDS/UL (ref 149–390)
PMV BLD AUTO: 11.7 FL (ref 8.9–12.7)
POTASSIUM SERPL-SCNC: 3.9 MMOL/L (ref 3.5–5.3)
PROT SERPL-MCNC: 7.4 G/DL (ref 6.4–8.4)
RBC # BLD AUTO: 4.81 MILLION/UL (ref 3.88–5.62)
SODIUM SERPL-SCNC: 140 MMOL/L (ref 135–147)
T4 FREE SERPL-MCNC: 0.95 NG/DL (ref 0.61–1.12)
TRIGL SERPL-MCNC: 286 MG/DL (ref ?–150)
TSH SERPL DL<=0.05 MIU/L-ACNC: 5.3 UIU/ML (ref 0.45–4.5)
VALPROATE SERPL-MCNC: 108 UG/ML (ref 50–100)
WBC # BLD AUTO: 7.82 THOUSAND/UL (ref 4.31–10.16)

## 2024-11-13 PROCEDURE — 80053 COMPREHEN METABOLIC PANEL: CPT

## 2024-11-13 PROCEDURE — 80061 LIPID PANEL: CPT

## 2024-11-13 PROCEDURE — 85025 COMPLETE CBC W/AUTO DIFF WBC: CPT

## 2024-11-13 PROCEDURE — 36415 COLL VENOUS BLD VENIPUNCTURE: CPT

## 2024-11-13 PROCEDURE — 80164 ASSAY DIPROPYLACETIC ACD TOT: CPT

## 2024-11-13 PROCEDURE — 84443 ASSAY THYROID STIM HORMONE: CPT

## 2024-11-13 PROCEDURE — 84439 ASSAY OF FREE THYROXINE: CPT

## 2024-11-14 ENCOUNTER — OFFICE VISIT (OUTPATIENT)
Dept: FAMILY MEDICINE CLINIC | Facility: CLINIC | Age: 19
End: 2024-11-14

## 2024-11-14 VITALS
HEART RATE: 92 BPM | OXYGEN SATURATION: 98 % | BODY MASS INDEX: 46.93 KG/M2 | SYSTOLIC BLOOD PRESSURE: 119 MMHG | HEIGHT: 67 IN | WEIGHT: 299 LBS | TEMPERATURE: 98.4 F | DIASTOLIC BLOOD PRESSURE: 79 MMHG

## 2024-11-14 DIAGNOSIS — R10.13 EPIGASTRIC PAIN: ICD-10-CM

## 2024-11-14 DIAGNOSIS — F25.9 SCHIZOAFFECTIVE DISORDER (HCC): ICD-10-CM

## 2024-11-14 DIAGNOSIS — N39.44 URINARY INCONTINENCE, NOCTURNAL ENURESIS: ICD-10-CM

## 2024-11-14 DIAGNOSIS — E11.9 TYPE 2 DIABETES MELLITUS WITHOUT COMPLICATION, WITH LONG-TERM CURRENT USE OF INSULIN (HCC): Primary | ICD-10-CM

## 2024-11-14 DIAGNOSIS — R00.0 TACHYCARDIA: ICD-10-CM

## 2024-11-14 DIAGNOSIS — E11.69 TYPE 2 DIABETES MELLITUS WITH OTHER SPECIFIED COMPLICATION, UNSPECIFIED WHETHER LONG TERM INSULIN USE (HCC): ICD-10-CM

## 2024-11-14 DIAGNOSIS — K59.00 CONSTIPATION: ICD-10-CM

## 2024-11-14 DIAGNOSIS — Z79.4 TYPE 2 DIABETES MELLITUS WITHOUT COMPLICATION, WITH LONG-TERM CURRENT USE OF INSULIN (HCC): Primary | ICD-10-CM

## 2024-11-14 DIAGNOSIS — R51.9 INTRACTABLE HEADACHE, UNSPECIFIED CHRONICITY PATTERN, UNSPECIFIED HEADACHE TYPE: ICD-10-CM

## 2024-11-14 DIAGNOSIS — F39 MOOD DISORDER (HCC): ICD-10-CM

## 2024-11-14 DIAGNOSIS — E55.9 VITAMIN D DEFICIENCY: ICD-10-CM

## 2024-11-14 DIAGNOSIS — I10 PRIMARY HYPERTENSION: ICD-10-CM

## 2024-11-14 DIAGNOSIS — E53.8 VITAMIN B12 DEFICIENCY: ICD-10-CM

## 2024-11-14 PROBLEM — R52 PAIN: Status: ACTIVE | Noted: 2024-11-14

## 2024-11-14 PROBLEM — E66.01 MORBID OBESITY WITH BMI OF 45.0-49.9, ADULT (HCC): Status: ACTIVE | Noted: 2024-01-20

## 2024-11-14 PROCEDURE — 99214 OFFICE O/P EST MOD 30 MIN: CPT | Performed by: FAMILY MEDICINE

## 2024-11-14 RX ORDER — TOLTERODINE 4 MG/1
4 CAPSULE, EXTENDED RELEASE ORAL DAILY
Qty: 30 CAPSULE | Refills: 5 | Status: SHIPPED | OUTPATIENT
Start: 2024-11-14

## 2024-11-14 RX ORDER — DESMOPRESSIN ACETATE 0.2 MG/1
0.4 TABLET ORAL
Qty: 60 TABLET | Refills: 5 | Status: SHIPPED | OUTPATIENT
Start: 2024-11-14 | End: 2025-05-13

## 2024-11-14 RX ORDER — OLANZAPINE 20 MG/1
20 TABLET ORAL
Qty: 30 TABLET | Refills: 5 | Status: SHIPPED | OUTPATIENT
Start: 2024-11-14 | End: 2025-05-13

## 2024-11-14 RX ORDER — UBIQUINOL 100 MG
CAPSULE ORAL AS NEEDED
Qty: 100 EACH | Refills: 1 | Status: SHIPPED | OUTPATIENT
Start: 2024-11-14

## 2024-11-14 RX ORDER — AMOXICILLIN 250 MG
1 CAPSULE ORAL 2 TIMES DAILY
Qty: 60 TABLET | Refills: 3 | Status: SHIPPED | OUTPATIENT
Start: 2024-11-14

## 2024-11-14 RX ORDER — PALIPERIDONE 6 MG/1
6 TABLET, EXTENDED RELEASE ORAL EVERY MORNING
Qty: 30 TABLET | Refills: 2 | Status: SHIPPED | OUTPATIENT
Start: 2024-11-14

## 2024-11-14 RX ORDER — METOPROLOL SUCCINATE 50 MG/1
50 TABLET, EXTENDED RELEASE ORAL DAILY
Qty: 90 TABLET | Refills: 1 | Status: SHIPPED | OUTPATIENT
Start: 2024-11-14

## 2024-11-14 RX ORDER — CHLORPROMAZINE HYDROCHLORIDE 50 MG/1
150 TABLET, FILM COATED ORAL
Qty: 90 TABLET | Refills: 1 | Status: SHIPPED | OUTPATIENT
Start: 2024-11-14 | End: 2025-01-13

## 2024-11-14 RX ORDER — IBUPROFEN 400 MG/1
400 TABLET, FILM COATED ORAL EVERY 6 HOURS PRN
Qty: 90 TABLET | Refills: 0 | Status: SHIPPED | OUTPATIENT
Start: 2024-11-14

## 2024-11-14 RX ORDER — CHOLECALCIFEROL (VITAMIN D3) 25 MCG
1000 TABLET ORAL DAILY
Qty: 90 TABLET | Refills: 3 | Status: SHIPPED | OUTPATIENT
Start: 2024-11-14

## 2024-11-14 RX ORDER — CLONIDINE HYDROCHLORIDE 0.2 MG/1
0.2 TABLET ORAL 3 TIMES DAILY
Qty: 90 TABLET | Refills: 1 | Status: SHIPPED | OUTPATIENT
Start: 2024-11-14

## 2024-11-14 RX ORDER — POLYETHYLENE GLYCOL 3350 17 G/17G
17 POWDER, FOR SOLUTION ORAL DAILY
Qty: 510 G | Refills: 3 | Status: SHIPPED | OUTPATIENT
Start: 2024-11-14 | End: 2025-03-14

## 2024-11-14 RX ORDER — DIVALPROEX SODIUM 500 MG/1
1500 TABLET, DELAYED RELEASE ORAL
Qty: 90 TABLET | Refills: 3 | Status: SHIPPED | OUTPATIENT
Start: 2024-11-14

## 2024-11-14 RX ORDER — ALUMINUM HYDROXIDE, MAGNESIUM HYDROXIDE, SIMETHICONE 400; 400; 40 MG/10ML; MG/10ML; MG/10ML
30 SUSPENSION ORAL 2 TIMES DAILY PRN
Qty: 355 ML | Refills: 0 | Status: SHIPPED | OUTPATIENT
Start: 2024-11-14

## 2024-11-14 RX ORDER — DIVALPROEX SODIUM 250 MG/1
250 TABLET, FILM COATED, EXTENDED RELEASE ORAL
Qty: 30 TABLET | Refills: 1 | Status: SHIPPED | OUTPATIENT
Start: 2024-11-14 | End: 2025-01-13

## 2024-11-14 RX ORDER — CHLORPROMAZINE HYDROCHLORIDE 100 MG/1
100 TABLET, FILM COATED ORAL 2 TIMES DAILY
Qty: 60 TABLET | Refills: 1 | Status: SHIPPED | OUTPATIENT
Start: 2024-11-14 | End: 2025-01-13

## 2024-11-15 NOTE — ASSESSMENT & PLAN NOTE
Recently started on insulin after admission for severe hyperglycemia.  - Requesting refill of multiple diabetes medications including metformin and Accu-Chek supplies.      Orders:    cholecalciferol (VITAMIN D3) 1,000 units tablet; Take 1 tablet (1,000 Units total) by mouth daily    metFORMIN (GLUCOPHAGE) 1000 MG tablet; Take 1 tablet (1,000 mg total) by mouth 2 (two) times a day with meals    Alcohol Swabs (Alcohol Prep) 70 % PADS; Use if needed (use as needed for blood sugar checks)

## 2024-11-15 NOTE — ASSESSMENT & PLAN NOTE
Patient doing well, mood stable.  No recent medication changes.  -Requesting refills of multiple psych meds  Orders:    OLANZapine (ZyPREXA) 20 MG tablet; Take 1 tablet (20 mg total) by mouth daily at bedtime    chlorproMAZINE (THORAZINE) 100 mg tablet; Take 1 tablet (100 mg total) by mouth 2 (two) times a day One in the morning, One in the afternoon.    divalproex sodium (DEPAKOTE ER) 250 mg 24 hr tablet; Take 1 tablet (250 mg total) by mouth daily at bedtime    divalproex sodium (Depakote) 500 mg DR tablet; Take 3 tablets (1,500 mg total) by mouth daily at bedtime    paliperidone (INVEGA) 6 MG 24 hr tablet; Take 1 tablet (6 mg total) by mouth every morning    chlorproMAZINE (THORAZINE) 50 mg tablet; Take 3 tablets (150 mg total) by mouth daily at bedtime

## 2024-11-15 NOTE — ASSESSMENT & PLAN NOTE
Symptoms stable  -Requesting refills of desmopressin and tolterodine  Orders:    desmopressin (DDAVP) 0.2 mg tablet; Take 2 tablets (0.4 mg total) by mouth daily at bedtime    tolterodine (DETROL LA) 4 mg 24 hr capsule; Take 1 capsule (4 mg total) by mouth daily

## 2024-11-15 NOTE — ASSESSMENT & PLAN NOTE
Recently ran out of vitamin D supplementation, prescription refilled.  Orders:    cholecalciferol (VITAMIN D3) 1,000 units tablet; Take 1 tablet (1,000 Units total) by mouth daily

## 2024-11-15 NOTE — ASSESSMENT & PLAN NOTE
BP at goal; asymptomatic.  - Refills provided of metoprolol and clonidine    Orders:    cloNIDine (CATAPRES) 0.2 mg tablet; Take 1 tablet (0.2 mg total) by mouth 3 (three) times a day

## 2024-11-15 NOTE — ASSESSMENT & PLAN NOTE
Requesting refill of metoprolol, heart rate within normal limits today.  Orders:    metoprolol succinate (TOPROL-XL) 50 mg 24 hr tablet; Take 1 tablet (50 mg total) by mouth daily

## 2024-11-15 NOTE — ASSESSMENT & PLAN NOTE
Still using MiraLAX daily.  Bowel movements regular.  -Requesting refills of senna and MiraLAX  Orders:    senna-docusate sodium (Senna-Plus) 8.6-50 mg per tablet; Take 1 tablet by mouth 2 (two) times a day    polyethylene glycol (MIRALAX) 17 g packet; Take 17 g by mouth daily

## 2024-11-15 NOTE — ASSESSMENT & PLAN NOTE
Mood stable, behaviors appropriate today.  Cooperative during visit.  -Requesting refills of Invega  Orders:    paliperidone (INVEGA) 6 MG 24 hr tablet; Take 1 tablet (6 mg total) by mouth every morning

## 2024-11-15 NOTE — ASSESSMENT & PLAN NOTE
Requesting refill of as needed ibuprofen  Orders:    ibuprofen (MOTRIN) 400 mg tablet; Take 1 tablet (400 mg total) by mouth every 6 (six) hours as needed for mild pain

## 2024-11-15 NOTE — ASSESSMENT & PLAN NOTE
Requesting refill of vitamin B supplementation  Orders:    cyanocobalamin (VITAMIN B-12) 500 MCG tablet; Take 1 tablet (500 mcg total) by mouth daily

## 2024-11-15 NOTE — ASSESSMENT & PLAN NOTE
Requesting refill of as needed Maalox  Orders:    aluminum-magnesium hydroxide-simethicone (MAALOX) 200-200-20 MG/5ML SUSP; Take 30 mL by mouth 2 (two) times a day as needed for heartburn

## 2024-11-15 NOTE — PROGRESS NOTES
Name: Manuel Back      : 2005      MRN: [id number]  Encounter Provider: Isak Samuel MD  Encounter Date: 2024   Encounter department: [facility] [place]  :  Assessment & Plan  Type 2 diabetes mellitus without complication, with long-term current use of insulin (HCC)  Recently started on insulin after admission for severe hyperglycemia.  - Requesting refill of multiple diabetes medications including metformin and Accu-Chek supplies.      Orders:    cholecalciferol (VITAMIN D3) 1,000 units tablet; Take 1 tablet (1,000 Units total) by mouth daily    metFORMIN (GLUCOPHAGE) 1000 MG tablet; Take 1 tablet (1,000 mg total) by mouth 2 (two) times a day with meals    Alcohol Swabs (Alcohol Prep) 70 % PADS; Use if needed (use as needed for blood sugar checks)    Schizoaffective disorder (HCC)  Patient doing well, mood stable.  No recent medication changes.  -Requesting refills of multiple psych meds  Orders:    OLANZapine (ZyPREXA) 20 MG tablet; Take 1 tablet (20 mg total) by mouth daily at bedtime    chlorproMAZINE (THORAZINE) 100 mg tablet; Take 1 tablet (100 mg total) by mouth 2 (two) times a day One in the morning, One in the afternoon.    divalproex sodium (DEPAKOTE ER) 250 mg 24 hr tablet; Take 1 tablet (250 mg total) by mouth daily at bedtime    divalproex sodium (Depakote) 500 mg DR tablet; Take 3 tablets (1,500 mg total) by mouth daily at bedtime    paliperidone (INVEGA) 6 MG 24 hr tablet; Take 1 tablet (6 mg total) by mouth every morning    chlorproMAZINE (THORAZINE) 50 mg tablet; Take 3 tablets (150 mg total) by mouth daily at bedtime    Mood disorder (HCC)  Mood stable, behaviors appropriate today.  Cooperative during visit.  -Requesting refills of Invega  Orders:    paliperidone (INVEGA) 6 MG 24 hr tablet; Take 1 tablet (6 mg total) by mouth every morning    Primary hypertension  BP at goal; asymptomatic.  - Refills provided of metoprolol and  clonidine    Orders:    cloNIDine (CATAPRES) 0.2 mg tablet; Take 1 tablet (0.2 mg total) by mouth 3 (three) times a day    Vitamin D deficiency  Recently ran out of vitamin D supplementation, prescription refilled.  Orders:    cholecalciferol (VITAMIN D3) 1,000 units tablet; Take 1 tablet (1,000 Units total) by mouth daily    Constipation  Still using MiraLAX daily.  Bowel movements regular.  -Requesting refills of senna and MiraLAX  Orders:    senna-docusate sodium (Senna-Plus) 8.6-50 mg per tablet; Take 1 tablet by mouth 2 (two) times a day    polyethylene glycol (MIRALAX) 17 g packet; Take 17 g by mouth daily    Epigastric pain  Requesting refill of as needed Maalox  Orders:    aluminum-magnesium hydroxide-simethicone (MAALOX) 200-200-20 MG/5ML SUSP; Take 30 mL by mouth 2 (two) times a day as needed for heartburn    Intractable headache, unspecified chronicity pattern, unspecified headache type  Requesting refill of as needed ibuprofen  Orders:    ibuprofen (MOTRIN) 400 mg tablet; Take 1 tablet (400 mg total) by mouth every 6 (six) hours as needed for mild pain    Vitamin B12 deficiency  Requesting refill of vitamin B supplementation  Orders:    cyanocobalamin (VITAMIN B-12) 500 MCG tablet; Take 1 tablet (500 mcg total) by mouth daily    Urinary incontinence, nocturnal enuresis  Symptoms stable  -Requesting refills of desmopressin and tolterodine  Orders:    desmopressin (DDAVP) 0.2 mg tablet; Take 2 tablets (0.4 mg total) by mouth daily at bedtime    tolterodine (DETROL LA) 4 mg 24 hr capsule; Take 1 capsule (4 mg total) by mouth daily    Tachycardia  Requesting refill of metoprolol, heart rate within normal limits today.  Orders:    metoprolol succinate (TOPROL-XL) 50 mg 24 hr tablet; Take 1 tablet (50 mg total) by mouth daily           History of Present Illness     19-year-old male with past medical history of multiple psychiatric disorders, type 2 diabetes presenting for 6-month follow-up.  Since last visit  "he was hospitalized for severe hyperglycemia and was started on insulin.  He is otherwise being managed by endocrinology for his type 2 diabetes care.  He has no acute complaints today and his caregivers do not have any additional items to discuss. A member of his group home called during our visit and requesting refills of all of his medications.      Review of Systems   Constitutional: Negative.    Eyes:  Negative for visual disturbance.   Respiratory: Negative.     Gastrointestinal:  Negative for constipation and diarrhea.   Endocrine: Negative for polydipsia, polyphagia and polyuria.   Genitourinary: Negative.    Skin:  Negative for rash.   Neurological: Negative.    Psychiatric/Behavioral:  Negative for agitation, hallucinations and suicidal ideas. The patient is not hyperactive.      Medical History Reviewed by provider this encounter:  Problems     .     Objective   /79 (BP Location: Right arm, Patient Position: Sitting, Cuff Size: Large)   Pulse 92   Temp 98.4 °F (36.9 °C) (Temporal)   Ht 5' 7\" (1.702 m)   Wt 136 kg (299 lb)   SpO2 98%   BMI 46.83 kg/m²      Physical Exam  Vitals reviewed.   Constitutional:       Appearance: He is obese.   HENT:      Head: Normocephalic.      Nose: Nose normal.      Mouth/Throat:      Mouth: Mucous membranes are moist.      Pharynx: Oropharynx is clear.   Eyes:      Extraocular Movements: Extraocular movements intact.   Cardiovascular:      Rate and Rhythm: Normal rate.   Pulmonary:      Effort: Pulmonary effort is normal.   Abdominal:      General: There is no distension.   Musculoskeletal:      Right lower leg: No edema.      Left lower leg: No edema.   Skin:     General: Skin is dry.      Findings: No rash.   Neurological:      Mental Status: He is alert. Mental status is at baseline.   Psychiatric:         Mood and Affect: Mood normal. Affect is not inappropriate.         Speech: Speech is delayed.         Behavior: Behavior is slowed. Behavior is " cooperative.         Judgment: Judgment is not impulsive or inappropriate.       Isak Samuel MD

## 2024-11-25 RX ORDER — INSULIN ASPART 100 [IU]/ML
1 INJECTION, SUSPENSION SUBCUTANEOUS AS NEEDED
COMMUNITY
Start: 2024-10-10

## 2024-11-25 RX ORDER — PSYLLIUM HUSK 0.4 G
1 CAPSULE ORAL AS NEEDED
COMMUNITY
Start: 2024-10-07

## 2024-12-02 ENCOUNTER — APPOINTMENT (OUTPATIENT)
Dept: LAB | Facility: CLINIC | Age: 19
End: 2024-12-02
Payer: COMMERCIAL

## 2024-12-02 DIAGNOSIS — R79.89 HYPOURICEMIA: ICD-10-CM

## 2024-12-02 DIAGNOSIS — E11.69 TYPE 2 DIABETES MELLITUS WITH OTHER SPECIFIED COMPLICATION, UNSPECIFIED WHETHER LONG TERM INSULIN USE (HCC): ICD-10-CM

## 2024-12-02 LAB
CHOLEST SERPL-MCNC: 125 MG/DL (ref ?–200)
EST. AVERAGE GLUCOSE BLD GHB EST-MCNC: 157 MG/DL
HBA1C MFR BLD: 7.1 %
HDLC SERPL-MCNC: 27 MG/DL
LDLC SERPL CALC-MCNC: 24 MG/DL (ref 0–100)
NONHDLC SERPL-MCNC: 98 MG/DL
T4 FREE SERPL-MCNC: 0.95 NG/DL (ref 0.61–1.12)
TRIGL SERPL-MCNC: 369 MG/DL (ref ?–150)
TSH SERPL DL<=0.05 MIU/L-ACNC: 5.29 UIU/ML (ref 0.45–4.5)

## 2024-12-02 PROCEDURE — 83036 HEMOGLOBIN GLYCOSYLATED A1C: CPT

## 2024-12-02 PROCEDURE — 84443 ASSAY THYROID STIM HORMONE: CPT

## 2024-12-02 PROCEDURE — 84439 ASSAY OF FREE THYROXINE: CPT

## 2024-12-02 PROCEDURE — 36415 COLL VENOUS BLD VENIPUNCTURE: CPT

## 2024-12-02 PROCEDURE — 80061 LIPID PANEL: CPT

## 2024-12-03 ENCOUNTER — OFFICE VISIT (OUTPATIENT)
Dept: ENDOCRINOLOGY | Facility: CLINIC | Age: 19
End: 2024-12-03
Payer: COMMERCIAL

## 2024-12-03 VITALS
HEART RATE: 83 BPM | BODY MASS INDEX: 46.83 KG/M2 | DIASTOLIC BLOOD PRESSURE: 84 MMHG | OXYGEN SATURATION: 96 % | WEIGHT: 298.4 LBS | HEIGHT: 67 IN | SYSTOLIC BLOOD PRESSURE: 128 MMHG

## 2024-12-03 DIAGNOSIS — E11.69 TYPE 2 DIABETES MELLITUS WITH OTHER SPECIFIED COMPLICATION, UNSPECIFIED WHETHER LONG TERM INSULIN USE (HCC): ICD-10-CM

## 2024-12-03 DIAGNOSIS — E11.65 DIABETES MELLITUS WITH HYPERGLYCEMIA (HCC): ICD-10-CM

## 2024-12-03 DIAGNOSIS — E11.9 TYPE 2 DIABETES MELLITUS WITHOUT COMPLICATION, WITH LONG-TERM CURRENT USE OF INSULIN (HCC): Primary | ICD-10-CM

## 2024-12-03 DIAGNOSIS — I10 PRIMARY HYPERTENSION: ICD-10-CM

## 2024-12-03 DIAGNOSIS — R79.89 ELEVATED TSH: ICD-10-CM

## 2024-12-03 DIAGNOSIS — E55.9 VITAMIN D DEFICIENCY: ICD-10-CM

## 2024-12-03 DIAGNOSIS — Z79.4 TYPE 2 DIABETES MELLITUS WITHOUT COMPLICATION, WITH LONG-TERM CURRENT USE OF INSULIN (HCC): Primary | ICD-10-CM

## 2024-12-03 DIAGNOSIS — E04.1 THYROID NODULE: ICD-10-CM

## 2024-12-03 PROCEDURE — 99214 OFFICE O/P EST MOD 30 MIN: CPT | Performed by: PHYSICIAN ASSISTANT

## 2024-12-03 RX ORDER — INSULIN LISPRO 100 [IU]/ML
INJECTION, SUSPENSION SUBCUTANEOUS
Qty: 30 ML | Refills: 3 | Status: SHIPPED | OUTPATIENT
Start: 2024-12-03

## 2024-12-03 NOTE — ASSESSMENT & PLAN NOTE
Lab Results   Component Value Date    HGBA1C 7.1 (H) 12/02/2024   Current HbA1c represents appropriate control. Blood sugars demonstrating stability likely due to dietary compliance, dose adjustments and Ozempic.   Continue current regimen with the following adjustments:  Increase Ozempic to 0.5mg dose  Decrease Lispro to 25 - 23  Advised to adhere to diabetic diet, and recommended staying active/exercising routinely.  Discussed symptoms and treatment of hypoglycemia.    Recommended routine follow-up with Ophthalmology and foot exams.   Ordered blood work to complete prior to next visit.    Orders:    Comprehensive metabolic panel; Future    Hemoglobin A1C; Future    TSH, 3rd generation with Free T4 reflex; Future    US thyroid; Future

## 2024-12-03 NOTE — PROGRESS NOTES
Established Patient Progress Note      Chief Complaint   Patient presents with    Diabetes Type 2        Impression & Plan:     Assessment & Plan  Type 2 diabetes mellitus without complication, with long-term current use of insulin (HCC)    Lab Results   Component Value Date    HGBA1C 7.1 (H) 12/02/2024   Current HbA1c represents appropriate control. Blood sugars demonstrating stability likely due to dietary compliance, dose adjustments and Ozempic.   Continue current regimen with the following adjustments:  Increase Ozempic to 0.5mg dose  Decrease Lispro to 25 - 23  Advised to adhere to diabetic diet, and recommended staying active/exercising routinely.  Discussed symptoms and treatment of hypoglycemia.    Recommended routine follow-up with Ophthalmology and foot exams.   Ordered blood work to complete prior to next visit.    Orders:    Comprehensive metabolic panel; Future    Hemoglobin A1C; Future    TSH, 3rd generation with Free T4 reflex; Future    US thyroid; Future    Primary hypertension  BP at goal.   Continue current medication regimen.         Thyroid nodule  No US noted - ordered this visit  Orders:    US thyroid; Future    Anti-microsomal antibody; Future    Cortisol Level, AM Specimen; Future    Vitamin D deficiency  Continue Vitamin D suppplement       Elevated TSH  Repeat TSH ordered  Anti-TPO and AM cortisol ordered  Consider initiating levothyroxine if TSH remains elevated pending additional testing and patient symptoms  Orders:    Anti-microsomal antibody; Future    Cortisol Level, AM Specimen; Future    Type 2 diabetes mellitus with other specified complication, unspecified whether long term insulin use (HCC)    Lab Results   Component Value Date    HGBA1C 7.1 (H) 12/02/2024       Orders:    Continuous Glucose Sensor (FreeStyle Alin 2 Sensor) MISC; Change every 14 days per  guidelines    semaglutide, 0.25 or 0.5 mg/dose, (Ozempic, 0.25 or 0.5 MG/DOSE,) 2 mg/3 mL injection pen; Inject  0.75 mL (0.5 mg total) under the skin every 7 days    Diabetes mellitus with hyperglycemia (HCC)    Lab Results   Component Value Date    HGBA1C 7.1 (H) 12/02/2024       Orders:    Insulin Lispro Prot & Lispro (Insulin Lispro Prot & Lispro) (75-25) 100 units/mL injection pen; Take 25 units in the morning and 23 units in the evening              History of Present Illness:   Manuel Back is a 19 y.o. male with type 2 diabetes, hypertension, schizoaffective disorder, morbid obesity seen in follow up.  Denies complications.  Denies recent severe hypoglycemic or severe hyperglycemic episodes. Denies any issues with his current regimen. Last A1C was 7.1.     Resides in a group home and is monitored by staff.  Patient has been losing weight since beginning Ozempic, did call with some lower blood sugar readings approximately 1 month ago, discussed continuing to decrease insulin dose. No CGM available today as patient removed Alin sensor and replacement has not yet arrived. Two BG readings available: 106 and 131. Patient not experiencing any GI side effects of Ozempic, agreeable to increase dose, decrease insulin slightly and monitor sugars closely.     Denies compressive symptoms, no known Thyroid US on record, though nodule is noted in history. Agreeable to further studies.     Current regimen:   Lispro 75/25: 28u qAM, 25u qPM  Metformin 1000 mg twice daily  Ozempic 0.25 mg weekly    Last Eye Exam: August 2024, due: August 2025  Last Foot Exam: May 2024, due: May 2025    Patient Active Problem List   Diagnosis    Obesity, unspecified    Moderate episode of recurrent major depressive disorder (HCC)    Disorganized schizophrenia (HCC)    Urinary incontinence, nocturnal enuresis    Primary hypertension    Vitamin D deficiency    Chronic pain of right knee    Schizoaffective disorder (HCC)    Type 2 diabetes mellitus without complication, with long-term current use of insulin (HCC)    Generalized abdominal pain     Constipation    Annual physical exam    Morbid obesity with BMI of 45.0-49.9, adult ([facility])    Vision blurring    Acute right ankle pain    Intellectual disability    Epigastric pain    Reflux gastritis    Closed head injury    Thyroid nodule    Left shoulder pain    Hypersomnia    Elevated TSH    Transaminitis    Mood disorder ([facility])    Tachycardia    Intractable headache    Pain    Vitamin B12 deficiency      Past Medical History:   Diagnosis Date    Chronic headaches     Cognitive impairment     Diabetes type 2, controlled ([facility])     Psychiatric illness       Past Surgical History:   Procedure Laterality Date    WRIST SURGERY Right       Social History     Tobacco Use    Smoking status: Never     Passive exposure: Past    Smokeless tobacco: Never   Substance Use Topics    Alcohol use: Never     Allergies   Allergen Reactions    Bee Pollen Other (See Comments)    Cholecalciferol Other (See Comments)    Pollen Extract Sneezing         Current Outpatient Medications:     acetaminophen (TYLENOL) 650 mg CR tablet, Take 1 tablet (650 mg total) by mouth every 8 (eight) hours as needed for mild pain, moderate pain or headaches, Disp: 30 tablet, Rfl: 0    Alcohol Swabs (Alcohol Prep) 70 % PADS, Use if needed (use as needed for blood sugar checks), Disp: 100 each, Rfl: 1    aluminum-magnesium hydroxide-simethicone (MAALOX) 200-200-20 MG/5ML SUSP, Take 30 mL by mouth 2 (two) times a day as needed for heartburn, Disp: 355 mL, Rfl: 0    Blood Glucose Monitoring Suppl (OneTouch Verio) w/Device KIT, daily, Disp: 1 kit, Rfl: 0    chlorproMAZINE (THORAZINE) 100 mg tablet, Take 1 tablet (100 mg total) by mouth 2 (two) times a day One in the morning, One in the afternoon., Disp: 60 tablet, Rfl: 1    chlorproMAZINE (THORAZINE) 50 mg tablet, Take 3 tablets (150 mg total) by mouth daily at bedtime, Disp: 90 tablet, Rfl: 1    cholecalciferol (VITAMIN D3) 1,000 units tablet, Take 1 tablet (1,000 Units total) by mouth daily, Disp: 90 tablet,  Rfl: 3    clonazePAM (KlonoPIN) 1 mg tablet, Take 1 tablet (1 mg total) by mouth daily, Disp: 30 tablet, Rfl: 0    cloNIDine (CATAPRES) 0.2 mg tablet, Take 1 tablet (0.2 mg total) by mouth 3 (three) times a day, Disp: 90 tablet, Rfl: 1    Continuous Glucose  (FreeStyle Alin 2 [place]) [facility], [place]), Use per  guidelines, Disp: 1 each, Rfl: 0    Continuous Glucose Sensor (FreeStyle Alin 2 Sensor) MISC, Change every 14 days per  guidelines, Disp: 2 each, Rfl: 5    cyanocobalamin (VITAMIN B-12) 500 MCG tablet, Take 1 tablet (500 mcg total) by mouth daily, Disp: 30 tablet, Rfl: 11    desmopressin (DDAVP) 0.2 mg tablet, Take 2 tablets (0.4 mg total) by mouth daily at bedtime, Disp: 60 tablet, Rfl: 5    diphenhydrAMINE (GNP Allergy Relief) 12.5 MG chewable tablet, Chew 1 tablet (12.5 mg total) in the morning, Disp: 30 tablet, Rfl: 5    divalproex sodium (DEPAKOTE ER) 250 mg 24 hr tablet, Take 1 tablet (250 mg total) by mouth daily at bedtime, Disp: 30 tablet, Rfl: 1    divalproex sodium (Depakote) 500 mg DR tablet, Take 3 tablets (1,500 mg total) by mouth daily at bedtime, Disp: 90 tablet, Rfl: 3    glucose blood (OneTouch Verio) test strip, Daily, Disp: 100 each, Rfl: 1    ibuprofen (MOTRIN) 400 mg tablet, Take 1 tablet (400 mg total) by mouth every 6 (six) hours as needed for mild pain, Disp: 90 tablet, Rfl: 0    Incontinence Supply Disposable (Comfort Shield Adult Diapers) MISC, Use 1 packet 4 (four) times a day, Disp: 48 each, Rfl: 11    Insulin Aspart Prot & Aspart (Insulin Asp Prot & Asp FlexPen) (70-30) 100 UNIT/ML SUPN, Inject 1 Units under the skin if needed, Disp: , Rfl:     Insulin Lispro Prot & Lispro (Insulin Lispro Prot & Lispro) (75-25) 100 units/mL injection pen, Take 25 units in the morning and 23 units in the evening, Disp: 30 mL, Rfl: 3    Insulin Pen Needle (BD Pen Needle Gianan 2nd Gen) 32G X 4 MM MISC, For use with insulin pen. Pharmacy may dispense brand covered by insurance.,  "Disp: 100 each, Rfl: 0    Lancets (onetouch ultrasoft) lancets, Daily., Disp: 100 each, Rfl: 1    Melatonin Maximum Strength 5 MG TABS, Take 1 tablet by mouth if needed, Disp: , Rfl:     metFORMIN (GLUCOPHAGE) 1000 MG tablet, Take 1 tablet (1,000 mg total) by mouth 2 (two) times a day with meals, Disp: 120 tablet, Rfl: 3    metoprolol succinate (TOPROL-XL) 50 mg 24 hr tablet, Take 1 tablet (50 mg total) by mouth daily, Disp: 90 tablet, Rfl: 1    OLANZapine (ZyPREXA) 20 MG tablet, Take 1 tablet (20 mg total) by mouth daily at bedtime, Disp: 30 tablet, Rfl: 5    paliperidone (INVEGA) 6 MG 24 hr tablet, Take 1 tablet (6 mg total) by mouth every morning, Disp: 30 tablet, Rfl: 2    polyethylene glycol (MIRALAX) 17 g packet, Take 17 g by mouth daily, Disp: 510 g, Rfl: 3    semaglutide, 0.25 or 0.5 mg/dose, (Ozempic, 0.25 or 0.5 MG/DOSE,) 2 mg/3 mL injection pen, Inject 0.75 mL (0.5 mg total) under the skin every 7 days, Disp: 2 mL, Rfl: 2    senna-docusate sodium (Senna-Plus) 8.6-50 mg per tablet, Take 1 tablet by mouth 2 (two) times a day, Disp: 60 tablet, Rfl: 3    Sunscreens (Coppertone Complete SPF30) LOTN, Apply 1 Application topically if needed (for application before sun exposure), Disp: 207 mL, Rfl: 3    tolterodine (DETROL LA) 4 mg 24 hr capsule, Take 1 capsule (4 mg total) by mouth daily, Disp: 30 capsule, Rfl: 5    Review of Systems   Constitutional:  Negative for fatigue.   HENT:  Negative for trouble swallowing and voice change.    Respiratory:  Negative for shortness of breath.    Cardiovascular:  Negative for chest pain and palpitations.   Gastrointestinal:  Negative for abdominal pain, diarrhea, nausea and vomiting.   Endocrine: Negative for polydipsia and polyuria.   Neurological:  Negative for light-headedness and headaches.   All other systems reviewed and are negative.      Physical Exam:  Body mass index is 46.74 kg/m².  /84   Pulse 83   Ht 5' 7\" (1.702 m)   Wt 135 kg (298 lb 6.4 oz)   SpO2 " 96%   BMI 46.74 kg/m²    Wt Readings from Last 3 Encounters:   12/03/24 135 kg (298 lb 6.4 oz)   11/14/24 136 kg (299 lb)   10/09/24 (!) 137 kg (302 lb 3.2 oz)       Physical Exam  Constitutional:       General: He is not in acute distress.     Appearance: He is well-developed.   HENT:      Head: Normocephalic and atraumatic.   Eyes:      General: No scleral icterus.     Conjunctiva/sclera: Conjunctivae normal.   Neck:      Thyroid: No thyroid mass or thyroid tenderness.   Pulmonary:      Effort: Pulmonary effort is normal. No respiratory distress.   Skin:     Findings: No rash.   Neurological:      Mental Status: He is alert.   Psychiatric:         Mood and Affect: Mood and affect normal.           Labs:   Lab Results   Component Value Date    HGBA1C 7.1 (H) 12/02/2024    HGBA1C 9.6 (H) 09/17/2024    HGBA1C 7.5 (H) 06/24/2024     Lab Results   Component Value Date    CREATININE 1.02 11/13/2024    CREATININE 0.94 10/18/2024    CREATININE 0.79 09/19/2024    BUN 16 11/13/2024    K 3.9 11/13/2024     11/13/2024    CO2 29 11/13/2024     eGFRcr   Date Value Ref Range Status   05/21/2023 96 >59 Final     eGFR   Date Value Ref Range Status   11/13/2024 105 ml/min/1.73sq m Final     Lab Results   Component Value Date    HDL 27 (L) 12/02/2024    TRIG 369 (H) 12/02/2024     Lab Results   Component Value Date    ALT 43 11/13/2024    AST 32 11/13/2024    ALKPHOS 77 11/13/2024     Lab Results   Component Value Date    ULU4UFRMFWMS 5.288 (H) 12/02/2024    [id number] 5.298 (H) 11/13/2024    [id number] 3.593 10/18/2024       Patient Instructions   Continue your medications with the following changes:  Increase Ozempic to the next dose: 0.5mg weekly  Decrease Lispro to 25 every morning and 23 units every evening  Monitor blood sugars regularly  Contact the office with any severe hypoglycemic or hyperglycemic events  Maintain appropriate diet and physical activity  Follow up in 3 months  Call with any questions, concerns,  or refill requests  Obtain labs prior to your next appointment  Obtain Thyroid Ultrasound and repeat Thyroid labs prior to next visit      Discussed with the patient and all questioned fully answered. He will call me if any problems arise.    Alina Oates PA-C

## 2024-12-03 NOTE — ASSESSMENT & PLAN NOTE
No US noted - ordered this visit  Orders:    US thyroid; Future    Anti-microsomal antibody; Future    Cortisol Level, AM Specimen; Future

## 2024-12-03 NOTE — PATIENT INSTRUCTIONS
Continue your medications with the following changes:  Increase Ozempic to the next dose: 0.5mg weekly  Decrease Lispro to 25 every morning and 23 units every evening  Monitor blood sugars regularly  Contact the office with any severe hypoglycemic or hyperglycemic events  Maintain appropriate diet and physical activity  Follow up in 3 months  Call with any questions, concerns, or refill requests  Obtain labs prior to your next appointment  Obtain Thyroid Ultrasound and repeat Thyroid labs prior to next visit

## 2024-12-03 NOTE — ASSESSMENT & PLAN NOTE
Repeat TSH ordered  Anti-TPO and AM cortisol ordered  Consider initiating levothyroxine if TSH remains elevated pending additional testing and patient symptoms  Orders:    Anti-microsomal antibody; Future    Cortisol Level, AM Specimen; Future

## 2024-12-06 DIAGNOSIS — E11.9 TYPE 2 DIABETES MELLITUS WITHOUT COMPLICATION, WITHOUT LONG-TERM CURRENT USE OF INSULIN (HCC): ICD-10-CM

## 2024-12-06 DIAGNOSIS — E11.65 DIABETES MELLITUS WITH HYPERGLYCEMIA (HCC): ICD-10-CM

## 2024-12-06 RX ORDER — PEN NEEDLE, DIABETIC 32GX 5/32"
NEEDLE, DISPOSABLE MISCELLANEOUS
Qty: 100 EACH | Refills: 0 | Status: SHIPPED | OUTPATIENT
Start: 2024-12-06

## 2024-12-09 RX ORDER — LANCETS
EACH MISCELLANEOUS
Qty: 100 EACH | Refills: 1 | Status: SHIPPED | OUTPATIENT
Start: 2024-12-09

## 2024-12-16 ENCOUNTER — TELEPHONE (OUTPATIENT)
Dept: FAMILY MEDICINE CLINIC | Facility: CLINIC | Age: 19
End: 2024-12-16

## 2024-12-16 ENCOUNTER — APPOINTMENT (OUTPATIENT)
Dept: LAB | Facility: HOSPITAL | Age: 19
End: 2024-12-16
Payer: COMMERCIAL

## 2024-12-16 DIAGNOSIS — E04.1 THYROID NODULE: ICD-10-CM

## 2024-12-16 DIAGNOSIS — R79.89 ELEVATED TSH: ICD-10-CM

## 2024-12-16 PROCEDURE — 36415 COLL VENOUS BLD VENIPUNCTURE: CPT

## 2024-12-16 PROCEDURE — 86376 MICROSOMAL ANTIBODY EACH: CPT

## 2024-12-16 NOTE — TELEPHONE ENCOUNTER
Mariah from [employer] called asking for a refill of patients metformin. Called back at [phone] and advised a script was sent on 11/14 with 3 refills to the pharmacy so there should still be refills on it. If she needed anything else to call us back.

## 2024-12-18 ENCOUNTER — HOSPITAL ENCOUNTER (OUTPATIENT)
Dept: ULTRASOUND IMAGING | Facility: HOSPITAL | Age: 19
Discharge: HOME | End: 2024-12-18
Payer: COMMERCIAL

## 2024-12-18 DIAGNOSIS — E11.9 TYPE 2 DIABETES MELLITUS WITHOUT COMPLICATION, WITH LONG-TERM CURRENT USE OF INSULIN (HCC): ICD-10-CM

## 2024-12-18 DIAGNOSIS — E04.1 THYROID NODULE: ICD-10-CM

## 2024-12-18 DIAGNOSIS — Z79.4 TYPE 2 DIABETES MELLITUS WITHOUT COMPLICATION, WITH LONG-TERM CURRENT USE OF INSULIN (HCC): ICD-10-CM

## 2024-12-18 PROCEDURE — 76536 US EXAM OF HEAD AND NECK: CPT

## 2024-12-19 LAB — THYROPEROXIDASE AB SERPL-ACNC: <9 IU/ML (ref 0–34)

## 2024-12-20 ENCOUNTER — HOSPITAL ENCOUNTER (EMERGENCY)
Facility: HOSPITAL | Age: 19
Discharge: HOME | End: 2024-12-21
Attending: EMERGENCY MEDICINE
Payer: COMMERCIAL

## 2024-12-20 VITALS
TEMPERATURE: 98.4 F | WEIGHT: 297.18 LBS | DIASTOLIC BLOOD PRESSURE: 78 MMHG | SYSTOLIC BLOOD PRESSURE: 163 MMHG | HEART RATE: 94 BPM | BODY MASS INDEX: 46.54 KG/M2 | RESPIRATION RATE: 22 BRPM | OXYGEN SATURATION: 100 %

## 2024-12-20 DIAGNOSIS — U07.1 COVID-19: ICD-10-CM

## 2024-12-20 DIAGNOSIS — R05.9 COUGH: Primary | ICD-10-CM

## 2024-12-20 PROCEDURE — 99284 EMERGENCY DEPT VISIT MOD MDM: CPT

## 2024-12-21 ENCOUNTER — APPOINTMENT (EMERGENCY)
Dept: RADIOLOGY | Facility: HOSPITAL | Age: 19
End: 2024-12-21
Payer: COMMERCIAL

## 2024-12-21 LAB
FLUAV AG UPPER RESP QL IA.RAPID: NEGATIVE
FLUBV AG UPPER RESP QL IA.RAPID: NEGATIVE
SARS-COV+SARS-COV-2 AG RESP QL IA.RAPID: POSITIVE

## 2024-12-21 PROCEDURE — 87804 INFLUENZA ASSAY W/OPTIC: CPT | Performed by: EMERGENCY MEDICINE

## 2024-12-21 PROCEDURE — 87811 SARS-COV-2 COVID19 W/OPTIC: CPT | Performed by: EMERGENCY MEDICINE

## 2024-12-21 PROCEDURE — 99284 EMERGENCY DEPT VISIT MOD MDM: CPT | Performed by: EMERGENCY MEDICINE

## 2024-12-21 PROCEDURE — 71045 X-RAY EXAM CHEST 1 VIEW: CPT

## 2024-12-21 NOTE — ED PROVIDER NOTES
Time reflects when diagnosis was documented in both MDM as applicable and the Disposition within this note       Time User Action Codes Description Comment    12/21/2024 12:32 AM Christopher Knapp Add [R05.9] Cough     12/21/2024 12:56 AM Christopher Knapp Add [U07.1] COVID-19           ED Disposition       ED Disposition   Discharge    Condition   Stable    Date/Time   Sat Dec 21, 2024 12:56 AM    Comment   Manuel Back discharge to home/self care.                   Assessment & Plan       Medical Decision Making  Clear-year-old male with cough and headache today.  Rapid antigen test positive for COVID-19.  Chest x-ray negative for pneumonia or pneumothorax.  Will discharge back to group home with plan for supportive care and close outpatient follow-up.  Here with clear lungs, no evidence of respiratory distress and reassuring vital signs.    Amount and/or Complexity of Data Reviewed  Labs: ordered. Decision-making details documented in ED Course.  Radiology: ordered. Decision-making details documented in ED Course.     Details: Negative for pneumonia or pneumothorax.             Medications - No data to display    ED Risk Strat Scores                                              History of Present Illness       Chief Complaint   Patient presents with    Cold Like Symptoms     Pt caretakers report headache, nasal congestion and lethargy. No meds pta.        Past Medical History:   Diagnosis Date    Chronic headaches     Cognitive impairment     Diabetes type 2, controlled (HCC)     Psychiatric illness       Past Surgical History:   Procedure Laterality Date    WRIST SURGERY Right       Family History   Problem Relation Age of Onset    No Known Problems Mother     No Known Problems Father       Social History     Tobacco Use    Smoking status: Never     Passive exposure: Past    Smokeless tobacco: Never   Vaping Use    Vaping status: Never Used   Substance Use Topics    Alcohol use: Never    Drug use: Not  "Currently     Types: Marijuana      E-Cigarette/Vaping    E-Cigarette Use Never User       E-Cigarette/Vaping Substances    Nicotine No     THC No     CBD No     Flavoring Yes     Other No     Unknown No       I have reviewed and agree with the history as documented.     Manuel is a 20 yo M known to me from previous ED visits.  He has history of diabetes and is a group home resident.  Staff states that today he was complaining of cough and chest congestion as well as a left-sided headache.  He has been less active than normal.  He was been tolerating p.o. but ate a little bit less than normal today.  Phil says that his headache has resolved he is no longer having any chest pain but he still has a cough and feels \"tired.\"  No reported fevers.  Staff states they have been checking his blood sugar throughout the day and it has been normal.          Review of Systems   Constitutional:  Positive for fatigue. Negative for chills and fever.   HENT:  Negative for ear pain and sore throat.    Eyes:  Negative for visual disturbance.   Respiratory:  Positive for cough. Negative for shortness of breath.    Cardiovascular:  Negative for chest pain and palpitations.   Gastrointestinal:  Positive for nausea. Negative for abdominal pain and vomiting.   Genitourinary:  Negative for dysuria and hematuria.   Musculoskeletal:  Negative for arthralgias and back pain.   Skin:  Negative for color change and rash.   Neurological:  Negative for syncope.   All other systems reviewed and are negative.          Objective       ED Triage Vitals [12/20/24 2328]   Temperature Pulse Blood Pressure Respirations SpO2 Patient Position - Orthostatic VS   98.4 °F (36.9 °C) 94 163/78 22 100 % Sitting      Temp Source Heart Rate Source BP Location FiO2 (%) Pain Score    Oral Monitor Right arm -- --      Vitals      Date and Time Temp Pulse SpO2 Resp BP Pain Score FACES Pain Rating User   12/20/24 2328 98.4 °F (36.9 °C) 94 100 % 22 163/78 -- -- NM "            Physical Exam  Vitals and nursing note reviewed.   Constitutional:       General: He is not in acute distress.     Appearance: He is well-developed.   HENT:      Head: Normocephalic and atraumatic.   Eyes:      Conjunctiva/sclera: Conjunctivae normal.   Cardiovascular:      Rate and Rhythm: Normal rate and regular rhythm.      Heart sounds: No murmur heard.  Pulmonary:      Effort: Pulmonary effort is normal. No respiratory distress.      Breath sounds: Normal breath sounds.   Abdominal:      Palpations: Abdomen is soft.      Tenderness: There is no abdominal tenderness.   Musculoskeletal:         General: No swelling.      Cervical back: Neck supple.   Skin:     General: Skin is warm and dry.      Capillary Refill: Capillary refill takes less than 2 seconds.   Neurological:      General: No focal deficit present.      Mental Status: He is alert and oriented to person, place, and time.   Psychiatric:         Mood and Affect: Mood normal.         Results Reviewed       Procedure Component Value Units Date/Time    FLU/COVID Rapid Antigen (30 min. TAT) - Preferred screening test in ED [[id number]]  (Abnormal) Collected: 12/21/24 0023    Lab Status: Final result Specimen: Nares from Nose Updated: 12/21/24 0045     SARS COV Rapid Antigen Positive     Influenza A Rapid Antigen Negative     Influenza B Rapid Antigen Negative    Narrative:      This test has been performed using the Quidel Cathy 2 FLU+SARS Antigen test under the Emergency Use Authorization (EUA). This test has been validated by the  and verified by the performing laboratory. The Cathy uses lateral flow immunofluorescent sandwich assay to detect SARS-COV, Influenza A and Influenza B Antigen.     The Quidel Cathy 2 SARS Antigen test does not differentiate between SARS-CoV and SARS-CoV-2.     Negative results are presumptive and may be confirmed with a molecular assay, if necessary, for patient management. Negative results do not rule out  SARS-CoV-2 or influenza infection and should not be used as the sole basis for treatment or patient management decisions. A negative test result may occur if the level of antigen in a sample is below the limit of detection of this test.     Positive results are indicative of the presence of viral antigens, but do not rule out bacterial infection or co-infection with other viruses.     All test results should be used as an adjunct to clinical observations and other information available to the provider.    FOR PEDIATRIC PATIENTS - copy/paste COVID Guidelines URL to browser: [email or web address]            XR chest 1 view portable    (Results Pending)       Procedures    ED Medication and Procedure Management   Prior to Admission Medications   Prescriptions Last Dose Informant Patient Reported? Taking?   Alcohol Swabs (Alcohol Prep) 70 % PADS  Self, Care Giver No No   Sig: Use if needed (use as needed for blood sugar checks)   Blood Glucose Monitoring Suppl (OneTouch Verio) w/Device KIT  Self, Care Giver No No   Sig: daily   Continuous Glucose  (FreeStyle Alin 2 [place]) DMITRY  Self, Care Giver No No   Sig: Use per  guidelines   Continuous Glucose Sensor (FreeStyle Alin 2 Sensor) MISC   No No   Sig: Change every 14 days per  guidelines   Incontinence Supply Disposable (Comfort Shield Adult Diapers) MISC  Outside Facility (Specify), Self, Care Giver No No   Sig: Use 1 packet 4 (four) times a day   Insulin Aspart Prot & Aspart (Insulin Asp Prot & Asp FlexPen) (70-30) 100 UNIT/ML SUPN  Self, Care Giver Yes No   Sig: Inject 1 Units under the skin if needed   Insulin Lispro Prot & Lispro (Insulin Lispro Prot & Lispro) (75-25) 100 units/mL injection pen   No No   Sig: Take 25 units in the morning and 23 units in the evening   Insulin Pen Needle (BD Pen Needle Gianna 2nd Gen) 32G X 4 MM MISC   No No   Sig: For use with insulin pen. Pharmacy  may dispense brand covered by insurance.   Lancets (onetouch ultrasoft) lancets   No No   Sig: Daily.   Melatonin Maximum Strength 5 MG TABS  Self, Care Giver Yes No   Sig: Take 1 tablet by mouth if needed   OLANZapine (ZyPREXA) 20 MG tablet  Self, Care Giver No No   Sig: Take 1 tablet (20 mg total) by mouth daily at bedtime   Sunscreens (Coppertone Complete SPF30) LOTN  Self, Care Giver No No   Sig: Apply 1 Application topically if needed (for application before sun exposure)   acetaminophen (TYLENOL) 650 mg CR tablet  Self, Care Giver No No   Sig: Take 1 tablet (650 mg total) by mouth every 8 (eight) hours as needed for mild pain, moderate pain or headaches   aluminum-magnesium hydroxide-simethicone (MAALOX) 200-200-20 MG/5ML SUSP  Self, Care Giver No No   Sig: Take 30 mL by mouth 2 (two) times a day as needed for heartburn   chlorproMAZINE (THORAZINE) 100 mg tablet  Self, Care Giver No No   Sig: Take 1 tablet (100 mg total) by mouth 2 (two) times a day One in the morning, One in the afternoon.   chlorproMAZINE (THORAZINE) 50 mg tablet  Self, Care Giver No No   Sig: Take 3 tablets (150 mg total) by mouth daily at bedtime   cholecalciferol (VITAMIN D3) 1,000 units tablet  Self, Care Giver No No   Sig: Take 1 tablet (1,000 Units total) by mouth daily   cloNIDine (CATAPRES) 0.2 mg tablet  Self, Care Giver No No   Sig: Take 1 tablet (0.2 mg total) by mouth 3 (three) times a day   clonazePAM (KlonoPIN) 1 mg tablet  Self, Care Giver No No   Sig: Take 1 tablet (1 mg total) by mouth daily   cyanocobalamin (VITAMIN B-12) 500 MCG tablet  Self, Care Giver No No   Sig: Take 1 tablet (500 mcg total) by mouth daily   desmopressin (DDAVP) 0.2 mg tablet  Self, Care Giver No No   Sig: Take 2 tablets (0.4 mg total) by mouth daily at bedtime   diphenhydrAMINE (GNP Allergy Relief) 12.5 MG chewable tablet  Self, Care Giver No No   Sig: Chew 1 tablet (12.5 mg total) in the morning   divalproex sodium (DEPAKOTE ER) 250 mg 24 hr tablet   Self, Care Giver No No   Sig: Take 1 tablet (250 mg total) by mouth daily at bedtime   divalproex sodium (Depakote) 500 mg DR tablet  Self, Care Giver No No   Sig: Take 3 tablets (1,500 mg total) by mouth daily at bedtime   glucose blood (OneTouch Verio) test strip  Self, Care Giver No No   Sig: Daily   ibuprofen (MOTRIN) 400 mg tablet  Self, Care Giver No No   Sig: Take 1 tablet (400 mg total) by mouth every 6 (six) hours as needed for mild pain   metFORMIN (GLUCOPHAGE) 1000 MG tablet  Self, Care Giver No No   Sig: Take 1 tablet (1,000 mg total) by mouth 2 (two) times a day with meals   metoprolol succinate (TOPROL-XL) 50 mg 24 hr tablet  Self, Care Giver No No   Sig: Take 1 tablet (50 mg total) by mouth daily   paliperidone (INVEGA) 6 MG 24 hr tablet  Self, Care Giver No No   Sig: Take 1 tablet (6 mg total) by mouth every morning   polyethylene glycol (MIRALAX) 17 g packet  Self, Care Giver No No   Sig: Take 17 g by mouth daily   semaglutide, 0.25 or 0.5 mg/dose, (Ozempic, 0.25 or 0.5 MG/DOSE,) 2 mg/3 mL injection pen   No No   Sig: Inject 0.75 mL (0.5 mg total) under the skin every 7 days   senna-docusate sodium (Senna-Plus) 8.6-50 mg per tablet  Self, Care Giver No No   Sig: Take 1 tablet by mouth 2 (two) times a day   tolterodine (DETROL LA) 4 mg 24 hr capsule  Self, Care Giver No No   Sig: Take 1 capsule (4 mg total) by mouth daily      Facility-Administered Medications: None     Discharge Medication List as of 12/21/2024 12:56 AM        CONTINUE these medications which have NOT CHANGED    Details   acetaminophen (TYLENOL) 650 mg CR tablet Take 1 tablet (650 mg total) by mouth every 8 (eight) hours as needed for mild pain, moderate pain or headaches, Starting Mon 10/7/2024, Normal      Alcohol Swabs (Alcohol Prep) 70 % PADS Use if needed (use as needed for blood sugar checks), Starting Thu 11/14/2024, Normal      aluminum-magnesium hydroxide-simethicone (MAALOX) 200-200-20 MG/5ML SUSP Take 30 mL by mouth 2  (two) times a day as needed for heartburn, Starting Thu 11/14/2024, Normal      Blood Glucose Monitoring Suppl (OneTouch Verio) w/Device KIT daily, Normal      !! chlorproMAZINE (THORAZINE) 100 mg tablet Take 1 tablet (100 mg total) by mouth 2 (two) times a day One in the morning, One in the afternoon., Starting Thu 11/14/2024, Until Mon 1/13/2025, Normal      !! chlorproMAZINE (THORAZINE) 50 mg tablet Take 3 tablets (150 mg total) by mouth daily at bedtime, Starting Thu 11/14/2024, Until Mon 1/13/2025, Normal      cholecalciferol (VITAMIN D3) 1,000 units tablet Take 1 tablet (1,000 Units total) by mouth daily, Starting Thu 11/14/2024, Normal      clonazePAM (KlonoPIN) 1 mg tablet Take 1 tablet (1 mg total) by mouth daily, Starting Wed 2/7/2024, Until Tue 12/3/2024, Normal      cloNIDine (CATAPRES) 0.2 mg tablet Take 1 tablet (0.2 mg total) by mouth 3 (three) times a day, Starting Thu 11/14/2024, Normal      Continuous Glucose  (FreeStyle Alin 2 [place]) DMITRY Use per  guidelines, Normal      Continuous Glucose Sensor (FreeStyle Alin 2 Sensor) MISC Change every 14 days per  guidelines, Normal      cyanocobalamin (VITAMIN B-12) 500 MCG tablet Take 1 tablet (500 mcg total) by mouth daily, Starting Thu 11/14/2024, Normal      desmopressin (DDAVP) 0.2 mg tablet Take 2 tablets (0.4 mg total) by mouth daily at bedtime, Starting Thu 11/14/2024, Until Tue 5/13/2025, Normal      diphenhydrAMINE (GNP Allergy Relief) 12.5 MG chewable tablet Chew 1 tablet (12.5 mg total) in the morning, Starting Wed 10/16/2024, Normal      divalproex sodium (DEPAKOTE ER) 250 mg 24 hr tablet Take 1 tablet (250 mg total) by mouth daily at bedtime, Starting Thu 11/14/2024, Until Mon 1/13/2025, Normal      divalproex sodium (Depakote) 500 mg DR tablet Take 3 tablets (1,500 mg total) by mouth daily at bedtime, Starting Thu 11/14/2024, Normal      glucose blood (OneTouch Verio) test strip Daily, Normal      ibuprofen  (MOTRIN) 400 mg tablet Take 1 tablet (400 mg total) by mouth every 6 (six) hours as needed for mild pain, Starting Thu 11/14/2024, Normal      Incontinence Supply Disposable (Comfort Shield Adult Diapers) MISC Use 1 packet 4 (four) times a day, Starting Mon 10/17/2022, Print      Insulin Aspart Prot & Aspart (Insulin Asp Prot & Asp FlexPen) (70-30) 100 UNIT/ML SUPN Inject 1 Units under the skin if needed, Starting Thu 10/10/2024, Historical Med      Insulin Lispro Prot & Lispro (Insulin Lispro Prot & Lispro) (75-25) 100 units/mL injection pen Take 25 units in the morning and 23 units in the evening, Normal      Insulin Pen Needle (BD Pen Needle Gianna 2nd Gen) 32G X 4 MM MISC For use with insulin pen. Pharmacy may dispense brand covered by insurance., Normal      Lancets (onetouch ultrasoft) lancets Daily., Normal      Melatonin Maximum Strength 5 MG TABS Take 1 tablet by mouth if needed, Starting Mon 10/7/2024, Historical Med      metFORMIN (GLUCOPHAGE) 1000 MG tablet Take 1 tablet (1,000 mg total) by mouth 2 (two) times a day with meals, Starting Thu 11/14/2024, Normal      metoprolol succinate (TOPROL-XL) 50 mg 24 hr tablet Take 1 tablet (50 mg total) by mouth daily, Starting Thu 11/14/2024, Normal      OLANZapine (ZyPREXA) 20 MG tablet Take 1 tablet (20 mg total) by mouth daily at bedtime, Starting Thu 11/14/2024, Until Tue 5/13/2025, Normal      paliperidone (INVEGA) 6 MG 24 hr tablet Take 1 tablet (6 mg total) by mouth every morning, Starting Thu 11/14/2024, Normal      polyethylene glycol (MIRALAX) 17 g packet Take 17 g by mouth daily, Starting Thu 11/14/2024, Until Fri 3/14/2025, Normal      semaglutide, 0.25 or 0.5 mg/dose, (Ozempic, 0.25 or 0.5 MG/DOSE,) 2 mg/3 mL injection pen Inject 0.75 mL (0.5 mg total) under the skin every 7 days, Starting Tue 12/3/2024, Normal      senna-docusate sodium (Senna-Plus) 8.6-50 mg per tablet Take 1 tablet by mouth 2 (two) times a day, Starting Thu 11/14/2024, Normal       Sunscreens (Coppertone Complete SPF30) LOTN Apply 1 Application topically if needed (for application before sun exposure), Starting Tue 5/7/2024, Normal      tolterodine (DETROL LA) 4 mg 24 hr capsule Take 1 capsule (4 mg total) by mouth daily, Starting Thu 11/14/2024, Normal       !! - Potential duplicate medications found. Please discuss with provider.        No discharge procedures on file.  ED SEPSIS DOCUMENTATION   Time reflects when diagnosis was documented in both MDM as applicable and the Disposition within this note       Time User Action Codes Description Comment    12/21/2024 12:32 AM Christopher Knapp [R05.9] Cough     12/21/2024 12:56 AM Christopher Knapp [U07.1] COVID-19                  Christopher Knapp MD  12/21/24 0144

## 2024-12-23 ENCOUNTER — TELEPHONE (OUTPATIENT)
Age: 19
End: 2024-12-23

## 2024-12-23 ENCOUNTER — TELEPHONE (OUTPATIENT)
Dept: ENDOCRINOLOGY | Facility: CLINIC | Age: 19
End: 2024-12-23

## 2024-12-23 NOTE — TELEPHONE ENCOUNTER
Sylvester from [facility] Radiology states there are significant findings on US Thyroid. Please advise, thank you.

## 2024-12-30 ENCOUNTER — OFFICE VISIT (OUTPATIENT)
Dept: FAMILY MEDICINE CLINIC | Facility: CLINIC | Age: 19
End: 2024-12-30

## 2024-12-30 ENCOUNTER — TELEPHONE (OUTPATIENT)
Dept: FAMILY MEDICINE CLINIC | Facility: CLINIC | Age: 19
End: 2024-12-30

## 2024-12-30 VITALS
DIASTOLIC BLOOD PRESSURE: 78 MMHG | SYSTOLIC BLOOD PRESSURE: 112 MMHG | WEIGHT: 291 LBS | HEIGHT: 67 IN | RESPIRATION RATE: 18 BRPM | HEART RATE: 98 BPM | OXYGEN SATURATION: 98 % | TEMPERATURE: 98.2 F | BODY MASS INDEX: 45.67 KG/M2

## 2024-12-30 DIAGNOSIS — U07.1 COVID-19: Primary | ICD-10-CM

## 2024-12-30 DIAGNOSIS — F51.01 PRIMARY INSOMNIA: ICD-10-CM

## 2024-12-30 DIAGNOSIS — R10.13 EPIGASTRIC PAIN: ICD-10-CM

## 2024-12-30 DIAGNOSIS — R51.9 INTRACTABLE HEADACHE, UNSPECIFIED CHRONICITY PATTERN, UNSPECIFIED HEADACHE TYPE: ICD-10-CM

## 2024-12-30 DIAGNOSIS — E11.65 DIABETES MELLITUS WITH HYPERGLYCEMIA (HCC): ICD-10-CM

## 2024-12-30 DIAGNOSIS — R05.2 SUBACUTE COUGH: ICD-10-CM

## 2024-12-30 DIAGNOSIS — E53.8 VITAMIN B12 DEFICIENCY: ICD-10-CM

## 2024-12-30 DIAGNOSIS — I10 PRIMARY HYPERTENSION: ICD-10-CM

## 2024-12-30 DIAGNOSIS — N39.44 URINARY INCONTINENCE, NOCTURNAL ENURESIS: ICD-10-CM

## 2024-12-30 DIAGNOSIS — J30.2 SEASONAL ALLERGIES: ICD-10-CM

## 2024-12-30 DIAGNOSIS — F25.9 SCHIZOAFFECTIVE DISORDER (HCC): ICD-10-CM

## 2024-12-30 DIAGNOSIS — R52 PAIN: ICD-10-CM

## 2024-12-30 PROCEDURE — 99213 OFFICE O/P EST LOW 20 MIN: CPT | Performed by: FAMILY MEDICINE

## 2024-12-30 RX ORDER — SENNOSIDES 8.6 MG
650 CAPSULE ORAL EVERY 8 HOURS PRN
Qty: 90 TABLET | Refills: 1 | Status: SHIPPED | OUTPATIENT
Start: 2024-12-30

## 2024-12-30 RX ORDER — DIVALPROEX SODIUM 250 MG/1
250 TABLET, FILM COATED, EXTENDED RELEASE ORAL
Qty: 30 TABLET | Refills: 1 | Status: SHIPPED | OUTPATIENT
Start: 2024-12-30 | End: 2025-02-28

## 2024-12-30 RX ORDER — CLONIDINE HYDROCHLORIDE 0.2 MG/1
0.2 TABLET ORAL 3 TIMES DAILY
Qty: 90 TABLET | Refills: 1 | Status: SHIPPED | OUTPATIENT
Start: 2024-12-30

## 2024-12-30 RX ORDER — ALUMINUM HYDROXIDE, MAGNESIUM HYDROXIDE, SIMETHICONE 400; 400; 40 MG/10ML; MG/10ML; MG/10ML
30 SUSPENSION ORAL 2 TIMES DAILY PRN
Qty: 355 ML | Refills: 3 | Status: SHIPPED | OUTPATIENT
Start: 2024-12-30

## 2024-12-30 RX ORDER — DESMOPRESSIN ACETATE 0.2 MG/1
0.4 TABLET ORAL
Qty: 90 TABLET | Refills: 1 | Status: SHIPPED | OUTPATIENT
Start: 2024-12-30 | End: 2025-06-28

## 2024-12-30 RX ORDER — UBIQUINOL 100 MG
CAPSULE ORAL AS NEEDED
Qty: 100 EACH | Refills: 1 | Status: SHIPPED | OUTPATIENT
Start: 2024-12-30

## 2024-12-30 RX ORDER — IBUPROFEN 400 MG/1
400 TABLET, FILM COATED ORAL EVERY 6 HOURS PRN
Qty: 90 TABLET | Refills: 1 | Status: SHIPPED | OUTPATIENT
Start: 2024-12-30

## 2024-12-30 RX ORDER — POLYETHYLENE GLYCOL 3350 17 G/17G
POWDER, FOR SOLUTION ORAL
Status: CANCELLED | OUTPATIENT
Start: 2024-12-30

## 2024-12-30 RX ORDER — DIVALPROEX SODIUM 500 MG/1
1500 TABLET, DELAYED RELEASE ORAL
Qty: 90 TABLET | Refills: 3 | Status: SHIPPED | OUTPATIENT
Start: 2024-12-30

## 2024-12-30 RX ORDER — PSYLLIUM HUSK 0.4 G
1 CAPSULE ORAL AS NEEDED
Qty: 90 TABLET | Refills: 1 | Status: SHIPPED | OUTPATIENT
Start: 2024-12-30 | End: 2025-01-06

## 2024-12-30 RX ORDER — PEN NEEDLE, DIABETIC 32GX 5/32"
NEEDLE, DISPOSABLE MISCELLANEOUS
Qty: 100 EACH | Refills: 0 | Status: SHIPPED | OUTPATIENT
Start: 2024-12-30

## 2024-12-30 RX ORDER — DIPHENHYDRAMINE HYDROCHLORIDE 12.5 MG/1
12.5 BAR, CHEWABLE ORAL DAILY
Qty: 30 TABLET | Refills: 5 | Status: SHIPPED | OUTPATIENT
Start: 2024-12-30

## 2024-12-30 RX ORDER — LANCETS
EACH MISCELLANEOUS
Qty: 100 EACH | Refills: 1 | Status: SHIPPED | OUTPATIENT
Start: 2024-12-30

## 2024-12-30 RX ORDER — BLOOD SUGAR DIAGNOSTIC
STRIP MISCELLANEOUS
Qty: 100 EACH | Refills: 1 | Status: SHIPPED | OUTPATIENT
Start: 2024-12-30

## 2024-12-30 NOTE — TELEPHONE ENCOUNTER
To be completed by: Dr. Samuel    Form: Person Directed Supports    Upon completion, please give to patient

## 2024-12-31 PROBLEM — R05.2 SUBACUTE COUGH: Status: ACTIVE | Noted: 2024-06-19

## 2024-12-31 PROBLEM — F51.01 PRIMARY INSOMNIA: Status: ACTIVE | Noted: 2024-12-31

## 2024-12-31 PROBLEM — J30.2 SEASONAL ALLERGIES: Status: ACTIVE | Noted: 2024-12-31

## 2024-12-31 PROBLEM — U07.1 COVID-19: Status: ACTIVE | Noted: 2024-12-31

## 2024-12-31 PROBLEM — E11.65 DIABETES MELLITUS WITH HYPERGLYCEMIA (HCC): Status: ACTIVE | Noted: 2024-12-31

## 2024-12-31 NOTE — ASSESSMENT & PLAN NOTE
Refill requested by caregivers for additional refills on file at associated pharmacy.   - No change to sig  Orders:    guaiFENesin (ROBITUSSIN) 100 mg/5 mL syrup; Take 10 mL (200 mg total) by mouth 3 (three) times a day as needed for cough

## 2024-12-31 NOTE — ASSESSMENT & PLAN NOTE
Follow up for group home patient recently seen in the ED  Recent ED visit for mild viral illness. Diagnosed with COVID-19 at the time. Rec'd to pursue conservative measures with no additional treatment given the mild nature of his illness. He has improved rapidly and feels close to his baseline. No acute complaints to.

## 2024-12-31 NOTE — ASSESSMENT & PLAN NOTE
Refill requested by caregivers for additional refills on file at associated pharmacy.   - No change to sig  Orders:    aluminum-magnesium hydroxide-simethicone (MAALOX) 200-200-20 MG/5ML SUSP; Take 30 mL by mouth 2 (two) times a day as needed for heartburn

## 2024-12-31 NOTE — ASSESSMENT & PLAN NOTE
Refill requested by caregivers for additional refills on file at associated pharmacy.   - No change to sig  Orders:    acetaminophen (TYLENOL) 650 mg CR tablet; Take 1 tablet (650 mg total) by mouth every 8 (eight) hours as needed for mild pain, moderate pain or headaches    ibuprofen (MOTRIN) 400 mg tablet; Take 1 tablet (400 mg total) by mouth every 6 (six) hours as needed for mild pain

## 2024-12-31 NOTE — ASSESSMENT & PLAN NOTE
Refill requested by caregivers for additional refills on file at associated pharmacy.   - No change to sig  Orders:    cyanocobalamin (VITAMIN B-12) 500 MCG tablet; Take 1 tablet (500 mcg total) by mouth daily

## 2024-12-31 NOTE — ASSESSMENT & PLAN NOTE
Refill requested by caregivers for additional refills on file at associated pharmacy.   - No change to sig  Orders:    diphenhydrAMINE (GNP Allergy Relief) 12.5 MG chewable tablet; Chew 1 tablet (12.5 mg total) in the morning

## 2024-12-31 NOTE — ASSESSMENT & PLAN NOTE
Refill requested by caregivers for additional refills on file at associated pharmacy.   - No change to sig  Orders:    acetaminophen (TYLENOL) 650 mg CR tablet; Take 1 tablet (650 mg total) by mouth every 8 (eight) hours as needed for mild pain, moderate pain or headaches

## 2024-12-31 NOTE — ASSESSMENT & PLAN NOTE
Refill requested by caregivers for additional refills on file at associated pharmacy.   - No change to sig  Lab Results   Component Value Date    HGBA1C 7.1 (H) 12/02/2024       Orders:    Alcohol Swabs (Alcohol Prep) 70 % PADS; Use if needed (use as needed for blood sugar checks)    glucose blood (OneTouch Verio) test strip; Daily    Insulin Pen Needle (BD Pen Needle Gianna 2nd Gen) 32G X 4 MM MISC; For use with insulin pen. Pharmacy may dispense brand covered by insurance.    Lancets (onetouch ultrasoft) lancets; Daily.

## 2024-12-31 NOTE — ASSESSMENT & PLAN NOTE
Refill requested by caregivers for additional refills on file at associated pharmacy.   - No change to sig  Orders:    divalproex sodium (DEPAKOTE ER) 250 mg 24 hr tablet; Take 1 tablet (250 mg total) by mouth daily at bedtime    divalproex sodium (Depakote) 500 mg DR tablet; Take 3 tablets (1,500 mg total) by mouth daily at bedtime

## 2024-12-31 NOTE — ASSESSMENT & PLAN NOTE
Refill requested by caregivers for additional refills on file at associated pharmacy.   - No change to sig  Orders:    cloNIDine (CATAPRES) 0.2 mg tablet; Take 1 tablet (0.2 mg total) by mouth 3 (three) times a day

## 2024-12-31 NOTE — ASSESSMENT & PLAN NOTE
Refill requested by caregivers for additional refills on file at associated pharmacy.   - No change to sig  Orders:    desmopressin (DDAVP) 0.2 mg tablet; Take 2 tablets (0.4 mg total) by mouth daily at bedtime

## 2024-12-31 NOTE — PROGRESS NOTES
Name: Manuel Back      : 2005      MRN: [id number]  Encounter Provider: Isak Samuel MD  Encounter Date: 2024   Encounter department: [facility] [place]  :  Assessment & Plan  COVID-19    Follow up for group home patient recently seen in the ED  Recent ED visit for mild viral illness. Diagnosed with COVID-19 at the time. Rec'd to pursue conservative measures with no additional treatment given the mild nature of his illness. He has improved rapidly and feels close to his baseline. No acute complaints to.        Epigastric pain  Refill requested by caregivers for additional refills on file at associated pharmacy.   - No change to sig  Orders:    aluminum-magnesium hydroxide-simethicone (MAALOX) 200-200-20 MG/5ML SUSP; Take 30 mL by mouth 2 (two) times a day as needed for heartburn    Intractable headache, unspecified chronicity pattern, unspecified headache type  Refill requested by caregivers for additional refills on file at associated pharmacy.   - No change to sig  Orders:    acetaminophen (TYLENOL) 650 mg CR tablet; Take 1 tablet (650 mg total) by mouth every 8 (eight) hours as needed for mild pain, moderate pain or headaches    ibuprofen (MOTRIN) 400 mg tablet; Take 1 tablet (400 mg total) by mouth every 6 (six) hours as needed for mild pain    Pain  Refill requested by caregivers for additional refills on file at associated pharmacy.   - No change to sig  Orders:    acetaminophen (TYLENOL) 650 mg CR tablet; Take 1 tablet (650 mg total) by mouth every 8 (eight) hours as needed for mild pain, moderate pain or headaches    Primary hypertension  Refill requested by caregivers for additional refills on file at associated pharmacy.   - No change to sig  Orders:    cloNIDine (CATAPRES) 0.2 mg tablet; Take 1 tablet (0.2 mg total) by mouth 3 (three) times a day    Vitamin B12 deficiency  Refill requested by caregivers for additional refills on file at  associated pharmacy.   - No change to sig  Orders:    cyanocobalamin (VITAMIN B-12) 500 MCG tablet; Take 1 tablet (500 mcg total) by mouth daily    Urinary incontinence, nocturnal enuresis  Refill requested by caregivers for additional refills on file at associated pharmacy.   - No change to sig  Orders:    desmopressin (DDAVP) 0.2 mg tablet; Take 2 tablets (0.4 mg total) by mouth daily at bedtime    Seasonal allergies  Refill requested by caregivers for additional refills on file at associated pharmacy.   - No change to sig  Orders:    diphenhydrAMINE (GNP Allergy Relief) 12.5 MG chewable tablet; Chew 1 tablet (12.5 mg total) in the morning    Schizoaffective disorder (HCC)  Refill requested by caregivers for additional refills on file at associated pharmacy.   - No change to sig  Orders:    divalproex sodium (DEPAKOTE ER) 250 mg 24 hr tablet; Take 1 tablet (250 mg total) by mouth daily at bedtime    divalproex sodium (Depakote) 500 mg DR tablet; Take 3 tablets (1,500 mg total) by mouth daily at bedtime    Diabetes mellitus with hyperglycemia (HCC)  Refill requested by caregivers for additional refills on file at associated pharmacy.   - No change to sig  Lab Results   Component Value Date    HGBA1C 7.1 (H) 12/02/2024       Orders:    Alcohol Swabs (Alcohol Prep) 70 % PADS; Use if needed (use as needed for blood sugar checks)    glucose blood (OneTouch Verio) test strip; Daily    Insulin Pen Needle (BD Pen Needle Gianna 2nd Gen) 32G X 4 MM MISC; For use with insulin pen. Pharmacy may dispense brand covered by insurance.    Lancets (onetouch ultrasoft) lancets; Daily.    Primary insomnia  Refill requested by caregivers for additional refills on file at associated pharmacy.   - No change to sig  Orders:    Melatonin Maximum Strength 5 MG TABS; Take 1 tablet (5 mg total) by mouth if needed (for sleep)    Subacute cough  Refill requested by caregivers for additional refills on file at associated pharmacy.   - No change  "to sig  Orders:    guaiFENesin (ROBITUSSIN) 100 mg/5 mL syrup; Take 10 mL (200 mg total) by mouth 3 (three) times a day as needed for cough           History of Present Illness     Other  This is a new problem. The current episode started in the past 7 days. The problem has been rapidly improving. Associated symptoms include congestion, coughing and fatigue. Pertinent negatives include no abdominal pain, anorexia, change in bowel habit, chills, fever, headaches or rash. He has tried rest (cough medication) for the symptoms. The treatment provided significant relief.     Review of Systems   Constitutional:  Positive for fatigue. Negative for chills and fever.   HENT:  Positive for congestion.    Respiratory:  Positive for cough.    Gastrointestinal:  Negative for abdominal pain, anorexia and change in bowel habit.   Skin:  Negative for rash.   Neurological:  Negative for headaches.       Objective   /78   Pulse 98   Temp 98.2 °F (36.8 °C) (Temporal)   Resp 18   Ht 5' 7\" (1.702 m)   Wt 132 kg (291 lb)   SpO2 98%   BMI 45.58 kg/m²      Physical Exam  Vitals reviewed.   Constitutional:       General: He is not in acute distress.     Appearance: Normal appearance. He is well-developed. He is not ill-appearing.   HENT:      Head: Normocephalic and atraumatic.      Right Ear: External ear normal.      Left Ear: External ear normal.      Nose: Nose normal.      Mouth/Throat:      Pharynx: Oropharynx is clear.   Eyes:      Extraocular Movements: Extraocular movements intact.      Conjunctiva/sclera: Conjunctivae normal.   Cardiovascular:      Rate and Rhythm: Normal rate and regular rhythm.      Heart sounds: No murmur heard.  Pulmonary:      Effort: Pulmonary effort is normal. No respiratory distress.      Breath sounds: Normal breath sounds. No wheezing, rhonchi or rales.   Abdominal:      General: Abdomen is flat. There is no distension.      Palpations: Abdomen is soft.      Tenderness: There is no abdominal " tenderness.   Musculoskeletal:         General: No signs of injury.      Cervical back: Normal range of motion and neck supple.   Skin:     Capillary Refill: Capillary refill takes less than 2 seconds.      Findings: No bruising or rash.   Neurological:      Mental Status: He is alert and oriented to person, place, and time.      Gait: Gait normal.   Psychiatric:         Mood and Affect: Mood normal.         Behavior: Behavior normal.       Isak Samuel MD

## 2024-12-31 NOTE — ASSESSMENT & PLAN NOTE
Refill requested by caregivers for additional refills on file at associated pharmacy.   - No change to sig  Orders:    Melatonin Maximum Strength 5 MG TABS; Take 1 tablet (5 mg total) by mouth if needed (for sleep)

## 2025-01-02 ENCOUNTER — HOSPITAL ENCOUNTER (EMERGENCY)
Facility: HOSPITAL | Age: 20
Discharge: HOME | End: 2025-01-02
Payer: COMMERCIAL

## 2025-01-02 VITALS
HEART RATE: 90 BPM | TEMPERATURE: 98 F | SYSTOLIC BLOOD PRESSURE: 124 MMHG | RESPIRATION RATE: 20 BRPM | DIASTOLIC BLOOD PRESSURE: 77 MMHG | OXYGEN SATURATION: 97 %

## 2025-01-02 DIAGNOSIS — R46.89 AGGRESSIVE BEHAVIOR: Primary | ICD-10-CM

## 2025-01-02 PROCEDURE — 99284 EMERGENCY DEPT VISIT MOD MDM: CPT
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
## 2025-01-06 ENCOUNTER — TELEPHONE (OUTPATIENT)
Dept: FAMILY MEDICINE CLINIC | Facility: CLINIC | Age: 20
End: 2025-01-06

## 2025-01-06 DIAGNOSIS — F51.01 PRIMARY INSOMNIA: ICD-10-CM

## 2025-01-06 RX ORDER — PSYLLIUM HUSK 0.4 G
2 CAPSULE ORAL AS NEEDED
Qty: 180 TABLET | Refills: 1 | Status: SHIPPED | OUTPATIENT
Start: 2025-01-06

## 2025-01-06 NOTE — TELEPHONE ENCOUNTER
Diego Reddy ([phone]) nurse from [employer] calling. Patient's most recent Melatonin was decreased to 5mg previously had been on 10mg. Diego reports patient has been having significant difficulties with insomnia, he is requesting Melatonin be increased to 10mg. Please review.

## 2025-01-06 NOTE — TELEPHONE ENCOUNTER
"              After Visit Summary   2/1/2017    Dung Malcolm    MRN: [id number]           Patient Information     Date Of Birth          2017        Visit Information        Provider Department      2/1/2017 10:45 AM Ben Perea MD [facility]        Today's Diagnoses     Encounter for routine child health examination w/o abnormal findings    -  1     Acute mucoid otitis media of both ears           Care Instructions        Preventive Care at the 12 Month Visit  Growth Measurements & Percentiles  Head Circumference: 18.82\" (47.8 cm) (98.28 %, Source: WHO (Girls, 0-2 years)) 98%ile based on WHO (Girls, 0-2 years) head circumference-for-age data using vitals from 2/1/2017.   Weight: 22 lbs 13 oz / 10.35 kg (actual weight) / 87%ile based on WHO (Girls, 0-2 years) weight-for-age data using vitals from 2/1/2017.   Length: 2' 8\" / 81.3 cm 100%ile based on WHO (Girls, 0-2 years) length-for-age data using vitals from 2/1/2017.   Weight for length: 50%ile based on WHO (Girls, 0-2 years) weight-for-recumbent length data using vitals from 2/1/2017.    Your toddler s next Preventive Check-up will be at 15 months of age.      Development  At this age, your child may:    Pull herself to a stand and walk with help.    Take a few steps alone.    Use a pincer grasp to get something.    Point or bang two objects together and put one object inside another.    Say one to three meaningful words (besides  mama  and  dannie ) correctly.    Start to understand that an object hidden by a cloth is still there (object permanence).    Play games like  peek-a-shearer,   pat-a-cake  and  so-big  and wave  bye-bye.       Feeding Tips    Weaning from the bottle will protect your child s dental health.  Once your child can handle a cup (around 9 months of age), you can start taking her off the bottle.  Your goal should be to have your child off of the bottle by 12-15 months of age at the latest.  A  sippy cup  causes fewer " Melatonin prescription changed to 10 mg nightly.   problems than a bottle; an open cup is even better.    Your child may refuse to eat foods she used to like.  Your child may become very  picky  about what she will eat.  Offer foods, but do not make your child eat them.    Be aware of textures that your child can chew without choking/gagging.    You may give your child whole milk.  Your pediatric provider may discuss options other than whole milk.  Your child should drink less than 24 ounces of milk each day.  If your child does not drink much milk, talk to your doctor about sources of calcium.    Limit the amount of fruit juice your child drinks to none or less than 4 ounces each day.    Brush your child s teeth with a small amount of fluoridated toothpaste one to two times each day.  Let your child play with the toothbrush after brushing.      Sleep    Your child will typically take two naps each day (most will decrease to one nap a day around 15-18 months old).    Your child may average about 13 hours of sleep each day.    Continue your regular nighttime routine which may include bathing, brushing teeth and reading.    Safety    Even if your child weighs more than 20 pounds, you should leave the car seat rear facing until your child is 2 years of age.    Falls at this age are common.  Keep sanchez on stairways and doors to dangerous areas.    Children explore by putting many things in the mouth.  Keep all medicines, cleaning supplies and poisons out of your child s reach.  Call the poison control center or your health care provider for directions in case your baby swallows poison.    Put the poison control number on all phones: 1-984.925.3253.    Keep electrical cords and harmful objects out of your child s reach.  Put plastic covers on unused electrical outlets.    Do not give your child small foods (such as peanuts, popcorn, pieces of hot dog or grapes) that could cause choking.    Turn your hot water heater to less than 120 degrees Fahrenheit.    Never put hot  liquids near table or countertop edges.  Keep your child away from a hot stove, oven and furnace.    When cooking on the stove, turn pot handles to the inside and use the back burners.  When grilling, be sure to keep your child away from the grill.    Do not let your child be near running machines, lawn mowers or cars.    Never leave your child alone in the bathtub or near water.    What Your Child Needs    Your child can understand almost everything you say.  She will respond to simple directions.  Do not swear or fight with your partner or other adults.  Your child will repeat what you say.    Show your child picture books.  Point to objects and name them.    Hold and cuddle your child as often as she will allow.    Encourage your child to play alone as well as with you and siblings.    Your child will become more independent.  She will say  I do  or  I can do it.   Let your child do as much as is possible.  Let her makes decisions as long as they are reasonable.    You will need to teach your child through discipline.  Teach and praise positive behaviors.  Protect her from harmful or poor behaviors.  Temper tantrums are common and should be ignored.  Make sure the child is safe during the tantrum.  If you give in, your child will throw more tantrums.    Never physically or emotionally hurt your child.  If you are losing control, take a few deep breaths, put your child in a safe place, and go into another room for a few minutes.  If possible, have someone else watch your child so you can take a break.  Call a friend, the Parent Warmline (530-372-0354) or call the Crisis Nursery ([phone]).      Dental Care    Your pediatric provider will speak with your regarding the need for regular dental appointments for cleanings and check-ups starting when your child s first tooth appears.      Your child may need fluoride supplements if you have well water.    Brush your child s teeth with a small amount (smaller than a  "pea) of fluoridated tooth paste once or twice daily.    Lab Work    Hemoglobin and lead levels will be checked.                  Follow-ups after your visit        Who to contact     If you have questions or need follow up information about today's clinic visit or your schedule please contact [facility] directly at [phone].  Normal or non-critical lab and imaging results will be communicated to you by MyChart, letter or phone within 4 business days after the clinic has received the results. If you do not hear from us within 7 days, please contact the clinic through [employer] or phone. If you have a critical or abnormal lab result, we will notify you by phone as soon as possible.  Submit refill requests through [employer] or call your pharmacy and they will forward the refill request to us. Please allow 3 business days for your refill to be completed.          Additional Information About Your Visit        MyChart Information     [employer] lets you send messages to your doctor, view your test results, renew your prescriptions, schedule appointments and more. To sign up, go to [email or web address], contact your [facility] clinic or call [phone] during business hours.            Care EveryWhere ID     This is your Care EveryWhere ID. This could be used by other organizations to access your [facility] medical records  [id number]        Your Vitals Were     Pulse Temperature Height BMI (Body Mass Index) Head Circumference Pulse Oximetry    125 96.4  F (35.8  C) (Rectal) 2' 8\" (0.813 m) 15.66 kg/m2 18.82\" (47.8 cm) 97%       Blood Pressure from Last 3 Encounters:   No data found for BP    Weight from Last 3 Encounters:   02/01/17 22 lb 13 oz (10.348 kg) (87.40 %*)   11/10/16 21 lb 12 oz (9.866 kg) (91.43 %*)   08/03/16 20 lb 0.5 oz (9.086 kg) (95.67 %*)     * Growth percentiles are based on WHO (Girls, 0-2 years) data.              We Performed the Following     CHICKEN POX VACCINE,LIVE,SUBCUT " [86328]     HEPA VACCINE PED/ADOL-2 DOSE(aka HEP A) [98869]     MMR VIRUS IMMUNIZATION, SUBCUT [71207]          Today's Medication Changes          These changes are accurate as of: 2/1/17 11:26 AM.  If you have any questions, ask your nurse or doctor.               Start taking these medicines.        Dose/Directions    amoxicillin 400 MG/5ML suspension   Commonly known as:  AMOXIL   Used for:  Acute mucoid otitis media of both ears   Started by:  Ben Perea MD        Dose:  400 mg   Take 5 mLs (400 mg) by mouth 2 times daily for 10 days   Quantity:  100 mL   Refills:  0            Where to get your medicines      Some of these will need a paper prescription and others can be bought over the counter.  Ask your nurse if you have questions.     Bring a paper prescription for each of these medications    - amoxicillin 400 MG/5ML suspension             Primary Care Provider Office Phone # Fax #    Ben Perea [phone]       [facility] [address] [place] [address]        Thank you!     Thank you for choosing [facility]  for your care. Our goal is always to provide you with excellent care. Hearing back from our patients is one way we can continue to improve our services. Please take a few minutes to complete the written survey that you may receive in the mail after your visit with us. Thank you!             Your Updated Medication List - Protect others around you: Learn how to safely use, store and throw away your medicines at www.disposemymeds.org.          This list is accurate as of: 2/1/17 11:26 AM.  Always use your most recent med list.                   Brand Name Dispense Instructions for use    amoxicillin 400 MG/5ML suspension    AMOXIL    100 mL    Take 5 mLs (400 mg) by mouth 2 times daily for 10 days       multivitamin, therapeutic with minerals Tabs tablet      Take 1 tablet by mouth daily

## 2025-01-08 ENCOUNTER — CLINICAL SUPPORT (OUTPATIENT)
Dept: NUTRITION | Facility: HOSPITAL | Age: 20
End: 2025-01-08
Payer: COMMERCIAL

## 2025-01-08 VITALS — WEIGHT: 292.8 LBS | BODY MASS INDEX: 45.86 KG/M2

## 2025-01-08 DIAGNOSIS — E11.9 TYPE 2 DIABETES MELLITUS WITHOUT COMPLICATION, WITHOUT LONG-TERM CURRENT USE OF INSULIN (HCC): Primary | ICD-10-CM

## 2025-01-08 NOTE — PROGRESS NOTES
" Nutrition Assessment Form    Patient Name: Manuel Back    YOB: 2005    Sex: Male     Assessment Date: 1/8/2025  Start Time: 11 Stop Time: 1130 Total Minutes: 30     Data:  Present at session: Self and 2 staff members (Nic and Osei)   Parent/Patient Concerns/reason for visit: \"He has been doing pretty good\"   Medical Dx/Reason for Referral: obesity   Past Medical History:   Diagnosis Date    Chronic headaches     Cognitive impairment     Diabetes type 2, controlled (HCC)     Psychiatric illness        Current Outpatient Medications   Medication Sig Dispense Refill    acetaminophen (TYLENOL) 650 mg CR tablet Take 1 tablet (650 mg total) by mouth every 8 (eight) hours as needed for mild pain, moderate pain or headaches 90 tablet 1    Alcohol Swabs (Alcohol Prep) 70 % PADS Use if needed (use as needed for blood sugar checks) 100 each 1    aluminum-magnesium hydroxide-simethicone (MAALOX) 200-200-20 MG/5ML SUSP Take 30 mL by mouth 2 (two) times a day as needed for heartburn 355 mL 3    Blood Glucose Monitoring Suppl (OneTouch Verio) w/Device KIT daily 1 kit 0    chlorproMAZINE (THORAZINE) 100 mg tablet Take 1 tablet (100 mg total) by mouth 2 (two) times a day One in the morning, One in the afternoon. 60 tablet 1    chlorproMAZINE (THORAZINE) 50 mg tablet Take 3 tablets (150 mg total) by mouth daily at bedtime 90 tablet 1    cholecalciferol (VITAMIN D3) 1,000 units tablet Take 1 tablet (1,000 Units total) by mouth daily 90 tablet 3    clonazePAM (KlonoPIN) 1 mg tablet Take 1 tablet (1 mg total) by mouth daily 30 tablet 0    cloNIDine (CATAPRES) 0.2 mg tablet Take 1 tablet (0.2 mg total) by mouth 3 (three) times a day 90 tablet 1    Continuous Glucose  (FreeStyle Alin 2 [place]) DMITRY Use per  guidelines 1 each 0    Continuous Glucose Sensor (FreeStyle Alin 2 Sensor) MISC Change every 14 days per  guidelines 2 each 5    cyanocobalamin (VITAMIN B-12) 500 MCG tablet " Take 1 tablet (500 mcg total) by mouth daily 30 tablet 11    desmopressin (DDAVP) 0.2 mg tablet Take 2 tablets (0.4 mg total) by mouth daily at bedtime 90 tablet 1    diphenhydrAMINE (GNP Allergy Relief) 12.5 MG chewable tablet Chew 1 tablet (12.5 mg total) in the morning 30 tablet 5    divalproex sodium (DEPAKOTE ER) 250 mg 24 hr tablet Take 1 tablet (250 mg total) by mouth daily at bedtime 30 tablet 1    divalproex sodium (Depakote) 500 mg DR tablet Take 3 tablets (1,500 mg total) by mouth daily at bedtime 90 tablet 3    glucose blood (OneTouch Verio) test strip Daily 100 each 1    guaiFENesin (ROBITUSSIN) 100 mg/5 mL syrup Take 10 mL (200 mg total) by mouth 3 (three) times a day as needed for cough 473 mL 0    ibuprofen (MOTRIN) 400 mg tablet Take 1 tablet (400 mg total) by mouth every 6 (six) hours as needed for mild pain 90 tablet 1    Incontinence Supply Disposable (Comfort Shield Adult Diapers) MISC Use 1 packet 4 (four) times a day 48 each 11    Insulin Aspart Prot & Aspart (Insulin Asp Prot & Asp FlexPen) (70-30) 100 UNIT/ML SUPN Inject 1 Units under the skin if needed      Insulin Lispro Prot & Lispro (Insulin Lispro Prot & Lispro) (75-25) 100 units/mL injection pen Take 25 units in the morning and 23 units in the evening 30 mL 3    Insulin Pen Needle (BD Pen Needle Gianna 2nd Gen) 32G X 4 MM MISC For use with insulin pen. Pharmacy may dispense brand covered by insurance. 100 each 0    Lancets (onetouch ultrasoft) lancets Daily. 100 each 1    Melatonin Maximum Strength 5 MG TABS Take 2 tablets (10 mg total) by mouth if needed (for sleep) 180 tablet 1    metFORMIN (GLUCOPHAGE) 1000 MG tablet Take 1 tablet (1,000 mg total) by mouth 2 (two) times a day with meals 120 tablet 3    metoprolol succinate (TOPROL-XL) 50 mg 24 hr tablet Take 1 tablet (50 mg total) by mouth daily 90 tablet 1    OLANZapine (ZyPREXA) 20 MG tablet Take 1 tablet (20 mg total) by mouth daily at bedtime 30 tablet 5    paliperidone (INVEGA) 6  "MG 24 hr tablet Take 1 tablet (6 mg total) by mouth every morning 30 tablet 2    polyethylene glycol (MIRALAX) 17 g packet Take 17 g by mouth daily 510 g 3    semaglutide, 0.25 or 0.5 mg/dose, (Ozempic, 0.25 or 0.5 MG/DOSE,) 2 mg/3 mL injection pen Inject 0.75 mL (0.5 mg total) under the skin every 7 days 2 mL 2    senna-docusate sodium (Senna-Plus) 8.6-50 mg per tablet Take 1 tablet by mouth 2 (two) times a day 60 tablet 3    Sunscreens (Coppertone Complete SPF30) LOTN Apply 1 Application topically if needed (for application before sun exposure) 207 mL 3    tolterodine (DETROL LA) 4 mg 24 hr capsule Take 1 capsule (4 mg total) by mouth daily 30 capsule 5     No current facility-administered medications for this visit.        Additional Meds/Supplements: N/a   Special Learning Needs/barriers to learning/any new barriers N/a   Height: HC Readings from Last 5 Encounters:   No data found for HC      Weight: Wt Readings from Last 10 Encounters:   12/30/24 132 kg (291 lb)   12/20/24 135 kg (297 lb 2.9 oz)   12/03/24 135 kg (298 lb 6.4 oz)   11/14/24 136 kg (299 lb)   10/09/24 (!) 137 kg (302 lb 3.2 oz)   09/30/24 (!) 137 kg (303 lb)   09/27/24 135 kg (298 lb)   09/17/24 132 kg (291 lb 0.1 oz)   09/16/24 (!) 138 kg (303 lb 2.1 oz)   09/12/24 (!) 139 kg (307 lb)     Estimated body mass index is 45.58 kg/m² as calculated from the following:    Height as of 12/30/24: 5' 7\" (1.702 m).    Weight as of 12/30/24: 132 kg (291 lb).   Recent Weight Change: [x]Yes     []No  Amount: 10# loss desired and intentional      Energy Needs: 1840cals (20cals/kg-1000 for 2#/wk wt loss)   Allergies   Allergen Reactions    Bee Pollen Other (See Comments)    Cholecalciferol Other (See Comments)    Pollen Extract Sneezing    or intolerances NKFA   Social History     Substance and Sexual Activity   Alcohol Use Never    N/a   Social History     Tobacco Use   Smoking Status Never    Passive exposure: Past   Smokeless Tobacco Never    N/a   Who " shops? Group home   Who cooks/cooking methods/Eating out/take out habits   Group home  Cooking methods: bake/burroughs/air burroughs/grill/boil/other________    Minimal eating out   Exercise: Plays football in the spring and summer time   Other: ie: Sleep habits/ stress level/ work habits household-lives with ?/ food security Stress level 2/10 scale  Sleep schedule currently off- sleeping all day and up all night was in psychiatric hospital last month and that is when his sleep schedule changed- trying to get back on schedule now  Currently going to graduate [employer] this May- he is nervous   Prior Nutritional Counseling? []Yes     [x]No  When:      Why:         Diet Hx: smoothies and water 40oz approx and milk- 2% milk - fruit/hawaiian punch (20oz)  Breakfast: Diet:  mostly skips B and lunch because he is sleeping all day   Lunch: He will have letha side up eggs-        Dinner:  smoothie a few times a week maybe 3- 2-3c milk and few cups of frozen fruit and plain greek yogurt if it is avaiable, 24oz per smoothie and will have 3x/wk 7-8pm- will have 2-3 thighs and 2-3 cups rice not many vegetables     Snacks:  will snack on fruits most days but candy on Wednesdays when he gest paid AM -   PM - 330pm   HS -    Other Notes/ Initial Assessment:  Manuel is here with 2 staff members from his group home.  He is a senior in high school and would like to lose weight, his sleep schedule is currently off because of his stay on the hospital last month.    Discussed importance of being active,  regular sleep schedule, regular meals, daily activity, encouraged trying new foods and increasing vegetables intake in general, minimizing calories from drinks, portions per plate method.  Provided Tips for wt loss and [address] sample menus and healthy snack ideas, RD name and number for home use.   Follow up scheduled for May 8th.  Pt is self pay.    Updated assessment (Follow up note only): 5/8/24 Manuel is here late for his appointment  with 2 staff member since he did not want to wake up this morning.  He is sleeping too late sometimes and will miss breakfast.  Staff reporting he is sleeping until 2-3pm in the afternoon, going to bed at 4am.   He will also sometimes take a snack at night.  He has an appointment with his PCP next week and they plan to make a sleeping chart. Discussed no wt loss with Manuel he is reporting he did not make any changes.  Reinforced if he wants to lose weight he needs to make some changes. He has minimal activity.  He will snack on fruits if anything,  Staff reporting Manuel has total freedom with his food at his current house and he is unable to control it or himself.  He will just fix himself food if they do not feed him.they are asking for calorie limits for Maunel- however RN reporting unable to limit intake at home.  Discussed portions per the plate method with Manuel and staff.  Staff reporting drinking mainly water and a cup of juice every once in a while.  Follow up scheduled.    7/17/24  Manuel is here to follow up with his wt which shows no change.  He eats 3 meals a day, mainly - though staff reporting he will sometimes skip breakfast.  He will eat out a few times a week but he has small portions.  He likes to eat out at [employer] or other Belgian place.  He is trying to eat smoothies.  His activity includes walking 3x/wk .5 mile.  He might snack on fruits but otherwise he will not snack.  He does eat a lot of salads- he mainly drinks water during the day and some almond milk mixed with 1%.       10/9/24  Manuel is her with a couple staff members to follow up with his weight.  He has lost 13# which he is pleased with, he has increased his walking. He states he is feeling better.  He has now become a diabetic. Staff is asking for information on low sodium low carbs and sugar free food items.  He now has ozempic once a week added and metformin.  Provided 1800 cals information and sample menus and  portions per the plate method.  Recommending 4432-6095 cals, 30-60gms carbs per meal, and 15gms carbs per snack.  Also encouraged activity 30 mins most days.   Follow up made for 3 months.    1/8/25  Manuel is here with another 10# wt loss- he has been eating more vegetables fruits and whole grains, he is on ozempic and insulin.  His sleeping has been off he has been going to sleep for 2-3am and waking  up at 1-2pm. Meds and wts from facility reviewed and food diary.  He is going for asleep study next weekend.  Wts from facility - tracked daily- ranging from 286-292 for the month of January.  BG has been running  for the month.  His activity has decreased in the winter time- walking int he community - stores, mall, also has some weights, but does not use often (maybe 2-3x/wk.  He will go to [employer] (or [employer]) once a week ($20/wk) and will eat out that day only.  He is mainly drinking zero sugar juice drink- SF fruit punch or lemonade.  Needs to increase water intake.  He might also drink almond milk.  He might also have some smoothies with oatmeal frozen fruit spinach and peanut butter or yogurt.  With his delayed waking patter he will usually have 2 big meals and then a snack or 2.  His goals next time would be to increase his water intake and his activity.  Follow up scheduled for 3 months.       Nutrition Diagnosis:   Overweight/obesity  related to Excess energy intake as evidenced by  BMI more than normative standard for age and sex (obesity-grade III 40+)       Any change or new dx since previous visit:     Nutrition Diagnosis:         Medical Nutrition Therapy Intervention:  [x]Individualized Meal Plan-Discussed the importance of eating 3 meals daily and not skipping, and how metabolism is affected.  Also discussed adding in small snacks or 5-6 small meals daily if desired vs 3 larger ones, and appropriate options and portions.  Appropriate timing of meals, including eating within 1 hr of waking and  eating meals slowly 20mins/meals, minimizing mindless/boredom or habitual eating, etc was also mentioned.  Discussed the plate method of portioning foods, including half a plate fruits and vegetables or a half plate all vegetables, 1/4 of the plate a lean protein source or meat, and a 1/4 of the plate being a whole grain carb- usually 1/2-1c.  This should be followed for at least 2 meals of the day, but could also be followed for all 3.  \Fluid intake discussed and appropriate amounts and choices, 16 oz with meals and additional 32oz during the day.  S/S dehydration also covered.Discussed the importance of trying new foods 12-16x, and retraining taste buds to like new foods.  Discussed how taste buds will change over the course of a lifetime.  Also included trying new foods at the beginning of a meal when you are the hungriest and more apt to like a new food and be more accepting of it. []Understanding Lab Values   []Basic Pathophysiology of Disease []Food/Medication Interactions   []Food Diary [x]Exercise-150 mins of moderate activity weekly, discussed importance of variety of activity, including wt bearing activities 2-3x/wk x 10-15mins. Discussed importance of activity throughout the lifecycle and its impact on overall health/stress management, etc.   [x]Lifestyle/Behavior Modification Techniques-Discussed the importance of adequate sleep, and ideal sleeping conditions, including a cool temperature 64-68 degrees F, and a dark and quiet room. Also discussed the importance of a regular and consistent sleep routine, including minimizing blue light exposure an hour before sleeping.  Weekend habits should include staying fairly consistent with weekday sleep habits to minimize disruption during the week.  A connection was made between getting adequate and good quality sleep and the ability to handle stress the next day, make healthy food choices, and be active. []Medication, Mechanism of Action   []Label Reading: CHO/  "Na/ Fat/ other_________ []Self Blood Glucose Monitoring   []Weight/BMI Goals: gain/lose/maintain []Other -           Comprehension: []Excellent  []Very Good  [x]Good  []Fair   []Poor    Receptivity: []Excellent  []Very Good  [x]Good  []Fair   []Poor    Expected Compliance: []Excellent  []Very Good  []Good  [x]Fair   [x]Poor        Goals (initial)/ Progress made on previous goals/new goals:   3 meals daily no skipping- has achieved continue   2.  Portions per plate method as dicussed with RD- has achieved continue   3. Minimize calories from drinks  has achieved continue  4. 30-60gms carbs per meal and 15gms at snack       No follow-ups on file.  Labs:  CMP  Lab Results   Component Value Date    K 3.9 11/13/2024     11/13/2024    CO2 29 11/13/2024    BUN 16 11/13/2024    CREATININE 1.02 11/13/2024    GLUF 124 (H) 11/13/2024    CALCIUM 9.1 11/13/2024    CORRECTEDCA 9.7 05/04/2023    AST 32 11/13/2024    ALT 43 11/13/2024    ALKPHOS 77 11/13/2024    EGFR 105 11/13/2024       BMP  Lab Results   Component Value Date    CALCIUM 9.1 11/13/2024    K 3.9 11/13/2024    CO2 29 11/13/2024     11/13/2024    BUN 16 11/13/2024    CREATININE 1.02 11/13/2024       Lipids  No results found for: \"CHOL\"  Lab Results   Component Value Date    HDL 27 (L) 12/02/2024    HDL 23 (L) 11/13/2024    HDL 26 (L) 10/18/2024     Lab Results   Component Value Date    LDLCALC 24 12/02/2024    LDLCALC 34 11/13/2024    LDLCALC 28 10/18/2024     Lab Results   Component Value Date    TRIG 369 (H) 12/02/2024    TRIG 286 (H) 11/13/2024    TRIG 291 (H) 10/18/2024     No results found for: \"CHOLHDL\"    Hemoglobin A1C  Lab Results   Component Value Date    HGBA1C 7.1 (H) 12/02/2024       Fasting Glucose  Lab Results   Component Value Date    GLUF 124 (H) 11/13/2024       Insulin     Thyroid  Lab Results   Component Value Date    TSH 1.50 05/21/2023       Hepatic Function Panel  Lab Results   Component Value Date    ALT 43 11/13/2024    AST 32 " "11/13/2024    ALKPHOS 77 11/13/2024       Celiac Disease Antibody Panel  No results found for: \"ENDOMYSIAL IGA\", \"GLIADIN IGA\", \"GLIADIN IGG\", \"IGA\", \"TISSUE TRANSGLUT AB\", \"TTG IGA\"   Iron  No results found for: \"IRON\", \"TIBC\", \"FERRITIN\"         Asha Paez RD  [facility]  [address] [id number]    "

## 2025-01-24 ENCOUNTER — HOSPITAL ENCOUNTER (OUTPATIENT)
Dept: SLEEP CENTER | Facility: CLINIC | Age: 20
Discharge: HOME | End: 2025-01-24
Payer: COMMERCIAL

## 2025-01-24 DIAGNOSIS — G47.10 HYPERSOMNIA: ICD-10-CM

## 2025-01-24 DIAGNOSIS — E66.01 MORBID OBESITY WITH BMI OF 45.0-49.9, ADULT (HCC): ICD-10-CM

## 2025-01-24 PROCEDURE — 95810 POLYSOM 6/> YRS 4/> PARAM: CPT | Performed by: PSYCHIATRY & NEUROLOGY

## 2025-01-24 PROCEDURE — 95810 POLYSOM 6/> YRS 4/> PARAM: CPT

## 2025-01-25 PROBLEM — G47.33 OSA (OBSTRUCTIVE SLEEP APNEA): Status: ACTIVE | Noted: 2025-01-25

## 2025-01-25 NOTE — PROGRESS NOTES
Sleep Study Documentation    Pre-Sleep Study       Sleep testing procedure explained to patient:YES    Patient napped prior to study:YES- less than 30 minutes. Napped after 2PM: yes    Caffeine:Dayshift worker after 12PM.  Caffeine use:NO    Alcohol:Dayshift workers after 5PM: Alcohol use:NO    Typical day for patient:YES       Study Documentation    Sleep Study Indications: Excessive daytime sleepiness.     Sleep Study: Diagnostic   Snore:Severe  Supplemental O2: no      Minimum SaO2 88%  Baseline SaO2 95%        EKG abnormalities: no     EEG abnormalities: no    Were abnormal behaviors in sleep observed:NO    Is Total Sleep Study Recording Time < 2 hours: N/A    Is Total Sleep Study Recording Time > 2 hours but study is incomplete: N/A    Is Total Sleep Study Recording Time 6 hours or more but sleep was not obtained: NO    Patient classification: disabled       Post-Sleep Study    Medication used at bedtime or during sleep study:YES other prescription medications    Patient reports time it took to fall asleep:greater than 60 minutes    Patient reports waking up during study:1 to 2 times.  Patient reports returning to sleep in 10 to 30 minutes.    Patient reports sleeping 4 to 6 hours without dreaming.    Does the Patient feel this is a typical night of sleep:better than usual    Patient rated sleepiness: Not sleepy or tired    PAP treatment:no.

## 2025-01-30 DIAGNOSIS — E11.69 TYPE 2 DIABETES MELLITUS WITH OTHER SPECIFIED COMPLICATION, UNSPECIFIED WHETHER LONG TERM INSULIN USE (HCC): ICD-10-CM

## 2025-01-30 PROBLEM — R05.2 SUBACUTE COUGH: Status: RESOLVED | Noted: 2024-06-19 | Resolved: 2025-01-30

## 2025-02-03 ENCOUNTER — RESULTS FOLLOW-UP (OUTPATIENT)
Dept: SLEEP CENTER | Facility: CLINIC | Age: 20
End: 2025-02-03

## 2025-02-03 DIAGNOSIS — G47.33 OSA (OBSTRUCTIVE SLEEP APNEA): Primary | ICD-10-CM

## 2025-02-04 NOTE — TELEPHONE ENCOUNTER
Called number listed for patient on chart and received voicemail of Mariah at [employer]. Left call back message to review results.    Needs to schedule CPAP study.

## 2025-02-04 NOTE — TELEPHONE ENCOUNTER
----- Message from Osei Engel MD sent at 2/3/2025  6:10 AM EST -----  Very mild MARTY  Would recommend trial of treatment  CPAP study may be best - ordered

## 2025-02-12 NOTE — TELEPHONE ENCOUNTER
Group home requests a new script for sunscreen to be sent to the pharmacy. Please advise thank you    Cardiologist: Dr. Malone  Pharmacy:  Escort:     87 yo F with a history of asthma, HTN, pre-DM, multiple small remote CVAs (no residual deficits) seen incidentally on brain MRI, renal cell carcinoma s/p nephrectomy 1998 who presented to follow-up to discuss implantation of ILR. She has been endorsing new episodes of chest pain in July, has had similar in past few years. Pt states the chest pressure lasted five years with radiation to back. Denies SOB, palpitations, edema, orthopnea, PND or other symptoms.      Per MD note, pt stated that she had NST revealing moderate size of mild reduced uptake in the lateral/inferolateral wall that is reversible. Plan was to undergo cardiac cath soon with clearance from her nephrologist, which was obtained. She would like to hold off on ILR at this time until after cardiac cath.     Previous cardiac testing:   Limited Echo 11/5/24: Injection of agitated saline via a peripheral vein reveals no evidence of a right-to-left shunt.  NST 9/2023: Normal  Echo 9/2023: LVSF normal, LV wall thickness mildly increased, RV cavity normal in size, mild MR.     In light of multiple risk fx, CCS III anginal symptoms and abnormal NST, referred for cardiac cath with possible PCI if clinically indicated. Cardiologist: Dr. Malone  Pharmacy:  Escort:     89 yo F with a history of asthma, HTN, pre-DM, multiple small remote CVAs (no residual deficits) seen incidentally on brain MRI, renal cell carcinoma s/p nephrectomy 1998 who presented to follow-up to discuss implantation of ILR. She has been endorsing new episodes of chest pain in July, has had similar symptoms in past few years. Pt states the chest pressure lasted five years with radiation to back. Denies SOB, palpitations, edema, orthopnea, PND or other symptoms.      Per MD note, pt stated that she had NST revealing moderate size of mild reduced uptake in the lateral/inferolateral wall that is reversible. Plan was to undergo cardiac cath soon with clearance from her nephrologist, which was obtained. She would like to hold off on ILR at this time until after cardiac cath.     Previous cardiac testing:   Limited Echo 11/5/24: Injection of agitated saline via a peripheral vein reveals no evidence of a right-to-left shunt.  NST 9/2023: Normal  Echo 9/2023: LVSF normal, LV wall thickness mildly increased, RV cavity normal in size, mild MR.     In light of multiple risk fx, CCS III anginal symptoms and abnormal NST, referred for cardiac cath with possible PCI if clinically indicated. Cardiologist: Dr. Malone  Outpatient Nephrologist: Julian Carrillo (provided clearance for cath: 1/23/2025)  Pharmacy: [facility] ([phone])  Escort: daughter  *CALL EP AFTER CATH: THEY WILL INSERT ILR PRIOR TO DISCHARGE    87 yo F, FHx of CAD (Mother MI)  with a history of asthma, HTN, pre-DM, multiple silent CVA (no residual deficits) seen incidentally on brain MRI (9/2024), renal cell carcinoma s/p nephrectomy 1998 who initially presented to neurologist with the complaint of  3 days of acute dizziness (Neuro: Stephane Win in 8/2024) and underwent  MRI Brain (8/2024) revealing Multiple small remote infarctions within the cerebral hemispheric white matter and basal ganglia. No acute infarction. Ischemic white matter disease upper range typical for age. Diffuse brain volume loss lower range typical for age.     Patient was referred to EP for potential ILR placement due to MRI Brain findings and endorsed she has been experiencing chest pain since July, has had similar symptoms in past few years. Pt states the chest pressure lasted five years with radiation to back and can occur independent of activity.  Denies SOB, palpitations, edema, orthopnea, PND or other symptoms.      Per MD note, pt stated that she had NST revealing moderate size of mild reduced uptake in the lateral/inferolateral wall that is reversible.     Previous cardiac testing:   Limited Echo 11/5/24: Injection of agitated saline via a peripheral vein reveals no evidence of a right-to-left shunt.  NST 9/2023: Normal  Echo 9/2023: LVSF normal, LV wall thickness mildly increased, RV cavity normal in size, mild MR.     In light of multiple risk fx, CCS III anginal symptoms and abnormal NST, referred for cardiac cath with possible PCI if clinically indicated.

## 2025-02-20 ENCOUNTER — TELEPHONE (OUTPATIENT)
Dept: ENDOCRINOLOGY | Facility: CLINIC | Age: 20
End: 2025-02-20

## 2025-02-20 DIAGNOSIS — E11.65 DIABETES MELLITUS WITH HYPERGLYCEMIA (HCC): ICD-10-CM

## 2025-02-20 RX ORDER — INSULIN LISPRO 100 [IU]/ML
INJECTION, SUSPENSION SUBCUTANEOUS
Qty: 15 ML | Refills: 3 | Status: SHIPPED | OUTPATIENT
Start: 2025-02-20

## 2025-02-20 NOTE — TELEPHONE ENCOUNTER
Per provider   Alina Oates PA-C to Manuel Back         2/20/25 10:17 AM  Please decrease evening dose to 20u.        This [facility] MyChart message has not been read.      TC office on  PT caregiver

## 2025-02-20 NOTE — TELEPHONE ENCOUNTER
Caregiver asking that new insulin dose be updated and sent to [facility]?

## 2025-03-03 ENCOUNTER — APPOINTMENT (OUTPATIENT)
Dept: LAB | Facility: CLINIC | Age: 20
End: 2025-03-03
Payer: COMMERCIAL

## 2025-03-03 DIAGNOSIS — E11.9 TYPE 2 DIABETES MELLITUS WITHOUT COMPLICATION, WITH LONG-TERM CURRENT USE OF INSULIN (HCC): ICD-10-CM

## 2025-03-03 DIAGNOSIS — Z79.4 TYPE 2 DIABETES MELLITUS WITHOUT COMPLICATION, WITH LONG-TERM CURRENT USE OF INSULIN (HCC): ICD-10-CM

## 2025-03-03 LAB
ALBUMIN SERPL BCG-MCNC: 3.8 G/DL (ref 3.5–5)
ALP SERPL-CCNC: 84 U/L (ref 34–104)
ALT SERPL W P-5'-P-CCNC: 66 U/L (ref 7–52)
ANION GAP SERPL CALCULATED.3IONS-SCNC: 9 MMOL/L (ref 4–13)
AST SERPL W P-5'-P-CCNC: 53 U/L (ref 13–39)
BILIRUB SERPL-MCNC: 0.32 MG/DL (ref 0.2–1)
BUN SERPL-MCNC: 11 MG/DL (ref 5–25)
CALCIUM SERPL-MCNC: 9.4 MG/DL (ref 8.4–10.2)
CHLORIDE SERPL-SCNC: 100 MMOL/L (ref 96–108)
CO2 SERPL-SCNC: 30 MMOL/L (ref 21–32)
CREAT SERPL-MCNC: 1.13 MG/DL (ref 0.6–1.3)
EST. AVERAGE GLUCOSE BLD GHB EST-MCNC: 126 MG/DL
GFR SERPL CREATININE-BSD FRML MDRD: 93 ML/MIN/1.73SQ M
GLUCOSE P FAST SERPL-MCNC: 100 MG/DL (ref 65–99)
HBA1C MFR BLD: 6 %
POTASSIUM SERPL-SCNC: 4.3 MMOL/L (ref 3.5–5.3)
PROT SERPL-MCNC: 7.4 G/DL (ref 6.4–8.4)
SODIUM SERPL-SCNC: 139 MMOL/L (ref 135–147)
T4 FREE SERPL-MCNC: 0.78 NG/DL (ref 0.61–1.12)
TSH SERPL DL<=0.05 MIU/L-ACNC: 5.26 UIU/ML (ref 0.45–4.5)

## 2025-03-03 PROCEDURE — 36415 COLL VENOUS BLD VENIPUNCTURE: CPT

## 2025-03-03 PROCEDURE — 80053 COMPREHEN METABOLIC PANEL: CPT

## 2025-03-03 PROCEDURE — 84443 ASSAY THYROID STIM HORMONE: CPT

## 2025-03-03 PROCEDURE — 83036 HEMOGLOBIN GLYCOSYLATED A1C: CPT

## 2025-03-03 PROCEDURE — 84439 ASSAY OF FREE THYROXINE: CPT

## 2025-03-11 ENCOUNTER — OFFICE VISIT (OUTPATIENT)
Dept: ENDOCRINOLOGY | Facility: CLINIC | Age: 20
End: 2025-03-11
Payer: COMMERCIAL

## 2025-03-11 VITALS
HEIGHT: 67 IN | BODY MASS INDEX: 44.57 KG/M2 | SYSTOLIC BLOOD PRESSURE: 126 MMHG | WEIGHT: 284 LBS | DIASTOLIC BLOOD PRESSURE: 78 MMHG

## 2025-03-11 DIAGNOSIS — E11.65 DIABETES MELLITUS WITH HYPERGLYCEMIA (HCC): ICD-10-CM

## 2025-03-11 DIAGNOSIS — I10 PRIMARY HYPERTENSION: ICD-10-CM

## 2025-03-11 DIAGNOSIS — E55.9 VITAMIN D DEFICIENCY: ICD-10-CM

## 2025-03-11 DIAGNOSIS — Z79.4 TYPE 2 DIABETES MELLITUS WITHOUT COMPLICATION, WITH LONG-TERM CURRENT USE OF INSULIN (HCC): Primary | ICD-10-CM

## 2025-03-11 DIAGNOSIS — E11.9 TYPE 2 DIABETES MELLITUS WITHOUT COMPLICATION, WITH LONG-TERM CURRENT USE OF INSULIN (HCC): Primary | ICD-10-CM

## 2025-03-11 DIAGNOSIS — E04.1 THYROID NODULE: ICD-10-CM

## 2025-03-11 PROCEDURE — 99214 OFFICE O/P EST MOD 30 MIN: CPT | Performed by: PHYSICIAN ASSISTANT

## 2025-03-11 RX ORDER — INSULIN LISPRO 100 [IU]/ML
INJECTION, SUSPENSION SUBCUTANEOUS
Qty: 15 ML | Refills: 3 | Status: SHIPPED | OUTPATIENT
Start: 2025-03-11

## 2025-03-11 NOTE — ASSESSMENT & PLAN NOTE
Ultrasound thyroid December 2024: No nodule meets current ACR criteria for requiring biopsy but follow-up ultrasound is recommended in 1 year.

## 2025-03-11 NOTE — ASSESSMENT & PLAN NOTE
Lab Results   Component Value Date    HGBA1C 6.0 (H) 03/03/2025       Orders:    Insulin Lispro Prot & Lispro (Insulin Lispro Prot & Lispro) (75-25) 100 units/mL injection pen; Take 20 units in the morning and 16 units in the evening

## 2025-03-11 NOTE — PROGRESS NOTES
Name: Manuel Back      : 2005      MRN: [id number]  Encounter Provider: Alina Oates PA-C  Encounter Date: 3/11/2025   Encounter department: [facility] [place]    Chief Complaint   Patient presents with    Diabetes Type 2   :  Assessment & Plan  Type 2 diabetes mellitus without complication, with long-term current use of insulin (HCC)    Lab Results   Component Value Date    HGBA1C 6.0 (H) 2025   Current HbA1c represents acceptable control. Blood sugars demonstrating tight control with some lower a.m. readings likely due to increase in Ozempic at last visit.   Continue current regimen with the following adjustments:  Decrease Lispro to 20 - 16  Consider further additional increasing Ozempic if patient GI symptoms improve with MiraLAX  Advised to adhere to diabetic diet, and recommended staying active/exercising routinely.  Discussed symptoms and treatment of hypoglycemia.    Recommended routine follow-up with Ophthalmology and foot exams.   Ordered blood work to complete prior to next visit.    Orders:    Hemoglobin A1C; Future    Comprehensive metabolic panel; Future    Albumin / creatinine urine ratio; Future    Lipid panel; Future    Thyroid nodule  Ultrasound thyroid 2024: No nodule meets current ACR criteria for requiring biopsy but follow-up ultrasound is recommended in 1 year.        Primary hypertension  BP at goal.   Continue current medication regimen.        Vitamin D deficiency  Continue supplementation       Diabetes mellitus with hyperglycemia (HCC)    Lab Results   Component Value Date    HGBA1C 6.0 (H) 2025       Orders:    Insulin Lispro Prot & Lispro (Insulin Lispro Prot & Lispro) (75-25) 100 units/mL injection pen; Take 20 units in the morning and 16 units in the evening        History of Present Illness     Manuel Back is a 19 y.o. male with type 2 diabetes seen in follow up.  Denies complications.   "Denies recent severe hypoglycemic or severe hyperglycemic episodes.  Staff with patient does admit he has had lower readings recently.  Agreeable to further lower insulin.  Denies any issues with his current regimen. Last A1C was 6.0. Denies recent illness, hospitalization or steroid use.     Did not bring Alin today, encouraged to bring to next appointment.    Denies compressive or obstructive symptoms     Home blood glucometer readings:   Before breakfast: 71 - 121  Bedtime: 86 -159    Current regimen:   Lispro 75/25: 25u qAM, 20u qPM  Metformin 1000 mg twice daily  Ozempic 0.5 mg weekly      Last Eye Exam: Not on file  Last Foot Exam: 05/28/2024  Health Maintenance   Topic Date Due    Diabetic Eye Exam  Never done    Diabetic Foot Exam  05/28/2025     Review of Systems as per HPI         Medical History Reviewed by provider this encounter:     .    Objective   /78 (BP Location: Left arm, Patient Position: Sitting, Cuff Size: Adult)   Ht 5' 7\" (1.702 m)   Wt 129 kg (284 lb)   BMI 44.48 kg/m²      Body mass index is 44.48 kg/m².  Wt Readings from Last 3 Encounters:   03/11/25 129 kg (284 lb)   01/08/25 133 kg (292 lb 12.8 oz)   12/30/24 132 kg (291 lb)     Physical Exam  Vitals and nursing note reviewed.   Constitutional:       General: He is not in acute distress.     Appearance: He is well-developed.   HENT:      Head: Normocephalic and atraumatic.   Eyes:      General: No scleral icterus.     Conjunctiva/sclera: Conjunctivae normal.   Neck:      Thyroid: No thyroid mass, thyromegaly or thyroid tenderness.   Pulmonary:      Effort: Pulmonary effort is normal.   Abdominal:      Palpations: Abdomen is soft.   Neurological:      Mental Status: He is alert.   Psychiatric:         Attention and Perception: Attention normal.         Labs:   Lab Results   Component Value Date    HGBA1C 6.0 (H) 03/03/2025    HGBA1C 7.1 (H) 12/02/2024    HGBA1C 9.6 (H) 09/17/2024     Lab Results   Component Value Date    " CREATININE 1.13 03/03/2025    CREATININE 1.02 11/13/2024    CREATININE 0.94 10/18/2024    BUN 11 03/03/2025    K 4.3 03/03/2025     03/03/2025    CO2 30 03/03/2025     eGFRcr   Date Value Ref Range Status   05/21/2023 96 >59 Final     eGFR   Date Value Ref Range Status   03/03/2025 93 ml/min/1.73sq m Final     Lab Results   Component Value Date    HDL 27 (L) 12/02/2024    TRIG 369 (H) 12/02/2024     Lab Results   Component Value Date    ALT 66 (H) 03/03/2025    AST 53 (H) 03/03/2025    ALKPHOS 84 03/03/2025     Lab Results   Component Value Date    HOU2AUUGJORC 5.264 (H) 03/03/2025    MNW6VZAFYZNJ 5.288 (H) 12/02/2024    SVD2SDVDNHCK 5.298 (H) 11/13/2024       Patient Instructions   Continue your medications with the following changes:  Take MiraLax OTC gummies to help alleviate constipation symptoms related to Ozempic  Decrease Lispro to 20 - 16  Monitor blood sugars regularly  Contact the office with any severe hypoglycemic or hyperglycemic events  Maintain appropriate diet and physical activity  Follow up in 4 months  Call with any questions, concerns, or refill requests  Obtain labs prior to your next appointment      Discussed with the patient and all questioned fully answered. He will call me if any problems arise.

## 2025-03-11 NOTE — PATIENT INSTRUCTIONS
Continue your medications with the following changes:  Take MiraLax OTC gummies to help alleviate constipation symptoms related to Ozempic  Decrease Lispro to 20 - 16  Monitor blood sugars regularly  Contact the office with any severe hypoglycemic or hyperglycemic events  Maintain appropriate diet and physical activity  Follow up in 4 months  Call with any questions, concerns, or refill requests  Obtain labs prior to your next appointment

## 2025-03-11 NOTE — ASSESSMENT & PLAN NOTE
Lab Results   Component Value Date    HGBA1C 6.0 (H) 03/03/2025   Current HbA1c represents acceptable control. Blood sugars demonstrating tight control with some lower a.m. readings likely due to increase in Ozempic at last visit.   Continue current regimen with the following adjustments:  Decrease Lispro to 20 - 16  Consider further additional increasing Ozempic if patient GI symptoms improve with MiraLAX  Advised to adhere to diabetic diet, and recommended staying active/exercising routinely.  Discussed symptoms and treatment of hypoglycemia.    Recommended routine follow-up with Ophthalmology and foot exams.   Ordered blood work to complete prior to next visit.    Orders:    Hemoglobin A1C; Future    Comprehensive metabolic panel; Future    Albumin / creatinine urine ratio; Future    Lipid panel; Future

## 2025-03-22 DIAGNOSIS — K59.00 CONSTIPATION: ICD-10-CM

## 2025-03-22 DIAGNOSIS — E11.65 DIABETES MELLITUS WITH HYPERGLYCEMIA (HCC): ICD-10-CM

## 2025-03-24 ENCOUNTER — TELEPHONE (OUTPATIENT)
Dept: ENDOCRINOLOGY | Facility: CLINIC | Age: 20
End: 2025-03-24

## 2025-03-25 RX ORDER — AMOXICILLIN 250 MG
1 CAPSULE ORAL 2 TIMES DAILY
Qty: 60 TABLET | Refills: 3 | Status: SHIPPED | OUTPATIENT
Start: 2025-03-25

## 2025-03-25 RX ORDER — UBIQUINOL 100 MG
CAPSULE ORAL
Qty: 200 EACH | Refills: 2 | Status: SHIPPED | OUTPATIENT
Start: 2025-03-25

## 2025-04-02 DIAGNOSIS — Z79.4 TYPE 2 DIABETES MELLITUS WITHOUT COMPLICATION, WITH LONG-TERM CURRENT USE OF INSULIN (HCC): ICD-10-CM

## 2025-04-02 DIAGNOSIS — E11.9 TYPE 2 DIABETES MELLITUS WITHOUT COMPLICATION, WITH LONG-TERM CURRENT USE OF INSULIN (HCC): ICD-10-CM

## 2025-04-25 DIAGNOSIS — J30.2 SEASONAL ALLERGIES: ICD-10-CM

## 2025-04-29 DIAGNOSIS — E11.65 DIABETES MELLITUS WITH HYPERGLYCEMIA (HCC): ICD-10-CM

## 2025-04-29 DIAGNOSIS — J30.2 SEASONAL ALLERGIES: ICD-10-CM

## 2025-04-29 RX ORDER — DIPHENHYDRAMINE HCL 12.5 MG/5ML
12.5 SOLUTION ORAL 4 TIMES DAILY PRN
Qty: 30 ML | Refills: 5 | Status: SHIPPED | OUTPATIENT
Start: 2025-04-29

## 2025-04-29 RX ORDER — DIPHENHYDRAMINE HCL 12.5 MG/1
12.5 TABLET, CHEWABLE ORAL DAILY
Qty: 30 TABLET | Refills: 5 | OUTPATIENT
Start: 2025-04-29

## 2025-04-29 NOTE — TELEPHONE ENCOUNTER
Pharmacy requesting refill on the   Requested Prescriptions     Pending Prescriptions Disp Refills    diphenhydrAMINE (GNP Allergy Relief) 12.5 MG chewable tablet 30 tablet 5     Sig: Chew 1 tablet (12.5 mg total) in the morning        Upcoming appointment on 5/19/25 with Dr. Samuel.

## 2025-05-05 RX ORDER — BLOOD SUGAR DIAGNOSTIC
STRIP MISCELLANEOUS
Qty: 100 EACH | Refills: 1 | Status: SHIPPED | OUTPATIENT
Start: 2025-05-05

## 2025-05-05 RX ORDER — LANCETS
EACH MISCELLANEOUS
Qty: 100 EACH | Refills: 1 | Status: SHIPPED | OUTPATIENT
Start: 2025-05-05

## 2025-05-05 RX ORDER — PEN NEEDLE, DIABETIC 32GX 5/32"
NEEDLE, DISPOSABLE MISCELLANEOUS
Qty: 100 EACH | Refills: 0 | Status: SHIPPED | OUTPATIENT
Start: 2025-05-05 | End: 2025-05-05

## 2025-05-19 ENCOUNTER — OFFICE VISIT (OUTPATIENT)
Dept: FAMILY MEDICINE CLINIC | Facility: CLINIC | Age: 20
End: 2025-05-19

## 2025-05-19 ENCOUNTER — TELEPHONE (OUTPATIENT)
Dept: FAMILY MEDICINE CLINIC | Facility: CLINIC | Age: 20
End: 2025-05-19

## 2025-05-19 VITALS
HEART RATE: 92 BPM | HEIGHT: 67 IN | WEIGHT: 277.2 LBS | BODY MASS INDEX: 43.51 KG/M2 | TEMPERATURE: 98.4 F | DIASTOLIC BLOOD PRESSURE: 84 MMHG | OXYGEN SATURATION: 98 % | RESPIRATION RATE: 17 BRPM | SYSTOLIC BLOOD PRESSURE: 119 MMHG

## 2025-05-19 DIAGNOSIS — K59.00 CONSTIPATION, UNSPECIFIED CONSTIPATION TYPE: ICD-10-CM

## 2025-05-19 DIAGNOSIS — Z13.6 SCREENING FOR CARDIOVASCULAR CONDITION: ICD-10-CM

## 2025-05-19 DIAGNOSIS — Z00.00 ANNUAL PHYSICAL EXAM: Primary | ICD-10-CM

## 2025-05-19 DIAGNOSIS — Z76.0 MEDICATION REFILL: ICD-10-CM

## 2025-05-19 DIAGNOSIS — Z79.4 TYPE 2 DIABETES MELLITUS WITHOUT COMPLICATION, WITH LONG-TERM CURRENT USE OF INSULIN (HCC): ICD-10-CM

## 2025-05-19 DIAGNOSIS — E11.9 TYPE 2 DIABETES MELLITUS WITHOUT COMPLICATION, WITH LONG-TERM CURRENT USE OF INSULIN (HCC): ICD-10-CM

## 2025-05-19 DIAGNOSIS — E66.01 MORBID OBESITY WITH BMI OF 40.0-44.9, ADULT (HCC): ICD-10-CM

## 2025-05-19 DIAGNOSIS — L85.3 DRY SKIN: ICD-10-CM

## 2025-05-19 PROCEDURE — 99395 PREV VISIT EST AGE 18-39: CPT | Performed by: FAMILY MEDICINE

## 2025-05-19 RX ORDER — CHOLECALCIFEROL (VITAMIN D3) 25 MCG
1000 TABLET ORAL DAILY
Qty: 90 TABLET | Refills: 3 | Status: SHIPPED | OUTPATIENT
Start: 2025-05-19

## 2025-05-19 RX ORDER — AMOXICILLIN 250 MG
1 CAPSULE ORAL 2 TIMES DAILY
Qty: 60 TABLET | Refills: 3 | Status: SHIPPED | OUTPATIENT
Start: 2025-05-19

## 2025-05-19 RX ORDER — PSYLLIUM HUSK 0.4 G
2 CAPSULE ORAL AS NEEDED
Qty: 180 TABLET | Refills: 1 | Status: SHIPPED | OUTPATIENT
Start: 2025-05-19

## 2025-05-19 RX ORDER — DIPHENHYDRAMINE HCL 12.5 MG/5ML
12.5 SOLUTION ORAL 2 TIMES DAILY
Qty: 30 ML | Refills: 5 | Status: SHIPPED | OUTPATIENT
Start: 2025-05-19

## 2025-05-19 RX ORDER — ALUMINUM HYDROXIDE, MAGNESIUM HYDROXIDE, SIMETHICONE 400; 400; 40 MG/10ML; MG/10ML; MG/10ML
30 SUSPENSION ORAL 2 TIMES DAILY PRN
Qty: 355 ML | Refills: 3 | Status: SHIPPED | OUTPATIENT
Start: 2025-05-19

## 2025-05-19 RX ORDER — PYRITHIONE ZINC 1 G/ML
2 LOTION/SHAMPOO TOPICAL
Qty: 30 TABLET | Refills: 1 | Status: SHIPPED | OUTPATIENT
Start: 2025-05-19

## 2025-05-19 RX ORDER — SENNOSIDES 8.6 MG
650 CAPSULE ORAL EVERY 8 HOURS PRN
Qty: 90 TABLET | Refills: 1 | Status: SHIPPED | OUTPATIENT
Start: 2025-05-19

## 2025-05-19 RX ORDER — TOLTERODINE 4 MG/1
4 CAPSULE, EXTENDED RELEASE ORAL DAILY
Qty: 30 CAPSULE | Refills: 5 | Status: SHIPPED | OUTPATIENT
Start: 2025-05-19

## 2025-05-19 RX ORDER — DESMOPRESSIN ACETATE 0.2 MG/1
0.4 TABLET ORAL
Qty: 90 TABLET | Refills: 1 | Status: SHIPPED | OUTPATIENT
Start: 2025-05-19 | End: 2025-11-15

## 2025-05-19 NOTE — TELEPHONE ENCOUNTER
Folder (To be completed by) -Dr. Samuel     Name of Form - PDS casenote        Form to be completed at visit    Patient was made aware of the 7-10 business day form policy.

## 2025-05-20 PROBLEM — Z76.0 MEDICATION REFILL: Status: ACTIVE | Noted: 2025-05-20

## 2025-05-20 PROBLEM — Z13.6 SCREENING FOR CARDIOVASCULAR CONDITION: Status: ACTIVE | Noted: 2025-05-20

## 2025-05-20 PROBLEM — E66.01 MORBID OBESITY WITH BMI OF 40.0-44.9, ADULT (HCC): Status: ACTIVE | Noted: 2025-05-20

## 2025-05-20 PROBLEM — L85.3 DRY SKIN: Status: ACTIVE | Noted: 2025-05-20

## 2025-05-20 NOTE — ASSESSMENT & PLAN NOTE
Orders:  •  cyanocobalamin (VITAMIN B-12) 500 MCG tablet; Take 1 tablet (500 mcg total) by mouth daily

## 2025-05-20 NOTE — ASSESSMENT & PLAN NOTE
Orders:  •  acetaminophen (TYLENOL) 650 mg CR tablet; Take 1 tablet (650 mg total) by mouth every 8 (eight) hours as needed for mild pain, moderate pain or headaches

## 2025-05-20 NOTE — ASSESSMENT & PLAN NOTE
Patient's diabetes has been well-managed and his last A1c was 6.0%.  He sees [facility] endocrinology for ongoing management.  -Agree with current medications  -Continue close follow-up with outpatient endocrinologist

## 2025-05-20 NOTE — ASSESSMENT & PLAN NOTE
Patient requesting trial of chewable gummy fiber supplements.  We have attempted to order MiraLAX Gummies in the past but they were not available through the pharmacy.  Today, we discussed trialing Metamucil Gummies.  Reviewed risks/benefits of transitioning from a daily MiraLAX supplement to a psyllium based fiber supplement.  -Start Metamucil Gummies 2 g p.o. daily  -Continue once daily MiraLAX  -Plan to return in 1 month to review effectiveness of new chewables.  If stool frequency has increased, we will plan to transition MiraLAX to daily as needed for no bowel movement greater than 24 hours  Orders:    Metamucil Fiber 2 g CHEW; Chew 2 g daily in the early morning

## 2025-05-20 NOTE — PROGRESS NOTES
Adult Annual Physical  Name: Manuel Back      : 2005      MRN: [id number]  Encounter Provider: Isak Samuel MD  Encounter Date: 2025   Encounter department: [facility] [place]    :  Assessment & Plan  Annual physical exam       19 y.o. male  presenting for routine annual physical.   No complaints today, doing well overall.   We reviewed recommended screenings based on age and personal risk factors.  Patient expressed understanding of the plan as discussed; all questions were answered.    Constipation, unspecified constipation type  Patient requesting trial of chewable gummy fiber supplements.  We have attempted to order MiraLAX Gummies in the past but they were not available through the pharmacy.  Today, we discussed trialing Metamucil Gummies.  Reviewed risks/benefits of transitioning from a daily MiraLAX supplement to a psyllium based fiber supplement.  -Start Metamucil Gummies 2 g p.o. daily  -Continue once daily MiraLAX  -Plan to return in 1 month to review effectiveness of new chewables.  If stool frequency has increased, we will plan to transition MiraLAX to daily as needed for no bowel movement greater than 24 hours  Orders:    Metamucil Fiber 2 g CHEW; Chew 2 g daily in the early morning    Dry skin  Patient requesting Chapstick for dry skin on and around lips  Orders:    Skin Protectants, Misc. (Lip-Care) STCK; Apply 1 Application topically 3 (three) times a day as needed (dry skin/lips)    Type 2 diabetes mellitus without complication, with long-term current use of insulin (HCC)  Patient's diabetes has been well-managed and his last A1c was 6.0%.  He sees [facility] endocrinology for ongoing management.  -Agree with current medications  -Continue close follow-up with outpatient endocrinologist  Screening for cardiovascular condition  Routine cardiovascular screening labs including lipid panel and CMP previously ordered by patient's  endocrinologist.  -Will follow-up results once collected and completed       Morbid obesity with BMI of 40.0-44.9, adult ([facility])  Discussed importance of regular physical activity and healthy dietary choices.  - Recommended 150 minutes of moderate physical activity weekly   - Recommended patient eat well balanced diet with focus on fruits, vegetable, whole grains, and fiber   Medication refill  Patient's medications will be managed at a new pharmacy.  Caregivers are requesting refills be sent to new pharmacy today.  -Multiple refills sent for both daily and as needed medications    Orders:    acetaminophen (TYLENOL) 650 mg CR tablet; Take 1 tablet (650 mg total) by mouth every 8 (eight) hours as needed for mild pain, moderate pain or headaches    aluminum-magnesium hydroxide-simethicone (MAALOX) 200-200-20 MG/5ML SUSP; Take 30 mL by mouth 2 (two) times a day as needed for heartburn    cholecalciferol (VITAMIN D3) 1,000 units tablet; Take 1 tablet (1,000 Units total) by mouth daily    cyanocobalamin (VITAMIN B-12) 500 MCG tablet; Take 1 tablet (500 mcg total) by mouth daily    desmopressin (DDAVP) 0.2 mg tablet; Take 2 tablets (0.4 mg total) by mouth daily at bedtime    diphenhydrAMINE (BENADRYL) 12.5 mg/5 mL oral liquid; Take 5 mL (12.5 mg total) by mouth 2 (two) times a day    Melatonin Maximum Strength 5 MG TABS; Take 2 tablets (10 mg total) by mouth if needed (for sleep)    tolterodine (DETROL LA) 4 mg 24 hr capsule; Take 1 capsule (4 mg total) by mouth daily    senna-docusate sodium (Senna-Plus) 8.6-50 mg per tablet; Take 1 tablet by mouth 2 (two) times a day      Preventive Screenings:  - Diabetes Screening: screening not indicated and has diabetes  - Cholesterol Screening: orders placed   - Hepatitis C screening: screening up-to-date   - HIV screening: screening up-to-date   - Colon cancer screening: screening not indicated   - Lung cancer screening: screening not indicated   - Prostate cancer screening:  screening not indicated     Immunizations:  - Immunizations due: Prevnar 20 and HPV (Gardasil 9)    Counseling/Anticipatory Guidance:    - Dental health: discussed importance of regular tooth brushing, flossing, and dental visits.   - Diet: discussed recommendations for a healthy/well-balanced diet.   - Exercise: the importance of regular exercise/physical activity was discussed. Recommend exercise 3-5 times per week for at least 30 minutes.   - Injury prevention: discussed safety/seat belts, safety helmets, smoke detectors, carbon monoxide detectors, and smoking near bedding or upholstery.     BMI Counseling: Body mass index is 43.29 kg/m². The BMI is above normal. Nutrition recommendations include encouraging healthy choices of fruits and vegetables and limiting drinks that contain sugar. Exercise recommendations include moderate physical activity 150 minutes/week. Pharmacotherapy was ordered to help aid in weight loss. Patient referred to PCP. Rationale for BMI follow-up plan is due to patient being overweight or obese.         History of Present Illness     Adult Annual Physical:  Patient presents for annual physical.     Diet and Physical Activity:  - Diet/Nutrition: diabetic diet and consuming 3-5 servings of fruits/vegetables daily.  - Exercise: walking and 3-4 times a week on average.    Depression Screening:  - PHQ-2 Score: 0  - PHQ-9 Score: 0    General Health:  - Sleep: sleeps well and uses CPAP machine.  - Hearing: normal hearing bilateral ears.  - Vision: no vision problems. Vision screening completed today in office.  Patient's vision is 20/20, corrected.  - Dental: regular dental visits.     Health:  - History of STDs: no.   - Urinary symptoms: none.     Review of Systems   Constitutional:  Negative for chills, fever and unexpected weight change.   HENT: Negative.     Eyes:  Negative for photophobia and visual disturbance.   Respiratory:  Negative for cough and shortness of breath.   "  Cardiovascular:  Negative for chest pain and palpitations.   Gastrointestinal:  Negative for abdominal pain and vomiting.   Genitourinary:  Negative for hematuria.   Musculoskeletal:  Negative for back pain.   Skin:  Negative for rash.   Neurological:  Negative for dizziness.   Psychiatric/Behavioral: Negative.     All other systems reviewed and are negative.    Medical History Reviewed by provider this encounter:     .    Objective   /84 (BP Location: Left arm, Patient Position: Sitting, Cuff Size: Large)   Pulse 92   Temp 98.4 °F (36.9 °C) (Temporal)   Resp 17   Ht 5' 7.1\" (1.704 m)   Wt 126 kg (277 lb 3.2 oz)   SpO2 98%   BMI 43.29 kg/m²     Physical Exam  Vitals reviewed.   Constitutional:       General: He is not in acute distress.     Appearance: Normal appearance. He is well-developed.   HENT:      Head: Normocephalic and atraumatic.      Right Ear: Tympanic membrane, ear canal and external ear normal. There is no impacted cerumen.      Left Ear: Tympanic membrane, ear canal and external ear normal. There is no impacted cerumen.      Nose: Nose normal.      Mouth/Throat:      Mouth: Mucous membranes are moist.      Pharynx: Oropharynx is clear.     Eyes:      Extraocular Movements: Extraocular movements intact.      Conjunctiva/sclera: Conjunctivae normal.      Pupils: Pupils are equal, round, and reactive to light.       Cardiovascular:      Rate and Rhythm: Normal rate and regular rhythm.      Pulses: Normal pulses.      Heart sounds: Normal heart sounds. No murmur heard.  Pulmonary:      Effort: Pulmonary effort is normal. No respiratory distress.      Breath sounds: Normal breath sounds.   Abdominal:      General: Abdomen is flat. Bowel sounds are normal. There is no distension.      Palpations: Abdomen is soft.      Tenderness: There is no abdominal tenderness.     Musculoskeletal:         General: No swelling.      Cervical back: Neck supple.   Lymphadenopathy:      Cervical: No cervical " adenopathy.     Skin:     General: Skin is warm and dry.      Capillary Refill: Capillary refill takes less than 2 seconds.     Neurological:      General: No focal deficit present.      Mental Status: He is alert and oriented to person, place, and time.     Psychiatric:         Mood and Affect: Mood normal.         Behavior: Behavior normal.       Isak Samuel MD

## 2025-05-20 NOTE — ASSESSMENT & PLAN NOTE
19 y.o. male  presenting for routine annual physical.   No complaints today, doing well overall.   We reviewed recommended screenings based on age and personal risk factors.  Patient expressed understanding of the plan as discussed; all questions were answered.

## 2025-05-20 NOTE — ASSESSMENT & PLAN NOTE
Routine cardiovascular screening labs including lipid panel and CMP previously ordered by patient's endocrinologist.  -Will follow-up results once collected and completed

## 2025-05-20 NOTE — ASSESSMENT & PLAN NOTE
Orders:  •  senna-docusate sodium (Senna-Plus) 8.6-50 mg per tablet; Take 1 tablet by mouth 2 (two) times a day

## 2025-05-20 NOTE — ASSESSMENT & PLAN NOTE
Orders:  •  desmopressin (DDAVP) 0.2 mg tablet; Take 2 tablets (0.4 mg total) by mouth daily at bedtime  •  tolterodine (DETROL LA) 4 mg 24 hr capsule; Take 1 capsule (4 mg total) by mouth daily

## 2025-05-20 NOTE — ASSESSMENT & PLAN NOTE
Orders:  •  diphenhydrAMINE (BENADRYL) 12.5 mg/5 mL oral liquid; Take 5 mL (12.5 mg total) by mouth 2 (two) times a day

## 2025-05-20 NOTE — ASSESSMENT & PLAN NOTE
Orders:  •  acetaminophen (TYLENOL) 650 mg CR tablet; Take 1 tablet (650 mg total) by mouth every 8 (eight) hours as needed for mild pain, moderate pain or headaches   yes

## 2025-05-20 NOTE — ASSESSMENT & PLAN NOTE
Orders:  •  Melatonin Maximum Strength 5 MG TABS; Take 2 tablets (10 mg total) by mouth if needed (for sleep)

## 2025-05-20 NOTE — ASSESSMENT & PLAN NOTE
Patient requesting Chapstick for dry skin on and around lips  Orders:    Skin Protectants, Misc. (Lip-Care) STCK; Apply 1 Application topically 3 (three) times a day as needed (dry skin/lips)

## 2025-05-20 NOTE — ASSESSMENT & PLAN NOTE
Patient's medications will be managed at a new pharmacy.  Caregivers are requesting refills be sent to new pharmacy today.  -Multiple refills sent for both daily and as needed medications    Orders:    acetaminophen (TYLENOL) 650 mg CR tablet; Take 1 tablet (650 mg total) by mouth every 8 (eight) hours as needed for mild pain, moderate pain or headaches    aluminum-magnesium hydroxide-simethicone (MAALOX) 200-200-20 MG/5ML SUSP; Take 30 mL by mouth 2 (two) times a day as needed for heartburn    cholecalciferol (VITAMIN D3) 1,000 units tablet; Take 1 tablet (1,000 Units total) by mouth daily    cyanocobalamin (VITAMIN B-12) 500 MCG tablet; Take 1 tablet (500 mcg total) by mouth daily    desmopressin (DDAVP) 0.2 mg tablet; Take 2 tablets (0.4 mg total) by mouth daily at bedtime    diphenhydrAMINE (BENADRYL) 12.5 mg/5 mL oral liquid; Take 5 mL (12.5 mg total) by mouth 2 (two) times a day    Melatonin Maximum Strength 5 MG TABS; Take 2 tablets (10 mg total) by mouth if needed (for sleep)    tolterodine (DETROL LA) 4 mg 24 hr capsule; Take 1 capsule (4 mg total) by mouth daily    senna-docusate sodium (Senna-Plus) 8.6-50 mg per tablet; Take 1 tablet by mouth 2 (two) times a day

## 2025-05-20 NOTE — ASSESSMENT & PLAN NOTE
Orders:  •  cholecalciferol (VITAMIN D3) 1,000 units tablet; Take 1 tablet (1,000 Units total) by mouth daily

## 2025-05-20 NOTE — ASSESSMENT & PLAN NOTE
Orders:  •  aluminum-magnesium hydroxide-simethicone (MAALOX) 200-200-20 MG/5ML SUSP; Take 30 mL by mouth 2 (two) times a day as needed for heartburn

## 2025-05-21 DIAGNOSIS — E11.69 TYPE 2 DIABETES MELLITUS WITH OTHER SPECIFIED COMPLICATION, UNSPECIFIED WHETHER LONG TERM INSULIN USE (HCC): ICD-10-CM

## 2025-05-21 NOTE — TELEPHONE ENCOUNTER
Medication: semaglutide, 0.25 or 0.5 mg/dose, (Ozempic, 0.25 or 0.5 MG/DOSE,) 2 mg/3 mL injection pen    Dose/Frequency: Inject 0.75 mL (0.5 mg total) under the skin every 7 days     Quantity: 2 mL    Pharmacy: [facility]- [facility]    Office:   [] PCP/Provider -   [x] Speciality/Provider - Endo/ [facility]    Does the patient have enough for 3 days?   [] Yes   [x] No - Send as HP to POD

## 2025-05-21 NOTE — TELEPHONE ENCOUNTER
Called and spoke to caregiver Luz to update her that Ozempic script was sent to [facility] pharmacy as she requested earlier. E-Prescribing Status: Receipt confirmed by pharmacy (5/21/2025 10:06 AM EDT)  Advised to call pharmacy to see if medication is ready for p/u. Luz verbalized understanding of all. Voiced no further questions/concerns

## 2025-05-22 DIAGNOSIS — E11.65 DIABETES MELLITUS WITH HYPERGLYCEMIA (HCC): ICD-10-CM

## 2025-05-22 RX ORDER — BLOOD SUGAR DIAGNOSTIC
STRIP MISCELLANEOUS
Qty: 100 EACH | Refills: 1 | Status: SHIPPED | OUTPATIENT
Start: 2025-05-22

## 2025-05-27 DIAGNOSIS — E11.65 DIABETES MELLITUS WITH HYPERGLYCEMIA (HCC): ICD-10-CM

## 2025-05-27 RX ORDER — INSULIN LISPRO 100 [IU]/ML
INJECTION, SUSPENSION SUBCUTANEOUS
Qty: 15 ML | Refills: 3 | Status: SHIPPED | OUTPATIENT
Start: 2025-05-27

## 2025-05-29 DIAGNOSIS — R00.0 TACHYCARDIA: ICD-10-CM

## 2025-05-29 RX ORDER — METOPROLOL SUCCINATE 50 MG/1
50 TABLET, EXTENDED RELEASE ORAL DAILY
Qty: 90 TABLET | Refills: 1 | Status: SHIPPED | OUTPATIENT
Start: 2025-05-29

## 2025-06-02 ENCOUNTER — TELEPHONE (OUTPATIENT)
Dept: ENDOCRINOLOGY | Facility: CLINIC | Age: 20
End: 2025-06-02

## 2025-06-02 NOTE — TELEPHONE ENCOUNTER
PA for Ozempic, 0.25 or 0.5 MG/DOSE SUBMITTED to [facility]    via  [x][employer]-Case ID # [id number]  [x]PA sent as URGENT    All office notes, labs and other pertaining documents and studies sent. Clinical questions answered. Awaiting determination from insurance company.     Turnaround time for your insurance to make a decision on your Prior Authorization can take 7-21 business days.

## 2025-06-03 NOTE — TELEPHONE ENCOUNTER
PA for Ozempic, 0.25 or 0.5 MG  APPROVED     Date(s) approved 6/2/25-6/2/26    Case #[id number]    Patient advised by          []MyChart Message  []Phone call   []LMOM  [x]L/M to call office as no active Communication consent on file  []Unable to leave detailed message as VM not approved on Communication consent       Pharmacy advised by    [x]Fax  []Phone call  []Secure Chat    Specialty Pharmacy    []      Approval letter scanned into Media Yes

## 2025-06-05 ENCOUNTER — CLINICAL SUPPORT (OUTPATIENT)
Dept: NUTRITION | Facility: HOSPITAL | Age: 20
End: 2025-06-05
Payer: COMMERCIAL

## 2025-06-05 VITALS — WEIGHT: 281.6 LBS | BODY MASS INDEX: 43.97 KG/M2

## 2025-06-05 DIAGNOSIS — E66.01 OBESITY, MORBID (MORE THAN 100 LBS OVER IDEAL WEIGHT OR BMI > 40) (HCC): Primary | ICD-10-CM

## 2025-06-05 NOTE — PROGRESS NOTES
" Nutrition Assessment Form    Patient Name: Manuel Back    YOB: 2005    Sex: Male     Assessment Date: 6/5/2025  Start Time: 1250 Stop Time: 120 Total Minutes: 30     Data:  Present at session: Self and 2 staff members (Nic and Osei)   Parent/Patient Concerns/reason for visit: \"I'm doing ok\"   Medical Dx/Reason for Referral: obesity   Past Medical History:   Diagnosis Date    Chronic headaches     Cognitive impairment     Diabetes type 2, controlled (HCC)     Psychiatric illness        Current Outpatient Medications   Medication Sig Dispense Refill    acetaminophen (TYLENOL) 650 mg CR tablet Take 1 tablet (650 mg total) by mouth every 8 (eight) hours as needed for mild pain, moderate pain or headaches 90 tablet 1    Alcohol Swabs (Alcohol Prep) 70 % PADS USE AS DIRECTED TO TEST BLOOD SUGAR DAILY @8AM (DM) 200 each 2    aluminum-magnesium hydroxide-simethicone (MAALOX) 200-200-20 MG/5ML SUSP Take 30 mL by mouth 2 (two) times a day as needed for heartburn 355 mL 3    Blood Glucose Monitoring Suppl (OneTouch Verio) w/Device KIT daily 1 kit 0    chlorproMAZINE (THORAZINE) 100 mg tablet Take 1 tablet (100 mg total) by mouth 2 (two) times a day One in the morning, One in the afternoon. 60 tablet 1    chlorproMAZINE (THORAZINE) 50 mg tablet Take 3 tablets (150 mg total) by mouth daily at bedtime 90 tablet 1    cholecalciferol (VITAMIN D3) 1,000 units tablet Take 1 tablet (1,000 Units total) by mouth daily 90 tablet 3    clonazePAM (KlonoPIN) 1 mg tablet Take 1 tablet (1 mg total) by mouth daily 30 tablet 0    cloNIDine (CATAPRES) 0.2 mg tablet Take 1 tablet (0.2 mg total) by mouth 3 (three) times a day 90 tablet 1    Continuous Glucose  (FreeStyle Alin 2 [place]) DMITRY Use per  guidelines 1 each 0    Continuous Glucose Sensor (FreeStyle Alin 2 Sensor) MISC Change every 14 days per  guidelines 2 each 5    cyanocobalamin (VITAMIN B-12) 500 MCG tablet Take 1 tablet " (500 mcg total) by mouth daily 30 tablet 11    desmopressin (DDAVP) 0.2 mg tablet Take 2 tablets (0.4 mg total) by mouth daily at bedtime 90 tablet 1    diphenhydrAMINE (BENADRYL) 12.5 mg/5 mL oral liquid Take 5 mL (12.5 mg total) by mouth 2 (two) times a day 30 mL 5    divalproex sodium (DEPAKOTE ER) 250 mg 24 hr tablet Take 1 tablet (250 mg total) by mouth daily at bedtime 30 tablet 1    divalproex sodium (Depakote) 500 mg DR tablet Take 3 tablets (1,500 mg total) by mouth daily at bedtime 90 tablet 3    glucose blood (OneTouch Verio) test strip Daily 100 each 1    guaiFENesin (ROBITUSSIN) 100 mg/5 mL syrup Take 10 mL (200 mg total) by mouth 3 (three) times a day as needed for cough 473 mL 0    ibuprofen (MOTRIN) 400 mg tablet Take 1 tablet (400 mg total) by mouth every 6 (six) hours as needed for mild pain 90 tablet 1    Incontinence Supply Disposable (Comfort Shield Adult Diapers) MISC Use 1 packet 4 (four) times a day 48 each 11    Insulin Lispro Prot & Lispro (Insulin Lispro Prot & Lispro) (75-25) 100 units/mL injection pen Take 20 units in the morning and 16 units in the evening 15 mL 3    Insulin Pen Needle (GNP UltiCare Pen Needles) 32G X 4 MM MISC USE WITH INSULIN PEN TWICE A DAY. 100 each 3    Lancets (onetouch ultrasoft) lancets Daily. 100 each 1    Melatonin Maximum Strength 5 MG TABS Take 2 tablets (10 mg total) by mouth if needed (for sleep) 180 tablet 1    Metamucil Fiber 2 g CHEW Chew 2 g daily in the early morning 30 tablet 1    metFORMIN (GLUCOPHAGE) 1000 MG tablet TAKE 1 TABLET BY MOUTH TWICE DAILY W/ MEALS AT 8AM-5PM (DM) *ABIMBOLA 68 tablet 0    metoprolol succinate (TOPROL-XL) 50 mg 24 hr tablet Take 1 tablet (50 mg total) by mouth daily 90 tablet 1    OLANZapine (ZyPREXA) 20 MG tablet Take 1 tablet (20 mg total) by mouth daily at bedtime 30 tablet 5    paliperidone (INVEGA) 6 MG 24 hr tablet Take 1 tablet (6 mg total) by mouth every morning 30 tablet 2    polyethylene glycol (MIRALAX) 17 g  "packet Take 17 g by mouth daily 510 g 3    semaglutide, 0.25 or 0.5 mg/dose, (Ozempic, 0.25 or 0.5 MG/DOSE,) 2 mg/3 mL injection pen Inject 0.75 mL (0.5 mg total) under the skin every 7 days 2 mL 1    senna-docusate sodium (Senna-Plus) 8.6-50 mg per tablet Take 1 tablet by mouth 2 (two) times a day 60 tablet 3    Skin Protectants, Misc. (Lip-Care) STCK Apply 1 Application topically 3 (three) times a day as needed (dry skin/lips) 4.2 Stick 3    Sunscreens (Coppertone Complete SPF30) LOTN Apply 1 Application topically if needed (for application before sun exposure) 207 mL 3    tolterodine (DETROL LA) 4 mg 24 hr capsule Take 1 capsule (4 mg total) by mouth daily 30 capsule 5     No current facility-administered medications for this visit.        Additional Meds/Supplements: N/a   Special Learning Needs/barriers to learning/any new barriers N/a   Height: HC Readings from Last 5 Encounters:   No data found for HC      Weight: Wt Readings from Last 10 Encounters:   05/19/25 126 kg (277 lb 3.2 oz)   03/11/25 129 kg (284 lb)   01/08/25 133 kg (292 lb 12.8 oz)   12/30/24 132 kg (291 lb)   12/20/24 135 kg (297 lb 2.9 oz)   12/03/24 135 kg (298 lb 6.4 oz)   11/14/24 136 kg (299 lb)   10/09/24 (!) 137 kg (302 lb 3.2 oz)   09/30/24 (!) 137 kg (303 lb)   09/27/24 135 kg (298 lb)     Estimated body mass index is 43.29 kg/m² as calculated from the following:    Height as of 5/19/25: 5' 7.1\" (1.704 m).    Weight as of 5/19/25: 126 kg (277 lb 3.2 oz).   Recent Weight Change: [x]Yes     []No  Amount: 11# loss desired and intentional      Energy Needs: 1840cals (20cals/kg-1000 for 2#/wk wt loss)   Allergies   Allergen Reactions    Bee Pollen Other (See Comments)    Cholecalciferol Other (See Comments)    Pollen Extract Sneezing    or intolerances NKFA   Social History     Substance and Sexual Activity   Alcohol Use Never    N/a   Social History     Tobacco Use   Smoking Status Never    Passive exposure: Past   Smokeless Tobacco Never "    N/a   Who shops? Group home   Who cooks/cooking methods/Eating out/take out habits   Group home  Cooking methods: bake/burroughs/air burroughs/grill/boil/other________    Minimal eating out   Exercise: Plays football in the spring and summer time   Other: ie: Sleep habits/ stress level/ work habits household-lives with ?/ food security Stress level 2/10 scale  Sleep schedule currently off- sleeping all day and up all night was in psychiatric hospital last month and that is when his sleep schedule changed- trying to get back on schedule now  Currently going to graduate [employer] this May- he is nervous   Prior Nutritional Counseling? []Yes     [x]No  When:      Why:         Diet Hx: smoothies and water 40oz approx and milk- 2% milk - fruit/hawaiian punch (20oz)  Breakfast: Diet:  mostly skips B and lunch because he is sleeping all day   Lunch: He will have letha side up eggs-        Dinner:  smoothie a few times a week maybe 3- 2-3c milk and few cups of frozen fruit and plain greek yogurt if it is avaiable, 24oz per smoothie and will have 3x/wk 7-8pm- will have 2-3 thighs and 2-3 cups rice not many vegetables     Snacks:  will snack on fruits most days but candy on Wednesdays when he gest paid AM -   PM - 330pm   HS -    Other Notes/ Initial Assessment:  Manuel is here with 2 staff members from his group home.  He is a senior in high school and would like to lose weight, his sleep schedule is currently off because of his stay on the hospital last month.    Discussed importance of being active,  regular sleep schedule, regular meals, daily activity, encouraged trying new foods and increasing vegetables intake in general, minimizing calories from drinks, portions per plate method.  Provided Tips for wt loss and [address] sample menus and healthy snack ideas, RD name and number for home use.   Follow up scheduled for May 8th.  Pt is self pay.    Updated assessment (Follow up note only): 5/8/24 Manuel is here late for his  appointment with 2 staff member since he did not want to wake up this morning.  He is sleeping too late sometimes and will miss breakfast.  Staff reporting he is sleeping until 2-3pm in the afternoon, going to bed at 4am.   He will also sometimes take a snack at night.  He has an appointment with his PCP next week and they plan to make a sleeping chart. Discussed no wt loss with Manuel he is reporting he did not make any changes.  Reinforced if he wants to lose weight he needs to make some changes. He has minimal activity.  He will snack on fruits if anything,  Staff reporting Manuel has total freedom with his food at his current house and he is unable to control it or himself.  He will just fix himself food if they do not feed him.they are asking for calorie limits for Manuel- however RN reporting unable to limit intake at home.  Discussed portions per the plate method with Manuel and staff.  Staff reporting drinking mainly water and a cup of juice every once in a while.  Follow up scheduled.    7/17/24  Manuel is here to follow up with his wt which shows no change.  He eats 3 meals a day, mainly - though staff reporting he will sometimes skip breakfast.  He will eat out a few times a week but he has small portions.  He likes to eat out [place] [facility] [place] or other [place] place.  He is trying to eat smoothies.  His activity includes walking 3x/wk .5 mile.  He might snack on fruits but otherwise he will not snack.  He does eat a lot of salads- he mainly drinks water during the day and some almond milk mixed with 1%.       10/9/24  Manuel is her with a couple staff members to follow up with his weight.  He has lost 13# which he is pleased with, he has increased his walking. He states he is feeling better.  He has now become a diabetic. Staff is asking for information on low sodium low carbs and sugar free food items.  He now has ozempic once a week added and metformin.  Provided 1800 cals information and sample  menus and portions per the plate method.  Recommending 1230-1281 cals, 30-60gms carbs per meal, and 15gms carbs per snack.  Also encouraged activity 30 mins most days.   Follow up made for 3 months.    1/8/25  Manuel is here with another 10# wt loss- he has been eating more vegetables fruits and whole grains, he is on ozempic and insulin.  His sleeping has been off he has been going to sleep for 2-3am and waking  up at 1-2pm. Meds and wts from facility reviewed and food diary.  He is going for asleep study next weekend.  Wts from facility - tracked daily- ranging from 286-292 for the month of January.  BG has been running  for the month.  His activity has decreased in the winter time- walking int he community - stores, mall, also has some weights, but does not use often (maybe 2-3x/wk.  He will go to [employer] (or five guys) once a week ($20/wk) and will eat out that day only.  He is mainly drinking zero sugar juice drink- SF fruit punch or lemonade.  Needs to increase water intake.  He might also drink almond milk.  He might also have some smoothies with oatmeal frozen fruit spinach and peanut butter or yogurt.  With his delayed waking patter he will usually have 2 big meals and then a snack or 2.  His goals next time would be to increase his water intake and his activity.  Follow up scheduled for 3 months.    6/5/25  Follow up with Manuel find him here with staff members.he is down another 10# but he thought it should be more. He is going to sleep at 9-10 and sleeping 9-12 hours at a time. He says he should be walking more but he is sleeping instead. He gest his money one a week.  The house gest groceries twice a week and he does not stop eating until it is gone. He does not have self control with food or drinks or money.  He states its part of the schizophrenia issues he has.  He mainly drinks juice and milk during the day.  The juice is diet.  Staff reporting he does not like the tap water the last time he  has bottles water he was using it inappropriately so they stopped buying it.   They are checking his weights daily at the house and it is fairly stable in the 179-181# range.  BG monitoring was 110-149.  Encouraged increased water drinking- ernestine is ok.  He does not eat out.  Portions sizes are too large still.  Went over portions with Manuel, using the plate method.  Follow up made.       Nutrition Diagnosis:   Overweight/obesity  related to Excess energy intake as evidenced by  BMI more than normative standard for age and sex (obesity-grade III 40+)       Any change or new dx since previous visit:     Nutrition Diagnosis:         Medical Nutrition Therapy Intervention:  [x]Individualized Meal Plan-Discussed the importance of eating 3 meals daily and not skipping, and how metabolism is affected.  Also discussed adding in small snacks or 5-6 small meals daily if desired vs 3 larger ones, and appropriate options and portions.  Appropriate timing of meals, including eating within 1 hr of waking and eating meals slowly 20mins/meals, minimizing mindless/boredom or habitual eating, etc was also mentioned.  Discussed the plate method of portioning foods, including half a plate fruits and vegetables or a half plate all vegetables, 1/4 of the plate a lean protein source or meat, and a 1/4 of the plate being a whole grain carb- usually 1/2-1c.  This should be followed for at least 2 meals of the day, but could also be followed for all 3.  \Fluid intake discussed and appropriate amounts and choices, 16 oz with meals and additional 32oz during the day.  S/S dehydration also covered.Discussed the importance of trying new foods 12-16x, and retraining taste buds to like new foods.  Discussed how taste buds will change over the course of a lifetime.  Also included trying new foods at the beginning of a meal when you are the hungriest and more apt to like a new food and be more accepting of it. []Understanding Lab Values    []Basic Pathophysiology of Disease []Food/Medication Interactions   []Food Diary [x]Exercise-150 mins of moderate activity weekly, discussed importance of variety of activity, including wt bearing activities 2-3x/wk x 10-15mins. Discussed importance of activity throughout the lifecycle and its impact on overall health/stress management, etc.   [x]Lifestyle/Behavior Modification Techniques-Discussed the importance of adequate sleep, and ideal sleeping conditions, including a cool temperature 64-68 degrees F, and a dark and quiet room. Also discussed the importance of a regular and consistent sleep routine, including minimizing blue light exposure an hour before sleeping.  Weekend habits should include staying fairly consistent with weekday sleep habits to minimize disruption during the week.  A connection was made between getting adequate and good quality sleep and the ability to handle stress the next day, make healthy food choices, and be active. []Medication, Mechanism of Action   []Label Reading: CHO/ Na/ Fat/ other_________ []Self Blood Glucose Monitoring   []Weight/BMI Goals: gain/lose/maintain []Other -           Comprehension: []Excellent  []Very Good  [x]Good  []Fair   []Poor    Receptivity: []Excellent  []Very Good  [x]Good  []Fair   []Poor    Expected Compliance: []Excellent  []Very Good  []Good  [x]Fair   [x]Poor        Goals (initial)/ Progress made on previous goals/new goals:   3 meals daily no skipping- has achieved continue   2.  Portions per plate method as dicussed with RD- has achieved continue   3. Minimize calories from drinks  has achieved continue  4. 30-60gms carbs per meal and 15gms at snack       No follow-ups on file.  Labs:  CMP  Lab Results   Component Value Date    K 4.3 03/03/2025     03/03/2025    CO2 30 03/03/2025    BUN 11 03/03/2025    CREATININE 1.13 03/03/2025    GLUF 100 (H) 03/03/2025    CALCIUM 9.4 03/03/2025    CORRECTEDCA 9.7 05/04/2023    AST 53 (H) 03/03/2025     "ALT 66 (H) 03/03/2025    ALKPHOS 84 03/03/2025    EGFR 93 03/03/2025       BMP  Lab Results   Component Value Date    CALCIUM 9.4 03/03/2025    K 4.3 03/03/2025    CO2 30 03/03/2025     03/03/2025    BUN 11 03/03/2025    CREATININE 1.13 03/03/2025       Lipids  No results found for: \"CHOL\"  Lab Results   Component Value Date    HDL 27 (L) 12/02/2024    HDL 23 (L) 11/13/2024    HDL 26 (L) 10/18/2024     Lab Results   Component Value Date    LDLCALC 24 12/02/2024    LDLCALC 34 11/13/2024    LDLCALC 28 10/18/2024     Lab Results   Component Value Date    TRIG 369 (H) 12/02/2024    TRIG 286 (H) 11/13/2024    TRIG 291 (H) 10/18/2024     No results found for: \"CHOLHDL\"    Hemoglobin A1C  Lab Results   Component Value Date    HGBA1C 6.0 (H) 03/03/2025       Fasting Glucose  Lab Results   Component Value Date    GLUF 100 (H) 03/03/2025       Insulin     Thyroid  Lab Results   Component Value Date    TSH 1.50 05/21/2023       Hepatic Function Panel  Lab Results   Component Value Date    ALT 66 (H) 03/03/2025    AST 53 (H) 03/03/2025    ALKPHOS 84 03/03/2025       Celiac Disease Antibody Panel  No results found for: \"ENDOMYSIAL IGA\", \"GLIADIN IGA\", \"GLIADIN IGG\", \"IGA\", \"TISSUE TRANSGLUT AB\", \"TTG IGA\"   Iron  No results found for: \"IRON\", \"TIBC\", \"FERRITIN\"         Asha Paez RD  [facility]  [address] [id number]    "

## 2025-06-09 DIAGNOSIS — E11.69 TYPE 2 DIABETES MELLITUS WITH OTHER SPECIFIED COMPLICATION, UNSPECIFIED WHETHER LONG TERM INSULIN USE (HCC): ICD-10-CM

## 2025-06-19 PROBLEM — Z13.6 SCREENING FOR CARDIOVASCULAR CONDITION: Status: RESOLVED | Noted: 2025-05-20 | Resolved: 2025-06-19

## 2025-06-23 ENCOUNTER — HOSPITAL ENCOUNTER (OUTPATIENT)
Dept: SLEEP CENTER | Facility: CLINIC | Age: 20
Discharge: HOME | End: 2025-06-23
Payer: COMMERCIAL

## 2025-06-23 DIAGNOSIS — G47.33 OSA (OBSTRUCTIVE SLEEP APNEA): ICD-10-CM

## 2025-06-23 PROCEDURE — 95811 POLYSOM 6/>YRS CPAP 4/> PARM: CPT

## 2025-06-23 PROCEDURE — 95811 POLYSOM 6/>YRS CPAP 4/> PARM: CPT | Performed by: PSYCHIATRY & NEUROLOGY

## 2025-06-24 NOTE — PROGRESS NOTES
Sleep Study Documentation    Pre-Sleep Study       Sleep testing procedure explained to patient:YES    Patient napped prior to study:YES- more than 30 minutes. Napped after 2PM: no    Caffeine:Dayshift worker after 12PM.  Caffeine use:YES- soda  12 to 26 ounces and chocolate  6 to 18 ounces    Alcohol:Dayshift workers after 5PM: Alcohol use:NO    Typical day for patient:YES         Study Documentation    Sleep Study Indications: snoring BMI > 30    Montage used: Standard    Transcutaneous CO2 used: NO    Sleep Study Type:     CPAP Study     Treatment: Mode of Therapy:CPAP  CPAP changed to BiPAP:No    Optimal PAP pressure: 6 cm h20   Leak:None  Snore:Eliminated  PAP pressure at which snoring was eliminated 5 cm h20       PAP Masks  PAP mask tried Skeeble Paykel Simplus  PAP mask choice Skeeble Paykel Simplus  PAP mask type:full face      PAP- Post-Study  PAP treatment:yes: Post PAP treatment patient reports feeling better and  would wear PAP mask at home.      Oxygen Data  Supplemental O2 used: No      EKG/EEG/Abnormal Behaviors   EKG abnormalities: no None    EEG abnormalities: no    Were abnormal behaviors in sleep observed:NO  No    Snoring    Character:  Soft    Frequency: Rare        Is Total Sleep Study Recording Time < 2 hours: N/A    Is Total Sleep Study Recording Time > 2 hours but study is incomplete: N/A    Is Total Sleep Study Recording Time 6 hours or more but sleep was not obtained: NO        Post-Sleep Study    Medication used at bedtime or during sleep study:YES other prescription medications    Patient reports time it took to fall asleep:greater than 60 minutes    Patient reports waking up during study:1 to 2 times.  Patient reports returning to sleep in greater than 30 minutes.    Patient reports sleeping 6 to 8 hours without dreaming.    Does the Patient feel this is a typical night of sleep:better than usual    Patient rated sleepiness: Somewhat sleepy or tired

## 2025-06-25 DIAGNOSIS — J30.2 SEASONAL ALLERGIES: Primary | ICD-10-CM

## 2025-06-25 RX ORDER — LORATADINE 10 MG/1
10 TABLET ORAL DAILY
Qty: 30 TABLET | Refills: 0 | Status: SHIPPED | OUTPATIENT
Start: 2025-06-25 | End: 2025-07-25

## 2025-06-25 RX ORDER — LORATADINE 10 MG/1
10 TABLET ORAL DAILY
COMMUNITY
Start: 2025-06-11 | End: 2025-06-25 | Stop reason: SDUPTHER

## 2025-06-26 ENCOUNTER — OFFICE VISIT (OUTPATIENT)
Dept: FAMILY MEDICINE CLINIC | Facility: CLINIC | Age: 20
End: 2025-06-26

## 2025-06-26 ENCOUNTER — TELEPHONE (OUTPATIENT)
Dept: FAMILY MEDICINE CLINIC | Facility: CLINIC | Age: 20
End: 2025-06-26

## 2025-06-26 VITALS
TEMPERATURE: 97.1 F | HEART RATE: 89 BPM | OXYGEN SATURATION: 99 % | BODY MASS INDEX: 43.22 KG/M2 | RESPIRATION RATE: 16 BRPM | DIASTOLIC BLOOD PRESSURE: 81 MMHG | WEIGHT: 276.8 LBS | SYSTOLIC BLOOD PRESSURE: 119 MMHG
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
## 2025-06-26 DIAGNOSIS — E11.9 TYPE 2 DIABETES MELLITUS WITHOUT COMPLICATION, WITH LONG-TERM CURRENT USE OF INSULIN (HCC): ICD-10-CM

## 2025-06-26 DIAGNOSIS — L85.3 DRY SKIN DERMATITIS: ICD-10-CM

## 2025-06-26 DIAGNOSIS — K59.00 CONSTIPATION, UNSPECIFIED CONSTIPATION TYPE: Primary | ICD-10-CM

## 2025-06-26 DIAGNOSIS — Z79.4 TYPE 2 DIABETES MELLITUS WITHOUT COMPLICATION, WITH LONG-TERM CURRENT USE OF INSULIN (HCC): ICD-10-CM

## 2025-06-26 PROCEDURE — 99213 OFFICE O/P EST LOW 20 MIN: CPT | Performed by: FAMILY MEDICINE

## 2025-06-26 RX ORDER — POLYETHYLENE GLYCOL 3350 17 G/17G
17 POWDER, FOR SOLUTION ORAL DAILY PRN
Qty: 510 G | Refills: 3 | Status: SHIPPED | OUTPATIENT
Start: 2025-06-26 | End: 2025-10-24

## 2025-06-26 RX ORDER — CONTAINER,EMPTY
1 EACH MISCELLANEOUS AS NEEDED
Qty: 1 EACH | Refills: 3 | Status: SHIPPED | OUTPATIENT
Start: 2025-06-26

## 2025-06-26 RX ORDER — PYRITHIONE ZINC 1 G/ML
2 LOTION/SHAMPOO TOPICAL
Qty: 30 TABLET | Refills: 3 | Status: SHIPPED | OUTPATIENT
Start: 2025-06-26

## 2025-06-26 NOTE — ASSESSMENT & PLAN NOTE
Requesting sharps container    Orders:    sharps container; Use 1 each as needed (please use as needed for sun protection)

## 2025-06-26 NOTE — ASSESSMENT & PLAN NOTE
Patient has been doing well with Metamucil Gummies.  He denies any significant changes in his bowel movement frequency.  Denies any adverse effects.  - Refill Metamucil Gummies today  - Change MiraLAX to daily as needed for no bowel movement in 24 hours or greater    Orders:    Metamucil Fiber 2 g CHEW; Chew 2 g daily in the early morning    polyethylene glycol (MIRALAX) 17 g packet; Take 17 g by mouth daily as needed (please give if no BM in 24 hours or more)

## 2025-06-26 NOTE — ASSESSMENT & PLAN NOTE
Requesting lotion for dry and irritated skin  - Eucerin sent to preferred pharmacy    Orders:    Emollient (Eucerin Daily Hydration) LOTN; Apply 1 Application topically 2 (two) times a day as needed (for dry or irritated skin)

## 2025-06-26 NOTE — ASSESSMENT & PLAN NOTE
>>ASSESSMENT AND PLAN FOR DRY SKIN DERMATITIS WRITTEN ON 6/26/2025  3:00 PM BY TASHIA DAILY MD    Requesting lotion for dry and irritated skin  - Eucerin sent to [facility] pharmacy

## 2025-06-26 NOTE — ASSESSMENT & PLAN NOTE
Requesting refill of sunscreen prescription    Orders:    Sunscreens (Coppertone Complete SPF30) LOTN; Apply 1 Application topically if needed (for application before sun exposure)

## 2025-06-26 NOTE — PROGRESS NOTES
Name: Manuel Back      : 2005      MRN: [id number]  Encounter Provider: Isak Samuel MD  Encounter Date: 2025   [facility] [place]  :  Assessment & Plan  Constipation, unspecified constipation type  Patient has been doing well with Metamucil Gummies.  He denies any significant changes in his bowel movement frequency.  Denies any adverse effects.  - Refill Metamucil Gummies today  - Change MiraLAX to daily as needed for no bowel movement in 24 hours or greater    Orders:    Metamucil Fiber 2 g CHEW; Chew 2 g daily in the early morning    polyethylene glycol (MIRALAX) 17 g packet; Take 17 g by mouth daily as needed (please give if no BM in 24 hours or more)    Mild sun exposure, initial encounter  Requesting refill of sunscreen prescription    Orders:    Sunscreens (Coppertone Complete SPF30) LOTN; Apply 1 Application topically if needed (for application before sun exposure)    Type 2 diabetes mellitus without complication, with long-term current use of insulin ([facility])  Requesting sharps container    Orders:    sharps container; Use 1 each as needed (please use as needed for sun protection)    Dry skin dermatitis  Requesting lotion for dry and irritated skin  - Eucerin sent to preferred pharmacy    Orders:    Emollient (Eucerin Daily Hydration) LOTN; Apply 1 Application topically 2 (two) times a day as needed (for dry or irritated skin)           History of Present Illness   Constipation  This is a chronic problem. The current episode started more than 1 year ago. The problem has been gradually improving since onset. His stool frequency is 1 time per day. He does not have bowel incontinence. He does not have bladder incontinence. He does not exercise regularly. He has adequate water intake. Pertinent negatives include no abdominal pain. Past treatments include laxatives and stool softeners. The treatment provided significant relief.      Review of Systems   Constitutional: Negative.    Respiratory: Negative.     Cardiovascular: Negative.    Gastrointestinal:  Positive for constipation. Negative for abdominal pain.   Skin: Negative.    Neurological: Negative.    Psychiatric/Behavioral: Negative.         Objective   /81 (BP Location: Left arm, Patient Position: Sitting, Cuff Size: Large)   Pulse 89   Temp (!) 97.1 °F (36.2 °C)   Resp 16   Wt 126 kg (276 lb 12.8 oz)   SpO2 99%   BMI 43.22 kg/m²      Physical Exam  Vitals reviewed.   Constitutional:       General: He is not in acute distress.     Appearance: Normal appearance. He is well-developed. He is not ill-appearing.   HENT:      Head: Normocephalic and atraumatic.      Right Ear: External ear normal.      Left Ear: External ear normal.      Nose: Nose normal.      Mouth/Throat:      Pharynx: Oropharynx is clear.     Eyes:      Extraocular Movements: Extraocular movements intact.      Conjunctiva/sclera: Conjunctivae normal.       Cardiovascular:      Rate and Rhythm: Normal rate and regular rhythm.      Heart sounds: No murmur heard.  Pulmonary:      Effort: Pulmonary effort is normal. No respiratory distress.      Breath sounds: Normal breath sounds.   Abdominal:      General: There is no distension.     Musculoskeletal:         General: No signs of injury.      Cervical back: Normal range of motion and neck supple.     Skin:     Capillary Refill: Capillary refill takes less than 2 seconds.      Findings: No bruising or rash.     Neurological:      Mental Status: He is alert and oriented to person, place, and time.      Gait: Gait normal.     Psychiatric:         Mood and Affect: Mood normal.         Behavior: Behavior normal.       Isak Samuel MD

## 2025-06-26 NOTE — ASSESSMENT & PLAN NOTE
Patient has been doing well with Metamucil Gummies.  He denies any significant changes in his bowel movement frequency.  Denies any adverse effects.  - Refill Metamucil Gummies today  - Change MiraLAX to daily as needed for no bowel movement in 24 hours or greater    Orders:    polyethylene glycol (MIRALAX) 17 g packet; Take 17 g by mouth daily as needed (please give if no BM in 24 hours or more)

## 2025-06-30 PROBLEM — G47.19 EXCESSIVE DAYTIME SLEEPINESS: Status: ACTIVE | Noted: 2025-06-30

## 2025-07-01 ENCOUNTER — OFFICE VISIT (OUTPATIENT)
Dept: PODIATRY | Facility: CLINIC | Age: 20
End: 2025-07-01
Payer: COMMERCIAL

## 2025-07-01 DIAGNOSIS — Z79.4 CONTROLLED TYPE 2 DIABETES MELLITUS WITHOUT COMPLICATION, WITH LONG-TERM CURRENT USE OF INSULIN (HCC): Primary | ICD-10-CM

## 2025-07-01 DIAGNOSIS — E11.9 CONTROLLED TYPE 2 DIABETES MELLITUS WITHOUT COMPLICATION, WITH LONG-TERM CURRENT USE OF INSULIN (HCC): Primary | ICD-10-CM

## 2025-07-01 PROCEDURE — 99203 OFFICE O/P NEW LOW 30 MIN: CPT | Performed by: PODIATRIST

## 2025-07-01 NOTE — PROGRESS NOTES
Name: Manuel Back      : 2005      MRN: [id number]  Encounter Provider: Gonzalo Kaufman DPM  Encounter Date: 2025   Encounter department: [facility] [place]    Discussed principles of diabetic footcare.  Vascular status is within normal limits and sensorium intact.  Elongated nails were trimmed.  No treatment needed for the asymptomatic flatfoot deformity.  Reappoint as needed  :  Assessment & Plan  Controlled type 2 diabetes mellitus without complication, with long-term current use of insulin ([facility])    Lab Results   Component Value Date    HGBA1C 6.0 (H) 2025                History of Present Illness   HPI  Manuel Back is a 20 y.o. male who presents for diabetic foot exam.  He is here with an aide.  Patient has an intellectual disability.  He denies significant pain in his feet.  He limits his walking on a regular basis.  He has a significant flatfoot disorder.  He denies numbness in his feet.  I personally reviewed an A1c dated 3/3/2025.  It was 6.0.    I personally reviewed an A1c dated 2024.  It was 7.1.        Review of Systems   Gastrointestinal:         Reflux gastritis   Neurological:  Negative for weakness and numbness.   Psychiatric/Behavioral:          Intellectually disabled; schizophrenia              Objective   There were no vitals taken for this visit.     Physical Exam    Cardiovascular:      Pulses: no weak pulses.           Dorsalis pedis pulses are 2+ on the right side and 2+ on the left side.        Posterior tibial pulses are 2+ on the right side and 2+ on the left side.   Feet:      Right foot:      Skin integrity: No ulcer, skin breakdown, erythema, warmth, callus or dry skin.      Left foot:      Skin integrity: No ulcer, skin breakdown, erythema, warmth, callus or dry skin.         Diabetic Foot Exam    Patient's shoes and socks removed.    Right Foot/Ankle   Right Foot Inspection  Skin Exam: skin normal and skin intact. No dry skin,  no warmth, no callus, no erythema, no maceration, no abnormal color, no pre-ulcer, no ulcer and no callus.     Toe Exam: ROM and strength within normal limits.     Sensory   Vibration: intact  Proprioception: intact  Monofilament testing: intact    Vascular  Capillary refills: < 3 seconds  The right DP pulse is 2+. The right PT pulse is 2+.     Right Toe  - Comprehensive Exam  Ecchymosis: none  Arch: pes planus  Hammertoes: absent  Claw Toes: absent  Swelling: none   Tenderness: none       Left Foot/Ankle  Left Foot Inspection  Skin Exam: skin normal and skin intact. No dry skin, no warmth, no erythema, no maceration, normal color, no pre-ulcer, no ulcer and no callus.     Toe Exam: ROM and strength within normal limits.     Sensory   Vibration: intact  Proprioception: intact  Monofilament testing: intact    Vascular  Capillary refills: < 3 seconds  The left DP pulse is 2+. The left PT pulse is 2+.     Left Toe  - Comprehensive Exam  Ecchymosis: none  Arch: pes planus  Hammertoes: absent  Claw toes: absent  Swelling: none   Tenderness: none       Assign Risk Category  No deformity present  No loss of protective sensation  No weak pulses  Risk: 0

## 2025-07-09 DIAGNOSIS — J30.2 SEASONAL ALLERGIES: ICD-10-CM

## 2025-07-09 DIAGNOSIS — Z79.4 TYPE 2 DIABETES MELLITUS WITHOUT COMPLICATION, WITH LONG-TERM CURRENT USE OF INSULIN (HCC): ICD-10-CM

## 2025-07-09 DIAGNOSIS — Z76.0 MEDICATION REFILL: ICD-10-CM

## 2025-07-09 DIAGNOSIS — E11.9 TYPE 2 DIABETES MELLITUS WITHOUT COMPLICATION, WITH LONG-TERM CURRENT USE OF INSULIN (HCC): ICD-10-CM

## 2025-07-10 RX ORDER — TOLTERODINE 4 MG/1
CAPSULE, EXTENDED RELEASE ORAL
Qty: 31 CAPSULE | Refills: 4 | Status: SHIPPED | OUTPATIENT
Start: 2025-07-10

## 2025-07-10 RX ORDER — LORATADINE 10 MG/1
10 TABLET ORAL DAILY
Qty: 31 TABLET | Refills: 10 | Status: SHIPPED | OUTPATIENT
Start: 2025-07-10

## 2025-07-11 DIAGNOSIS — E11.69 TYPE 2 DIABETES MELLITUS WITH OTHER SPECIFIED COMPLICATION, UNSPECIFIED WHETHER LONG TERM INSULIN USE (HCC): ICD-10-CM

## 2025-07-11 RX ORDER — SEMAGLUTIDE 0.68 MG/ML
INJECTION, SOLUTION SUBCUTANEOUS
Qty: 3 ML | Refills: 1 | Status: SHIPPED | OUTPATIENT
Start: 2025-07-11 | End: 2025-07-15 | Stop reason: SDUPTHER

## 2025-07-12 ENCOUNTER — TELEPHONE (OUTPATIENT)
Dept: SLEEP CENTER | Facility: CLINIC | Age: 20
End: 2025-07-12

## 2025-07-12 NOTE — TELEPHONE ENCOUNTER
CPAP titration study resulted and shows an effective PAP titration resulting in AHI of 1.7.   CPAP ordered.     Results read in [facility].     Called patient and left message for care giver Luz Macdonald I will send a [employer] message with the sleep study results and next steps.  Provided sleep center number([phone]) to call if any questions.

## 2025-07-14 ENCOUNTER — TELEPHONE (OUTPATIENT)
Dept: SLEEP CENTER | Facility: CLINIC | Age: 20
End: 2025-07-14

## 2025-07-15 ENCOUNTER — OFFICE VISIT (OUTPATIENT)
Dept: ENDOCRINOLOGY | Facility: CLINIC | Age: 20
End: 2025-07-15
Payer: COMMERCIAL

## 2025-07-15 VITALS
HEART RATE: 97 BPM | HEIGHT: 67 IN | SYSTOLIC BLOOD PRESSURE: 122 MMHG | WEIGHT: 278.8 LBS | BODY MASS INDEX: 43.76 KG/M2 | DIASTOLIC BLOOD PRESSURE: 82 MMHG

## 2025-07-15 DIAGNOSIS — K59.00 CONSTIPATION, UNSPECIFIED CONSTIPATION TYPE: ICD-10-CM

## 2025-07-15 DIAGNOSIS — Z79.4 CONTROLLED TYPE 2 DIABETES MELLITUS WITHOUT COMPLICATION, WITH LONG-TERM CURRENT USE OF INSULIN (HCC): Primary | ICD-10-CM

## 2025-07-15 DIAGNOSIS — E04.1 THYROID NODULE: ICD-10-CM

## 2025-07-15 DIAGNOSIS — E11.69 TYPE 2 DIABETES MELLITUS WITH OTHER SPECIFIED COMPLICATION, UNSPECIFIED WHETHER LONG TERM INSULIN USE (HCC): ICD-10-CM

## 2025-07-15 DIAGNOSIS — E11.9 CONTROLLED TYPE 2 DIABETES MELLITUS WITHOUT COMPLICATION, WITH LONG-TERM CURRENT USE OF INSULIN (HCC): Primary | ICD-10-CM

## 2025-07-15 DIAGNOSIS — I10 PRIMARY HYPERTENSION: ICD-10-CM

## 2025-07-15 DIAGNOSIS — E11.65 DIABETES MELLITUS WITH HYPERGLYCEMIA (HCC): ICD-10-CM

## 2025-07-15 DIAGNOSIS — E55.9 VITAMIN D DEFICIENCY: ICD-10-CM

## 2025-07-15 LAB
DME PARACHUTE DELIVERY DATE REQUESTED: NORMAL
DME PARACHUTE ITEM DESCRIPTION: NORMAL
DME PARACHUTE ORDER STATUS: NORMAL
DME PARACHUTE SUPPLIER NAME: NORMAL
DME PARACHUTE SUPPLIER PHONE: NORMAL

## 2025-07-15 PROCEDURE — 99214 OFFICE O/P EST MOD 30 MIN: CPT | Performed by: PHYSICIAN ASSISTANT

## 2025-07-15 RX ORDER — INSULIN LISPRO 100 [IU]/ML
INJECTION, SUSPENSION SUBCUTANEOUS
Qty: 15 ML | Refills: 3 | Status: SHIPPED | OUTPATIENT
Start: 2025-07-15

## 2025-07-15 RX ORDER — KETOROLAC TROMETHAMINE 30 MG/ML
1 INJECTION, SOLUTION INTRAMUSCULAR; INTRAVENOUS DAILY
Qty: 1 EACH | Refills: 0 | Status: SHIPPED | OUTPATIENT
Start: 2025-07-15

## 2025-07-15 RX ORDER — HYDROCHLOROTHIAZIDE 12.5 MG/1
1 CAPSULE ORAL
Qty: 6 EACH | Refills: 3 | Status: SHIPPED | OUTPATIENT
Start: 2025-07-15

## 2025-08-08 LAB

## 2025-08-21 ENCOUNTER — TELEPHONE (OUTPATIENT)
Dept: FAMILY MEDICINE CLINIC | Facility: CLINIC | Age: 20
End: 2025-08-21

## 2025-08-21 ENCOUNTER — OFFICE VISIT (OUTPATIENT)
Dept: FAMILY MEDICINE CLINIC | Facility: CLINIC | Age: 20
End: 2025-08-21

## 2025-08-21 VITALS
TEMPERATURE: 98 F | SYSTOLIC BLOOD PRESSURE: 115 MMHG | DIASTOLIC BLOOD PRESSURE: 79 MMHG | RESPIRATION RATE: 18 BRPM | WEIGHT: 277 LBS | OXYGEN SATURATION: 97 % | HEIGHT: 67 IN | BODY MASS INDEX: 43.47 KG/M2 | HEART RATE: 91 BPM

## 2025-08-21 DIAGNOSIS — R51.9 INTRACTABLE HEADACHE, UNSPECIFIED CHRONICITY PATTERN, UNSPECIFIED HEADACHE TYPE: ICD-10-CM

## 2025-08-21 DIAGNOSIS — K59.00 CONSTIPATION, UNSPECIFIED CONSTIPATION TYPE: Primary | ICD-10-CM

## 2025-08-21 DIAGNOSIS — E11.65 DIABETES MELLITUS WITH HYPERGLYCEMIA (HCC): ICD-10-CM

## 2025-08-21 DIAGNOSIS — F25.9 SCHIZOAFFECTIVE DISORDER (HCC): ICD-10-CM

## 2025-08-21 PROCEDURE — 99214 OFFICE O/P EST MOD 30 MIN: CPT | Performed by: GENERAL PRACTICE

## 2025-08-21 RX ORDER — INSULIN LISPRO 100 [IU]/ML
INJECTION, SUSPENSION SUBCUTANEOUS
Qty: 15 ML | Refills: 3 | Status: SHIPPED | OUTPATIENT
Start: 2025-08-21

## 2025-08-21 RX ORDER — LANCETS
EACH MISCELLANEOUS
Qty: 100 EACH | Refills: 1 | Status: SHIPPED | OUTPATIENT
Start: 2025-08-21

## 2025-08-21 RX ORDER — DIVALPROEX SODIUM 500 MG/1
1500 TABLET, DELAYED RELEASE ORAL
Qty: 90 TABLET | Refills: 3 | Status: SHIPPED | OUTPATIENT
Start: 2025-08-21

## 2025-08-21 RX ORDER — PEN NEEDLE, DIABETIC 32GX 5/32"
NEEDLE, DISPOSABLE MISCELLANEOUS 2 TIMES DAILY
Qty: 100 EACH | Refills: 3 | Status: SHIPPED | OUTPATIENT
Start: 2025-08-21

## 2025-08-21 RX ORDER — DIVALPROEX SODIUM 250 MG/1
250 TABLET, FILM COATED, EXTENDED RELEASE ORAL
Qty: 30 TABLET | Refills: 1 | Status: SHIPPED | OUTPATIENT
Start: 2025-08-21 | End: 2025-10-20

## 2025-08-21 RX ORDER — BLOOD SUGAR DIAGNOSTIC
STRIP MISCELLANEOUS
Qty: 100 EACH | Refills: 1 | Status: SHIPPED | OUTPATIENT
Start: 2025-08-21

## 2025-08-21 RX ORDER — IBUPROFEN 400 MG/1
400 TABLET, FILM COATED ORAL EVERY 6 HOURS PRN
Qty: 90 TABLET | Refills: 1 | Status: SHIPPED | OUTPATIENT
Start: 2025-08-21

## 2025-08-21 RX ORDER — PYRITHIONE ZINC 1 G/ML
2 LOTION/SHAMPOO TOPICAL
Qty: 30 TABLET | Refills: 3 | Status: SHIPPED | OUTPATIENT
Start: 2025-08-21